# Patient Record
Sex: MALE | Race: ASIAN | NOT HISPANIC OR LATINO | Employment: FULL TIME | URBAN - METROPOLITAN AREA
[De-identification: names, ages, dates, MRNs, and addresses within clinical notes are randomized per-mention and may not be internally consistent; named-entity substitution may affect disease eponyms.]

---

## 2017-01-10 ENCOUNTER — CARE COORDINATION (OUTPATIENT)
Dept: OTOLARYNGOLOGY | Facility: CLINIC | Age: 52
End: 2017-01-10

## 2017-01-10 NOTE — PROGRESS NOTES
Call was placed to the patient to inform him that his CT scan did not show evidence of sinusitis.  Will follow up as previously planned.  was left with this information.    Raeann Quintero R.N., B.S.N.  1/10/2017 3:41 PM

## 2017-01-17 ENCOUNTER — OFFICE VISIT (OUTPATIENT)
Dept: OTOLARYNGOLOGY | Facility: CLINIC | Age: 52
End: 2017-01-17

## 2017-01-17 DIAGNOSIS — J31.0 CHRONIC RHINITIS: Primary | ICD-10-CM

## 2017-01-17 ASSESSMENT — PAIN SCALES - GENERAL: PAINLEVEL: NO PAIN (0)

## 2017-01-17 NOTE — PATIENT INSTRUCTIONS
Plan of care:  Follow up with Dr Yang as needed  Clinic contact information:  1. To schedule an appointment call [phone], option 1  2. To talk to the Triage RN call [phone], option 3  3. If you need to speak to Janeth or get a message to your doctor on a Friday, call the triage RN  4. JanethRN: [phone]  5. Surgery scheduling:      Kandy Squires: [phone]      Julienne Chen: [phone]  6. Fax: [phone]

## 2017-01-17 NOTE — Clinical Note
1/17/2017       RE: Vimal Goldsmith  [address] [facility] [address]     Dear Colleague,    Thank you for referring your patient, Vimal Goldsmith, to the [facility] at [facility]. Please see a copy of my visit note below.    CHIEF COMPLAINT:  Followup of rhinitis and possible sinusitis.      HISTORY OF PRESENT ILLNESS:  The patient returns to clinic for followup of the above complaints.  We were able to obtain a CT scan of his sinuses on 01/06/2017.  A CT showed clear maxillary sinuses,  clear sphenoid sinuses, clear frontal sinuses, some minimal left anterior ethmoid air cell mucosal thickening.  Overall, the CT scan did not show significant sinusitis.  The patient reports that in the interim, he continues with nasal sprays that I had placed him on which were saline, Atrovent and Flonase.  He is not sure if it was placebo or not, but he does feel like it has made a difference.  The effect is temporary, but he still feels better than before.  He still continues to get some headaches deep behind his head.      IMAGING:  A CT maxillofacial scan was done on 01/06/2017.  I reviewed the CT scan images with the patient.  By my read, I do not see any evidence of significant chronic or acute sinusitis.      ASSESSMENT AND PLAN:  I reassured the patient that I do not believe that his sinuses were the cause of his symptoms.  I feel that he has likely chronic nonallergic rhinitis in which case I recommend that he continue with the sprays.  My goal would be to have him cycle on and off of the sprays throughout the year.  Most likely, he would need sprays during the winter and fall months whereas spring and summer he likely will not have as much of an issue.  The patient is in agreement with the plan.  He will call if he needs refills of his medications which we will provide for him.     I spent a total of 15 minutes face-to-face with Vimal Goldsmith during today's  office visit.  Over 50% of this time was spent counseling the patient on and/or coordinating care as documented in my assessment and plan.    Again, thank you for allowing me to participate in the care of your patient.      Sincerely,    Raeann Yang MD

## 2017-01-17 NOTE — PROGRESS NOTES
CHIEF COMPLAINT:  Followup of rhinitis and possible sinusitis.      HISTORY OF PRESENT ILLNESS:  The patient returns to clinic for followup of the above complaints.  We were able to obtain a CT scan of his sinuses on 01/06/2017.  A CT showed clear maxillary sinuses,  clear sphenoid sinuses, clear frontal sinuses, some minimal left anterior ethmoid air cell mucosal thickening.  Overall, the CT scan did not show significant sinusitis.  The patient reports that in the interim, he continues with nasal sprays that I had placed him on which were saline, Atrovent and Flonase.  He is not sure if it was placebo or not, but he does feel like it has made a difference.  The effect is temporary, but he still feels better than before.  He still continues to get some headaches deep behind his head.      IMAGING:  A CT maxillofacial scan was done on 01/06/2017.  I reviewed the CT scan images with the patient.  By my read, I do not see any evidence of significant chronic or acute sinusitis.      ASSESSMENT AND PLAN:  I reassured the patient that I do not believe that his sinuses were the cause of his symptoms.  I feel that he has likely chronic nonallergic rhinitis in which case I recommend that he continue with the sprays.  My goal would be to have him cycle on and off of the sprays throughout the year.  Most likely, he would need sprays during the winter and fall months whereas spring and summer he likely will not have as much of an issue.  The patient is in agreement with the plan.  He will call if he needs refills of his medications which we will provide for him.     I spent a total of 15 minutes face-to-face with Vimal Goldsmith during today's office visit.  Over 50% of this time was spent counseling the patient on and/or coordinating care as documented in my assessment and plan.

## 2017-01-17 NOTE — NURSING NOTE
Chief Complaint   Patient presents with     RECHECK     recurrent maxillary sinusitis     Stephanie York Medical Assistant

## 2017-02-23 ENCOUNTER — OFFICE VISIT (OUTPATIENT)
Dept: OPHTHALMOLOGY | Facility: CLINIC | Age: 52
End: 2017-02-23
Attending: OPHTHALMOLOGY
Payer: COMMERCIAL

## 2017-02-23 DIAGNOSIS — H01.00A BLEPHARITIS OF UPPER AND LOWER EYELIDS OF BOTH EYES, UNSPECIFIED TYPE: ICD-10-CM

## 2017-02-23 DIAGNOSIS — H53.2 MONOCULAR DIPLOPIA OF BOTH EYES: Primary | ICD-10-CM

## 2017-02-23 DIAGNOSIS — H01.00B BLEPHARITIS OF UPPER AND LOWER EYELIDS OF BOTH EYES, UNSPECIFIED TYPE: ICD-10-CM

## 2017-02-23 DIAGNOSIS — Z98.890 H/O LASER ASSISTED IN SITU KERATOMILEUSIS: ICD-10-CM

## 2017-02-23 DIAGNOSIS — H04.123 BILATERAL DRY EYES: ICD-10-CM

## 2017-02-23 PROCEDURE — 99213 OFFICE O/P EST LOW 20 MIN: CPT | Mod: ZF

## 2017-02-23 ASSESSMENT — CONF VISUAL FIELD
OD_NORMAL: 1
OS_NORMAL: 1

## 2017-02-23 ASSESSMENT — VISUAL ACUITY
OS_SC+: -1
METHOD: SNELLEN - LINEAR
OD_PH_SC: 20/25
OD_SC: 20/50
OS_SC: 20/20

## 2017-02-23 ASSESSMENT — TONOMETRY
IOP_METHOD: TONOPEN
OS_IOP_MMHG: 10
OD_IOP_MMHG: 10

## 2017-02-23 ASSESSMENT — EXTERNAL EXAM - RIGHT EYE: OD_EXAM: NORMAL

## 2017-02-23 ASSESSMENT — EXTERNAL EXAM - LEFT EYE: OS_EXAM: NORMAL

## 2017-02-23 NOTE — NURSING NOTE
Chief Complaints and History of Present Illnesses   Patient presents with     Consult For     diplopia     HPI    Affected eye(s):  Both   Symptoms:        Frequency:  Constant       Do you have eye pain now?:  No      Comments:  States that since Lasik (LE) in Jan vertical diplopia has gotten worse.    Has monovision and states that the diplopia has been going on for a long time  +dry  Monocular diplopia BE.  Gets worse when reading and tired  Doreen Wilson COT 2:13 PM February 23, 2017

## 2017-02-23 NOTE — MR AVS SNAPSHOT
After Visit Summary   2/23/2017    Vimal Goldsmith    MRN: [id number]           Patient Information     Date Of Birth          1965        Visit Information        Provider Department      2/23/2017 1:45 PM Missael Chahal MD [facility]        Today's Diagnoses     Monocular diplopia of both eyes    -  1    H/O laser assisted in situ keratomileusis - Left Eye        Bilateral dry eyes        Blepharitis of upper and lower eyelids of both eyes, unspecified type           Follow-ups after your visit        Follow-up notes from your care team     Return in about 4 weeks (around 3/23/2017) for VT only, Tear lab, topography.      Your next 10 appointments already scheduled     Apr 04, 2017 12:45 PM CDT   RETURN GENERAL with Missael Chahal MD   Eye Clinic ([facility])    [address]  [address]  [address]  [facility] [id number] [phone]              Who to contact     Please call your clinic at [phone] to:    Ask questions about your health    Make or cancel appointments    Discuss your medicines    Learn about your test results    Speak to your doctor   If you have compliments or concerns about an experience at your clinic, or if you wish to file a complaint, please contact [facility] Patient Relations at [phone] or email us at [email or web address]         Additional Information About Your Visit        MyChart Information     [employer] gives you secure access to your electronic health record. If you see a primary care provider, you can also send messages to your care team and make appointments. If you have questions, please call your primary care clinic.  If you do not have a primary care provider, please call [phone] and they will assist you.      Snoox is an electronic gateway that provides easy, online access to your medical records. With Snoox, you can request a clinic appointment, read your test  results, renew a prescription or communicate with your care team.     To access your existing account, please contact your [facility] or call [phone] for assistance.        Care EveryWhere ID     This is your Care EveryWhere ID. This could be used by other organizations to access your [place] medical records  [id number]         Blood Pressure from Last 3 Encounters:   11/28/16 120/74   12/02/15 106/65   11/21/14 117/58    Weight from Last 3 Encounters:   11/28/16 74.7 kg (164 lb 9.6 oz)   12/02/15 73.8 kg (162 lb 11.2 oz)   11/21/14 75.3 kg (166 lb)              Today, you had the following     No orders found for display         Today's Medication Changes          These changes are accurate as of: 2/23/17  3:17 PM.  If you have any questions, ask your nurse or doctor.               Start taking these medicines.        Dose/Directions    Lifitegrast 5 % Soln   Commonly known as:  XIIDRA   Used for:  Monocular diplopia of both eyes, H/O laser assisted in situ keratomileusis, Bilateral dry eyes   Started by:  Missael Chahal MD        Dose:  1 drop   Apply 1 drop to eye 2 times daily   Quantity:  90 each   Refills:  3            Where to get your medicines      These medications were sent to [id number] - [place], [place] - [address] AT [address]  [address] [address]     Phone:  [phone]     Lifitegrast 5 % Soln                Primary Care Provider Office Phone # Fax #    Topher Kenney [phone]        [facility] [address]  [facility] [address]        Thank you!     Thank you for choosing EYE CLINIC  for your care. Our goal is always to provide you with excellent care. Hearing back from our patients is one way we can continue to improve our services. Please take a few minutes to complete the written survey that you may receive in the mail after your visit  with us. Thank you!             Your Updated Medication List - Protect others around you: Learn how to safely use, store and throw away your medicines at www.disposemymeds.org.          This list is accurate as of: 2/23/17  3:17 PM.  Always use your most recent med list.                   Brand Name Dispense Instructions for use    CALCIUM 500 + D PO      Take  by mouth daily.       DAILY MULTIVITAMIN PO      Take  by mouth daily.       dapsone 25 MG tablet     45 tablet    Take 1 tablet (25 mg) by mouth daily Take 2 when flaring       ipratropium 0.06 % spray    ATROVENT    3 Box    Spray 2 sprays into both nostrils 4 times daily as needed for rhinitis       ketoconazole 2 % cream    NIZORAL    60 g    Apply topically daily       Lifitegrast 5 % Soln    XIIDRA    90 each    Apply 1 drop to eye 2 times daily       misc intestinal milla regulat Chew      Take  by mouth daily.       sildenafil 50 MG cap/tab    REVATIO/VIAGRA    6 tablet    Take 0.5 tablets (25 mg) by mouth daily as needed for erectile dysfunction Take 30 min to 4 hours before intercourse.  Never use with nitroglycerin, terazosin or doxazosin.       triamcinolone 0.1 % cream    KENALOG    80 g    Use on the body as needed for dermatitis herpetiformis       zolpidem 5 MG tablet    AMBIEN    30 tablet    Take 1 tablet (5 mg) by mouth nightly as needed

## 2017-02-24 NOTE — PROGRESS NOTES
Assessment & Plan      Vimal Goldsmith is a 52 year old male with the following diagnoses:   1. Monocular diplopia of both eyes    2. 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 Bilateral dry eyes    4. Blepharitis of upper and lower eyelids of both eyes, unspecified type         Longstanding monocular diplopia, previously followed by Dr. Marcelino  H/O anisometropia and possible amblyopia ( Left eye previously with greater myopia)  S/P laser in situ keratomileusis (LASIK) left eye in Jan 2017   Goal to maintain monovision with right eye for near    Reports worsening diplopia since surgery   Currently using artificial tears occasionally  Reading glasses as needed     Ortho by cover-testing today  Monocular symptoms persist both eyes   Discussed contributing factors  Recommend artifical tears frequently  Warm compresses twice a day   Trial of xiidra twice a day both eyes      Patient disposition:   Return in about 4 weeks (around 3/23/2017) for VT only, Tear lab, topography.  May need hard contact lens refraction if symptoms persist         Attending Physician Attestation: Complete documentation of historical and exam elements from today's encounter can be found in the full encounter summary report (not reduplicated in this progress note). I personally obtained the chief complaint(s) and history of present illness.  I confirmed and edited as necessary the review of systems, past medical/surgical history, family history, social history, and examination findings as documented by others; and I examined the patient myself. I personally reviewed the relevant tests, images, and reports as documented above. I formulated and edited as necessary the assessment and plan and discussed the findings and management plan with the patient and family. - Missael Chahal MD 10:25 AM 2/24/2017

## 2017-02-25 DIAGNOSIS — Z51.81 THERAPEUTIC DRUG MONITORING: ICD-10-CM

## 2017-02-25 DIAGNOSIS — L13.0 DERMATITIS HERPETIFORMIS: ICD-10-CM

## 2017-02-28 RX ORDER — DAPSONE 25 MG/1
TABLET ORAL
Qty: 60 TABLET | Refills: 0 | Status: SHIPPED | OUTPATIENT
Start: 2017-02-28 | End: 2017-04-26

## 2017-02-28 NOTE — TELEPHONE ENCOUNTER
Last seen:12/27/16  Next appt: None    Dermatitis herpetiformis, well controlled on a gluten-free diet and oral dapsone.  Today he will continue dapsone 25 mg daily with occasional 50 mg daily for flares.  He had a normal CBC with differential and LFTs at his last check in late November.  He also will continue his gluten-free diet and follow up with us in approximately 4 months' time.

## 2017-02-28 NOTE — TELEPHONE ENCOUNTER
Received refill request for dapsone 25 mg as the resident on call. Reviewed patient's chart and attached communication. Patient last seen 12/27/16 for dermatitis herpetiformis. After reviewing the assessment and plan from last visit, the refill request was accepted. Patient to follow up in roughly two months. Will CC clinic coordinators to help facilitate follow up as none scheduled yet.       Tunde Coronado MD  PGY-2 -Internal Medicine - Dermatology Resident

## 2017-04-24 DIAGNOSIS — Z98.890 H/O LASER ASSISTED IN SITU KERATOMILEUSIS: Primary | ICD-10-CM

## 2017-04-24 DIAGNOSIS — H04.123 DRY EYES: ICD-10-CM

## 2017-04-25 ENCOUNTER — OFFICE VISIT (OUTPATIENT)
Dept: OPHTHALMOLOGY | Facility: CLINIC | Age: 52
End: 2017-04-25
Attending: OPHTHALMOLOGY
Payer: COMMERCIAL

## 2017-04-25 DIAGNOSIS — H04.123 DRY EYES: ICD-10-CM

## 2017-04-25 DIAGNOSIS — Z98.890 H/O LASER ASSISTED IN SITU KERATOMILEUSIS: ICD-10-CM

## 2017-04-25 DIAGNOSIS — H01.00B BLEPHARITIS OF UPPER AND LOWER EYELIDS OF BOTH EYES, UNSPECIFIED TYPE: ICD-10-CM

## 2017-04-25 DIAGNOSIS — H26.9 CATARACTS, BILATERAL: Primary | ICD-10-CM

## 2017-04-25 DIAGNOSIS — H01.00A BLEPHARITIS OF UPPER AND LOWER EYELIDS OF BOTH EYES, UNSPECIFIED TYPE: ICD-10-CM

## 2017-04-25 PROCEDURE — 83861 MICROFLUID ANALY TEARS: CPT | Mod: ZP | Performed by: OPHTHALMOLOGY

## 2017-04-25 PROCEDURE — 92025 CPTRIZED CORNEAL TOPOGRAPHY: CPT | Mod: ZF | Performed by: OPHTHALMOLOGY

## 2017-04-25 PROCEDURE — 92015 DETERMINE REFRACTIVE STATE: CPT | Mod: ZF

## 2017-04-25 PROCEDURE — 99212 OFFICE O/P EST SF 10 MIN: CPT | Mod: 25,ZF

## 2017-04-25 ASSESSMENT — EXTERNAL EXAM - LEFT EYE: OS_EXAM: NORMAL

## 2017-04-25 ASSESSMENT — VISUAL ACUITY
OD_PH_SC: 20/25
OS_SC: 20/20
METHOD: SNELLEN - LINEAR
OD_SC: 20/30

## 2017-04-25 ASSESSMENT — CONF VISUAL FIELD
OD_NORMAL: 1
OS_NORMAL: 1

## 2017-04-25 ASSESSMENT — TONOMETRY
IOP_METHOD: ICARE
OD_IOP_MMHG: 09
OS_IOP_MMHG: 08

## 2017-04-25 ASSESSMENT — EXTERNAL EXAM - RIGHT EYE: OD_EXAM: NORMAL

## 2017-04-25 ASSESSMENT — REFRACTION_MANIFEST
OD_AXIS: 135
OS_SPHERE: -0.50
OD_SPHERE: -1.00
OD_ADD: +1.75
OS_ADD: +1.75
OD_CYLINDER: +1.00
OS_CYLINDER: SPHERE

## 2017-04-25 NOTE — NURSING NOTE
Chief Complaints and History of Present Illnesses   Patient presents with     Follow Up For     Monocular diplopia of both eyes      HPI    Affected eye(s):  Both   Symptoms:        Duration:  2 months   Frequency:  Constant       Do you have eye pain now?:  No      Comments:  Pt. States that VA has still been blurry BE.  VA may have improved slightly.   No c/o comfort BE.  Ani Nieves COT 3:39 PM April 25, 2017

## 2017-04-25 NOTE — PROGRESS NOTES
HPI  Improvement in symptoms since starting Xiidra. Symptoms are worse in the distance than near, worse after prolonged reading.     Assessment & Plan      Vimal Goldsmith is a 52 year old male with the following diagnoses:   1. Cataracts, bilateral - Both Eyes    2. Dry eyes - Both Eyes    3. Blepharitis of upper and lower eyelids of both eyes, unspecified type    4. H/O laser assisted in situ keratomileusis - Left Eye         Longstanding monocular diplopia, previously followed by Dr. Marcelino  H/O anisometropia and possible mild amblyopia right eye (Left eye previously with greater myopia)  S/P laser in situ keratomileusis (LASIK) left eye in Jan 2017   Goal to maintain monovision with right eye for near, though patient considering right eye laser in situ keratomileusis (LASIK) in the future  Advised to trial contact lens right eye first to simulate monovision      TearOSM today within normal limits today   Continue artifical tears frequently  Warm compresses twice a day   Continue xiidra twice a day both eyes    Refracted today with mild myopia/astigmatism OD, will try glasses for now  Advise patient to defer LASIK OD for near monovision    Patient disposition:   Return in about 1 year (around 4/25/2018) for Follow Up.         Topher Kaye MD    Attending Physician Attestation: Complete documentation of historical and exam elements from today's encounter can be found in the full encounter summary report (not reduplicated in this progress note). I personally obtained the chief complaint(s) and history of present illness.  I confirmed and edited as necessary the review of systems, past medical/surgical history, family history, social history, and examination findings as documented by others; and I examined the patient myself. I personally reviewed the relevant tests, images, and reports as documented above. I formulated and edited as necessary the assessment and plan and discussed the findings and management plan with  the patient and family. - Missael Chahal MD  4/25/2017   Attending Physician Image/Tesing Attestation: I have personally view and interpreted all testing/images as documented in imaging/procedures

## 2017-04-25 NOTE — MR AVS SNAPSHOT
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      Who to contact     Please call your clinic at [phone] to:    Ask questions about your health    Make or cancel appointments    Discuss your medicines    Learn about your test results    Speak to your doctor   If you have compliments or concerns about an experience at your clinic, or if you wish to file a complaint, please contact [facility] Patient Relations at [phone] or email us at [email or web address]         Additional Information About Your Visit        MyChart Information     [employer] gives you secure access to your electronic health record. If you see a primary care provider, you can also send messages to your care team and make appointments. If you have questions, please call your primary care clinic.  If you do not have a primary care provider, please call [phone] and they will assist you.      HyperBees is an electronic gateway that provides easy, online access to your medical records. With [employer]Bees, you can request a clinic appointment, read your test results, renew a prescription or communicate with your care team.     To access your existing account, please contact your [facility] or call [phone] for assistance.        Care EveryWhere ID     This is your Care EveryWhere ID. This could be used by  other organizations to access your [place] medical records  [id number]         Blood Pressure from Last 3 Encounters:   11/28/16 120/74   12/02/15 106/65   11/21/14 117/58    Weight from Last 3 Encounters:   11/28/16 74.7 kg (164 lb 9.6 oz)   12/02/15 73.8 kg (162 lb 11.2 oz)   11/21/14 75.3 kg (166 lb)              We Performed the Following     Corneal Topography OU (both eyes)     Ophth TearLab Test        Primary Care Provider Office Phone # Fax #    Topher Kenney [phone]        [facility] [address]  [facility] [address]        Thank you!     Thank you for choosing EYE CLINIC  for your care. Our goal is always to provide you with excellent care. Hearing back from our patients is one way we can continue to improve our services. Please take a few minutes to complete the written survey that you may receive in the mail after your visit with us. Thank you!             Your Updated Medication List - Protect others around you: Learn how to safely use, store and throw away your medicines at www.disposemymeds.org.          This list is accurate as of: 4/25/17  9:08 PM.  Always use your most recent med list.                   Brand Name Dispense Instructions for use    CALCIUM 500 + D PO      Take  by mouth daily.       DAILY MULTIVITAMIN PO      Take  by mouth daily.       * dapsone 25 MG tablet     45 tablet    Take 1 tablet (25 mg) by mouth daily Take 2 when flaring       * dapsone 25 MG tablet     60 tablet    TAKE 1 TABLET BY MOUTH DAILY. TAKE 2 TABLETS BY MOUTH WHEN FLARING.       ipratropium 0.06 % spray    ATROVENT    3 Box    Spray 2 sprays into both nostrils 4 times daily as needed for rhinitis       ketoconazole 2 % cream    NIZORAL    60 g    Apply topically daily       Lifitegrast 5 % Soln    XIIDRA    90 each    Apply 1 drop to eye 2 times daily       misc intestinal milla regulat Chew      Take  by mouth daily.       sildenafil 50 MG cap/tab    REVATIO/VIAGRA    6  tablet    Take 0.5 tablets (25 mg) by mouth daily as needed for erectile dysfunction Take 30 min to 4 hours before intercourse.  Never use with nitroglycerin, terazosin or doxazosin.       triamcinolone 0.1 % cream    KENALOG    80 g    Use on the body as needed for dermatitis herpetiformis       zolpidem 5 MG tablet    AMBIEN    30 tablet    Take 1 tablet (5 mg) by mouth nightly as needed       * Notice:  This list has 2 medication(s) that are the same as other medications prescribed for you. Read the directions carefully, and ask your doctor or other care provider to review them with you.

## 2017-04-26 DIAGNOSIS — Z51.81 THERAPEUTIC DRUG MONITORING: ICD-10-CM

## 2017-04-26 DIAGNOSIS — L13.0 DERMATITIS HERPETIFORMIS: ICD-10-CM

## 2017-04-26 DIAGNOSIS — G47.00 INSOMNIA: ICD-10-CM

## 2017-04-26 RX ORDER — DAPSONE 25 MG/1
TABLET ORAL
Qty: 60 TABLET | Refills: 0 | Status: SHIPPED | OUTPATIENT
Start: 2017-04-26 | End: 2017-12-27

## 2017-04-26 NOTE — TELEPHONE ENCOUNTER
Received refill request for dapsone 25 mg as the resident on call. Reviewed patient's chart and attached communication. Patient last seen 12/27/16 for dermatitis herpetiformis. Refill prescribed in February with plan to schedule f/u however none in place. Left a Voicemail for patient to call in to schedule f/u appt.   Will CC clinic coordinators to help facilitate follow up as none scheduled yet.

## 2017-04-27 RX ORDER — ZOLPIDEM TARTRATE 5 MG/1
5 TABLET ORAL
Qty: 30 TABLET | Refills: 3 | Status: SHIPPED | OUTPATIENT
Start: 2017-04-27 | End: 2018-04-30

## 2017-06-01 DIAGNOSIS — R09.81 NASAL CONGESTION: Primary | ICD-10-CM

## 2017-06-01 RX ORDER — FLUTICASONE PROPIONATE 50 MCG
2 SPRAY, SUSPENSION (ML) NASAL DAILY
Qty: 1 BOTTLE | Refills: 6 | Status: SHIPPED | OUTPATIENT
Start: 2017-06-01 | End: 2017-07-01

## 2017-09-15 DIAGNOSIS — N52.9 ERECTILE DYSFUNCTION, UNSPECIFIED ERECTILE DYSFUNCTION TYPE: ICD-10-CM

## 2017-09-15 RX ORDER — SILDENAFIL 50 MG/1
TABLET, FILM COATED ORAL
Qty: 6 TABLET | Refills: 0 | Status: SHIPPED | OUTPATIENT
Start: 2017-09-15 | End: 2017-11-29

## 2017-09-15 NOTE — TELEPHONE ENCOUNTER
viagra   Last Written Prescription Date:  8/5/16  Last Fill Quantity: 6,   # refills: 3  Last Office Visit : 11/28/16  Future Office visit:  No future appt  Routed to clinic RN, directions edited so rx would eprescribe

## 2017-10-26 ENCOUNTER — TELEPHONE (OUTPATIENT)
Dept: INTERNAL MEDICINE | Facility: CLINIC | Age: 52
End: 2017-10-26

## 2017-10-26 DIAGNOSIS — Z11.3 ROUTINE SCREENING FOR STI (SEXUALLY TRANSMITTED INFECTION): Primary | ICD-10-CM

## 2017-10-26 DIAGNOSIS — Z13.6 SCREENING, ISCHEMIC HEART DISEASE: ICD-10-CM

## 2017-10-26 DIAGNOSIS — Z13.1 SCREENING FOR DIABETES MELLITUS: ICD-10-CM

## 2017-10-26 DIAGNOSIS — K90.0 CELIAC DISEASE: ICD-10-CM

## 2017-10-26 DIAGNOSIS — Z12.5 SPECIAL SCREENING FOR MALIGNANT NEOPLASM OF PROSTATE: ICD-10-CM

## 2017-10-26 NOTE — TELEPHONE ENCOUNTER
Dr. Kenney    Pt has an upcoming appt for physical on 11/29, wants to have labs done prior to the appt.  Is it OK to order following labs ?    Lipid  PSA  CBC    Soon-Mi

## 2017-11-01 ENCOUNTER — TELEPHONE (OUTPATIENT)
Dept: DERMATOLOGY | Facility: CLINIC | Age: 52
End: 2017-11-01

## 2017-11-01 NOTE — TELEPHONE ENCOUNTER
Lvm to change 1/4 appointment from 10:30 to either 7:30 or 9:00. Left triage line for Vimal to call back.

## 2017-11-27 DIAGNOSIS — K90.0 CELIAC DISEASE: ICD-10-CM

## 2017-11-27 DIAGNOSIS — Z13.1 SCREENING FOR DIABETES MELLITUS: ICD-10-CM

## 2017-11-27 DIAGNOSIS — Z13.6 SCREENING, ISCHEMIC HEART DISEASE: ICD-10-CM

## 2017-11-27 DIAGNOSIS — Z12.5 SPECIAL SCREENING FOR MALIGNANT NEOPLASM OF PROSTATE: ICD-10-CM

## 2017-11-27 DIAGNOSIS — Z11.3 ROUTINE SCREENING FOR STI (SEXUALLY TRANSMITTED INFECTION): ICD-10-CM

## 2017-11-27 LAB
ALBUMIN SERPL-MCNC: 3.8 G/DL (ref 3.4–5)
ALP SERPL-CCNC: 56 U/L (ref 40–150)
ALT SERPL W P-5'-P-CCNC: 26 U/L (ref 0–70)
ANION GAP SERPL CALCULATED.3IONS-SCNC: 8 MMOL/L (ref 3–14)
AST SERPL W P-5'-P-CCNC: 13 U/L (ref 0–45)
BILIRUB SERPL-MCNC: 0.9 MG/DL (ref 0.2–1.3)
BUN SERPL-MCNC: 17 MG/DL (ref 7–30)
CALCIUM SERPL-MCNC: 8.8 MG/DL (ref 8.5–10.1)
CHLORIDE SERPL-SCNC: 106 MMOL/L (ref 94–109)
CHOLEST SERPL-MCNC: 158 MG/DL
CO2 SERPL-SCNC: 25 MMOL/L (ref 20–32)
CREAT SERPL-MCNC: 0.91 MG/DL (ref 0.66–1.25)
GFR SERPL CREATININE-BSD FRML MDRD: 87 ML/MIN/1.7M2
GLUCOSE SERPL-MCNC: 108 MG/DL (ref 70–99)
HBA1C MFR BLD: 4.7 % (ref 4.3–6)
HDLC SERPL-MCNC: 52 MG/DL
HIV 1+2 AB+HIV1 P24 AG SERPL QL IA: NONREACTIVE
LDLC SERPL CALC-MCNC: 92 MG/DL
NONHDLC SERPL-MCNC: 106 MG/DL
POTASSIUM SERPL-SCNC: 4 MMOL/L (ref 3.4–5.3)
PROT SERPL-MCNC: 7.4 G/DL (ref 6.8–8.8)
PSA SERPL-ACNC: 0.8 UG/L (ref 0–4)
SODIUM SERPL-SCNC: 139 MMOL/L (ref 133–144)
T PALLIDUM IGG+IGM SER QL: NEGATIVE
TRIGL SERPL-MCNC: 70 MG/DL

## 2017-11-28 LAB
C TRACH DNA SPEC QL NAA+PROBE: NEGATIVE
N GONORRHOEA DNA SPEC QL NAA+PROBE: NEGATIVE
SPECIMEN SOURCE: NORMAL
SPECIMEN SOURCE: NORMAL

## 2017-11-29 ENCOUNTER — OFFICE VISIT (OUTPATIENT)
Dept: INTERNAL MEDICINE | Facility: CLINIC | Age: 52
End: 2017-11-29

## 2017-11-29 VITALS
HEART RATE: 84 BPM | BODY MASS INDEX: 25.82 KG/M2 | WEIGHT: 164.5 LBS | OXYGEN SATURATION: 93 % | SYSTOLIC BLOOD PRESSURE: 126 MMHG | DIASTOLIC BLOOD PRESSURE: 74 MMHG | RESPIRATION RATE: 18 BRPM | TEMPERATURE: 98 F | HEIGHT: 67 IN

## 2017-11-29 DIAGNOSIS — R61 GENERALIZED HYPERHIDROSIS: ICD-10-CM

## 2017-11-29 DIAGNOSIS — N52.9 ERECTILE DYSFUNCTION, UNSPECIFIED ERECTILE DYSFUNCTION TYPE: ICD-10-CM

## 2017-11-29 DIAGNOSIS — Z00.00 ROUTINE GENERAL MEDICAL EXAMINATION AT A HEALTH CARE FACILITY: Primary | ICD-10-CM

## 2017-11-29 DIAGNOSIS — L13.0 DERMATITIS HERPETIFORMIS: ICD-10-CM

## 2017-11-29 LAB
BASOPHILS # BLD AUTO: 0.1 10E9/L (ref 0–0.2)
BASOPHILS NFR BLD AUTO: 0.8 %
DIFFERENTIAL METHOD BLD: NORMAL
EOSINOPHIL # BLD AUTO: 0.1 10E9/L (ref 0–0.7)
EOSINOPHIL NFR BLD AUTO: 1.7 %
ERYTHROCYTE [DISTWIDTH] IN BLOOD BY AUTOMATED COUNT: 12.2 % (ref 10–15)
HCT VFR BLD AUTO: 42 % (ref 40–53)
HGB BLD-MCNC: 14.5 G/DL (ref 13.3–17.7)
IMM GRANULOCYTES # BLD: 0 10E9/L (ref 0–0.4)
IMM GRANULOCYTES NFR BLD: 0.2 %
LYMPHOCYTES # BLD AUTO: 1.9 10E9/L (ref 0.8–5.3)
LYMPHOCYTES NFR BLD AUTO: 29.1 %
MCH RBC QN AUTO: 30.8 PG (ref 26.5–33)
MCHC RBC AUTO-ENTMCNC: 34.5 G/DL (ref 31.5–36.5)
MCV RBC AUTO: 89 FL (ref 78–100)
MONOCYTES # BLD AUTO: 0.4 10E9/L (ref 0–1.3)
MONOCYTES NFR BLD AUTO: 6.3 %
NEUTROPHILS # BLD AUTO: 4.1 10E9/L (ref 1.6–8.3)
NEUTROPHILS NFR BLD AUTO: 61.9 %
NRBC # BLD AUTO: 0 10*3/UL
NRBC BLD AUTO-RTO: 0 /100
PLATELET # BLD AUTO: 224 10E9/L (ref 150–450)
RBC # BLD AUTO: 4.71 10E12/L (ref 4.4–5.9)
TSH SERPL DL<=0.005 MIU/L-ACNC: 3.2 MU/L (ref 0.4–4)
WBC # BLD AUTO: 6.5 10E9/L (ref 4–11)

## 2017-11-29 RX ORDER — SILDENAFIL 50 MG/1
TABLET, FILM COATED ORAL
Qty: 6 TABLET | Refills: 11 | Status: SHIPPED | OUTPATIENT
Start: 2017-11-29 | End: 2018-11-19

## 2017-11-29 ASSESSMENT — PAIN SCALES - GENERAL: PAINLEVEL: NO PAIN (0)

## 2017-11-29 NOTE — PROGRESS NOTES
ASSESSMENT/PLAN:  Preventative - he will send me the biometric form to fill out.  His labs look very good.  He has cataracts and will follow with the eye doctor.    Needs CBC for dermatitis herptiformis    Sweating with travel - likely disrupted circadian rhythm, check TSH    Erectile dysfunction - renew lenin Kenney MD, FACP      SUBJECTIVE:  Here for a physical.    No concerns with health.  He is traveling a lot.    Gen: no fevers, night sweats or weight change (some weight gain over time with decreased exercise) - some sweating with traveling and when body is disrupted  Eyes: he has some blurry vision - bilateral cataracts and dry eyes (per the ophthalmology)  Ears, Noses, Mouth, Throat: no ringing in ears or hearing change, no epistaxis or nasal discharge, no oral lesions, throat clear  Cardiac: no chest pain, palpitations, or pain with walking  Lungs: no dyspnea, cough, or shortness of breath  GI: no nausea, vomiting, diarrhea or constipation, no abdominal pain  : no change in urine, hematuria, or sexual dysfunction (takes viagra)  Musculoskeletal: no joint or muscle pain or swelling  Skin: no concerning lesions or moles  Neuro: no loss of strength or sensation, no numbness or tingling, no tremor  Endo: no polyuria or polydipsia, no temperature intolerance  Heme/Lymph: no concerning bumps, no bleeding problems  Allergy: no environmental or drug allergies  Psych: no depression or anxiety, he uses Ambien for jet lag with travel to [place]    Patient Active Problem List   Diagnosis     Dermatitis herpetiformis     Celiac disease     Tinea pedis     Acne rosacea     BPH (benign prostatic hypertrophy)     Bacterial folliculitis     Chalazion     Therapeutic drug monitoring       Past Medical History:   Diagnosis Date     Benign positional vertigo      Celiac disease 10/23/2011     Chronic kidney disease 1975    diagnosed when I was [age]     Chronic sinusitis      Dermatitis herpetiformis       Gastro-oesophageal reflux disease 1987    had suffered any years ago before  celiac disease was diagno     Hoarseness      Migraines      Nasal polyps      Reduced vision        Past Surgical History:   Procedure Laterality Date     ENHANCE LASER REFRACTIVE BILATERAL EXISTING PT IN PARAMETERS Left 01/04/2017     ESOPHAGOSCOPY, GASTROSCOPY, DUODENOSCOPY (EGD), COMBINED  2/11/2013    Procedure: COMBINED ESOPHAGOSCOPY, GASTROSCOPY, DUODENOSCOPY (EGD), BIOPSY SINGLE OR MULTIPLE;;  Surgeon: Luke Juan MD;  Location: [facility]     NASAL/SINUS POLYPECTOMY         Family History   Problem Relation Age of Onset     Lipids Mother      KIDNEY DISEASE Mother      Arthritis Mother      Hypertension Mother      OSTEOPOROSIS Mother      GASTROINTESTINAL DISEASE Father      maybe celiac     CANCER Father      Leukemia     HEART DISEASE Father      Hypertension Father      CEREBROVASCULAR DISEASE Father      Stomach Problem Father      GASTROINTESTINAL DISEASE Brother      maybe celiac     KIDNEY DISEASE Brother      Kidnet stone     Cancer - colorectal No family hx of      Prostate Cancer No family hx of      Melanoma No family hx of      Skin Cancer No family hx of        Social History     Social History     Marital status: Single     Spouse name: N/A     Number of children: N/A     Years of education: N/A     Occupational History     Not on file.     Social History Main Topics     Smoking status: Never Smoker     Smokeless tobacco: Never Used      Comment: NON SMOKING ENVIRONMENT, 10/6/11, KJM.      Alcohol use No      Comment: a few sips to help sleep     Drug use: No     Sexual activity: Yes     Partners: Male     Birth control/ protection: Pull-out method, Condom, None     Other Topics Concern     Not on file     Social History Narrative       Health Maintenance   Topic Date Due     TETANUS IMMUNIZATION (SYSTEM ASSIGNED)  08/21/2019     COLONOSCOPY Q10 YR  11/12/2020     LIPID SCREEN Q5 YR MALE (SYSTEM ASSIGNED)  11/27/2022  "    INFLUENZA VACCINE (SYSTEM ASSIGNED)  Completed     HEPATITIS C SCREENING  Completed       OBJECTIVE:  Physical Exam:  /74  Pulse 84  Temp 98  F (36.7  C) (Oral)  Resp 18  Ht 1.702 m (5' 7\")  Wt 74.6 kg (164 lb 8 oz)  SpO2 93%  BMI 25.76 kg/m2  Constitutional: no distress, comfortable, pleasant   Eyes: anicteric, normal extra-ocular movements   Ears, Nose and Throat: tympanic membranes clear, nose clear and free of lesions, throat clear, neck supple with full range of motion, no thyromegaly.   Cardiovascular: regular rate and rhythm, normal S1 and S2, no murmurs, rubs or gallops, peripheral pulses full and symmetric   Respiratory: clear to auscultation, no wheezes or crackles, normal breath sounds   Gastrointestinal: positive bowel sounds, nontender, no hepatosplenomegaly, no masses   Musculoskeletal: full range of motion, no edema   Skin: no concerning lesions although did not get him entirely undressed for a full head to toe exam - only undressed from the waist up   Neurological: cranial nerves intact, normal strength and sensation, reflexes at patella and biceps normal, normal gait, no tremor   Psychological: appropriate mood   Lymphatic: no cervical  lymphadenopathy        " No flare at this time,    anti CCP: results pending   ESR: 7   CRP: 2.0  NSAIDS PRN. No flare at this time,    anti CCP: results pending   ESR: 7   CRP: 2.0  NSAIDS PRN. No flare at this time,    anti CCP: results pending   ESR: 7   CRP: 2.0  NSAIDS PRN.

## 2017-11-29 NOTE — NURSING NOTE
Chief Complaint   Patient presents with     Physical     Patient is here for annual physical.      Eve Atkins LPN at 4:32 PM on 11/29/2017.

## 2017-11-29 NOTE — PATIENT INSTRUCTIONS
[facility]: [phone]     [facility] Medication Refill Request Information:  * Please contact your pharmacy regarding ANY request for medication refills.  ** [facility] Prescription Fax = [phone]  * Please allow 3 business days for routine medication refills.  * Please allow 5 business days for controlled substance medication refills.     [facility] Test Result notification information:  *You will be notified with in 7-10 days of your appointment day regarding the results of your test.  If you are on MyChart you will be notified as soon as the provider has reviewed the results and signed off on them.

## 2017-11-29 NOTE — MR AVS SNAPSHOT
After Visit Summary   11/29/2017    Vimal Goldsmith    MRN: [id number]           Patient Information     Date Of Birth          1965        Visit Information        Provider Department      11/29/2017 4:30 PM Topher Kenney MD [facility]        Today's Diagnoses     Dermatitis herpetiformis    -  1    Erectile dysfunction, unspecified erectile dysfunction type        Generalized hyperhidrosis          Care Instructions    Primary Care Center: [phone]     [facility] Center Medication Refill Request Information:  * Please contact your pharmacy regarding ANY request for medication refills.  ** [facility] Prescription Fax = [phone]  * Please allow 3 business days for routine medication refills.  * Please allow 5 business days for controlled substance medication refills.     [facility] Test Result notification information:  *You will be notified with in 7-10 days of your appointment day regarding the results of your test.  If you are on MyChart you will be notified as soon as the provider has reviewed the results and signed off on them.          Follow-ups after your visit        Your next 10 appointments already scheduled     Nov 29, 2017  5:15 PM CST   LAB with [facility] ([facility])    [address] [place] [id number] [phone]           Please do not eat 10-12 hours before your appointment if you are coming in fasting for labs on lipids, cholesterol, or glucose (sugar). This does not apply to pregnant women. Water, hot tea and black coffee (with nothing added) are okay. Do not drink other fluids, diet soda or chew gum.            Dec 27, 2017 10:45 AM CST   (Arrive by 10:30 AM)   Return Visit with Palak Garza MD   [facility] Dermatology ([facility])    [address] [place] [address] [phone]              Future tests that were ordered for  "you today     Open Future Orders        Priority Expected Expires Ordered    TSH with free T4 reflex Routine 11/29/2017 12/29/2017 11/29/2017    CBC with platelets differential Routine 11/29/2017 12/29/2017 11/29/2017            Who to contact     Please call your clinic at [phone] to:    Ask questions about your health    Make or cancel appointments    Discuss your medicines    Learn about your test results    Speak to your doctor   If you have compliments or concerns about an experience at your clinic, or if you wish to file a complaint, please contact [facility] Patient Relations at [phone] or email us at [email or web address]         Additional Information About Your Visit        Branch[employer] Information     [employer] gives you secure access to your electronic health record. If you see a primary care provider, you can also send messages to your care team and make appointments. If you have questions, please call your primary care clinic.  If you do not have a primary care provider, please call [phone] and they will assist you.      [employer]anch[employer] is an electronic gateway that provides easy, online access to your medical records. With [employer], you can request a clinic appointment, read your test results, renew a prescription or communicate with your care team.     To access your existing account, please contact your [facility] or call [phone] for assistance.        Care EveryWhere ID     This is your Care EveryWhere ID. This could be used by other organizations to access your [place] medical records  [id number]        Your Vitals Were     Pulse Temperature Respirations Height Pulse Oximetry BMI (Body Mass Index)    84 98  F (36.7  C) (Oral) 18 1.702 m (5' 7\") 93% 25.76 kg/m2       Blood Pressure from Last 3 Encounters:   11/29/17 126/74   11/28/16 120/74   12/02/15 106/65    Weight from Last 3 Encounters:   11/29/17 74.6 kg (164 lb 8 oz) "   11/28/16 74.7 kg (164 lb 9.6 oz)   12/02/15 73.8 kg (162 lb 11.2 oz)                 Where to get your medicines      These medications were sent to [id number] - [place] - [address] AT [facility] OF [facility] access [facility] [address] & [facility]  [address], [place] [address]     Phone:  [phone]     sildenafil 50 MG tablet          Primary Care Provider Office Phone # Fax #    Topher Kenney [phone]       [address] [place] [address] 741  [facility] [address]        Equal Access to Services     [facility] HospitalMADELINE : Hadii aad ku hadasho Soomaali, waaxda luqadaha, qaybta kaalmada adeegyada, waxjose e ramirez . So [facility] [phone].    ATENCIÓN: Si habla español, tiene a burden disposición servicios gratuitos de asistencia lingüística. [facility] [phone].    We comply with applicable federal civil rights laws and [place] laws. We do not discriminate on the basis of race, color, national origin, age, disability, sex, sexual orientation, or gender identity.            Thank you!     Thank you for choosing [facility]IMARY CARE CLINIC  for your care. Our goal is always to provide you with excellent care. Hearing back from our patients is one way we can continue to improve our services. Please take a few minutes to complete the written survey that you may receive in the mail after your visit with us. Thank you!             Your Updated Medication List - Protect others around you: Learn how to safely use, store and throw away your medicines at www.disposemymeds.org.          This list is accurate as of: 11/29/17  4:58 PM.  Always use your most recent med list.                   Brand Name Dispense Instructions for use Diagnosis    CALCIUM 500 + D PO      Take  by mouth daily.    Dermatitis herpetiformis       DAILY MULTIVITAMIN PO      Take  by mouth daily.    Dermatitis herpetiformis       * dapsone 25 MG tablet     45 tablet    Take 1 tablet (25 mg) by mouth  daily Take 2 when flaring    Dermatitis herpetiformis, Therapeutic drug monitoring       * dapsone 25 MG tablet     60 tablet    TAKE 1 TABLET BY MOUTH DAILY, TAKE 2 TABLETS WHEN FLARING    Dermatitis herpetiformis, Therapeutic drug monitoring       ipratropium 0.06 % spray    ATROVENT    3 Box    Spray 2 sprays into both nostrils 4 times daily as needed for rhinitis    Sinus pressure       Lifitegrast 5 % Soln    XIIDRA    90 each    Apply 1 drop to eye 2 times daily    Monocular diplopia of both eyes, H/O laser assisted in situ keratomileusis, Bilateral dry eyes       misc intestinal milla regulat Chew      Take  by mouth daily.    Dermatitis herpetiformis       sildenafil 50 MG tablet    VIAGRA    6 tablet    Take 0.5 tablets (25 mg) 30 min to 4 hours before intercourse daily as needed. Never use with nitroglycerin, terazosin or doxazosin.    Erectile dysfunction, unspecified erectile dysfunction type       triamcinolone 0.1 % cream    KENALOG    80 g    Use on the body as needed for dermatitis herpetiformis    Dermatitis herpetiformis       zolpidem 5 MG tablet    AMBIEN    30 tablet    Take 1 tablet (5 mg) by mouth nightly as needed    Insomnia       * Notice:  This list has 2 medication(s) that are the same as other medications prescribed for you. Read the directions carefully, and ask your doctor or other care provider to review them with you.

## 2017-12-27 ENCOUNTER — OFFICE VISIT (OUTPATIENT)
Dept: DERMATOLOGY | Facility: CLINIC | Age: 52
End: 2017-12-27
Payer: COMMERCIAL

## 2017-12-27 DIAGNOSIS — L13.0 DERMATITIS HERPETIFORMIS: ICD-10-CM

## 2017-12-27 DIAGNOSIS — Z51.81 THERAPEUTIC DRUG MONITORING: ICD-10-CM

## 2017-12-27 RX ORDER — DAPSONE 25 MG/1
TABLET ORAL
Qty: 60 TABLET | Refills: 8 | Status: SHIPPED | OUTPATIENT
Start: 2017-12-27 | End: 2018-11-19

## 2017-12-27 ASSESSMENT — PAIN SCALES - GENERAL: PAINLEVEL: NO PAIN (0)

## 2017-12-27 NOTE — LETTER
12/27/2017       RE: Vimal Goldsmith  [address] [place] [address]     Dear Colleague,    Thank you for referring your patient, Vimal Goldsmith, to the [facility] DERMATOLOGY at [facility]. Please see a copy of my visit note below.    Dermatology Follow up Visit:    DERMATOLOGY PROBLEM LIST:  1. Dermatitis herpetiformis  - on dapsone 25 mg daily, increase to 50 mg daily with flares  - gluten free diet    CHIEF COMPLAINT:  Followup of dermatitis herpetiformis.        HISTORY OF PRESENT ILLNESS:  Mr. Goldsmith is a very pleasant, 52-year-old male with a long-standing diagnosis of dermatitis herpetiformis, currently managed with gluten avoidance and oral dapsone.  His last visit was in 012/27/16, at which time the plan was to continue dapsone 25 mg daily with 50 mg on days in which he was flaring.  Since then, he reports generally good control with occasional flares over the past several weeks when he was off schedule for the holidays. He notes infrequent outbreaks on the elbows and buttocks occurring with likely cross-contamination when eating out.  In general, he has only required dapsone 25 mg per day for most days.  He has no new areas of involvement and no other skin complaints.          REVIEW OF SYSTEMS:  No recent fevers or chills.  No significant shortness of breath or chest pain.        PHYSICAL EXAMINATION:    GENERAL:  This is a well-appearing, well-nourished male with a normal mood and affect who is oriented x3.    SKIN:  A cutaneous exam of the head, neck and left upper extremity is performed.  On the left extensor elbow, there are rare, pink macules and flat-topped, 2 mm papules, predominantly post inflammatory in nature.  No active vesicles.  NEURO: normal toe walk      ASSESSMENT AND PLAN:  Dermatitis herpetiformis, well controlled on a gluten-free diet and oral dapsone.  He had a normal CBC with differential and LFTs at his last check in late November 2017.   -  continue dapsone 25 mg daily with occasional 50 mg daily for flares.     - continue gluten-free diet--Patient is doing very well with this but notes likely cross-contamination occurring on occasion. Discussed the elevated risk for MALT lymphoma in patients with celiac who are repeatedly exposed to gluten.       RTC in 1 year  Dr. Garza staffed the patient.    Staff Involved:  Resident (Kamille Hope), staff (as above)    .I, Palak Garza MD, saw this patient with the resident and agree with the resident s findings and plan of care as documented in the resident s note.    Again, thank you for allowing me to participate in the care of your patient.      Sincerely,    Palak Garza MD

## 2017-12-27 NOTE — PATIENT INSTRUCTIONS
Continue dapsone 25 mg daily, okay to increase to 50 mg as needed for flares. Continue the good work in maintaining your gluten free diet. Contact us with any questions or concerns.

## 2017-12-27 NOTE — MR AVS SNAPSHOT
After Visit Summary   12/27/2017    Vimal Goldsmith    MRN: [id number]           Patient Information     Date Of Birth          1965        Visit Information        Provider Department      12/27/2017 10:45 AM Palak Garza MD [facility] D[facility]        Today's Diagnoses     Dermatitis herpetiformis        Therapeutic drug monitoring          Care Instructions    Continue dapsone 25 mg daily, okay to increase to 50 mg as needed for flares. Continue the good work in maintaining your gluten free diet. Contact us with any questions or concerns.           Follow-ups after your visit        Follow-up notes from your care team     Return in about 1 year (around 12/27/2018).      Who to contact     Please call your clinic at [phone] to:    Ask questions about your health    Make or cancel appointments    Discuss your medicines    Learn about your test results    Speak to your doctor   If you have compliments or concerns about an experience at your clinic, or if you wish to file a complaint, please contact [facility] Patient Relations at [phone] or email us at [email or web address]         Additional Information About Your Visit        MyChart Information     [employer] gives you secure access to your electronic health record. If you see a primary care provider, you can also send messages to your care team and make appointments. If you have questions, please call your primary care clinic.  If you do not have a primary care provider, please call [phone] and they will assist you.      [employer] is an electronic gateway that provides easy, online access to your medical records. With [employer], you can request a clinic appointment, read your test results, renew a prescription or communicate with your care team.     To access your existing account, please contact your [facility] or call [phone] for assistance.        Care  EveryWhere ID     This is your Care EveryWhere ID. This could be used by other organizations to access your [place] medical records  [id number]         Blood Pressure from Last 3 Encounters:   11/29/17 126/74   11/28/16 120/74   12/02/15 106/65    Weight from Last 3 Encounters:   11/29/17 74.6 kg (164 lb 8 oz)   11/28/16 74.7 kg (164 lb 9.6 oz)   12/02/15 73.8 kg (162 lb 11.2 oz)              Today, you had the following     No orders found for display         Today's Medication Changes          These changes are accurate as of: 12/27/17 11:05 AM.  If you have any questions, ask your nurse or doctor.               These medicines have changed or have updated prescriptions.        Dose/Directions    * dapsone 25 MG tablet   This may have changed:  Another medication with the same name was changed. Make sure you understand how and when to take each.   Used for:  Dermatitis herpetiformis, Therapeutic drug monitoring   Changed by:  Thomas Rodas MD        Dose:  25 mg   Take 1 tablet (25 mg) by mouth daily Take 2 when flaring   Quantity:  45 tablet   Refills:  5       * dapsone 25 MG tablet   This may have changed:  See the new instructions.   Used for:  Dermatitis herpetiformis, Therapeutic drug monitoring   Changed by:  Palak Garza MD        TAKE 1 TABLET BY MOUTH DAILY, TAKE 2 TABLETS WHEN FLARING   Quantity:  60 tablet   Refills:  8       * Notice:  This list has 2 medication(s) that are the same as other medications prescribed for you. Read the directions carefully, and ask your doctor or other care provider to review them with you.         Where to get your medicines      These medications were sent to [facility] - [place] - [address] AT [address]  [address] [place] [address] [address]     Phone:  [phone]     dapsone 25 MG tablet                Primary Care Provider Office Phone # Fax #    Topher Kenney [phone]  [phone]       [address] [place] [address] [address]  [facility] [address]        Equal Access to Services     LAITH BHAKTA : Hadii hudson jacobs shereenjose manuel Jose, waconradda luqadaha, qaybta kaalmada patrickgallito, abdullahi sdin hayaaanabella nguyenanamaria ford ashley gutierrez. So [facility] [phone].    ATENCIÓN: Si habla español, tiene a burden disposición servicios gratuitos de asistencia lingüística. Charlette al [phone].    We comply with applicable federal civil rights laws and [place] laws. We do not discriminate on the basis of race, color, national origin, age, disability, sex, sexual orientation, or gender identity.            Thank you!     Thank you for choosing [facility] DERMATOLOGY  for your care. Our goal is always to provide you with excellent care. Hearing back from our patients is one way we can continue to improve our services. Please take a few minutes to complete the written survey that you may receive in the mail after your visit with us. Thank you!             Your Updated Medication List - Protect others around you: Learn how to safely use, store and throw away your medicines at www.disposemymeds.org.          This list is accurate as of: 12/27/17 11:05 AM.  Always use your most recent med list.                   Brand Name Dispense Instructions for use Diagnosis    CALCIUM 500 + D PO      Take  by mouth daily.    Dermatitis herpetiformis       DAILY MULTIVITAMIN PO      Take  by mouth daily.    Dermatitis herpetiformis       * dapsone 25 MG tablet     45 tablet    Take 1 tablet (25 mg) by mouth daily Take 2 when flaring    Dermatitis herpetiformis, Therapeutic drug monitoring       * dapsone 25 MG tablet     60 tablet    TAKE 1 TABLET BY MOUTH DAILY, TAKE 2 TABLETS WHEN FLARING    Dermatitis herpetiformis, Therapeutic drug monitoring       ipratropium 0.06 % spray    ATROVENT    3 Box    Spray 2 sprays into both nostrils 4 times daily as needed for rhinitis    Sinus pressure       Lifitegrast 5 % Soln    XIIDRA    90 each    Apply 1 drop  to eye 2 times daily    Monocular diplopia of both eyes, H/O laser assisted in situ keratomileusis, Bilateral dry eyes       misc intestinal milla regulat Chew      Take  by mouth daily.    Dermatitis herpetiformis       sildenafil 50 MG tablet    VIAGRA    6 tablet    Take 0.5 tablets (25 mg) 30 min to 4 hours before intercourse daily as needed. Never use with nitroglycerin, terazosin or doxazosin.    Erectile dysfunction, unspecified erectile dysfunction type       triamcinolone 0.1 % cream    KENALOG    80 g    Use on the body as needed for dermatitis herpetiformis    Dermatitis herpetiformis       zolpidem 5 MG tablet    AMBIEN    30 tablet    Take 1 tablet (5 mg) by mouth nightly as needed    Insomnia       * Notice:  This list has 2 medication(s) that are the same as other medications prescribed for you. Read the directions carefully, and ask your doctor or other care provider to review them with you.

## 2017-12-27 NOTE — NURSING NOTE
Dermatology Rooming Note    Vimal Goldsmith's goals for this visit include:   Chief Complaint   Patient presents with     Derm Problem     Vimal is here today for a follow up for his medication dapsone. States he needs his dapsone rx renewed. States he has no concerning areas today.         Bess Gaviria LPN

## 2017-12-27 NOTE — PROGRESS NOTES
Dermatology Follow up Visit:    DERMATOLOGY PROBLEM LIST:  1. Dermatitis herpetiformis  - on dapsone 25 mg daily, increase to 50 mg daily with flares  - gluten free diet    CHIEF COMPLAINT:  Followup of dermatitis herpetiformis.        HISTORY OF PRESENT ILLNESS:  Mr. Goldsmith is a very pleasant, 52-year-old male with a long-standing diagnosis of dermatitis herpetiformis, currently managed with gluten avoidance and oral dapsone.  His last visit was in 012/27/16, at which time the plan was to continue dapsone 25 mg daily with 50 mg on days in which he was flaring.  Since then, he reports generally good control with occasional flares over the past several weeks when he was off schedule for the holidays. He notes infrequent outbreaks on the elbows and buttocks occurring with likely cross-contamination when eating out.  In general, he has only required dapsone 25 mg per day for most days.  He has no new areas of involvement and no other skin complaints.          REVIEW OF SYSTEMS:  No recent fevers or chills.  No significant shortness of breath or chest pain.        PHYSICAL EXAMINATION:    GENERAL:  This is a well-appearing, well-nourished male with a normal mood and affect who is oriented x3.    SKIN:  A cutaneous exam of the head, neck and left upper extremity is performed.  On the left extensor elbow, there are rare, pink macules and flat-topped, 2 mm papules, predominantly post inflammatory in nature.  No active vesicles.  NEURO: normal toe walk      ASSESSMENT AND PLAN:  Dermatitis herpetiformis, well controlled on a gluten-free diet and oral dapsone.  He had a normal CBC with differential and LFTs at his last check in late November 2017.   - continue dapsone 25 mg daily with occasional 50 mg daily for flares.     - continue gluten-free diet--Patient is doing very well with this but notes likely cross-contamination occurring on occasion. Discussed the elevated risk for MALT lymphoma in patients with celiac who are  repeatedly exposed to gluten.       RTC in 1 year  Dr. Garza staffed the patient.    Staff Involved:  Resident (Kamille Hope), staff (as above)    .I, Palak Garza MD, saw this patient with the resident and agree with the resident s findings and plan of care as documented in the resident s note.

## 2018-04-30 DIAGNOSIS — G47.00 INSOMNIA: ICD-10-CM

## 2018-04-30 DIAGNOSIS — H04.123 BILATERAL DRY EYES: ICD-10-CM

## 2018-04-30 DIAGNOSIS — H53.2 MONOCULAR DIPLOPIA OF BOTH EYES: ICD-10-CM

## 2018-04-30 DIAGNOSIS — Z98.890 H/O LASER ASSISTED IN SITU KERATOMILEUSIS: ICD-10-CM

## 2018-04-30 RX ORDER — ZOLPIDEM TARTRATE 5 MG/1
5 TABLET ORAL
Qty: 30 TABLET | Refills: 3 | Status: SHIPPED | OUTPATIENT
Start: 2018-04-30 | End: 2018-11-19

## 2018-04-30 NOTE — TELEPHONE ENCOUNTER
burtpdpatrickm faxed [id number] - [place], [place] - [address]  AT [place] OF [place] [address] & [facility]    Nadja Smith LPN

## 2018-04-30 NOTE — TELEPHONE ENCOUNTER
"Medication:   Lifitegrast (XIIDRA) 5 % SOLN  Last Written Prescription Date:  2/23/17  Last Fill Quantity: 90,   # refills: 3    Last Office Visit:4/25/17  Future Office visit:none    Attending Provider:AIMEE Chahal  \" Continue xiidra twice a day both eyes\"       Routing refill request to provider for review/approval because:  Not on protocol    "

## 2018-09-15 DIAGNOSIS — J31.0 CHRONIC RHINITIS: Primary | ICD-10-CM

## 2018-09-15 DIAGNOSIS — R09.81 NASAL CONGESTION: ICD-10-CM

## 2018-09-18 NOTE — TELEPHONE ENCOUNTER
fluticasone (FLONASE) 50 MCG/ACT spray      Last Written Prescription Date:  6/1/17  Last Fill Quantity: 1 bottle,   # refills: 6  Last Office Visit : 1/17/17  Future Office visit:  none    Routing refill request to provider for review/approval because:  Drug not active on patient's medication list         English

## 2018-09-20 ENCOUNTER — OFFICE VISIT (OUTPATIENT)
Dept: INTERNAL MEDICINE | Facility: CLINIC | Age: 53
End: 2018-09-20
Payer: COMMERCIAL

## 2018-09-20 VITALS — SYSTOLIC BLOOD PRESSURE: 114 MMHG | HEART RATE: 73 BPM | DIASTOLIC BLOOD PRESSURE: 76 MMHG

## 2018-09-20 DIAGNOSIS — J31.0 NON-ALLERGIC RHINITIS: Primary | ICD-10-CM

## 2018-09-20 DIAGNOSIS — G44.209 TENSION HEADACHE: ICD-10-CM

## 2018-09-20 RX ORDER — ECHINACEA PURPUREA EXTRACT 125 MG
1 TABLET ORAL EVERY 4 HOURS PRN
Qty: 104 ML | Refills: 0 | Status: SHIPPED | OUTPATIENT
Start: 2018-09-20 | End: 2022-06-06

## 2018-09-20 RX ORDER — FLUTICASONE PROPIONATE 50 MCG
1 SPRAY, SUSPENSION (ML) NASAL DAILY
Qty: 1 BOTTLE | Refills: 0 | Status: SHIPPED | OUTPATIENT
Start: 2018-09-20 | End: 2022-06-06

## 2018-09-20 RX ORDER — IPRATROPIUM BROMIDE 42 UG/1
2 SPRAY, METERED NASAL 4 TIMES DAILY PRN
Qty: 15 ML | Refills: 0 | Status: SHIPPED | OUTPATIENT
Start: 2018-09-20 | End: 2019-03-17

## 2018-09-20 RX ORDER — OXYMETAZOLINE HYDROCHLORIDE 0.05 G/100ML
2 SPRAY NASAL 2 TIMES DAILY
Qty: 30 ML | Refills: 0 | Status: SHIPPED | OUTPATIENT
Start: 2018-09-20 | End: 2019-03-17

## 2018-09-20 ASSESSMENT — PAIN SCALES - GENERAL: PAINLEVEL: MODERATE PAIN (4)

## 2018-09-20 NOTE — PROGRESS NOTES
"                     PRIMARY CARE CENTER       SUBJECTIVE:  Vimal Goldsmith is a 53 year old male with a PMHx of celiac disease, dermatitis herpetiformis, migraines, nasal polyps who comes in for concerns for sinus infection.    Patient is previously been seen by ENT, most recently 1/2017.  At the time patient was on nasal saline, Atrovent, Flonase.  CT scan showed clear maxillary sinuses.  Patient was noted to have headaches which he associated with sinusitis.  ENT felt that sinuses were likely not the cause of his symptoms.  Diagnosed with likely chronic nonallergic rhinitis.  Was recommended to continue nasal sprays cycling on during the fall and winter months and off during spring and summer.    Went to [place] 7/2018. Notes continuing congestion since that time and \"mucous bothering throat\" and cough for first month. Was taking sudafed and mucinex which he has since stopped. \"Pressure on top of my head\" and temples. Symptoms vary in frequency. Similar problem \"2 years ago\". Using all 3 sprays and \"not helping very much\". Not using all every day. No fever chills. No SOB. Stress level \"actually fine\". Works on computer all day and notes holding tension in his shoulders.    Medications and allergies reviewed by me today.     ROS:   Constitutional, HEENT, cardiovascular, pulmonary, gi and gu systems are negative, except as otherwise noted.    OBJECTIVE:    /76 (BP Location: Right arm, Patient Position: Sitting, Cuff Size: Adult Regular)  Pulse 73   Wt Readings from Last 1 Encounters:   11/29/17 74.6 kg (164 lb 8 oz)     GEN: pleasant, nad  HEENT: nc/at, eomi, anicteric, b/l TMs normal, no maxillary or frontal sinus tenderness, b/l edematous nasal mucosae, no purulent mucous, multiple areas of tenderness at posterior skull base and temporalis m. No temporal tenderness, palpable cord, temporal artery pulses symmetric. Oropharynx with mucous drainage.     ASSESSMENT/PLAN:    Vimal was seen today for sinus " problem and headache.    Diagnoses and all orders for this visit:    Non-allergic rhinitis  Reviewed nasal spray technique.  Recommended the patient start with Afrin in order to facilitate delivery of other nasal sprays but that he limited to less than 3 days of use.  Patient will return to clinic in 2 weeks if symptoms have not improved.  -     oxymetazoline (AFRIN) 0.05 % spray; Spray 2 sprays into both nostrils 2 times daily Spray before using other inhalers, do not use for more than 3 days  -     fluticasone (FLONASE) 50 MCG/ACT spray; Spray 1 spray into both nostrils daily  -     sodium chloride (OCEAN) 0.65 % nasal spray; Spray 1 spray into both nostrils every 4 hours as needed for congestion  -     ipratropium (ATROVENT) 0.06 % spray; Spray 2 sprays into both nostrils 4 times daily as needed for rhinitis    Tension headache  Patient symptoms most consistent with tension type headache, does have tender points along posterior skull base as well as temporalis muscle consistent with this.  Patient's posture at the computer also consistent with tension-type headache.  Discussed conservative measures as well as massage therapy and acupuncture.  Recommended that patient continue with self massage as needed.  If symptoms have not improved within 2 weeks, patient will return for follow-up.  Will consider physical therapy evaluation at the time in order to improve posture.       Pt should return to clinic for f/u with me if symptoms not improved within 2 weeks     Rafa Trammell MD  Sep 20, 2018    Pt was seen and plan of care discussed with Dr Munson.   While the patient was in clinic, I reviewed the pertinent medical history and results.  I discussed the current findings on physical examination, as well as the patient s diagnosis and treatment plan with the resident and agree with the information as documented with the following exceptions: none.  Jennifer Munson MD

## 2018-09-20 NOTE — MR AVS SNAPSHOT
After Visit Summary   9/20/2018    Vimal Goldsmith    MRN: [id number]           Patient Information     Date Of Birth          1965        Visit Information        Provider Department      9/20/2018 12:35 PM Rafa Trammell MD [facility]        Today's Diagnoses     Non-allergic rhinitis    -  1    Tension headache          Care Instructions    Primary Care Center Medication Refill Request Information:  * Please contact your pharmacy regarding ANY request for medication refills.  ** [facility] Prescription Fax = [phone]  * Please allow 3 business days for routine medication refills.  * Please allow 5 business days for controlled substance medication refills.     Primary Care Center Test Result notification information:  *You will be notified with in 7-10 days of your appointment day regarding the results of your test.  If you are on MyChart you will be notified as soon as the provider has reviewed the results and signed off on them.    Primary Care Center: [phone]                [facility]         Internal Medicine Resident                   Continuity Clinic    Who We Are    Resident Continuity Clinic is a part of the [facility] Primary [facility].  Resident physicians see patients independently and establish a relationship with them over the course of their three-year residency program.  As with the Primary Care Clinic, our Resident Continuity Clinic models a group practice.  If your doctor is not available, you will be able to see another resident physician.  At the end of a resident s training, patients will be transitioned to a new resident physician for ongoing care.     We treat patients with a wide array of medical needs from routine physicals, to acute illnesses, to diabetes and blood pressure management, to complex medical illness.  What is a Resident Physician?    Resident physicians hold medical degrees and are doctors. They are training to become  specialists in Internal Medicine. They work under the supervision of board-certified faculty physicians.  Expectations for Your Care    We strive to provide accessible, quality care at all times.    In order to provide this care, it is best to see your primary care resident doctor consistently rather switching between providers.  In the event you do see another physician, you should schedule a follow-up visit with your usual primary care doctor.    If you are transitioning your care from another clinic, it is helpful to have your records available for your doctor to review.    We do not prescribe controlled substances, such as ADD medications or narcotic pain medications, on your first visit.  We will review your health records and concerns prior to devising a treatment plan with you in order to provide the best care.      Clinic Services     Extended clinic hours; patient  to help navigate your visit;  parking; laboratory and imaging services with evening and weekend hours    Multiple medical and surgical specialties in one building    Complementary services, including Nutrition, Integrative Medicine, Pharmacy consultations, Mental and Behavioral Health, Sports Medicine and Physical Therapy    Thank You    We would like to thank you for choosing the [facility] Internal Medicine Resident Continuity Clinic for your primary care. You are making a priceless contribution to the training of the next generation of health care practitioners.     Contact us at [phone] for appointments or questions.    Resident Clinic Hours are Tuesdays and Thursdays, 7:30am-5:00pm    Residents  Aster Baptiste MD   (Female )   Nadja Us MD   (Female)   Israel Rocha MD  (Male)   Lex Escudero MD  (Male)   Kaushal Rodriguez MD   (Female)   Eddie Alberto MD  (Male)    Rob Casey MD  (Male)   Yo Babcock MD  (Male)   Lex Trammell MD (Male)   Emmanuel Lopez MD  (Male)   Lisa Montoya,  MD (Female)    Madelyn Dickinson MD (Female)   Sophie Cullen MD  (Male)   Shelly Victoria MD(Female)   Mary Fritz MD  (Female)    Supervising Physicians   Marissa Torres, MD Barb Brasher, MD Rafael Zaragoza, MD Marcello Herrera, MD Janeth Beckham, MD Jennifer Munson, MD Lane Bhatti, MD Marissa Eason, MD Catalina Villanueva MD          - start using flonase, atrovent, nasal saline every day  - use afrin nasal spray for up to 3 days prior to using your other inhalers  - use heat/ice/tylenol/nsaids for headache, massage painful areas with your fingers as needed  - may pursue massage and/or acupuncture   - return to clinic if symptoms do not improve in the next 2 weeks          Follow-ups after your visit        Follow-up notes from your care team     Return if symptoms worsen or fail to improve.      Your next 10 appointments already scheduled     Dec 17, 2018 10:00 AM CST   (Arrive by 9:45 AM)   PHYSICAL with Topher Kenney MD   [facility] ([facility])    [address] [place] [id number] [phone]              Who to contact     Please call your clinic at [phone] to:    Ask questions about your health    Make or cancel appointments    Discuss your medicines    Learn about your test results    Speak to your doctor            Additional Information About Your Visit        [employer] Information     [employer] gives you secure access to your electronic health record. If you see a primary care provider, you can also send messages to your care team and make appointments. If you have questions, please call your primary care clinic.  If you do not have a primary care provider, please call [phone] and they will assist you.      [employer] is an electronic gateway that provides easy, online access to your medical records. With [employer], you can request a clinic appointment, read your test results, renew a prescription or  communicate with your care team.     To access your existing account, please contact your [facility] or call [phone] for assistance.        Care EveryWhere ID     This is your Care EveryWhere ID. This could be used by other organizations to access your [facility] medical records  [id number]        Your Vitals Were     Pulse                   73            Blood Pressure from Last 3 Encounters:   09/20/18 114/76   11/29/17 126/74   11/28/16 120/74    Weight from Last 3 Encounters:   11/29/17 74.6 kg (164 lb 8 oz)   11/28/16 74.7 kg (164 lb 9.6 oz)   12/02/15 73.8 kg (162 lb 11.2 oz)              Today, you had the following     No orders found for display         Today's Medication Changes          These changes are accurate as of 9/20/18  1:35 PM.  If you have any questions, ask your nurse or doctor.               Start taking these medicines.        Dose/Directions    fluticasone 50 MCG/ACT spray   Commonly known as:  FLONASE   Used for:  Non-allergic rhinitis   Started by:  Rafa Trammell MD        Dose:  1 spray   Spray 1 spray into both nostrils daily   Quantity:  1 Bottle   Refills:  0       oxymetazoline 0.05 % spray   Commonly known as:  AFRIN   Used for:  Non-allergic rhinitis   Started by:  Rafa Trammell MD        Dose:  2 spray   Spray 2 sprays into both nostrils 2 times daily Spray before using other inhalers, do not use for more than 3 days   Quantity:  30 mL   Refills:  0       sodium chloride 0.65 % nasal spray   Commonly known as:  OCEAN   Used for:  Non-allergic rhinitis   Started by:  Rafa Trammell MD        Dose:  1 spray   Spray 1 spray into both nostrils every 4 hours as needed for congestion   Quantity:  104 mL   Refills:  0         These medicines have changed or have updated prescriptions.        Dose/Directions    * ipratropium 0.06 % spray   Commonly known as:  ATROVENT   This may have changed:  Another medication with the same name was added. Make  sure you understand how and when to take each.   Used for:  Sinus pressure   Changed by:  Rafa Trammell MD        Dose:  2 spray   Spray 2 sprays into both nostrils 4 times daily as needed for rhinitis   Quantity:  3 Box   Refills:  1       * ipratropium 0.06 % spray   Commonly known as:  ATROVENT   This may have changed:  You were already taking a medication with the same name, and this prescription was added. Make sure you understand how and when to take each.   Used for:  Non-allergic rhinitis   Changed by:  Rafa Trammell MD        Dose:  2 spray   Spray 2 sprays into both nostrils 4 times daily as needed for rhinitis   Quantity:  15 mL   Refills:  0       * Notice:  This list has 2 medication(s) that are the same as other medications prescribed for you. Read the directions carefully, and ask your doctor or other care provider to review them with you.      Stop taking these medicines if you haven't already. Please contact your care team if you have questions.     CALCIUM 500 + D PO   Stopped by:  Rafa Trammell MD           DAILY MULTIVITAMIN PO   Stopped by:  Rafa Trammell MD           misc intestinal milla regulat Chew   Stopped by:  Rafa Trammell MD           triamcinolone 0.1 % cream   Commonly known as:  KENALOG   Stopped by:  Rafa Trammell MD                Where to get your medicines      These medications were sent to [facility] [address] [facility] [address] AT [facility] [address] & [facility]  [address] [place] [facility] [place] [address]     Phone:  [phone]     fluticasone 50 MCG/ACT spray    ipratropium 0.06 % spray    oxymetazoline 0.05 % spray    sodium chloride 0.65 % nasal spray                Primary Care Provider Office Phone # Fax #    Topher Kenney [phone]       [address] [place] [address]  [facility] [address]        Equal Access to Services     LAITH BHAKTA AH: Bri Garcia, sugey wood, ricardo whitley  abdullahi patelanamaria benitez'aan ah. So [facility] [place] [phone].    ATENCIÓN: Si silvinola tu, tiene a burden disposición servicios gratuitos de asistencia lingüística. Charlette al [phone].    We comply with applicable federal civil rights laws and [place] laws. We do not discriminate on the basis of race, color, national origin, age, disability, sex, sexual orientation, or gender identity.            Thank you!     Thank you for choosing [facility]IMARY [facility]ARE CL[facility]  for your care. Our goal is always to provide you with excellent care. Hearing back from our patients is one way we can continue to improve our services. Please take a few minutes to complete the written survey that you may receive in the mail after your visit with us. Thank you!             Your Updated Medication List - Protect others around you: Learn how to safely use, store and throw away your medicines at www.disposemymeds.org.          This list is accurate as of 9/20/18  1:35 PM.  Always use your most recent med list.                   Brand Name Dispense Instructions for use Diagnosis    * dapsone 25 MG tablet     45 tablet    Take 1 tablet (25 mg) by mouth daily Take 2 when flaring    Dermatitis herpetiformis, Therapeutic drug monitoring       * dapsone 25 MG tablet     60 tablet    TAKE 1 TABLET BY MOUTH DAILY, TAKE 2 TABLETS WHEN FLARING    Dermatitis herpetiformis, Therapeutic drug monitoring       fluticasone 50 MCG/ACT spray    FLONASE    1 Bottle    Spray 1 spray into both nostrils daily    Non-allergic rhinitis       * ipratropium 0.06 % spray    ATROVENT    3 Box    Spray 2 sprays into both nostrils 4 times daily as needed for rhinitis    Sinus pressure       * ipratropium 0.06 % spray    ATROVENT    15 mL    Spray 2 sprays into both nostrils 4 times daily as needed for rhinitis    Non-allergic rhinitis       lifitegrast 5 % opthalmic solution    XIIDRA    60 each    Place 1 drop into both eyes 2 times daily    Monocular  diplopia of both eyes, H/O laser assisted in situ keratomileusis, Bilateral dry eyes       oxymetazoline 0.05 % spray    AFRIN    30 mL    Spray 2 sprays into both nostrils 2 times daily Spray before using other inhalers, do not use for more than 3 days    Non-allergic rhinitis       sildenafil 50 MG tablet    VIAGRA    6 tablet    Take 0.5 tablets (25 mg) 30 min to 4 hours before intercourse daily as needed. Never use with nitroglycerin, terazosin or doxazosin.    Erectile dysfunction, unspecified erectile dysfunction type       sodium chloride 0.65 % nasal spray    OCEAN    104 mL    Spray 1 spray into both nostrils every 4 hours as needed for congestion    Non-allergic rhinitis       zolpidem 5 MG tablet    AMBIEN    30 tablet    Take 1 tablet (5 mg) by mouth nightly as needed    Insomnia       * Notice:  This list has 4 medication(s) that are the same as other medications prescribed for you. Read the directions carefully, and ask your doctor or other care provider to review them with you.

## 2018-09-20 NOTE — PATIENT INSTRUCTIONS
[facility] Medication Refill Request Information:  * Please contact your pharmacy regarding ANY request for medication refills.  ** [facility] Prescription Fax = [phone]  * Please allow 3 business days for routine medication refills.  * Please allow 5 business days for controlled substance medication refills.     [facility] Test Result notification information:  *You will be notified with in 7-10 days of your appointment day regarding the results of your test.  If you are on MyChart you will be notified as soon as the provider has reviewed the results and signed off on them.    [facility]: [phone]                [facility]         Internal Medicine Resident                   Continuity Clinic    Who We Are    Resident Continuity Clinic is a part of the [facility] Primary Care Clinic.  Resident physicians see patients independently and establish a relationship with them over the course of their three-year residency program.  As with the Primary Care Clinic, our Resident Continuity Clinic models a group practice.  If your doctor is not available, you will be able to see another resident physician.  At the end of a resident s training, patients will be transitioned to a new resident physician for ongoing care.     We treat patients with a wide array of medical needs from routine physicals, to acute illnesses, to diabetes and blood pressure management, to complex medical illness.  What is a Resident Physician?    Resident physicians hold medical degrees and are doctors. They are training to become specialists in Internal Medicine. They work under the supervision of board-certified faculty physicians.  Expectations for Your Care    We strive to provide accessible, quality care at all times.    In order to provide this care, it is best to see your primary care resident doctor consistently rather switching between providers.  In the event you do see another physician, you should  schedule a follow-up visit with your usual primary care doctor.    If you are transitioning your care from another clinic, it is helpful to have your records available for your doctor to review.    We do not prescribe controlled substances, such as ADD medications or narcotic pain medications, on your first visit.  We will review your health records and concerns prior to devising a treatment plan with you in order to provide the best care.      Clinic Services     Extended clinic hours; patient  to help navigate your visit;  parking; laboratory and imaging services with evening and weekend hours    Multiple medical and surgical specialties in one building    Complementary services, including Nutrition, Integrative Medicine, Pharmacy consultations, Mental and Behavioral Health, Sports Medicine and Physical Therapy    Thank You    We would like to thank you for choosing the [facility] Internal Medicine Resident Continuity Clinic for your primary care. You are making a priceless contribution to the training of the next generation of health care practitioners.     Contact us at [phone] for appointments or questions.    Resident Clinic Hours are Tuesdays and Thursdays, 7:30am-5:00pm    Residents  Aster Baptiste MD   (Female )   Nadja Us MD   (Female)   Israel Rocha MD  (Male)   Lex Escudero MD  (Male)   Kaushal Rodriguez MD   (Female)   Eddie Alberto MD  (Male)    Rob Casey MD  (Male)   Yo Babcock MD  (Male)   Lex Trammell MD (Male)   Emmanuel Lopez MD  (Male)   Lisa Montoya MD (Female)    Madelyn Dickinson MD (Female)   Sophie Cullen MD  (Male)   Shelly Victoria MD(Female)   Mary Fritz MD  (Female)    Supervising Physicians   MD Barb Rosales MD Briar Duffy, MD James Langland, MD Mary Logeais, MD Tanya Melnik, MD Charles Moldow, MD Heather Thompson Buum, MD Kathleen Watson, MD          - start using  flonase, atrovent, nasal saline every day  - use afrin nasal spray for up to 3 days prior to using your other inhalers  - use heat/ice/tylenol/nsaids for headache, massage painful areas with your fingers as needed  - may pursue massage and/or acupuncture   - return to clinic if symptoms do not improve in the next 2 weeks

## 2018-09-20 NOTE — NURSING NOTE
Chief Complaint   Patient presents with     Sinus Problem     since late July     Headache     on the very top of his head       Naveen Aparicio, EMT 12:52 PM on 9/20/2018.

## 2018-11-19 ENCOUNTER — OFFICE VISIT (OUTPATIENT)
Dept: FAMILY MEDICINE | Facility: CLINIC | Age: 53
End: 2018-11-19
Payer: COMMERCIAL

## 2018-11-19 VITALS
OXYGEN SATURATION: 95 % | TEMPERATURE: 98 F | SYSTOLIC BLOOD PRESSURE: 122 MMHG | HEIGHT: 67 IN | HEART RATE: 71 BPM | DIASTOLIC BLOOD PRESSURE: 72 MMHG | WEIGHT: 164 LBS | BODY MASS INDEX: 25.74 KG/M2

## 2018-11-19 DIAGNOSIS — N52.9 ERECTILE DYSFUNCTION, UNSPECIFIED ERECTILE DYSFUNCTION TYPE: ICD-10-CM

## 2018-11-19 DIAGNOSIS — Z98.890 H/O LASER ASSISTED IN SITU KERATOMILEUSIS: ICD-10-CM

## 2018-11-19 DIAGNOSIS — H04.123 BILATERAL DRY EYES: ICD-10-CM

## 2018-11-19 DIAGNOSIS — F51.02 ADJUSTMENT INSOMNIA: ICD-10-CM

## 2018-11-19 DIAGNOSIS — H53.2 MONOCULAR DIPLOPIA OF BOTH EYES: ICD-10-CM

## 2018-11-19 DIAGNOSIS — L13.0 DERMATITIS HERPETIFORMIS: ICD-10-CM

## 2018-11-19 DIAGNOSIS — Z51.81 THERAPEUTIC DRUG MONITORING: ICD-10-CM

## 2018-11-19 DIAGNOSIS — Z00.00 ROUTINE HISTORY AND PHYSICAL EXAMINATION OF ADULT: Primary | ICD-10-CM

## 2018-11-19 DIAGNOSIS — Z11.3 SCREEN FOR STD (SEXUALLY TRANSMITTED DISEASE): ICD-10-CM

## 2018-11-19 LAB
ALBUMIN SERPL-MCNC: 4 G/DL (ref 3.4–5)
ALP SERPL-CCNC: 58 U/L (ref 40–150)
ALT SERPL W P-5'-P-CCNC: 29 U/L (ref 0–70)
ANION GAP SERPL CALCULATED.3IONS-SCNC: 8 MMOL/L (ref 3–14)
AST SERPL W P-5'-P-CCNC: 20 U/L (ref 0–45)
BASOPHILS # BLD AUTO: 0 10E9/L (ref 0–0.2)
BASOPHILS NFR BLD AUTO: 0.6 %
BILIRUB SERPL-MCNC: 0.8 MG/DL (ref 0.2–1.3)
BUN SERPL-MCNC: 16 MG/DL (ref 7–30)
CALCIUM SERPL-MCNC: 9.4 MG/DL (ref 8.5–10.1)
CHLORIDE SERPL-SCNC: 104 MMOL/L (ref 94–109)
CO2 SERPL-SCNC: 25 MMOL/L (ref 20–32)
CREAT SERPL-MCNC: 0.85 MG/DL (ref 0.66–1.25)
DIFFERENTIAL METHOD BLD: NORMAL
EOSINOPHIL # BLD AUTO: 0.1 10E9/L (ref 0–0.7)
EOSINOPHIL NFR BLD AUTO: 2.2 %
ERYTHROCYTE [DISTWIDTH] IN BLOOD BY AUTOMATED COUNT: 13.6 % (ref 10–15)
GFR SERPL CREATININE-BSD FRML MDRD: >90 ML/MIN/1.7M2
GLUCOSE SERPL-MCNC: 103 MG/DL (ref 70–99)
HCT VFR BLD AUTO: 43.4 % (ref 40–53)
HGB BLD-MCNC: 14.9 G/DL (ref 13.3–17.7)
LYMPHOCYTES # BLD AUTO: 1.9 10E9/L (ref 0.8–5.3)
LYMPHOCYTES NFR BLD AUTO: 28.4 %
MCH RBC QN AUTO: 31.3 PG (ref 26.5–33)
MCHC RBC AUTO-ENTMCNC: 34.3 G/DL (ref 31.5–36.5)
MCV RBC AUTO: 91 FL (ref 78–100)
MONOCYTES # BLD AUTO: 0.6 10E9/L (ref 0–1.3)
MONOCYTES NFR BLD AUTO: 8.6 %
NEUTROPHILS # BLD AUTO: 3.9 10E9/L (ref 1.6–8.3)
NEUTROPHILS NFR BLD AUTO: 60.2 %
PLATELET # BLD AUTO: 239 10E9/L (ref 150–450)
POTASSIUM SERPL-SCNC: 4 MMOL/L (ref 3.4–5.3)
PROT SERPL-MCNC: 7.8 G/DL (ref 6.8–8.8)
RBC # BLD AUTO: 4.76 10E12/L (ref 4.4–5.9)
SODIUM SERPL-SCNC: 137 MMOL/L (ref 133–144)
WBC # BLD AUTO: 6.5 10E9/L (ref 4–11)

## 2018-11-19 PROCEDURE — 99396 PREV VISIT EST AGE 40-64: CPT | Performed by: FAMILY MEDICINE

## 2018-11-19 PROCEDURE — 80053 COMPREHEN METABOLIC PANEL: CPT | Performed by: FAMILY MEDICINE

## 2018-11-19 PROCEDURE — 87491 CHLMYD TRACH DNA AMP PROBE: CPT | Performed by: FAMILY MEDICINE

## 2018-11-19 PROCEDURE — 36415 COLL VENOUS BLD VENIPUNCTURE: CPT | Performed by: FAMILY MEDICINE

## 2018-11-19 PROCEDURE — 87389 HIV-1 AG W/HIV-1&-2 AB AG IA: CPT | Performed by: FAMILY MEDICINE

## 2018-11-19 PROCEDURE — 86780 TREPONEMA PALLIDUM: CPT | Performed by: FAMILY MEDICINE

## 2018-11-19 PROCEDURE — 85025 COMPLETE CBC W/AUTO DIFF WBC: CPT | Performed by: FAMILY MEDICINE

## 2018-11-19 PROCEDURE — 87591 N.GONORRHOEAE DNA AMP PROB: CPT | Performed by: FAMILY MEDICINE

## 2018-11-19 RX ORDER — DAPSONE 25 MG/1
TABLET ORAL
Qty: 60 TABLET | Refills: 8 | Status: SHIPPED | OUTPATIENT
Start: 2018-11-19 | End: 2019-12-20

## 2018-11-19 RX ORDER — SILDENAFIL 50 MG/1
TABLET, FILM COATED ORAL
Qty: 6 TABLET | Refills: 11 | Status: SHIPPED | OUTPATIENT
Start: 2018-11-19 | End: 2020-01-07

## 2018-11-19 RX ORDER — ZOLPIDEM TARTRATE 5 MG/1
5 TABLET ORAL
Qty: 30 TABLET | Refills: 3 | Status: SHIPPED | OUTPATIENT
Start: 2018-11-19 | End: 2018-11-20

## 2018-11-19 NOTE — MR AVS SNAPSHOT
After Visit Summary   11/19/2018    Vimal Goldsmith    MRN: [id number]           Patient Information     Date Of Birth          1965        Visit Information        Provider Department      11/19/2018 9:30 AM Griffin Bravo MD [facility]        Today's Diagnoses     Routine history and physical examination of adult    -  1    Dermatitis herpetiformis        Therapeutic drug monitoring        Adjustment insomnia        Monocular diplopia of both eyes        H/O laser assisted in situ keratomileusis - Left Eye        Bilateral dry eyes        Erectile dysfunction, unspecified erectile dysfunction type        Screen for STD (sexually transmitted disease)          Care Instructions      Preventive Health Recommendations  Male Ages 50 - 64    Yearly exam:             See your health care provider every year in order to  o   Review health changes.   o   Discuss preventive care.    o   Review your medicines if your doctor has prescribed any.     Have a cholesterol test every 5 years, or more frequently if you are at risk for high cholesterol/heart disease.     Have a diabetes test (fasting glucose) every three years. If you are at risk for diabetes, you should have this test more often.     Have a colonoscopy at age 50, or have a yearly FIT test (stool test). These exams will check for colon cancer.      Talk with your health care provider about whether or not a prostate cancer screening test (PSA) is right for you.    You should be tested each year for STDs (sexually transmitted diseases), if you re at risk.     Shots: Get a flu shot each year. Get a tetanus shot every 10 years.     Nutrition:    Eat at least 5 servings of fruits and vegetables daily.     Eat whole-grain bread, whole-wheat pasta and brown rice instead of white grains and rice.     Get adequate Calcium and Vitamin D.     Lifestyle    Exercise for at least 150 minutes a week (30 minutes a day, 5 days a week). This will  "help you control your weight and prevent disease.     Limit alcohol to one drink per day.     No smoking.     Wear sunscreen to prevent skin cancer.     See your dentist every six months for an exam and cleaning.     See your eye doctor every 1 to 2 years.            Follow-ups after your visit        Follow-up notes from your care team     Return in about 1 year (around 11/19/2019) for Physical Exam.      Who to contact     If you have questions or need follow up information about today's clinic visit or your schedule please contact [facility] directly at [phone].  Normal or non-critical lab and imaging results will be communicated to you by [employer]hart, letter or phone within 4 business days after the clinic has received the results. If you do not hear from us within 7 days, please contact the clinic through [employer] or phone. If you have a critical or abnormal lab result, we will notify you by phone as soon as possible.  Submit refill requests through [employer] or call your pharmacy and they will forward the refill request to us. Please allow 3 business days for your refill to be completed.          Additional Information About Your Visit        MyChart Information     [employer] gives you secure access to your electronic health record. If you see a primary care provider, you can also send messages to your care team and make appointments. If you have questions, please call your primary care clinic.  If you do not have a primary care provider, please call [phone] and they will assist you.        Care EveryWhere ID     This is your Care EveryWhere ID. This could be used by other organizations to access your [place] medical records  [id number]        Your Vitals Were     Pulse Temperature Height Pulse Oximetry BMI (Body Mass Index)       71 98  F (36.7  C) (Oral) 5' 7\" (1.702 m) 95% 25.69 kg/m2        Blood Pressure from Last 3 Encounters:   11/19/18 122/72   09/20/18 114/76   11/29/17 126/74    " Weight from Last 3 Encounters:   11/19/18 164 lb (74.4 kg)   11/29/17 164 lb 8 oz (74.6 kg)   11/28/16 164 lb 9.6 oz (74.7 kg)              We Performed the Following     CBC with platelets differential     Chlamydia trachomatis PCR     Comprehensive metabolic panel     HIV Antigen Antibody Combo     Neisseria gonorrhoeae PCR     Treponema Abs w Reflex to RPR and Titer          Today's Medication Changes          These changes are accurate as of 11/19/18 11:59 PM.  If you have any questions, ask your nurse or doctor.               These medicines have changed or have updated prescriptions.        Dose/Directions    zolpidem 5 MG tablet   Commonly known as:  AMBIEN   This may have changed:  reasons to take this   Used for:  Adjustment insomnia   Changed by:  Griffin Bravo MD        Dose:  5 mg   Take 1 tablet (5 mg) by mouth nightly as needed for sleep   Quantity:  30 tablet   Refills:  3            Where to get your medicines      These medications were sent to [id number]  [place] - [address] AT [place] [id number] [facility] [place] [address]     Phone:  [phone]     dapsone 25 MG tablet    lifitegrast 5 % opthalmic solution    sildenafil 50 MG tablet         Some of these will need a paper prescription and others can be bought over the counter.  Ask your nurse if you have questions.     Bring a paper prescription for each of these medications     zolpidem 5 MG tablet                Primary Care Provider Office Phone # Fax #    Topher Kenney [phone]       [address] [place] [address] [address] [place] [address]        Equal Access to Services     [facility] AH: Hadii aad ku hadasho Soomaali, waaxda luqadaha, qaybta kaalmada adeegyada, abdullahi gutierrez. So [facility] [phone].    ATENCIÓN: Si habla español, tiene a burden disposición servicios gratuitos de asistencia lingüística. Llame al  [phone].    We comply with applicable federal civil rights laws and [place] laws. We do not discriminate on the basis of race, color, national origin, age, disability, sex, sexual orientation, or gender identity.            Thank you!     Thank you for choosing [facility]  for your care. Our goal is always to provide you with excellent care. Hearing back from our patients is one way we can continue to improve our services. Please take a few minutes to complete the written survey that you may receive in the mail after your visit with us. Thank you!             Your Updated Medication List - Protect others around you: Learn how to safely use, store and throw away your medicines at www.disposemymeds.org.          This list is accurate as of 11/19/18 11:59 PM.  Always use your most recent med list.                   Brand Name Dispense Instructions for use Diagnosis    dapsone 25 MG tablet     60 tablet    TAKE 1 TABLET BY MOUTH DAILY, TAKE 2 TABLETS WHEN FLARING    Dermatitis herpetiformis, Therapeutic drug monitoring       fluticasone 50 MCG/ACT spray    FLONASE    1 Bottle    Spray 1 spray into both nostrils daily    Non-allergic rhinitis       ipratropium 0.06 % spray    ATROVENT    15 mL    Spray 2 sprays into both nostrils 4 times daily as needed for rhinitis    Non-allergic rhinitis       lifitegrast 5 % opthalmic solution    XIIDRA    60 each    Place 1 drop into both eyes 2 times daily    Monocular diplopia of both eyes, H/O laser assisted in situ keratomileusis, Bilateral dry eyes       oxymetazoline 0.05 % spray    AFRIN    30 mL    Spray 2 sprays into both nostrils 2 times daily Spray before using other inhalers, do not use for more than 3 days    Non-allergic rhinitis       sildenafil 50 MG tablet    VIAGRA    6 tablet    Take 0.5 tablets (25 mg) 30 min to 4 hours before intercourse daily as needed. Never use with nitroglycerin, terazosin or doxazosin.    Erectile dysfunction, unspecified  erectile dysfunction type       sodium chloride 0.65 % nasal spray    OCEAN    104 mL    Spray 1 spray into both nostrils every 4 hours as needed for congestion    Non-allergic rhinitis       zolpidem 5 MG tablet    AMBIEN    30 tablet    Take 1 tablet (5 mg) by mouth nightly as needed for sleep    Adjustment insomnia

## 2018-11-19 NOTE — PROGRESS NOTES
SUBJECTIVE:   CC: Vimal Goldsmith is an 53 year old male who presents for preventative health visit.     Physical   Annual:     Getting at least 3 servings of Calcium per day:  Yes    Bi-annual eye exam:  Yes    Dental care twice a year:  Yes    Sleep apnea or symptoms of sleep apnea:  None    Diet:  Gluten-free/reduced    Frequency of exercise:  1 day/week    Duration of exercise:  15-30 minutes    Taking medications regularly:  Yes    Medication side effects:  None, No muscle aches and No significant flushing    Additional concerns today:  Yes      Desires STD screening, in a relationship that is stable but not always 100% monogamous    Also has a number chronic conditions that he needs refills on including dapsone for dermatitis herpetiformis and some medication for dry eyes    Needs labs for these things        Today's PHQ-2 Score:   PHQ-2 ( 1999 [employer]) 11/19/2018   Q1: Little interest or pleasure in doing things 0   Q2: Feeling down, depressed or hopeless 0   PHQ-2 Score 0   Q1: Little interest or pleasure in doing things Not at all   Q2: Feeling down, depressed or hopeless Not at all   PHQ-2 Score 0       Abuse: Current or Past(Physical, Sexual or Emotional)- No  Do you feel safe in your environment - Yes    Social History   Substance Use Topics     Smoking status: Never Smoker     Smokeless tobacco: Never Used      Comment: NON SMOKING ENVIRONMENT, 10/6/11, TALIB.      Alcohol use No      Comment: a few sips to help sleep     Alcohol Use 11/19/2018   If you drink alcohol do you typically have greater than 3 drinks per day OR greater than 7 drinks per week? No   No flowsheet data found.    Last PSA:   PSA   Date Value Ref Range Status   11/27/2017 0.80 0 - 4 ug/L Final     Comment:     Assay Method:  Chemiluminescence using Siemens Vista analyzer       Reviewed orders with patient. Reviewed health maintenance and updated orders accordingly - Yes  Labs reviewed in EPIC  BP Readings from Last 3 Encounters:   11/19/18  122/72   09/20/18 114/76   11/29/17 126/74    Wt Readings from Last 3 Encounters:   11/19/18 164 lb (74.4 kg)   11/29/17 164 lb 8 oz (74.6 kg)   11/28/16 164 lb 9.6 oz (74.7 kg)                  Patient Active Problem List   Diagnosis     Dermatitis herpetiformis     Celiac disease     Tinea pedis     Acne rosacea     BPH (benign prostatic hypertrophy)     Bacterial folliculitis     Chalazion     Therapeutic drug monitoring     Bilateral dry eyes     Adjustment insomnia     Past Surgical History:   Procedure Laterality Date     ENHANCE LASER REFRACTIVE BILATERAL EXISTING PT IN PARAMETERS Left 01/04/2017     ESOPHAGOSCOPY, GASTROSCOPY, DUODENOSCOPY (EGD), COMBINED  2/11/2013    Procedure: COMBINED ESOPHAGOSCOPY, GASTROSCOPY, DUODENOSCOPY (EGD), BIOPSY SINGLE OR MULTIPLE;;  Surgeon: Luke Juan MD;  Location:  GI     NASAL/SINUS POLYPECTOMY         Social History   Substance Use Topics     Smoking status: Never Smoker     Smokeless tobacco: Never Used      Comment: NON SMOKING ENVIRONMENT, 10/6/11, KJM.      Alcohol use No      Comment: a few sips to help sleep     Family History   Problem Relation Age of Onset     Lipids Mother      KIDNEY DISEASE Mother      Arthritis Mother      Hypertension Mother      Osteoporosis Mother      GASTROINTESTINAL DISEASE Father      maybe celiac     Cancer Father      Leukemia     HEART DISEASE Father      Hypertension Father      Cerebrovascular Disease Father      Stomach Problem Father      GASTROINTESTINAL DISEASE Brother      maybe celiac     KIDNEY DISEASE Brother      Kidnet stone     Cancer - colorectal No family hx of      Prostate Cancer No family hx of      Melanoma No family hx of      Skin Cancer No family hx of          Current Outpatient Prescriptions   Medication Sig Dispense Refill     dapsone 25 MG tablet TAKE 1 TABLET BY MOUTH DAILY, TAKE 2 TABLETS WHEN FLARING 60 tablet 8     fluticasone (FLONASE) 50 MCG/ACT spray Spray 1 spray into both nostrils daily 1  Bottle 0     ipratropium (ATROVENT) 0.06 % spray Spray 2 sprays into both nostrils 4 times daily as needed for rhinitis 15 mL 0     lifitegrast (XIIDRA) 5 % opthalmic solution Place 1 drop into both eyes 2 times daily 60 each 1     oxymetazoline (AFRIN) 0.05 % spray Spray 2 sprays into both nostrils 2 times daily Spray before using other inhalers, do not use for more than 3 days 30 mL 0     sildenafil (VIAGRA) 50 MG tablet Take 0.5 tablets (25 mg) 30 min to 4 hours before intercourse daily as needed. Never use with nitroglycerin, terazosin or doxazosin. 6 tablet 11     sodium chloride (OCEAN) 0.65 % nasal spray Spray 1 spray into both nostrils every 4 hours as needed for congestion 104 mL 0     zolpidem (AMBIEN) 5 MG tablet Take 1 tablet (5 mg) by mouth nightly as needed for sleep 30 tablet 3     Allergies   Allergen Reactions     Gluten Meal Rash and GI Disturbance     Nkda [No Known Drug Allergies]        Reviewed and updated as needed this visit by clinical staff         Reviewed and updated as needed this visit by Provider            Review of Systems  CONSTITUTIONAL: NEGATIVE for fever, chills, change in weight  INTEGUMENTARY/SKIN: NEGATIVE for worrisome rashes, moles or lesions  EYES: NEGATIVE for vision changes or irritation  ENT: NEGATIVE for ear, mouth and throat problems  RESP: NEGATIVE for significant cough or SOB  CV: NEGATIVE for chest pain, palpitations or peripheral edema  GI: NEGATIVE for nausea, abdominal pain, heartburn, or change in bowel habits   male: negative for dysuria, hematuria, decreased urinary stream, erectile dysfunction, urethral discharge  MUSCULOSKELETAL: NEGATIVE for significant arthralgias or myalgia  NEURO: NEGATIVE for weakness, dizziness or paresthesias  PSYCHIATRIC: NEGATIVE for changes in mood or affect    OBJECTIVE:   There were no vitals taken for this visit.    Physical Exam    General Appearance: Pleasant, alert, in no acute respiratory distress.  Head Exam: Normal.  Normocephalic, atraumatic. No sinus tenderness.  Eye Exam: Normal external eye, conjunctiva, lids. DOMINGUEZ.  Ear Exam: Normal auditory canals and external ears. Non-tender.  Left TM-normal. Right TM-normal.  OroPharynx Exam: Dental hygiene adequate. Normal buccal mucosa. Normal pharynx.  Neck Exam: Supple, no masses or enlarged, tender nodes.  Thyroid Exam: No nodules or enlargement or tenderness.  Chest/Respiratory Exam: Normal, comfortable, easy respirations. Chest wall normal. Lungs are clear to auscultation. No wheezing, crackles, or rhonchi.  Cardiovascular Exam: Regular rate and rhythm. No murmur, gallop, or rubs. No pedal edema.  Gastrointestinal Exam: Soft, non-tender, no masses or organomegaly.  Musculoskeletal Exam: Back is non-tender, full ROM of upper and lower extremities.  Skin: no rash, warm and dry.    Neurologic Exam: Nonfocal, no tremor. Normal gait.  Psychiatric Exam: Alert - appropriate, normal affect    ASSESSMENT/PLAN:       ICD-10-CM    1. Routine history and physical examination of adult Z00.00    2. Dermatitis herpetiformis L13.0 dapsone 25 MG tablet     CBC with platelets differential     Comprehensive metabolic panel   3. Therapeutic drug monitoring Z51.81 dapsone 25 MG tablet   4. Adjustment insomnia F51.02 zolpidem (AMBIEN) 5 MG tablet   5. Monocular diplopia of both eyes H53.2 lifitegrast (XIIDRA) 5 % opthalmic solution   6. H/O laser assisted in situ keratomileusis - Left Eye Z98.890 lifitegrast (XIIDRA) 5 % opthalmic solution   7. Bilateral dry eyes H04.123 lifitegrast (XIIDRA) 5 % opthalmic solution   8. Erectile dysfunction, unspecified erectile dysfunction type N52.9 sildenafil (VIAGRA) 50 MG tablet   9. Screen for STD (sexually transmitted disease) Z11.3 Treponema Abs w Reflex to RPR and Titer     Chlamydia trachomatis PCR     Neisseria gonorrhoeae PCR     HIV Antigen Antibody Combo       COUNSELING:   Reviewed preventive health counseling, as reflected in patient instructions        "Regular exercise       Healthy diet/nutrition    BP Readings from Last 1 Encounters:   09/20/18 114/76     Estimated body mass index is 25.76 kg/(m^2) as calculated from the following:    Height as of 11/29/17: 5' 7\" (1.702 m).    Weight as of 11/29/17: 164 lb 8 oz (74.6 kg).           reports that he has never smoked. He has never used smokeless tobacco.      Counseling Resources:  ATP IV Guidelines  Pooled Cohorts Equation Calculator  FRAX Risk Assessment  ICSI Preventive Guidelines  Dietary Guidelines for Americans, 2010  USDA's MyPlate  ASA Prophylaxis  Lung CA Screening    Griffin Bravo MD  [facility]  "

## 2018-11-19 NOTE — NURSING NOTE
"Chief Complaint   Patient presents with     Physical     /72  Pulse 71  Temp 98  F (36.7  C) (Oral)  Ht 5' 7\" (1.702 m)  Wt 164 lb (74.4 kg)  SpO2 95%  BMI 25.69 kg/m2 Estimated body mass index is 25.69 kg/(m^2) as calculated from the following:    Height as of this encounter: 5' 7\" (1.702 m).    Weight as of this encounter: 164 lb (74.4 kg).  Medication Reconciliation: complete      Health Maintenance that is potentially due pending provider review:  NONE    n/a    ZACH Cardenas  "

## 2018-11-20 ENCOUNTER — TELEPHONE (OUTPATIENT)
Dept: FAMILY MEDICINE | Facility: CLINIC | Age: 53
End: 2018-11-20

## 2018-11-20 DIAGNOSIS — F51.02 ADJUSTMENT INSOMNIA: ICD-10-CM

## 2018-11-20 DIAGNOSIS — Z13.220 SCREENING FOR HYPERLIPIDEMIA: Primary | ICD-10-CM

## 2018-11-20 LAB
C TRACH DNA SPEC QL NAA+PROBE: NEGATIVE
HIV 1+2 AB+HIV1 P24 AG SERPL QL IA: NONREACTIVE
N GONORRHOEA DNA SPEC QL NAA+PROBE: NEGATIVE
SPECIMEN SOURCE: NORMAL
SPECIMEN SOURCE: NORMAL
T PALLIDUM AB SER QL: NONREACTIVE

## 2018-11-20 RX ORDER — ZOLPIDEM TARTRATE 5 MG/1
5 TABLET ORAL
Qty: 30 TABLET | Refills: 3 | Status: SHIPPED | OUTPATIENT
Start: 2018-11-20 | End: 2019-06-10

## 2018-11-20 NOTE — TELEPHONE ENCOUNTER
Rx faxed.  Pt informed.    JF,  Please sign pended fasting lipids per pt request for biometric form.  Pt requesting to come in tomorrow am for fasting labs.  Please authorize if appropriate.  Thanks,  Amy Merida RN

## 2018-11-20 NOTE — TELEPHONE ENCOUNTER
JF,  Pharm says they did not receive the ambien.  Did receive other meds yesterday.  Pended new order.  Please authorize if appropriate.  Thanks,  Amy Merida RN

## 2018-11-20 NOTE — TELEPHONE ENCOUNTER
Reason for Call:  Medication or medication refill:    Do you use a [facility]?  Name of the pharmacy and phone number for the current request:  [id number] - [place] - [address] AT [address]      Name of the medication requested: zolpidem (AMBIEN) 5 MG tablet    Other request: Pt stats he misplaced the paper Rx that as given at the apt    Can we leave a detailed message on this number? YES    Phone number patient can be reached at: Home number on file [phone] (home)    Best Time: any    Call taken on 11/20/2018 at 8:48 AM by Angie Alves

## 2018-11-20 NOTE — TELEPHONE ENCOUNTER
JF,  Please see below.  Do you know if this rx was given to pt?  I dont see that it was faxed from us.  Please advise.  Thanks,  Amy Merida RN

## 2018-11-21 ENCOUNTER — TELEPHONE (OUTPATIENT)
Dept: FAMILY MEDICINE | Facility: CLINIC | Age: 53
End: 2018-11-21

## 2018-11-21 DIAGNOSIS — Z13.220 SCREENING FOR HYPERLIPIDEMIA: ICD-10-CM

## 2018-11-21 PROCEDURE — 80061 LIPID PANEL: CPT | Performed by: FAMILY MEDICINE

## 2018-11-21 PROCEDURE — 36415 COLL VENOUS BLD VENIPUNCTURE: CPT | Performed by: FAMILY MEDICINE

## 2018-11-21 NOTE — TELEPHONE ENCOUNTER
Reason for Call:  Form, our goal is to have forms completed with 72 hours, however, some forms may require a visit or additional information.    Type of letter, form or note:  biometric screening    Who is the form from?: Patient    Where did the form come from: Patient or family brought in       What clinic location was the form placed at?: [facility]    Where the form was placed: 's Box    What number is listed as a contact on the form?: [phone]        Additional comments: fax to [phone]    Call taken on 11/21/2018 at 10:47 AM by Gwen Marte

## 2018-11-21 NOTE — TELEPHONE ENCOUNTER
PN,   Please advise in JF's absence  Pt needs lipid panel for biometric form  Was in for lab only this AM  No order in place though  JF out until Monday  Lab needs order this week to send blood out  Please advise  Thanks,  Fariha FLYNN RN

## 2018-11-21 NOTE — TELEPHONE ENCOUNTER
Received form(s) from Pt for Biometric screenings.  Placed form(s) in/on JF's desk.  Forms need to be filled out and signed and faxed to [phone]..    Call pt to verify form was sent: Yes  Copy needs to be sent for scanning after completion: Yes

## 2018-11-23 LAB
CHOLEST SERPL-MCNC: 173 MG/DL
HDLC SERPL-MCNC: 48 MG/DL
LDLC SERPL CALC-MCNC: 114 MG/DL
NONHDLC SERPL-MCNC: 125 MG/DL
TRIGL SERPL-MCNC: 57 MG/DL

## 2018-11-27 NOTE — PROGRESS NOTES
Dear Vimal,   Your test results are all back -   -Cholesterol levels (LDL,HDL, Triglycerides) are up a little.  Keep working on healthy diet and exercise.  ADVISE: rechecking  in 1 year.  Let us know if you have any questions.  -Noris Knox, DO

## 2018-12-03 RX ORDER — FLUTICASONE PROPIONATE 50 MCG
2 SPRAY, SUSPENSION (ML) NASAL DAILY
Qty: 16 G | Refills: 6 | OUTPATIENT
Start: 2018-12-03

## 2019-03-17 DIAGNOSIS — J31.0 NON-ALLERGIC RHINITIS: ICD-10-CM

## 2019-03-23 NOTE — TELEPHONE ENCOUNTER
ipratropium (ATROVENT) 0.06 % spray  Last Written Prescription Date:  9/20/18  Last Fill Quantity: 15ML,   # refills:  0  Last Office Visit : 9/20/18  Future Office visit:  NONE    oxymetazoline (AFRIN) 0.05 %   Last Written Prescription Date:  9/20/18  Last Fill Quantity: 30ML,   # refills: 0     2 MEDS Routing refill request to provider for review/approval because:  Drug not on the refill protocol

## 2019-03-25 RX ORDER — IPRATROPIUM BROMIDE 42 UG/1
SPRAY, METERED NASAL
Qty: 15 ML | Refills: 1 | Status: SHIPPED | OUTPATIENT
Start: 2019-03-25 | End: 2021-11-23

## 2019-03-25 RX ORDER — OXYMETAZOLINE HYDROCHLORIDE 0.05 G/100ML
SPRAY NASAL
Qty: 30 ML | Refills: 1 | Status: SHIPPED | OUTPATIENT
Start: 2019-03-25 | End: 2022-06-06

## 2019-04-17 ENCOUNTER — OFFICE VISIT (OUTPATIENT)
Dept: FAMILY MEDICINE | Facility: CLINIC | Age: 54
End: 2019-04-17
Payer: COMMERCIAL

## 2019-04-17 VITALS
HEART RATE: 70 BPM | SYSTOLIC BLOOD PRESSURE: 114 MMHG | OXYGEN SATURATION: 94 % | DIASTOLIC BLOOD PRESSURE: 72 MMHG | TEMPERATURE: 98.1 F | RESPIRATION RATE: 16 BRPM

## 2019-04-17 DIAGNOSIS — Z71.84 TRAVEL ADVICE ENCOUNTER: Primary | ICD-10-CM

## 2019-04-17 PROCEDURE — 99403 PREV MED CNSL INDIV APPRX 45: CPT | Mod: 25 | Performed by: NURSE PRACTITIONER

## 2019-04-17 PROCEDURE — 90691 TYPHOID VACCINE IM: CPT | Mod: GA | Performed by: NURSE PRACTITIONER

## 2019-04-17 PROCEDURE — 36415 COLL VENOUS BLD VENIPUNCTURE: CPT | Mod: GA | Performed by: NURSE PRACTITIONER

## 2019-04-17 PROCEDURE — 90472 IMMUNIZATION ADMIN EACH ADD: CPT | Mod: GA | Performed by: NURSE PRACTITIONER

## 2019-04-17 PROCEDURE — 90686 IIV4 VACC NO PRSV 0.5 ML IM: CPT | Performed by: NURSE PRACTITIONER

## 2019-04-17 PROCEDURE — 86762 RUBELLA ANTIBODY: CPT | Mod: GA | Performed by: NURSE PRACTITIONER

## 2019-04-17 PROCEDURE — 90471 IMMUNIZATION ADMIN: CPT | Performed by: NURSE PRACTITIONER

## 2019-04-17 PROCEDURE — 86787 VARICELLA-ZOSTER ANTIBODY: CPT | Mod: GA | Performed by: NURSE PRACTITIONER

## 2019-04-17 PROCEDURE — 86765 RUBEOLA ANTIBODY: CPT | Mod: GA | Performed by: NURSE PRACTITIONER

## 2019-04-17 PROCEDURE — 86735 MUMPS ANTIBODY: CPT | Mod: GA | Performed by: NURSE PRACTITIONER

## 2019-04-17 PROCEDURE — 90715 TDAP VACCINE 7 YRS/> IM: CPT | Mod: GA | Performed by: NURSE PRACTITIONER

## 2019-04-17 RX ORDER — AZITHROMYCIN 500 MG/1
500 TABLET, FILM COATED ORAL DAILY
Qty: 3 TABLET | Refills: 0 | Status: SHIPPED | OUTPATIENT
Start: 2019-04-17 | End: 2019-04-20

## 2019-04-17 RX ORDER — ACETAZOLAMIDE 125 MG/1
TABLET ORAL
Qty: 30 TABLET | Refills: 0 | Status: SHIPPED | OUTPATIENT
Start: 2019-04-17 | End: 2021-11-23

## 2019-04-17 NOTE — NURSING NOTE
"Chief Complaint   Patient presents with     Travel Clinic     [place] and [facility]      /72   Pulse 70   Temp 98.1  F (36.7  C) (Oral)   Resp 16   SpO2 94%  Estimated body mass index is 25.69 kg/m  as calculated from the following:    Height as of 11/19/18: 1.702 m (5' 7\").    Weight as of 11/19/18: 74.4 kg (164 lb).  bp completed using cuff size: regular       Health Maintenance addressed:  NONE    n/a    Alecia Murillo MA     "

## 2019-04-17 NOTE — PROGRESS NOTES
Nurse Note      Itinerary:  [place] and [place]       Departure Date: 05/20/2019      Return Date: 06/07/2019      Length of Trip 2 weeks       Reason for Travel: Tourism           Urban or rural: both      Accommodations: Hotel        IMMUNIZATION HISTORY  Have you received any immunizations within the past 4 weeks?  No  Have you ever fainted from having your blood drawn or from an injection?  No  Have you ever had a fever reaction to vaccination?  No  Have you ever had any bad reaction or side effect from any vaccination?  No  Have you ever had hepatitis A or B vaccine?  Yes  Do you live (or work closely) with anyone who has AIDS, an AIDS-like condition, any other immune disorder or who is on chemotherapy for cancer?  No  Do you have a family history of immunodeficiency?  No  Have you received any injection of immune globulin or any blood products during the past 12 months?  No    Patient roomed by BAYRON Ray  Vimal Goldsmith is a 54 year old male seen today alone for counsultation for international travel to [place] ([facility]) for Tourism.  Patient will be departing in  5 week(s) and staying for   2.5 week(s) and  traveling with partner.      Patient itinerary :  will be in the [facility] > [facility] . [facility] >  [facility] > [facility]  region  which presents risk for food borne illnesses and Typhoid. exposure.      Patient's activities will include sightseeing and high altitude exposure.    Patient's country of birth is [place]    Special medical concerns: Celiac  Pre-travel questionnaire was completed by patient and reviewed by provider.     Vitals: /72   Pulse 70   Temp 98.1  F (36.7  C) (Oral)   Resp 16   SpO2 94%   BMI= There is no height or weight on file to calculate BMI.    EXAM:  General:  Well-nourished, well-developed in no acute distress.  Appears to be stated age, interacts appropriately and expresses understanding of information given to patient.    Current Outpatient Medications    Medication Sig Dispense Refill     dapsone 25 MG tablet TAKE 1 TABLET BY MOUTH DAILY, TAKE 2 TABLETS WHEN FLARING 60 tablet 8     fluticasone (FLONASE) 50 MCG/ACT spray Spray 1 spray into both nostrils daily 1 Bottle 0     ipratropium (ATROVENT) 0.06 % nasal spray Spray 2 sprays into both nostrils 4 times daily as needed for rhinitis 15 mL 1     lifitegrast (XIIDRA) 5 % opthalmic solution Place 1 drop into both eyes 2 times daily 60 each 1     oxymetazoline (ANEFRIN NASAL SPRAY) 0.05 % nasal spray USE TWO SPRAYS IN EACH NOSTRIL TWICE DAILY(SPRAY BEFORE USING OTHER INHALERS, DO NOT USE FOR MORE THAN 3 DAYS). 30 mL 1     sildenafil (VIAGRA) 50 MG tablet Take 0.5 tablets (25 mg) 30 min to 4 hours before intercourse daily as needed. Never use with nitroglycerin, terazosin or doxazosin. 6 tablet 11     sodium chloride (OCEAN) 0.65 % nasal spray Spray 1 spray into both nostrils every 4 hours as needed for congestion 104 mL 0     zolpidem (AMBIEN) 5 MG tablet Take 1 tablet (5 mg) by mouth nightly as needed for sleep 30 tablet 3     Patient Active Problem List   Diagnosis     Dermatitis herpetiformis     Celiac disease     Tinea pedis     Acne rosacea     BPH (benign prostatic hypertrophy)     Bacterial folliculitis     Chalazion     Therapeutic drug monitoring     Bilateral dry eyes     Adjustment insomnia     Allergies   Allergen Reactions     Gluten Meal Rash and GI Disturbance     Nkda [No Known Drug Allergies]          Immunizations discussed include:   Hepatitis A:  Up to date  Hepatitis B: Up to date  Influenza: Ordered/given today, risks, benefits and side effects reviewed  Typhoid: Ordered/given today, risks, benefits and side effects reviewed  Rabies: Declined  reviewed managment of a animal bite or scratch (washing wound, seek medical care within 24 hours for post exposure prophylaxis )  Yellow Fever: Not indicated  Japanese Encephalitis: Not indicated  Meningococcus: Not indicated  Tetanus/Diphtheria:  Ordered/given today, risks, benefits and side effects reviewed  Measles/Mumps/Rubella: Titers drawn  Cholera: Not needed  Polio: Up to date  Pneumococcal: Under age of 65  Varicella: Titers drawn  Zostavax:  Not indicated  Shingrix: deferred  HPV:  Not indicated  TB:  Had disease as a child . Treatd    Altitude Exposure on this trip: yes up to 12,000ft in [place] ( has a gradual elevation gain profile)   Past tolerance to Altitude: no experience    ASSESSMENT/PLAN:    ICD-10-CM    1. Travel advice encounter Z71.89 [place]ola Antibody IgG     Rubella Antibody IgG Quantitative     Mumps Antibody IgG     Varicella Zoster Virus Antibody IgG     azithromycin (ZITHROMAX) 500 MG tablet     acetaZOLAMIDE (DIAMOX) 125 MG tablet     VACCINE ADMINISTRATION, INITIAL     VACCINE ADMINISTRATION, EACH ADDITIONAL     I have reviewed general recommendations for safe travel   including: food/water precautions, insect precautions, safer sex   practices given high prevalence of Zika, HIV and other STDs,   roadway safety. Educational materials and Travax report provided.    Malaraia prophylaxis recommended: none  Symptomatic treatment for traveler's diarrhea: azithromycin  Altitude illness prevention and treatment: Diamox prescription given with instructions on use and education provided on Acute altitude illness recognition and prevention.    Evacuation insurance advised and resources were provided to patient.    Total visit time 45 minutes  with over 50% of time spent counseling patient as detailed above.    Daisy Roman CNP

## 2019-04-17 NOTE — PATIENT INSTRUCTIONS
Today April 17, 2019 you received the    Flu Vaccine    Tetanus (Tdap) Vaccine    Typhoid - injectable. This vaccine is valid for two years.   .    These appointments can be made as a NURSE ONLY visit.    **It is very important for the vaccinations to be given on the scheduled day(s), this helps ensure you receive the full effectiveness of the vaccine.**    Please call [facility] with any questions [phone]    Thank you for visiting [facility]

## 2019-04-17 NOTE — NURSING NOTE
Screening Questionnaire for Adult Immunization    Are you sick today?   No   Do you have allergies to medications, food, a vaccine component or latex?   No   Have you ever had a serious reaction after receiving a vaccination?   No   Do you have a long-term health problem with heart disease, lung disease, asthma, kidney disease, metabolic disease (e.g. diabetes), anemia, or other blood disorder?   No   Do you have cancer, leukemia, HIV/AIDS, or any other immune system problem?   No   In the past 3 months, have you taken medications that affect  your immune system, such as prednisone, other steroids, or anticancer drugs; drugs for the treatment of rheumatoid arthritis, Crohn s disease, or psoriasis; or have you had radiation treatments?   No   Have you had a seizure, or a brain or other nervous system problem?   No   During the past year, have you received a transfusion of blood or blood     products, or been given immune (gamma) globulin or antiviral drug?   No   For women: Are you pregnant or is there a chance you could become        pregnant during the next month?   No   Have you received any vaccinations in the past 4 weeks?   No     Immunization questionnaire answers were all negative.        Per orders of GIRISH Roman, injection of Typhoid, Flu and TDAP given by Alecia Murillo. Patient instructed to remain in clinic for 15 minutes afterwards, and to report any adverse reaction to me immediately.       Screening performed by Alecia Murillo on 4/17/2019 at 11:24 AM.

## 2019-04-18 LAB
MEV IGG SER QL IA: 6.3 AI (ref 0–0.8)
MUV IGG SER QL IA: 2.3 AI (ref 0–0.8)
RUBV IGG SERPL IA-ACNC: 14 IU/ML
VZV IGG SER QL IA: 2.2 AI (ref 0–0.8)

## 2019-05-13 ENCOUNTER — TELEPHONE (OUTPATIENT)
Dept: OTHER | Facility: CLINIC | Age: 54
End: 2019-05-13

## 2019-06-10 DIAGNOSIS — F51.02 ADJUSTMENT INSOMNIA: ICD-10-CM

## 2019-06-11 PROBLEM — F51.02 ADJUSTMENT INSOMNIA: Status: ACTIVE | Noted: 2018-11-19

## 2019-06-11 RX ORDER — ZOLPIDEM TARTRATE 5 MG/1
TABLET ORAL
Qty: 30 TABLET | Refills: 0 | Status: SHIPPED | OUTPATIENT
Start: 2019-06-11 | End: 2019-10-21

## 2019-06-11 NOTE — TELEPHONE ENCOUNTER
ZOLPIDEM 5MG TABLETS  Last Written Prescription Date:  11/20/2018  Last Fill Quantity: 30,  # refills: 3   Last office visit: 4/17/2019 with prescribing provider:  11/19/2018 by DIEGO other date is from travel visit with [place]   Future Office Visit: Nothing at this time scheduled.      Requested Prescriptions   Pending Prescriptions Disp Refills     zolpidem (AMBIEN) 5 MG tablet [Pharmacy Med Name: ZOLPIDEM 5MG TABLETS] 30 tablet 0     Sig: TAKE 1 TABLET BY MOUTH NIGHTLY AS NEEDED FOR SLEEP       There is no refill protocol information for this order

## 2019-06-11 NOTE — TELEPHONE ENCOUNTER
JF,     Controlled Substance Refill Request for Ambien    Last refill: no fill information on [place]     Last clinic visit: 11/19/2018     Clinic visit frequency required: Q 12 months  Next appt: none    Controlled substance agreement on file: No.    Documentation in problem list reviewed:  started, needs to be completed    Processing:  Fax Rx to [facility] [address] - [place] [facility]    [place] reviewed:  reviewed- no concerns  [place]  [email or web address]    Please authorize if appropriate.  Thanks,  Fariha FLYNN RN

## 2019-10-21 DIAGNOSIS — F51.02 ADJUSTMENT INSOMNIA: ICD-10-CM

## 2019-10-21 NOTE — TELEPHONE ENCOUNTER
Zolpidem Tartrate 5 MG         Last Written Prescription Date:  6/11/2019  Last Fill Quantity: 30tablet,   # refills: 0  Last Office Visit: 4/17/2019 [place]  Future Office visit:       Routing refill request to provider for review/approval because:  Drug not on the [facility], [facility] or [facility] refill protocol or controlled substance

## 2019-10-22 NOTE — TELEPHONE ENCOUNTER
Controlled Substance Refill Request for Ambien    Last refill: 6/11/19 #30    Last clinic visit: 11/19/18     Clinic visit frequency required: Not documented  Next appt:     Controlled substance agreement on file: No.    Documentation in problem list reviewed:  No    Processing:  Fax Rx to [facility] pharmacy    RX monitoring [place] ([place]) reviewed:  reviewed- no concerns  [place] profile:  [email or web address]

## 2019-10-24 RX ORDER — ZOLPIDEM TARTRATE 5 MG/1
TABLET ORAL
Qty: 30 TABLET | Refills: 0 | Status: SHIPPED | OUTPATIENT
Start: 2019-10-24 | End: 2020-01-07

## 2019-11-02 ENCOUNTER — HEALTH MAINTENANCE LETTER (OUTPATIENT)
Age: 54
End: 2019-11-02

## 2019-12-20 ENCOUNTER — MYC MEDICAL ADVICE (OUTPATIENT)
Dept: FAMILY MEDICINE | Facility: CLINIC | Age: 54
End: 2019-12-20

## 2020-01-06 NOTE — PROGRESS NOTES
SUBJECTIVE:   CC: Vimal Goldsmith is an 55 year old male who presents for preventative health visit.     Healthy Habits:     Getting at least 3 servings of Calcium per day:  Yes    Bi-annual eye exam:  Yes    Dental care twice a year:  Yes    Sleep apnea or symptoms of sleep apnea:  None    Diet:  Gluten-free/reduced    Frequency of exercise:  1 day/week    Duration of exercise:  Less than 15 minutes    Taking medications regularly:  Yes    Medication side effects:  Not applicable    PHQ-2 Total Score: 0    Additional concerns today:  No    Desires STD screening, in a relationship that is stable but not always 100% monogamous     Also has a number chronic conditions that he needs refills on including dapsone for dermatitis herpetiformis and some medication for dry eyes     Needs labs for these things  Refills, stable    The patient also wishes to address some reduction in erectile function, and  Viagra type medications.   ROS: He is not having any loss in body hair, muscle mass or decrease in libido  Genital Exam deferred   Side effects are discussed. He does not have heart disease, does not seem at risk for CAD, and is not using nitrates. Brief sexual counseling is provided. The proper use is discussed.      Patient has been having mild to moderate symptoms of insomnia  Particularly with travel    Previous treatment modalities employed include ambien     No known medical causes of insomnia    Problem pert ROS:  Musculoskeletal: Normal; movements are symmetrical, no weakness  Neuro: Normal; no tremor  Psych: No suicidal thoughts or plan. No halluciations.  Psych exam:  GROOMING: Adequately groomed.  ATTIRE: Appropriate.  AGE: Appears stated.  BEHAVIOR TOWARDS EXAMINER: Cooperative.  MOTOR ACTIVITY: Unremarkable.  EYE CONTACT: Appropriate.  Mood:  good  Affect:  appropriate and in normal range  SPEECH/LANGUAGE: Unremarkable.  ATTENTION: Unremarkable.  CONCENTRATION: Unremarkable.  THOUGHT PROCESS: Unremarkable  THOUGHT  CONTENT: Unremarkable for hallucinations and delusions.  ORIENTATION: Times 3.  MEMORY: No evidence of impairment.  JUDGMENT: No evidence of impairment.  ESTIMATED INTELLIGENCE: Average.  DEMONSTRATED INSIGHT: Adequate.  FUND OF KNOWLEDGE: Adequate.  SUICIDE RISK: None apparent and denied.    Discussed appropriate use of ambien    Discussed potential for impairment and dependence with benzo or Z medications, a limited Rx prescribed                   Today's PHQ-2 Score:   PHQ-2 ( 1999 Pfizer) 1/7/2020   Q1: Little interest or pleasure in doing things 0   Q2: Feeling down, depressed or hopeless 0   PHQ-2 Score 0   Q1: Little interest or pleasure in doing things Not at all   Q2: Feeling down, depressed or hopeless Not at all   PHQ-2 Score 0       Abuse: Current or Past(Physical, Sexual or Emotional)- No  Do you feel safe in your environment? Yes    Have you ever done Advance Care Planning? (For example, a Health Directive, POLST, or a discussion with a medical provider or your loved ones about your wishes):     Social History     Tobacco Use     Smoking status: Never Smoker     Smokeless tobacco: Never Used     Tobacco comment: NON SMOKING ENVIRONMENT, 10/6/11, TALIB.    Substance Use Topics     Alcohol use: No     Comment: a few sips to help sleep     If you drink alcohol do you typically have >3 drinks per day or >7 drinks per week? No    Alcohol Use 1/7/2020   Prescreen: >3 drinks/day or >7 drinks/week? No   Prescreen: >3 drinks/day or >7 drinks/week? -   No flowsheet data found.    Last PSA:   PSA   Date Value Ref Range Status   11/27/2017 0.80 0 - 4 ug/L Final     Comment:     Assay Method:  Chemiluminescence using Siemens Vista analyzer       Reviewed orders with patient. Reviewed health maintenance and updated orders accordingly - Yes  Labs reviewed in EPIC  BP Readings from Last 3 Encounters:   01/07/20 116/74   04/17/19 114/72   11/19/18 122/72    Wt Readings from Last 3 Encounters:   01/07/20 75.4 kg (166 lb  3.2 oz)   11/19/18 74.4 kg (164 lb)   11/29/17 74.6 kg (164 lb 8 oz)                  Patient Active Problem List   Diagnosis     Dermatitis herpetiformis     Celiac disease     Tinea pedis     Acne rosacea     BPH (benign prostatic hypertrophy)     Bacterial folliculitis     Chalazion     Therapeutic drug monitoring     Bilateral dry eyes     Adjustment insomnia     Past Surgical History:   Procedure Laterality Date     ENHANCE LASER REFRACTIVE BILATERAL EXISTING PT IN PARAMETERS Left 01/04/2017     ESOPHAGOSCOPY, GASTROSCOPY, DUODENOSCOPY (EGD), COMBINED  2/11/2013    Procedure: COMBINED ESOPHAGOSCOPY, GASTROSCOPY, DUODENOSCOPY (EGD), BIOPSY SINGLE OR MULTIPLE;;  Surgeon: Luke Juan MD;  Location:  GI     NASAL/SINUS POLYPECTOMY         Social History     Tobacco Use     Smoking status: Never Smoker     Smokeless tobacco: Never Used     Tobacco comment: NON SMOKING ENVIRONMENT, 10/6/11, KJM.    Substance Use Topics     Alcohol use: No     Comment: a few sips to help sleep     Family History   Problem Relation Age of Onset     Lipids Mother      Kidney Disease Mother      Arthritis Mother      Hypertension Mother      Osteoporosis Mother      Gastrointestinal Disease Father         maybe celiac     Cancer Father         Leukemia     Heart Disease Father      Hypertension Father      Cerebrovascular Disease Father      Stomach Problem Father      Gastrointestinal Disease Brother         maybe celiac     Kidney Disease Brother         Kidnet stone     Cancer - colorectal No family hx of      Prostate Cancer No family hx of      Melanoma No family hx of      Skin Cancer No family hx of          Current Outpatient Medications   Medication Sig Dispense Refill     acetaZOLAMIDE (DIAMOX) 125 MG tablet Take one tab every 12 hours starting 24 hours prior to [place] and continue for 24 hours while at the highest altitude 30 tablet 0     dapsone (ACZONE) 25 MG tablet TAKE 1 TABLET BY MOUTH DAILY, TAKE 2 TABLETS WHEN  FLARING 60 tablet 11     fluticasone (FLONASE) 50 MCG/ACT spray Spray 1 spray into both nostrils daily 1 Bottle 0     ipratropium (ATROVENT) 0.06 % nasal spray Spray 2 sprays into both nostrils 4 times daily as needed for rhinitis 15 mL 1     lifitegrast (XIIDRA) 5 % opthalmic solution Place 1 drop into both eyes 2 times daily 60 each 1     oxymetazoline (ANEFRIN NASAL SPRAY) 0.05 % nasal spray USE TWO SPRAYS IN EACH NOSTRIL TWICE DAILY(SPRAY BEFORE USING OTHER INHALERS, DO NOT USE FOR MORE THAN 3 DAYS). 30 mL 1     sildenafil (VIAGRA) 50 MG tablet Take 0.5 tablets (25 mg) 30 min to 4 hours before intercourse daily as needed. Never use with nitroglycerin, terazosin or doxazosin. 6 tablet 11     sodium chloride (OCEAN) 0.65 % nasal spray Spray 1 spray into both nostrils every 4 hours as needed for congestion 104 mL 0     zolpidem (AMBIEN) 5 MG tablet TAKE ONE TABLET BY MOUTH NIGHTLY AS NEEDED FOR SLEEP 30 tablet 0     Allergies   Allergen Reactions     Gluten Meal Rash and GI Disturbance     Nkda [No Known Drug Allergies]        Reviewed and updated as needed this visit by clinical staff  Allergies         Reviewed and updated as needed this visit by Provider            Review of Systems  CONSTITUTIONAL: NEGATIVE for fever, chills, change in weight  INTEGUMENTARY/SKIN: NEGATIVE for worrisome rashes, moles or lesions  EYES: NEGATIVE for vision changes or irritation  ENT: NEGATIVE for ear, mouth and throat problems  RESP: NEGATIVE for significant cough or SOB  CV: NEGATIVE for chest pain, palpitations or peripheral edema  GI: NEGATIVE for nausea, abdominal pain, heartburn, or change in bowel habits   male: negative for dysuria, hematuria, decreased urinary stream, erectile dysfunction, urethral discharge  MUSCULOSKELETAL: NEGATIVE for significant arthralgias or myalgia  NEURO: NEGATIVE for weakness, dizziness or paresthesias  PSYCHIATRIC: NEGATIVE for changes in mood or affect    OBJECTIVE:   /74 (BP Location:  "Left arm, Patient Position: Sitting, Cuff Size: Adult Large)   Pulse 69   Resp 14   Ht 1.715 m (5' 7.5\")   Wt 75.4 kg (166 lb 3.2 oz)   SpO2 98%   BMI 25.65 kg/m      Physical Exam    General Appearance: Pleasant, alert, in no acute respiratory distress.  Head Exam: Normal. Normocephalic, atraumatic. No sinus tenderness.  Eye Exam: Normal external eye, conjunctiva, lids. DOMINGUEZ.  Ear Exam: Normal auditory canals and external ears. Non-tender.  Left TM-normal. Right TM-normal.  OroPharynx Exam: Dental hygiene adequate. Normal buccal mucosa. Normal pharynx.  Neck Exam: Supple, no masses or enlarged, tender nodes.  Thyroid Exam: No nodules or enlargement or tenderness.  Chest/Respiratory Exam: Normal, comfortable, easy respirations. Chest wall normal. Lungs are clear to auscultation. No wheezing, crackles, or rhonchi.  Cardiovascular Exam: Regular rate and rhythm. No murmur, gallop, or rubs. No pedal edema.  Gastrointestinal Exam: Soft, non-tender, no masses or organomegaly.  Musculoskeletal Exam: Back is non-tender, full ROM of upper and lower extremities.  Skin: no rash, warm and dry.    Neurologic Exam: Nonfocal, no tremor. Normal gait.  Psychiatric Exam: Alert - appropriate, normal affect      ASSESSMENT/PLAN:       ICD-10-CM    1. Routine general medical examination at a health care facility Z00.00    2. Dermatitis herpetiformis L13.0 dapsone (ACZONE) 25 MG tablet     CBC with platelets     Comprehensive metabolic panel   3. Therapeutic drug monitoring Z51.81 dapsone (ACZONE) 25 MG tablet     CBC with platelets     Comprehensive metabolic panel   4. Monocular diplopia of both eyes H53.2 lifitegrast (XIIDRA) 5 % opthalmic solution   5. H/O laser assisted in situ keratomileusis - Left Eye Z98.890 lifitegrast (XIIDRA) 5 % opthalmic solution   6. Bilateral dry eyes H04.123 lifitegrast (XIIDRA) 5 % opthalmic solution   7. Erectile dysfunction, unspecified erectile dysfunction type N52.9 sildenafil (VIAGRA) 50 MG " "tablet   8. Adjustment insomnia F51.02 zolpidem (AMBIEN) 5 MG tablet   9. Screen for STD (sexually transmitted disease) Z11.3 Neisseria gonorrhoeae PCR     Chlamydia trachomatis PCR     Treponema Abs w Reflex to RPR and Titer     HIV Antigen Antibody Combo   10. Lipid screening Z13.220 Lipid Profile       COUNSELING:   Reviewed preventive health counseling, as reflected in patient instructions       Regular exercise       Healthy diet/nutrition       HIV screeninx in teen years, 1x in adult years, and at intervals if high risk       Colon cancer screening    Estimated body mass index is 25.65 kg/m  as calculated from the following:    Height as of this encounter: 1.715 m (5' 7.5\").    Weight as of this encounter: 75.4 kg (166 lb 3.2 oz).          reports that he has never smoked. He has never used smokeless tobacco.      Counseling Resources:  ATP IV Guidelines  Pooled Cohorts Equation Calculator  FRAX Risk Assessment  ICSI Preventive Guidelines  Dietary Guidelines for Americans, 2010  USDA's MyPlate  ASA Prophylaxis  Lung CA Screening    Griffin Lex Bravo MD  [facility]  "

## 2020-01-07 ENCOUNTER — OFFICE VISIT (OUTPATIENT)
Dept: FAMILY MEDICINE | Facility: CLINIC | Age: 55
End: 2020-01-07
Payer: COMMERCIAL

## 2020-01-07 VITALS
RESPIRATION RATE: 14 BRPM | HEART RATE: 69 BPM | DIASTOLIC BLOOD PRESSURE: 74 MMHG | OXYGEN SATURATION: 98 % | SYSTOLIC BLOOD PRESSURE: 116 MMHG | BODY MASS INDEX: 25.19 KG/M2 | WEIGHT: 166.2 LBS | HEIGHT: 68 IN

## 2020-01-07 DIAGNOSIS — H04.123 BILATERAL DRY EYES: ICD-10-CM

## 2020-01-07 DIAGNOSIS — Z13.220 LIPID SCREENING: ICD-10-CM

## 2020-01-07 DIAGNOSIS — Z51.81 THERAPEUTIC DRUG MONITORING: ICD-10-CM

## 2020-01-07 DIAGNOSIS — F51.02 ADJUSTMENT INSOMNIA: ICD-10-CM

## 2020-01-07 DIAGNOSIS — L13.0 DERMATITIS HERPETIFORMIS: ICD-10-CM

## 2020-01-07 DIAGNOSIS — Z00.00 ROUTINE GENERAL MEDICAL EXAMINATION AT A HEALTH CARE FACILITY: Primary | ICD-10-CM

## 2020-01-07 DIAGNOSIS — Z11.3 SCREEN FOR STD (SEXUALLY TRANSMITTED DISEASE): ICD-10-CM

## 2020-01-07 DIAGNOSIS — Z98.890 H/O LASER ASSISTED IN SITU KERATOMILEUSIS: ICD-10-CM

## 2020-01-07 DIAGNOSIS — H53.2 MONOCULAR DIPLOPIA OF BOTH EYES: ICD-10-CM

## 2020-01-07 DIAGNOSIS — N52.9 ERECTILE DYSFUNCTION, UNSPECIFIED ERECTILE DYSFUNCTION TYPE: ICD-10-CM

## 2020-01-07 LAB
ALBUMIN SERPL-MCNC: 3.8 G/DL (ref 3.4–5)
ALP SERPL-CCNC: 54 U/L (ref 40–150)
ALT SERPL W P-5'-P-CCNC: 32 U/L (ref 0–70)
ANION GAP SERPL CALCULATED.3IONS-SCNC: 5 MMOL/L (ref 3–14)
AST SERPL W P-5'-P-CCNC: 16 U/L (ref 0–45)
BILIRUB SERPL-MCNC: 0.7 MG/DL (ref 0.2–1.3)
BUN SERPL-MCNC: 18 MG/DL (ref 7–30)
CALCIUM SERPL-MCNC: 8.6 MG/DL (ref 8.5–10.1)
CHLORIDE SERPL-SCNC: 107 MMOL/L (ref 94–109)
CHOLEST SERPL-MCNC: 173 MG/DL
CO2 SERPL-SCNC: 26 MMOL/L (ref 20–32)
CREAT SERPL-MCNC: 0.91 MG/DL (ref 0.66–1.25)
ERYTHROCYTE [DISTWIDTH] IN BLOOD BY AUTOMATED COUNT: 13.1 % (ref 10–15)
GFR SERPL CREATININE-BSD FRML MDRD: >90 ML/MIN/{1.73_M2}
GLUCOSE SERPL-MCNC: 109 MG/DL (ref 70–99)
HCT VFR BLD AUTO: 43.8 % (ref 40–53)
HDLC SERPL-MCNC: 53 MG/DL
HGB BLD-MCNC: 15 G/DL (ref 13.3–17.7)
LDLC SERPL CALC-MCNC: 104 MG/DL
MCH RBC QN AUTO: 30.1 PG (ref 26.5–33)
MCHC RBC AUTO-ENTMCNC: 34.2 G/DL (ref 31.5–36.5)
MCV RBC AUTO: 88 FL (ref 78–100)
NONHDLC SERPL-MCNC: 120 MG/DL
PLATELET # BLD AUTO: 198 10E9/L (ref 150–450)
POTASSIUM SERPL-SCNC: 3.8 MMOL/L (ref 3.4–5.3)
PROT SERPL-MCNC: 7.4 G/DL (ref 6.8–8.8)
RBC # BLD AUTO: 4.99 10E12/L (ref 4.4–5.9)
SODIUM SERPL-SCNC: 138 MMOL/L (ref 133–144)
TRIGL SERPL-MCNC: 81 MG/DL
WBC # BLD AUTO: 4.6 10E9/L (ref 4–11)

## 2020-01-07 PROCEDURE — 99396 PREV VISIT EST AGE 40-64: CPT | Performed by: FAMILY MEDICINE

## 2020-01-07 PROCEDURE — 36415 COLL VENOUS BLD VENIPUNCTURE: CPT | Performed by: FAMILY MEDICINE

## 2020-01-07 PROCEDURE — 87491 CHLMYD TRACH DNA AMP PROBE: CPT | Performed by: FAMILY MEDICINE

## 2020-01-07 PROCEDURE — 99214 OFFICE O/P EST MOD 30 MIN: CPT | Mod: 25 | Performed by: FAMILY MEDICINE

## 2020-01-07 PROCEDURE — 86780 TREPONEMA PALLIDUM: CPT | Performed by: FAMILY MEDICINE

## 2020-01-07 PROCEDURE — 80061 LIPID PANEL: CPT | Performed by: FAMILY MEDICINE

## 2020-01-07 PROCEDURE — 85027 COMPLETE CBC AUTOMATED: CPT | Performed by: FAMILY MEDICINE

## 2020-01-07 PROCEDURE — 87591 N.GONORRHOEAE DNA AMP PROB: CPT | Performed by: FAMILY MEDICINE

## 2020-01-07 PROCEDURE — 87389 HIV-1 AG W/HIV-1&-2 AB AG IA: CPT | Performed by: FAMILY MEDICINE

## 2020-01-07 PROCEDURE — 80053 COMPREHEN METABOLIC PANEL: CPT | Performed by: FAMILY MEDICINE

## 2020-01-07 RX ORDER — SILDENAFIL 50 MG/1
TABLET, FILM COATED ORAL
Qty: 6 TABLET | Refills: 11 | Status: SHIPPED | OUTPATIENT
Start: 2020-01-07 | End: 2021-03-11

## 2020-01-07 RX ORDER — DAPSONE 25 MG/1
TABLET ORAL
Qty: 60 TABLET | Refills: 11 | Status: SHIPPED | OUTPATIENT
Start: 2020-01-07 | End: 2021-01-22

## 2020-01-07 RX ORDER — ZOLPIDEM TARTRATE 5 MG/1
TABLET ORAL
Qty: 30 TABLET | Refills: 0 | Status: SHIPPED | OUTPATIENT
Start: 2020-01-07 | End: 2020-02-27

## 2020-01-07 ASSESSMENT — MIFFLIN-ST. JEOR: SCORE: 1555.44

## 2020-01-07 ASSESSMENT — PAIN SCALES - GENERAL: PAINLEVEL: NO PAIN (0)

## 2020-01-21 ENCOUNTER — OFFICE VISIT (OUTPATIENT)
Dept: OPHTHALMOLOGY | Facility: CLINIC | Age: 55
End: 2020-01-21
Attending: OPHTHALMOLOGY
Payer: COMMERCIAL

## 2020-01-21 DIAGNOSIS — H04.123 BILATERAL DRY EYES: ICD-10-CM

## 2020-01-21 DIAGNOSIS — H52.7 REFRACTIVE ERROR: ICD-10-CM

## 2020-01-21 DIAGNOSIS — Z98.890 S/P LASIK (LASER ASSISTED IN SITU KERATOMILEUSIS) OF BOTH EYES: ICD-10-CM

## 2020-01-21 DIAGNOSIS — H25.813 COMBINED FORM OF AGE-RELATED CATARACT, BOTH EYES: Primary | ICD-10-CM

## 2020-01-21 PROCEDURE — 92015 DETERMINE REFRACTIVE STATE: CPT | Mod: ZF

## 2020-01-21 PROCEDURE — G0463 HOSPITAL OUTPT CLINIC VISIT: HCPCS | Mod: 25,ZF

## 2020-01-21 ASSESSMENT — REFRACTION_MANIFEST
OS_SPHERE: PLANO
OS_AXIS: 165
OD_ADD: +2.25
OS_CYLINDER: +0.50
OD_CYLINDER: +1.00
OD_AXIS: 149
OD_SPHERE: -1.00
OS_ADD: +2.25

## 2020-01-21 ASSESSMENT — TONOMETRY
IOP_METHOD: TONOPEN
OS_IOP_MMHG: 10
OD_IOP_MMHG: 11

## 2020-01-21 ASSESSMENT — CUP TO DISC RATIO
OD_RATIO: 0.2
OS_RATIO: 0.2

## 2020-01-21 ASSESSMENT — CONF VISUAL FIELD
METHOD: COUNTING FINGERS
OD_NORMAL: 1
OS_NORMAL: 1

## 2020-01-21 ASSESSMENT — EXTERNAL EXAM - LEFT EYE: OS_EXAM: NORMAL

## 2020-01-21 ASSESSMENT — VISUAL ACUITY
OD_SC: 20/40
OS_SC: 20/25
METHOD: SNELLEN - LINEAR
OD_SC+: -1

## 2020-01-21 ASSESSMENT — EXTERNAL EXAM - RIGHT EYE: OD_EXAM: NORMAL

## 2020-01-21 NOTE — PROGRESS NOTES
Chief Complaint(s) and History of Present Illness(es)     Consult       Comments    Vimal is here complaining of tired after being on the computer or reading   for an extended period of time. This has been going on for years and he   has been seen here for the same problem in the past. He was told to use   Xiidra which he does as needed. He has a history of LASIK LE by Dr Hobson, and cataract surgery BE from here. He says refraction does not   help his RE, but he would like new glasses.    Simone Valdovinos COT 2:42 PM January 21, 2020         Review of systems for the eyes was negative other than the pertinent positives/negatives listed in the HPI.      Assessment & Plan      Vimal Goldsmith is a 55 year old male with the following diagnoses:   1. Combined form of age-related cataract, both eyes    2. Refractive error    3. Bilateral dry eyes    4. S/P LASIK (laser assisted in situ keratomileusis) of both eyes       BCVA 20/25 -3 and 20/20    Longstanding monocular diplopia, previously followed by Dr. Marcelino  H/O anisometropia and possible mild amblyopia right eye (Left eye previously with greater myopia)  S/P laser in situ keratomileusis (LASIK) left eye in Jan 2017  Noting more fatigue with reading and computer (working on main application)  Using xiidra as needed and tears on occasion        Increase artifical tears to 3-4x daily during heavy computer use days  Warm compresses twice a day   Resume xiidra twice a day both eyes; instructed that this medication works best if uses consistently    Early visual significance of cataracts ou, L > R  Observe, BAT testing next year    Patient disposition:   Return in about 1 year (around 1/21/2021) for Vision, Pressure, Refraction, (BAT), Dilation.       Dawit Roger, PGY2  Ophthalmology Resident    Attending Physician Attestation:  Complete documentation of historical and exam elements from today's encounter can be found in the full encounter summary report (not  reduplicated in this progress note).  I personally obtained the chief complaint(s) and history of present illness.  I confirmed and edited as necessary the review of systems, past medical/surgical history, family history, social history, and examination findings as documented by others; and I examined the patient myself.  I personally reviewed the relevant tests, images, and reports as documented above.  I formulated and edited as necessary the assessment and plan and discussed the findings and management plan with the patient and family. . - Missael Chahal MD

## 2020-01-21 NOTE — NURSING NOTE
Chief Complaints and History of Present Illnesses   Patient presents with     Consult     Chief Complaint(s) and History of Present Illness(es)     Consult               Comments     Vimla is here complaining of tired after being on the computer or reading for an extended period of time. This has been going on for years and he has been seen here for the same problem in the past. He was told to use Xiidra which he does as needed. He has a history of LASIK LE by Dr Hobson, and cataract surgery BE from here. He says refraction does not help his RE, but he would like new glasses.    Simone Valdovinos COT 2:42 PM January 21, 2020

## 2020-01-23 ENCOUNTER — TELEPHONE (OUTPATIENT)
Dept: FAMILY MEDICINE | Facility: CLINIC | Age: 55
End: 2020-01-23

## 2020-01-23 NOTE — TELEPHONE ENCOUNTER
Reason for Call:  Form, our goal is to have forms completed with 72 hours, however, some forms may require a visit or additional information.    Type of letter, form or note:  medical    Who is the form from?: Patient    Where did the form come from: Patient or family brought in       What clinic location was the form placed at?: [facility]    Where the form was placed: Dr Bravo's Box/Folder    What number is listed as a contact on the form?: [phone]       Additional comments: Health screening form    Call taken on 1/23/2020 at 11:04 AM by Gwen Marte

## 2020-01-23 NOTE — TELEPHONE ENCOUNTER
Forms received from [employer] form for completion and signature  Placed forms:  On your desk  Forms need to be faxed or mailed to [phone]    Patient call? yes  Copy sent to scanning? yes    All.ZACH Wang

## 2020-02-27 DIAGNOSIS — F51.02 ADJUSTMENT INSOMNIA: ICD-10-CM

## 2020-02-27 RX ORDER — ZOLPIDEM TARTRATE 5 MG/1
TABLET ORAL
Qty: 30 TABLET | Refills: 0 | Status: SHIPPED | OUTPATIENT
Start: 2020-02-27 | End: 2021-01-28

## 2020-02-27 NOTE — TELEPHONE ENCOUNTER
ZOLPIDEM 5MG TABLETS  Last Written Prescription Date:  01/07/2020  Last Fill Quantity: 30,  # refills: 0   Last office visit: 1/7/2020 with prescribing provider:  DIEGO   Future Office Visit: Nothing at this time scheduled.     Requested Prescriptions   Pending Prescriptions Disp Refills     zolpidem (AMBIEN) 5 MG tablet [Pharmacy Med Name: ZOLPIDEM 5MG TABLETS] 30 tablet      Sig: TAKE ONE TABLET BY MOUTH NIGHTLY AS NEEDED FOR SLEEP       There is no refill protocol information for this order

## 2020-02-27 NOTE — TELEPHONE ENCOUNTER
JF,    Controlled Substance Refill Request for Ambien 5 mg    Last refill: 1/8/2020 - #30    Last clinic visit: 1/7/2020  Note from OV:  Patient has been having mild to moderate symptoms of insomnia  Particularly with travel     Discussed appropriate use of ambien     Discussed potential for impairment and dependence with benzo or Z medications, a limited Rx prescribed      Clinic visit frequency required: Not documented  Next appt: none    Controlled substance agreement on file: No.    Documentation in problem list reviewed:  No    Processing:  Rx to be electronically transmitted to pharmacy by provider     [place] reviewed:  problem. Called [facility]. Filled 1/8/2020 for #30  [place]:  [email or web address]    Thanks,  Marisol Renteria RN

## 2020-07-06 DIAGNOSIS — F51.02 ADJUSTMENT INSOMNIA: ICD-10-CM

## 2020-07-07 RX ORDER — ZOLPIDEM TARTRATE 5 MG/1
TABLET ORAL
Start: 2020-07-07

## 2020-11-05 ENCOUNTER — ALLIED HEALTH/NURSE VISIT (OUTPATIENT)
Dept: NURSING | Facility: CLINIC | Age: 55
End: 2020-11-05
Payer: COMMERCIAL

## 2020-11-05 DIAGNOSIS — Z23 NEED FOR PROPHYLACTIC VACCINATION AND INOCULATION AGAINST INFLUENZA: Primary | ICD-10-CM

## 2020-11-05 PROCEDURE — 90682 RIV4 VACC RECOMBINANT DNA IM: CPT

## 2020-11-05 PROCEDURE — 90471 IMMUNIZATION ADMIN: CPT

## 2020-11-05 PROCEDURE — 99207 PR NO CHARGE NURSE ONLY: CPT

## 2021-01-21 ENCOUNTER — MYC REFILL (OUTPATIENT)
Dept: FAMILY MEDICINE | Facility: CLINIC | Age: 56
End: 2021-01-21

## 2021-01-21 ENCOUNTER — MYC MEDICAL ADVICE (OUTPATIENT)
Dept: FAMILY MEDICINE | Facility: CLINIC | Age: 56
End: 2021-01-21

## 2021-01-21 DIAGNOSIS — Z51.81 THERAPEUTIC DRUG MONITORING: ICD-10-CM

## 2021-01-21 DIAGNOSIS — F51.02 ADJUSTMENT INSOMNIA: ICD-10-CM

## 2021-01-21 DIAGNOSIS — L13.0 DERMATITIS HERPETIFORMIS: ICD-10-CM

## 2021-01-21 RX ORDER — ZOLPIDEM TARTRATE 5 MG/1
TABLET ORAL
Qty: 30 TABLET | Refills: 0 | Status: CANCELLED | OUTPATIENT
Start: 2021-01-21

## 2021-01-21 NOTE — TELEPHONE ENCOUNTER
Routing refill request to provider for review/approval because:  Drug not on the [facility] refill protocol   Please authorize if appropriate.  Thanks,  Amy Merida RN        Requested Prescriptions   Pending Prescriptions Disp Refills     dapsone (ACZONE) 25 MG tablet 60 tablet 11     Sig: TAKE 1 TABLET BY MOUTH DAILY, TAKE 2 TABLETS WHEN FLARING       There is no refill protocol information for this order

## 2021-01-22 RX ORDER — DAPSONE 25 MG/1
TABLET ORAL
Qty: 60 TABLET | Refills: 0 | Status: SHIPPED | OUTPATIENT
Start: 2021-01-22 | End: 2021-01-28

## 2021-01-28 ENCOUNTER — MYC MEDICAL ADVICE (OUTPATIENT)
Dept: FAMILY MEDICINE | Facility: CLINIC | Age: 56
End: 2021-01-28

## 2021-01-28 ENCOUNTER — VIRTUAL VISIT (OUTPATIENT)
Dept: FAMILY MEDICINE | Facility: CLINIC | Age: 56
End: 2021-01-28
Payer: COMMERCIAL

## 2021-01-28 DIAGNOSIS — L13.0 DERMATITIS HERPETIFORMIS: ICD-10-CM

## 2021-01-28 DIAGNOSIS — F51.02 ADJUSTMENT INSOMNIA: Primary | ICD-10-CM

## 2021-01-28 DIAGNOSIS — Z51.81 THERAPEUTIC DRUG MONITORING: ICD-10-CM

## 2021-01-28 DIAGNOSIS — Z11.3 SCREEN FOR STD (SEXUALLY TRANSMITTED DISEASE): ICD-10-CM

## 2021-01-28 DIAGNOSIS — Z12.11 COLON CANCER SCREENING: ICD-10-CM

## 2021-01-28 PROCEDURE — 99214 OFFICE O/P EST MOD 30 MIN: CPT | Mod: 95 | Performed by: FAMILY MEDICINE

## 2021-01-28 RX ORDER — DAPSONE 25 MG/1
TABLET ORAL
Qty: 60 TABLET | Refills: 6 | Status: SHIPPED | OUTPATIENT
Start: 2021-01-28 | End: 2021-11-23

## 2021-01-28 RX ORDER — ZOLPIDEM TARTRATE 5 MG/1
TABLET ORAL
Qty: 30 TABLET | Refills: 1 | Status: SHIPPED | OUTPATIENT
Start: 2021-01-28 | End: 2022-04-24

## 2021-01-28 NOTE — PROGRESS NOTES
Alison Fisher is a 56 year old who is being evaluated via a billable video visit.      How would you like to obtain your AVS? MyChart  If the video visit is dropped, the invitation should be resent by: Text to cell phone: [phone]  Will anyone else be joining your video visit? No      Video Start Time: 12:51 PM  Assessment & Plan       Adjustment insomnia  Stable, refill    - zolpidem (AMBIEN) 5 MG tablet; TAKE ONE TABLET BY MOUTH NIGHTLY AS NEEDED FOR SLEEP    Dermatitis herpetiformis  Stable  Needs monitoring for side effects    - dapsone (ACZONE) 25 MG tablet; TAKE 1 TABLET BY MOUTH DAILY, TAKE 2 TABLETS WHEN FLARING  - CBC with platelets differential; Future  - Comprehensive metabolic panel; Future    Therapeutic drug monitoring    - dapsone (ACZONE) 25 MG tablet; TAKE 1 TABLET BY MOUTH DAILY, TAKE 2 TABLETS WHEN FLARING  - CBC with platelets differential; Future  - Comprehensive metabolic panel; Future    Screen for STD (sexually transmitted disease)    Non monogamous  - HIV Antigen Antibody Combo; Future    Colon cancer screening    - GASTROENTEROLOGY ADULT REF PROCEDURE ONLY; Future        Return in about 1 year (around 1/28/2022) for Physical, or as needed if today's problem persists.    Griffin Bravo MD  [facility]     Alison Fisher is a 56 year old who presents to clinic today for the following health issues     HPI       Patient calling for medication check   Just needs refills of Ambien   chronic conditions that he needs refills on including dapsone for dermatitis herpetiformis and some medication for dry eyes     Patient has been having mild to moderate symptoms of insomnia  Particularly with travel    Previous treatment modalities employed include ambien     No known medical causes of insomnia  No other current concerns      How many servings of fruits and vegetables do you eat daily?  4 or more    On average, how many sweetened beverages do you  drink each day (Examples: soda, juice, sweet tea, etc.  Do NOT count diet or artificially sweetened beverages)?   0    How many days per week do you exercise enough to make your heart beat faster? 3 or less    How many minutes a day do you exercise enough to make your heart beat faster? 20 - 29    How many days per week do you miss taking your medication? 0    Health Maintenance Addressed:  Colon - will discuss with PCP - knows he's due        Review of Systems         Objective           Vitals:  No vitals were obtained today due to virtual visit.    Physical Exam   GENERAL: Healthy, alert and no distress  EYES: Eyes grossly normal to inspection.  No discharge or erythema, or obvious scleral/conjunctival abnormalities.  RESP: No audible wheeze, cough, or visible cyanosis.  No visible retractions or increased work of breathing.    SKIN: Visible skin clear. No significant rash, abnormal pigmentation or lesions.  NEURO: Cranial nerves grossly intact.  Mentation and speech appropriate for age.  PSYCH: Mentation appears normal, affect normal/bright, judgement and insight intact, normal speech and appearance well-groomed.                Video-Visit Details    Type of service:  Video Visit    Video End Time:12:59 PM    Originating Location (pt. Location): Home    Distant Location (provider location):  [facility]     Platform used for Video Visit: [employer]

## 2021-03-11 DIAGNOSIS — N52.9 ERECTILE DYSFUNCTION, UNSPECIFIED ERECTILE DYSFUNCTION TYPE: ICD-10-CM

## 2021-03-11 RX ORDER — SILDENAFIL 50 MG/1
TABLET, FILM COATED ORAL
Qty: 6 TABLET | Refills: 3 | Status: SHIPPED | OUTPATIENT
Start: 2021-03-11 | End: 2022-04-25

## 2021-03-11 NOTE — TELEPHONE ENCOUNTER
Sildenafil:    Prescription approved per [facility] Refill Protocol.  Marisol Renteria RN

## 2021-03-27 ENCOUNTER — IMMUNIZATION (OUTPATIENT)
Dept: NURSING | Facility: CLINIC | Age: 56
End: 2021-03-27
Payer: COMMERCIAL

## 2021-03-27 PROCEDURE — 0011A PR COVID VAC MODERNA 100 MCG/0.5 ML IM: CPT

## 2021-03-27 PROCEDURE — 91301 PR COVID VAC MODERNA 100 MCG/0.5 ML IM: CPT

## 2021-04-03 ENCOUNTER — HEALTH MAINTENANCE LETTER (OUTPATIENT)
Age: 56
End: 2021-04-03

## 2021-04-24 ENCOUNTER — IMMUNIZATION (OUTPATIENT)
Dept: NURSING | Facility: CLINIC | Age: 56
End: 2021-04-24
Attending: INTERNAL MEDICINE
Payer: COMMERCIAL

## 2021-04-24 PROCEDURE — 0012A PR COVID VAC MODERNA 100 MCG/0.5 ML IM: CPT

## 2021-04-24 PROCEDURE — 91301 PR COVID VAC MODERNA 100 MCG/0.5 ML IM: CPT

## 2021-09-07 ENCOUNTER — TRANSFERRED RECORDS (OUTPATIENT)
Dept: HEALTH INFORMATION MANAGEMENT | Facility: CLINIC | Age: 56
End: 2021-09-07

## 2021-09-12 ENCOUNTER — HEALTH MAINTENANCE LETTER (OUTPATIENT)
Age: 56
End: 2021-09-12

## 2021-10-18 ENCOUNTER — IMMUNIZATION (OUTPATIENT)
Dept: FAMILY MEDICINE | Facility: CLINIC | Age: 56
End: 2021-10-18
Payer: COMMERCIAL

## 2021-10-18 DIAGNOSIS — Z23 NEED FOR PROPHYLACTIC VACCINATION AND INOCULATION AGAINST INFLUENZA: Primary | ICD-10-CM

## 2021-10-18 PROCEDURE — 90471 IMMUNIZATION ADMIN: CPT

## 2021-10-18 PROCEDURE — 90682 RIV4 VACC RECOMBINANT DNA IM: CPT

## 2021-11-08 ENCOUNTER — IMMUNIZATION (OUTPATIENT)
Dept: NURSING | Facility: CLINIC | Age: 56
End: 2021-11-08
Payer: COMMERCIAL

## 2021-11-08 PROCEDURE — 91306 COVID-19,PF,MODERNA (18+ YRS BOOSTER .25ML): CPT

## 2021-11-08 PROCEDURE — 0064A COVID-19,PF,MODERNA (18+ YRS BOOSTER .25ML): CPT

## 2021-11-15 ENCOUNTER — MYC MEDICAL ADVICE (OUTPATIENT)
Dept: FAMILY MEDICINE | Facility: CLINIC | Age: 56
End: 2021-11-15
Payer: COMMERCIAL

## 2021-11-15 DIAGNOSIS — Z11.3 SCREEN FOR STD (SEXUALLY TRANSMITTED DISEASE): Primary | ICD-10-CM

## 2021-11-16 NOTE — TELEPHONE ENCOUNTER
JF,  Please see below [employer] message and advise.  You already have CMP, CBC, and HIV ordered - anything else?  Thanks,  Fariha FLYNN RN

## 2021-11-19 ENCOUNTER — LAB (OUTPATIENT)
Dept: LAB | Facility: CLINIC | Age: 56
End: 2021-11-19
Payer: COMMERCIAL

## 2021-11-19 DIAGNOSIS — Z11.3 SCREEN FOR STD (SEXUALLY TRANSMITTED DISEASE): ICD-10-CM

## 2021-11-19 DIAGNOSIS — Z13.220 LIPID SCREENING: ICD-10-CM

## 2021-11-19 DIAGNOSIS — Z13.1 DIABETES MELLITUS SCREENING: ICD-10-CM

## 2021-11-19 DIAGNOSIS — L13.0 DERMATITIS HERPETIFORMIS: ICD-10-CM

## 2021-11-19 DIAGNOSIS — Z51.81 THERAPEUTIC DRUG MONITORING: ICD-10-CM

## 2021-11-19 LAB
ALBUMIN SERPL-MCNC: 3.7 G/DL (ref 3.4–5)
ALP SERPL-CCNC: 62 U/L (ref 40–150)
ALT SERPL W P-5'-P-CCNC: 35 U/L (ref 0–70)
ANION GAP SERPL CALCULATED.3IONS-SCNC: 6 MMOL/L (ref 3–14)
AST SERPL W P-5'-P-CCNC: 18 U/L (ref 0–45)
BASOPHILS # BLD AUTO: 0 10E3/UL (ref 0–0.2)
BASOPHILS NFR BLD AUTO: 1 %
BILIRUB SERPL-MCNC: 0.6 MG/DL (ref 0.2–1.3)
BUN SERPL-MCNC: 17 MG/DL (ref 7–30)
CALCIUM SERPL-MCNC: 8.8 MG/DL (ref 8.5–10.1)
CHLORIDE BLD-SCNC: 107 MMOL/L (ref 94–109)
CO2 SERPL-SCNC: 24 MMOL/L (ref 20–32)
CREAT SERPL-MCNC: 0.93 MG/DL (ref 0.66–1.25)
EOSINOPHIL # BLD AUTO: 0.1 10E3/UL (ref 0–0.7)
EOSINOPHIL NFR BLD AUTO: 3 %
ERYTHROCYTE [DISTWIDTH] IN BLOOD BY AUTOMATED COUNT: 12.9 % (ref 10–15)
GFR SERPL CREATININE-BSD FRML MDRD: >90 ML/MIN/1.73M2
GLUCOSE BLD-MCNC: 117 MG/DL (ref 70–99)
HCT VFR BLD AUTO: 44.3 % (ref 40–53)
HGB BLD-MCNC: 15.5 G/DL (ref 13.3–17.7)
HIV 1+2 AB+HIV1 P24 AG SERPL QL IA: NONREACTIVE
LYMPHOCYTES # BLD AUTO: 1.9 10E3/UL (ref 0.8–5.3)
LYMPHOCYTES NFR BLD AUTO: 39 %
MCH RBC QN AUTO: 31.4 PG (ref 26.5–33)
MCHC RBC AUTO-ENTMCNC: 35 G/DL (ref 31.5–36.5)
MCV RBC AUTO: 90 FL (ref 78–100)
MONOCYTES # BLD AUTO: 0.4 10E3/UL (ref 0–1.3)
MONOCYTES NFR BLD AUTO: 9 %
NEUTROPHILS # BLD AUTO: 2.4 10E3/UL (ref 1.6–8.3)
NEUTROPHILS NFR BLD AUTO: 49 %
PLATELET # BLD AUTO: 221 10E3/UL (ref 150–450)
POTASSIUM BLD-SCNC: 4.1 MMOL/L (ref 3.4–5.3)
PROT SERPL-MCNC: 7.6 G/DL (ref 6.8–8.8)
RBC # BLD AUTO: 4.94 10E6/UL (ref 4.4–5.9)
SODIUM SERPL-SCNC: 137 MMOL/L (ref 133–144)
T PALLIDUM AB SER QL: NONREACTIVE
WBC # BLD AUTO: 4.9 10E3/UL (ref 4–11)

## 2021-11-19 PROCEDURE — 87591 N.GONORRHOEAE DNA AMP PROB: CPT

## 2021-11-19 PROCEDURE — 87389 HIV-1 AG W/HIV-1&-2 AB AG IA: CPT

## 2021-11-19 PROCEDURE — 36415 COLL VENOUS BLD VENIPUNCTURE: CPT

## 2021-11-19 PROCEDURE — 80061 LIPID PANEL: CPT

## 2021-11-19 PROCEDURE — 80053 COMPREHEN METABOLIC PANEL: CPT

## 2021-11-19 PROCEDURE — 87491 CHLMYD TRACH DNA AMP PROBE: CPT

## 2021-11-19 PROCEDURE — 83036 HEMOGLOBIN GLYCOSYLATED A1C: CPT

## 2021-11-19 PROCEDURE — 85025 COMPLETE CBC W/AUTO DIFF WBC: CPT

## 2021-11-19 PROCEDURE — 86780 TREPONEMA PALLIDUM: CPT

## 2021-11-20 LAB
C TRACH DNA SPEC QL NAA+PROBE: NEGATIVE
N GONORRHOEA DNA SPEC QL NAA+PROBE: NEGATIVE

## 2021-11-22 ASSESSMENT — ENCOUNTER SYMPTOMS
COUGH: 0
FREQUENCY: 1
HEMATOCHEZIA: 1
DIARRHEA: 0
PARESTHESIAS: 0
NERVOUS/ANXIOUS: 0
MYALGIAS: 1
HEADACHES: 1
DYSURIA: 0
SORE THROAT: 0
CHILLS: 0
ABDOMINAL PAIN: 0
HEARTBURN: 0
SHORTNESS OF BREATH: 0
FEVER: 0
WEAKNESS: 0
DIZZINESS: 0
ARTHRALGIAS: 0
EYE PAIN: 1
NAUSEA: 0
CONSTIPATION: 0
HEMATURIA: 0

## 2021-11-23 ENCOUNTER — OFFICE VISIT (OUTPATIENT)
Dept: FAMILY MEDICINE | Facility: CLINIC | Age: 56
End: 2021-11-23
Payer: COMMERCIAL

## 2021-11-23 VITALS
HEIGHT: 67 IN | OXYGEN SATURATION: 99 % | RESPIRATION RATE: 22 BRPM | BODY MASS INDEX: 26.38 KG/M2 | SYSTOLIC BLOOD PRESSURE: 134 MMHG | WEIGHT: 168.1 LBS | HEART RATE: 75 BPM | DIASTOLIC BLOOD PRESSURE: 79 MMHG | TEMPERATURE: 98.5 F

## 2021-11-23 DIAGNOSIS — Z00.00 ROUTINE GENERAL MEDICAL EXAMINATION AT A HEALTH CARE FACILITY: Primary | ICD-10-CM

## 2021-11-23 DIAGNOSIS — Z51.81 THERAPEUTIC DRUG MONITORING: ICD-10-CM

## 2021-11-23 DIAGNOSIS — Z13.1 DIABETES MELLITUS SCREENING: ICD-10-CM

## 2021-11-23 DIAGNOSIS — L13.0 DERMATITIS HERPETIFORMIS: ICD-10-CM

## 2021-11-23 DIAGNOSIS — J31.0 NON-ALLERGIC RHINITIS: ICD-10-CM

## 2021-11-23 DIAGNOSIS — Z12.11 COLON CANCER SCREENING: ICD-10-CM

## 2021-11-23 DIAGNOSIS — Z13.220 LIPID SCREENING: ICD-10-CM

## 2021-11-23 LAB
CHOLEST SERPL-MCNC: 165 MG/DL
HBA1C MFR BLD: 4.6 % (ref 0–5.6)
HDLC SERPL-MCNC: 49 MG/DL
LDLC SERPL CALC-MCNC: 105 MG/DL
NONHDLC SERPL-MCNC: 116 MG/DL
TRIGL SERPL-MCNC: 57 MG/DL

## 2021-11-23 PROCEDURE — 99396 PREV VISIT EST AGE 40-64: CPT | Performed by: FAMILY MEDICINE

## 2021-11-23 RX ORDER — DAPSONE 25 MG/1
TABLET ORAL
Qty: 60 TABLET | Refills: 6 | Status: SHIPPED | OUTPATIENT
Start: 2021-11-23 | End: 2022-06-06

## 2021-11-23 RX ORDER — IPRATROPIUM BROMIDE 42 UG/1
SPRAY, METERED NASAL
Qty: 15 ML | Refills: 1 | Status: SHIPPED | OUTPATIENT
Start: 2021-11-23 | End: 2022-06-06

## 2021-11-23 ASSESSMENT — MIFFLIN-ST. JEOR: SCORE: 1543.19

## 2021-11-23 NOTE — PROGRESS NOTES
SUBJECTIVE:   CC: Vimal Goldsmith is an 56 year old male who presents for preventative health visit.     Patient has been advised of split billing requirements and indicates understanding: Yes  Healthy Habits:     Getting at least 3 servings of Calcium per day:  Yes    Bi-annual eye exam:  Yes    Dental care twice a year:  Yes    Sleep apnea or symptoms of sleep apnea:  None    Diet:  Gluten-free/reduced    Frequency of exercise:  2-3 days/week    Duration of exercise:  15-30 minutes    Taking medications regularly:  Yes    Medication side effects:  Not applicable    PHQ-2 Total Score: 0    Additional concerns today:  Yes    Pt here to f/u on rashes and seasonal allergies/sinus issues.    Desires STD screening, in a relationship that is stable but not always 100% monogamous     Also has a number chronic conditions that he needs refills on including dapsone for dermatitis herpetiformis and some medication for dry eyes     Needs labs for these things  Refills, stable        Today's PHQ-2 Score:   PHQ-2 ( 1999 [employer]) 11/22/2021   Q1: Little interest or pleasure in doing things 0   Q2: Feeling down, depressed or hopeless 0   PHQ-2 Score 0   PHQ-2 Total Score (12-17 Years)- Positive if 3 or more points; Administer PHQ-A if positive -   Q1: Little interest or pleasure in doing things Not at all   Q2: Feeling down, depressed or hopeless Not at all   PHQ-2 Score 0       Abuse: Current or Past(Physical, Sexual or Emotional)- No  Do you feel safe in your environment? Yes    Have you ever done Advance Care Planning? (For example, a Health Directive, POLST, or a discussion with a medical provider or your loved ones about your wishes): No, advance care planning information given to patient to review.  Patient declined advance care planning discussion at this time.    Social History     Tobacco Use     Smoking status: Never Smoker     Smokeless tobacco: Never Used     Tobacco comment: NON SMOKING ENVIRONMENT, 10/6/11, TAL[facility].     Substance Use Topics     Alcohol use: No     Comment: a few sips to help sleep     If you drink alcohol do you typically have >3 drinks per day or >7 drinks per week? No    No flowsheet data found.    Last PSA:   PSA   Date Value Ref Range Status   11/27/2017 0.80 0 - 4 ug/L Final     Comment:     Assay Method:  Chemiluminescence using Siemens Vista analyzer       Reviewed orders with patient. Reviewed health maintenance and updated orders accordingly - Yes  Lab work is in process  Labs reviewed in EPIC  BP Readings from Last 3 Encounters:   11/23/21 134/79   01/07/20 116/74   04/17/19 114/72    Wt Readings from Last 3 Encounters:   11/23/21 76.2 kg (168 lb 1.6 oz)   01/07/20 75.4 kg (166 lb 3.2 oz)   11/19/18 74.4 kg (164 lb)                  Patient Active Problem List   Diagnosis     Dermatitis herpetiformis     Celiac disease     Tinea pedis     Acne rosacea     BPH (benign prostatic hypertrophy)     Bacterial folliculitis     Chalazion     Therapeutic drug monitoring     Bilateral dry eyes     Adjustment insomnia     Past Surgical History:   Procedure Laterality Date     CATARACT IOL, RT/LT       ENHANCE LASER REFRACTIVE BILATERAL EXISTING PT IN PARAMETERS Left 01/04/2017     ESOPHAGOSCOPY, GASTROSCOPY, DUODENOSCOPY (EGD), COMBINED  2/11/2013    Procedure: COMBINED ESOPHAGOSCOPY, GASTROSCOPY, DUODENOSCOPY (EGD), BIOPSY SINGLE OR MULTIPLE;;  Surgeon: Luke Juan MD;  Location:  GI     NASAL/SINUS POLYPECTOMY         Social History     Tobacco Use     Smoking status: Never Smoker     Smokeless tobacco: Never Used     Tobacco comment: NON SMOKING ENVIRONMENT, 10/6/11, KJM.    Substance Use Topics     Alcohol use: No     Comment: a few sips to help sleep     Family History   Problem Relation Age of Onset     Lipids Mother      Kidney Disease Mother      Arthritis Mother      Hypertension Mother         passed away     Osteoporosis Mother      Gastrointestinal Disease Father         maybe celiac     Cancer  Father         Leukemia     Heart Disease Father      Hypertension Father         passed away     Cerebrovascular Disease Father         passed     Stomach Problem Father      Other Cancer Father         leukemia     Gastrointestinal Disease Brother         maybe celiac     Kidney Disease Brother         Casimiro stone     Cancer - colorectal No family hx of      Prostate Cancer No family hx of      Melanoma No family hx of      Skin Cancer No family hx of          Current Outpatient Medications   Medication Sig Dispense Refill     dapsone (ACZONE) 25 MG tablet TAKE 1 TABLET BY MOUTH DAILY, TAKE 2 TABLETS WHEN FLARING 60 tablet 6     fluticasone (FLONASE) 50 MCG/ACT spray Spray 1 spray into both nostrils daily 1 Bottle 0     ipratropium (ATROVENT) 0.06 % nasal spray Spray 2 sprays into both nostrils 4 times daily as needed for rhinitis 15 mL 1     lifitegrast (XIIDRA) 5 % opthalmic solution Place 1 drop into both eyes 2 times daily 60 each 1     oxymetazoline (ANEFRIN NASAL SPRAY) 0.05 % nasal spray USE TWO SPRAYS IN EACH NOSTRIL TWICE DAILY(SPRAY BEFORE USING OTHER INHALERS, DO NOT USE FOR MORE THAN 3 DAYS). 30 mL 1     sildenafil (VIAGRA) 50 MG tablet Take 0.5 tablets (25 mg) 30 min to 4 hours before intercourse daily as needed. Never use with nitroglycerin, terazosin or doxazosin. 6 tablet 3     sodium chloride (OCEAN) 0.65 % nasal spray Spray 1 spray into both nostrils every 4 hours as needed for congestion 104 mL 0     zolpidem (AMBIEN) 5 MG tablet TAKE ONE TABLET BY MOUTH NIGHTLY AS NEEDED FOR SLEEP 30 tablet 1       Reviewed and updated as needed this visit by clinical staff  Tobacco  Allergies  Meds   Med Hx  Surg Hx  Fam Hx  Soc Hx       Reviewed and updated as needed this visit by Provider                   Review of Systems   Constitutional: Negative for chills and fever.   HENT: Positive for congestion. Negative for ear pain, hearing loss and sore throat.    Eyes: Positive for pain. Negative for  "visual disturbance.   Respiratory: Negative for cough and shortness of breath.    Cardiovascular: Negative for chest pain and peripheral edema.   Gastrointestinal: Positive for hematochezia. Negative for abdominal pain, constipation, diarrhea, heartburn and nausea.   Genitourinary: Positive for frequency and urgency. Negative for dysuria, genital sores, hematuria, impotence and penile discharge.   Musculoskeletal: Positive for myalgias. Negative for arthralgias.   Skin: Positive for rash.   Neurological: Positive for headaches. Negative for dizziness, weakness and paresthesias.   Psychiatric/Behavioral: Negative for mood changes. The patient is not nervous/anxious.          OBJECTIVE:   /79 (Patient Position: Sitting, Cuff Size: Adult Regular)   Pulse 75   Temp 98.5  F (36.9  C) (Tympanic)   Resp 22   Ht 1.689 m (5' 6.5\")   Wt 76.2 kg (168 lb 1.6 oz)   SpO2 99%   BMI 26.73 kg/m      Physical Exam    General Appearance: Pleasant, alert, in no acute respiratory distress.  Head Exam: Normal. Normocephalic, atraumatic. No sinus tenderness.  Eye Exam: Normal external eye, conjunctiva, lids. DOMINGUEZ.  Ear Exam: Normal auditory canals and external ears. Non-tender.  Left TM-normal. Right TM-normal.  Neck Exam: Supple, no obvious thyroid enlargement  Chest/Respiratory Exam: Normal, comfortable, easy respirations. Chest wall normal. Lungs are clear to auscultation. No wheezing, crackles, or rhonchi.  Cardiovascular Exam: Regular rate and rhythm. No murmur, gallop, or rubs.  Musculoskeletal Exam: Back is non-tender, full ROM of upper and lower extremities.  Skin: no rash, warm and dry.  folliculitis  Neurologic Exam: Nonfocal, no tremor. Normal gait.  Psychiatric Exam: Alert - appropriate, normal affect      ASSESSMENT/PLAN:       ICD-10-CM    1. Routine general medical examination at a health care facility  Z00.00 REVIEW OF HEALTH MAINTENANCE PROTOCOL ORDERS   2. Dermatitis herpetiformis  L13.0 dapsone (ACZONE) 25 " "MG tablet   3. Therapeutic drug monitoring  Z51.81 dapsone (ACZONE) 25 MG tablet   4. Non-allergic rhinitis  J31.0 ipratropium (ATROVENT) 0.06 % nasal spray   5. Lipid screening  Z13.220 Lipid panel reflex to direct LDL Fasting   6. Diabetes mellitus screening  Z13.1 Hemoglobin A1c   7. Colon cancer screening  Z12.11 Adult Gastro Ref - Procedure Only       Patient has been advised of split billing requirements and indicates understanding: Yes  COUNSELING:   Reviewed preventive health counseling, as reflected in patient instructions       Regular exercise       Healthy diet/nutrition    Estimated body mass index is 26.73 kg/m  as calculated from the following:    Height as of this encounter: 1.689 m (5' 6.5\").    Weight as of this encounter: 76.2 kg (168 lb 1.6 oz).         He reports that he has never smoked. He has never used smokeless tobacco.      Counseling Resources:  ATP IV Guidelines  Pooled Cohorts Equation Calculator  FRAX Risk Assessment  ICSI Preventive Guidelines  Dietary Guidelines for Americans, 2010  USDA's MyPlate  ASA Prophylaxis  Lung CA Screening    Griffin Lex Bravo MD  [facility]  "

## 2022-02-27 ENCOUNTER — TRANSFERRED RECORDS (OUTPATIENT)
Dept: HEALTH INFORMATION MANAGEMENT | Facility: CLINIC | Age: 57
End: 2022-02-27
Payer: COMMERCIAL

## 2022-04-22 DIAGNOSIS — F51.02 ADJUSTMENT INSOMNIA: ICD-10-CM

## 2022-04-22 NOTE — TELEPHONE ENCOUNTER
Routing refill request to provider for review/approval because:  Drug not on the [facility] refill protocol     Pending Prescriptions:                       Disp   Refills    zolpidem (AMBIEN) 5 MG tablet              30 tab*1        Sig: TAKE ONE TABLET BY MOUTH NIGHTLY AS NEEDED FOR SLEEP      Angie Chow RN

## 2022-04-24 RX ORDER — ZOLPIDEM TARTRATE 5 MG/1
TABLET ORAL
Qty: 30 TABLET | Refills: 1 | Status: SHIPPED | OUTPATIENT
Start: 2022-04-24 | End: 2022-06-06

## 2022-06-03 NOTE — PROGRESS NOTES
Assessment & Plan     Impacted cerumen of right ear  The clinical assistant flushed out the impacted cerumen.  Tips on how to prevent wax buildup in the future were given.  He tolerated the procedure well.  - MO REMOVAL IMPACTED CERUMEN IRRIGATION/LVG UNILAT    Screen for colon cancer  - Adult Gastro Ref - Procedure Only; Future    Adjustment insomnia  He was given a refill of zolpidem which continues to help with insomnia symptoms when needed.  - zolpidem (AMBIEN) 5 MG tablet; TAKE ONE TABLET BY MOUTH NIGHTLY AS NEEDED FOR SLEEP    Dermatitis herpetiformis  He was given a refill of dapsone which she takes as needed.  - dapsone (ACZONE) 25 MG tablet; TAKE 1 TABLET BY MOUTH DAILY, TAKE 2 TABLETS WHEN FLARING    Erectile dysfunction, unspecified erectile dysfunction type  He was given a refill of sildenafil that continues to help with erectile dysfunction symptoms.  - sildenafil (VIAGRA) 50 MG tablet; Take 0.5 tablets (25 mg) 30 min to 4 hours before intercourse daily as needed. Never use with nitroglycerin, terazosin or doxazosin.    Non-allergic rhinitis  Stable on Flonase and ipratropium as needed.  - fluticasone (FLONASE) 50 MCG/ACT nasal spray; Spray 1 spray into both nostrils daily  - ipratropium (ATROVENT) 0.06 % nasal spray; Spray 2 sprays into both nostrils 4 times daily as needed for rhinitis                 Return in about 6 months (around 12/6/2022) for Annual Exam.    Steven Ambrosio [facility]    Subjective   OskarAsim or Phillip is a 57 year old who presents for the following health issues     HPI     Concern - Ear problem   Onset: After swimming on a week ago  Description: Stuffy ear mainly on right  Intensity: moderate  Progression of Symptoms:  improving  Accompanying Signs & Symptoms: started as hearing issues with ear plugging and stuffy with decreased hearing but it is improving since using debrox  Previous history of similar problem: None  Precipitating factors:         Worsened by: None  Alleviating factors:        Improved by: Debrox  Therapies tried and outcome: Debrox        He also has a history significant for insomnia, dermatitis herpetiformis, allergic rhinitis and erectile dysfunction.  He needs refills of his chronic medications that have helped keep these issues stable.    Review of Systems   Constitutional, HEENT, cardiovascular, pulmonary, gi and gu systems are negative, except as otherwise noted.      Objective    /69   Pulse 70   Temp 97.4  F (36.3  C) (Tympanic)   Resp 16   Wt 72.8 kg (160 lb 8 oz)   SpO2 94%   BMI 25.52 kg/m    Body mass index is 25.52 kg/m .  Physical Exam   GENERAL: healthy, alert and no distress  HENT: The left canal and tympanic membrane is clear.  The right canal is impacted with cerumen.  Once this was removed the canal and tympanic membrane appeared clear.  NECK: no adenopathy, no asymmetry, masses, or scars and thyroid normal to palpation  RESP: lungs clear to auscultation - no rales, rhonchi or wheezes  CV: regular rate and rhythm, normal S1 S2, no S3 or S4, no murmur, click or rub, no peripheral edema and peripheral pulses strong  MS: no gross musculoskeletal defects noted, no edema  SKIN: no suspicious lesions or rashes  NEURO: Normal strength and tone, mentation intact and speech normal  PSYCH: mentation appears normal, affect normal/bright

## 2022-06-06 ENCOUNTER — OFFICE VISIT (OUTPATIENT)
Dept: FAMILY MEDICINE | Facility: CLINIC | Age: 57
End: 2022-06-06
Payer: COMMERCIAL

## 2022-06-06 VITALS
RESPIRATION RATE: 16 BRPM | TEMPERATURE: 97.4 F | BODY MASS INDEX: 25.52 KG/M2 | SYSTOLIC BLOOD PRESSURE: 126 MMHG | HEART RATE: 70 BPM | DIASTOLIC BLOOD PRESSURE: 69 MMHG | OXYGEN SATURATION: 94 % | WEIGHT: 160.5 LBS

## 2022-06-06 DIAGNOSIS — J31.0 NON-ALLERGIC RHINITIS: ICD-10-CM

## 2022-06-06 DIAGNOSIS — N52.9 ERECTILE DYSFUNCTION, UNSPECIFIED ERECTILE DYSFUNCTION TYPE: ICD-10-CM

## 2022-06-06 DIAGNOSIS — H61.21 IMPACTED CERUMEN OF RIGHT EAR: Primary | ICD-10-CM

## 2022-06-06 DIAGNOSIS — L13.0 DERMATITIS HERPETIFORMIS: ICD-10-CM

## 2022-06-06 DIAGNOSIS — F51.02 ADJUSTMENT INSOMNIA: ICD-10-CM

## 2022-06-06 DIAGNOSIS — Z12.11 SCREEN FOR COLON CANCER: ICD-10-CM

## 2022-06-06 PROCEDURE — 99214 OFFICE O/P EST MOD 30 MIN: CPT | Mod: 25 | Performed by: FAMILY MEDICINE

## 2022-06-06 PROCEDURE — 69209 REMOVE IMPACTED EAR WAX UNI: CPT | Performed by: FAMILY MEDICINE

## 2022-06-06 RX ORDER — SILDENAFIL 50 MG/1
TABLET, FILM COATED ORAL
Qty: 6 TABLET | Refills: 3 | Status: SHIPPED | OUTPATIENT
Start: 2022-06-06 | End: 2022-12-19

## 2022-06-06 RX ORDER — DAPSONE 25 MG/1
TABLET ORAL
Qty: 60 TABLET | Refills: 6 | Status: SHIPPED | OUTPATIENT
Start: 2022-06-06 | End: 2022-12-19

## 2022-06-06 RX ORDER — FLUTICASONE PROPIONATE 50 MCG
1 SPRAY, SUSPENSION (ML) NASAL DAILY
Qty: 18.2 ML | Refills: 3 | Status: SHIPPED | OUTPATIENT
Start: 2022-06-06 | End: 2022-12-19

## 2022-06-06 RX ORDER — ZOLPIDEM TARTRATE 5 MG/1
TABLET ORAL
Qty: 30 TABLET | Refills: 1 | Status: SHIPPED | OUTPATIENT
Start: 2022-06-06 | End: 2022-12-19

## 2022-06-06 RX ORDER — IPRATROPIUM BROMIDE 42 UG/1
SPRAY, METERED NASAL
Qty: 15 ML | Refills: 1 | Status: SHIPPED | OUTPATIENT
Start: 2022-06-06

## 2022-06-20 ENCOUNTER — TELEPHONE (OUTPATIENT)
Dept: GASTROENTEROLOGY | Facility: CLINIC | Age: 57
End: 2022-06-20
Payer: COMMERCIAL

## 2022-06-20 NOTE — TELEPHONE ENCOUNTER
Screening Questions  BlueKIND OF PREP RedLOCATION [review exclusion criteria] GreenSEDATION TYPE      1. Are you able to give consent for your medical care? Do you have a legal guardian or medical Power of ?  Y (Sedation review/consideration needed)    2. Have you had a positive covid test in the last 90 days? N  a. If yes, what date?    3. Are you active on mychart? Y    4. What insurance is in the chart? MEDICA     3.   Ordering/Referring Provider: Steven Robledo     4. BMI 25.4 [BMI OVER 40-EXTENDED PREP]  If greater than 40 review exclusion criteria [PAC APPT IF @ [facility]]        5.  Respiratory Screening :  [If yes to any of the following HOSPITAL setting only]     Do you use daily home oxygen? N    Do you have mod to severe Obstructive Sleep Apnea? N  [OKAY @ [facility] SH PH RI]   Do you have Pulmonary Hypertension? N     Do you have UNCONTROLLED asthma? N        6.   Have you had a heart or lung transplant? N      7.   Are you currently on dialysis? N [ If yes, G-PREP & HOSPITAL setting only]     8.   Do you have chronic kidney disease? N [ If yes, G-PREP ]    9.   Have you had a stroke or Transient ischemic attack (TIA - aka  mini stroke ) within 6 months?  N (If yes, please review exclusion criteria)    10.   In the past 6 months, have you had any heart related issues including cardiomyopathy or heart attack? N           If yes, did it require cardiac stenting or other implantable device? N      11.   Do you have any implantable devices in your body (pacemaker, defib, LVAD)? N (If yes, please review exclusion criteria)    12.   Do you take nitroglycerin? N           If yes, how often? N  (if yes, HOSPITAL setting ONLY)    13.   Are you currently taking any blood thinners? N           [IF YES, INFORM PATIENT TO FOLLOW UP W/ ORDERING PROVIDER FOR BRIDGING INSTRUCTIONS]     14.   Do you have a diagnosis of diabetes? N   [ If yes, G-PREP ]    15.   [FEMALES] Are you currently pregnant?     If  yes, how many weeks?     16.   Are you taking any prescription pain medications on a routine schedule?  N  [ If yes, EXTENDED PREP.] [If yes, MAC]    17.   Do you have any chemical dependencies such as alcohol, street drugs, or methadone?  N [If yes, MAC]    18.   Do you have any history of post-traumatic stress syndrome, severe anxiety or history of psychosis?  N  [If yes, MAC]    19.   Do you transfer independently?  Y    20.  On a regular basis do you go 3-5 days between bowel movements? N   [ If yes, EXTENDED PREP.]    21.   Preferred LOCAL Pharmacy for Pre Prescription   [id number] - [place], [place] - [address]  AT [facility] OF [address]      Scheduling Details      Caller : Vimal Goldsmith  (Please ask for phone number if not scheduled by patient)    Type of Procedure Scheduled: colon  Which Colonoscopy Prep was Sent?: mirlax  KHORUTS CF PATIENTS & GROEN'S PATIENTS REQUIRE EXTENDED PREP  Surgeon: Yessica  Date of Procedure: 7/14/22  Location:       Sedation Type: cs  Conscious Sedation- Needs  for 6 hours after the procedure  MAC/General-Needs  for 24 hours after procedure    Pre-op Required at [facility] [place], [place] and OR for MAC sedation:   (advise patient they will need a pre-op prior to procedure -)      Informed patient they will need an adult  y  Cannot take any type of public or medical transportation alone    Pre-Procedure Covid test to be completed at [facility] or Externally: home test    Confirmed Nurse will call to complete assessment y    Additional comments:

## 2022-06-22 ENCOUNTER — LAB (OUTPATIENT)
Dept: LAB | Facility: CLINIC | Age: 57
End: 2022-06-22
Attending: FAMILY MEDICINE
Payer: COMMERCIAL

## 2022-06-22 DIAGNOSIS — Z20.822 ENCOUNTER FOR LABORATORY TESTING FOR COVID-19 VIRUS: ICD-10-CM

## 2022-06-22 PROCEDURE — U0005 INFEC AGEN DETEC AMPLI PROBE: HCPCS

## 2022-06-22 PROCEDURE — U0003 INFECTIOUS AGENT DETECTION BY NUCLEIC ACID (DNA OR RNA); SEVERE ACUTE RESPIRATORY SYNDROME CORONAVIRUS 2 (SARS-COV-2) (CORONAVIRUS DISEASE [COVID-19]), AMPLIFIED PROBE TECHNIQUE, MAKING USE OF HIGH THROUGHPUT TECHNOLOGIES AS DESCRIBED BY CMS-2020-01-R: HCPCS

## 2022-06-23 LAB — SARS-COV-2 RNA RESP QL NAA+PROBE: NEGATIVE

## 2022-07-14 ENCOUNTER — HOSPITAL ENCOUNTER (OUTPATIENT)
Facility: CLINIC | Age: 57
Discharge: HOME | End: 2022-07-14
Attending: INTERNAL MEDICINE | Admitting: INTERNAL MEDICINE
Payer: COMMERCIAL

## 2022-07-14 VITALS
OXYGEN SATURATION: 95 % | DIASTOLIC BLOOD PRESSURE: 77 MMHG | RESPIRATION RATE: 9 BRPM | SYSTOLIC BLOOD PRESSURE: 108 MMHG | HEART RATE: 61 BPM

## 2022-07-14 LAB — COLONOSCOPY: NORMAL

## 2022-07-14 PROCEDURE — G0500 MOD SEDAT ENDO SERVICE >5YRS: HCPCS | Performed by: INTERNAL MEDICINE

## 2022-07-14 PROCEDURE — 250N000011 HC RX IP 250 OP 636: Performed by: INTERNAL MEDICINE

## 2022-07-14 PROCEDURE — 45380 COLONOSCOPY AND BIOPSY: CPT | Performed by: INTERNAL MEDICINE

## 2022-07-14 PROCEDURE — 88305 TISSUE EXAM BY PATHOLOGIST: CPT | Mod: TC | Performed by: INTERNAL MEDICINE

## 2022-07-14 PROCEDURE — 88305 TISSUE EXAM BY PATHOLOGIST: CPT | Mod: 26 | Performed by: PATHOLOGY

## 2022-07-14 RX ORDER — FENTANYL CITRATE 50 UG/ML
INJECTION, SOLUTION INTRAMUSCULAR; INTRAVENOUS PRN
Status: COMPLETED | OUTPATIENT
Start: 2022-07-14 | End: 2022-07-14

## 2022-07-14 RX ADMIN — FENTANYL CITRATE 100 MCG: 50 INJECTION, SOLUTION INTRAMUSCULAR; INTRAVENOUS at 09:11

## 2022-07-14 RX ADMIN — MIDAZOLAM 2 MG: 1 INJECTION INTRAMUSCULAR; INTRAVENOUS at 09:12

## 2022-07-14 NOTE — H&P
[facility]  Pre-Endoscopy History and Physical     Vimal Goldsmith MRN# [id number]   YOB: 1965 Age: 57 year old     Date of Procedure: 7/14/2022  Primary care provider: Steven Robledo  Type of Endoscopy: colonoscopy  Reason for Procedure: Screening  Type of Anesthesia Anticipated: Moderate Sedation    HPI:    Vimal is a 57 year old male who will be undergoing the above procedure.      A history and physical has been performed. The patient's medications and allergies have been reviewed. The risks and benefits of the procedure and the sedation options and risks were discussed with the patient.  All questions were answered and informed consent was obtained.      He denies a personal or family history of anesthesia complications or bleeding disorders.     Allergies   Allergen Reactions     Gluten Meal Rash and GI Disturbance     Nkda [No Known Drug Allergies]         Prior to Admission Medications   Prescriptions Last Dose Informant Patient Reported? Taking?   dapsone (ACZONE) 25 MG tablet   No No   Sig: TAKE 1 TABLET BY MOUTH DAILY, TAKE 2 TABLETS WHEN FLARING   fluticasone (FLONASE) 50 MCG/ACT nasal spray   No No   Sig: Spray 1 spray into both nostrils daily   ipratropium (ATROVENT) 0.06 % nasal spray   No No   Sig: Spray 2 sprays into both nostrils 4 times daily as needed for rhinitis   sildenafil (VIAGRA) 50 MG tablet   No No   Sig: Take 0.5 tablets (25 mg) 30 min to 4 hours before intercourse daily as needed. Never use with nitroglycerin, terazosin or doxazosin.   zolpidem (AMBIEN) 5 MG tablet   No No   Sig: TAKE ONE TABLET BY MOUTH NIGHTLY AS NEEDED FOR SLEEP      Facility-Administered Medications: None       Patient Active Problem List   Diagnosis     Dermatitis herpetiformis     Celiac disease     Tinea pedis     Acne rosacea     BPH (benign prostatic hypertrophy)     Bacterial folliculitis     Chalazion     Therapeutic drug monitoring     Bilateral dry eyes      Adjustment insomnia        Past Medical History:   Diagnosis Date     Benign positional vertigo      Celiac disease 10/23/2011     Chronic kidney disease 1975    diagnosed when I was [age]     Chronic sinusitis      Dermatitis herpetiformis      Gastro-oesophageal reflux disease 1987    had suffered any years ago before  celiac disease was diagno     Hoarseness      Migraines      Nasal polyps      Reduced vision         Past Surgical History:   Procedure Laterality Date     CATARACT IOL, RT/LT       ENHANCE LASER REFRACTIVE BILATERAL EXISTING PT IN PARAMETERS Left 01/04/2017     ESOPHAGOSCOPY, GASTROSCOPY, DUODENOSCOPY (EGD), COMBINED  2/11/2013    Procedure: COMBINED ESOPHAGOSCOPY, GASTROSCOPY, DUODENOSCOPY (EGD), BIOPSY SINGLE OR MULTIPLE;;  Surgeon: Luke Juan MD;  Location:  GI     NASAL/SINUS POLYPECTOMY         Social History     Tobacco Use     Smoking status: Never Smoker     Smokeless tobacco: Never Used     Tobacco comment: NON SMOKING ENVIRONMENT, 10/6/11, KJM.    Substance Use Topics     Alcohol use: No     Comment: a few sips to help sleep       Family History   Problem Relation Age of Onset     Lipids Mother      Kidney Disease Mother      Arthritis Mother      Hypertension Mother         passed away     Osteoporosis Mother      Gastrointestinal Disease Father         maybe celiac     Cancer Father         Leukemia     Heart Disease Father      Hypertension Father         passed away     Cerebrovascular Disease Father         passed     Stomach Problem Father      Other Cancer Father         leukemia     Gastrointestinal Disease Brother         maybe celiac     Kidney Disease Brother         Casimiro ledesma     Cancer - colorectal No family hx of      Prostate Cancer No family hx of      Melanoma No family hx of      Skin Cancer No family hx of        REVIEW OF SYSTEMS:     5 point ROS negative except as noted above in HPI, including Gen., Resp., CV, GI &  system review.      PHYSICAL EXAM:   BP  "100/72   Pulse 62   Resp 16   SpO2 96%  Estimated body mass index is 25.52 kg/m  as calculated from the following:    Height as of 11/23/21: 1.689 m (5' 6.5\").    Weight as of 6/6/22: 72.8 kg (160 lb 8 oz).   GENERAL APPEARANCE: healthy, alert and no distress  MENTAL STATUS: alert  AIRWAY EXAM: Mallampatti Class I (visualization of the soft palate, fauces, uvula, anterior and posterior pillars)  RESP: lungs clear to auscultation - no rales, rhonchi or wheezes  CV: normal S1 S2, no S3 or S4      DIAGNOSTICS:    Not indicated      IMPRESSION   ASA Class 2 - Mild systemic disease        PLAN:       Plan for colonoscopy. We discussed the risks, benefits and alternatives and the patient wished to proceed.    The above has been forwarded to the consulting provider.      Signed Electronically by: Cooper Juan MD,MD  July 14, 2022      [facility]  Ph: [phone] Fax: [phone]    "

## 2022-07-15 LAB
PATH REPORT.COMMENTS IMP SPEC: NORMAL
PATH REPORT.COMMENTS IMP SPEC: NORMAL
PATH REPORT.FINAL DX SPEC: NORMAL
PATH REPORT.GROSS SPEC: NORMAL
PATH REPORT.MICROSCOPIC SPEC OTHER STN: NORMAL
PATH REPORT.RELEVANT HX SPEC: NORMAL
PHOTO IMAGE: NORMAL

## 2022-08-18 ENCOUNTER — TELEPHONE (OUTPATIENT)
Dept: DERMATOLOGY | Facility: CLINIC | Age: 57
End: 2022-08-18

## 2022-12-19 ENCOUNTER — OFFICE VISIT (OUTPATIENT)
Dept: FAMILY MEDICINE | Facility: CLINIC | Age: 57
End: 2022-12-19
Payer: COMMERCIAL

## 2022-12-19 VITALS
WEIGHT: 163.6 LBS | DIASTOLIC BLOOD PRESSURE: 69 MMHG | SYSTOLIC BLOOD PRESSURE: 121 MMHG | TEMPERATURE: 97.9 F | BODY MASS INDEX: 25.68 KG/M2 | OXYGEN SATURATION: 94 % | HEIGHT: 67 IN | RESPIRATION RATE: 18 BRPM | HEART RATE: 79 BPM

## 2022-12-19 DIAGNOSIS — L13.0 DERMATITIS HERPETIFORMIS: ICD-10-CM

## 2022-12-19 DIAGNOSIS — Z00.00 ROUTINE GENERAL MEDICAL EXAMINATION AT A HEALTH CARE FACILITY: Primary | ICD-10-CM

## 2022-12-19 DIAGNOSIS — N52.9 ERECTILE DYSFUNCTION, UNSPECIFIED ERECTILE DYSFUNCTION TYPE: ICD-10-CM

## 2022-12-19 DIAGNOSIS — Z11.3 SCREENING FOR STDS (SEXUALLY TRANSMITTED DISEASES): ICD-10-CM

## 2022-12-19 DIAGNOSIS — Z12.5 SCREENING FOR PROSTATE CANCER: ICD-10-CM

## 2022-12-19 DIAGNOSIS — F51.02 ADJUSTMENT INSOMNIA: ICD-10-CM

## 2022-12-19 DIAGNOSIS — Z13.0 SCREENING, ANEMIA, DEFICIENCY, IRON: ICD-10-CM

## 2022-12-19 DIAGNOSIS — Z13.1 SCREENING FOR DIABETES MELLITUS: ICD-10-CM

## 2022-12-19 DIAGNOSIS — Z13.220 SCREENING CHOLESTEROL LEVEL: ICD-10-CM

## 2022-12-19 DIAGNOSIS — J31.0 NON-ALLERGIC RHINITIS: ICD-10-CM

## 2022-12-19 PROCEDURE — 99396 PREV VISIT EST AGE 40-64: CPT | Performed by: FAMILY MEDICINE

## 2022-12-19 RX ORDER — SILDENAFIL 50 MG/1
TABLET, FILM COATED ORAL
Qty: 12 TABLET | Refills: 6 | Status: SHIPPED | OUTPATIENT
Start: 2022-12-19 | End: 2024-04-04

## 2022-12-19 RX ORDER — DAPSONE 25 MG/1
TABLET ORAL
Qty: 180 TABLET | Refills: 3 | Status: SHIPPED | OUTPATIENT
Start: 2022-12-19 | End: 2023-01-17

## 2022-12-19 RX ORDER — ZOLPIDEM TARTRATE 5 MG/1
TABLET ORAL
Qty: 30 TABLET | Refills: 3 | Status: SHIPPED | OUTPATIENT
Start: 2022-12-19 | End: 2023-04-26

## 2022-12-19 RX ORDER — FLUTICASONE PROPIONATE 50 MCG
1 SPRAY, SUSPENSION (ML) NASAL DAILY
Qty: 18.2 ML | Refills: 6 | Status: SHIPPED | OUTPATIENT
Start: 2022-12-19

## 2022-12-19 ASSESSMENT — ENCOUNTER SYMPTOMS
PARESTHESIAS: 0
NAUSEA: 0
PALPITATIONS: 0
FEVER: 0
WEAKNESS: 0
SHORTNESS OF BREATH: 0
COUGH: 0
HEARTBURN: 0
HEMATURIA: 0
NERVOUS/ANXIOUS: 0
JOINT SWELLING: 0
MYALGIAS: 0
FREQUENCY: 1
DIZZINESS: 0
EYE PAIN: 0
SORE THROAT: 0
DYSURIA: 0
HEADACHES: 0
ABDOMINAL PAIN: 0
CONSTIPATION: 0
CHILLS: 0
ARTHRALGIAS: 0
HEMATOCHEZIA: 0
DIARRHEA: 0

## 2022-12-19 ASSESSMENT — PAIN SCALES - GENERAL: PAINLEVEL: NO PAIN (0)

## 2022-12-19 NOTE — PROGRESS NOTES
SUBJECTIVE:   CC: Alison Fisher is an 57 year old who presents for preventative health visit.   Patient has been advised of split billing requirements and indicates understanding: Yes  Healthy Habits:     Getting at least 3 servings of Calcium per day:  Yes    Bi-annual eye exam:  NO    Dental care twice a year:  Yes    Sleep apnea or symptoms of sleep apnea:  Daytime drowsiness    Diet:  Gluten-free/reduced    Frequency of exercise:  2-3 days/week    Duration of exercise:  15-30 minutes    Taking medications regularly:  Yes    Medication side effects:  None    PHQ-2 Total Score: 0    Additional concerns today:  No    He also has a history significant for insomnia, dermatitis herpetiformis, allergic rhinitis and erectile dysfunction.  He needs refills of his chronic medications that have helped keep these issues stable.    Social history: .  He and his  are going to [place] for the holidays.  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            Today's PHQ-2 Score:   PHQ-2 ( 1999 [employer]) 12/19/2022   Q1: Little interest or pleasure in doing things 0   Q2: Feeling down, depressed or hopeless 0   PHQ-2 Score 0   PHQ-2 Total Score (12-17 Years)- Positive if 3 or more points; Administer PHQ-A if positive -   Q1: Little interest or pleasure in doing things Not at all   Q2: Feeling down, depressed or hopeless Not at all   PHQ-2 Score 0           Social History     Tobacco Use     Smoking status: Never     Smokeless tobacco: Never     Tobacco comments:     NON SMOKING ENVIRONMENT, 10/6/11, TALIB.    Substance Use Topics     Alcohol use: No     Comment: a few sips to help sleep     If you drink alcohol do you typically have >3 drinks per day or >7 drinks per week? No    Alcohol Use 12/19/2022   Prescreen: >3 drinks/day or >7 drinks/week? No   Prescreen: >3 drinks/day or >7 drinks/week? -   No flowsheet data found.    Last PSA:   PSA   Date Value Ref Range Status   11/27/2017 0.80  "0 - 4 ug/L Final     Comment:     Assay Method:  Chemiluminescence using Siemens Vista analyzer       Reviewed orders with patient. Reviewed health maintenance and updated orders accordingly - Yes  Lab work is in process  Labs reviewed in EPIC    Reviewed and updated as needed this visit by clinical staff   Tobacco  Allergies  Meds              Reviewed and updated as needed this visit by Provider                     Review of Systems   Constitutional: Negative for chills and fever.   HENT: Negative for congestion, ear pain, hearing loss and sore throat.    Eyes: Negative for pain and visual disturbance.   Respiratory: Negative for cough and shortness of breath.    Cardiovascular: Negative for chest pain, palpitations and peripheral edema.   Gastrointestinal: Negative for abdominal pain, constipation, diarrhea, heartburn, hematochezia and nausea.   Genitourinary: Positive for frequency. Negative for dysuria, genital sores, hematuria, impotence, penile discharge and urgency.   Musculoskeletal: Negative for arthralgias, joint swelling and myalgias.   Skin: Negative for rash.   Neurological: Negative for dizziness, weakness, headaches and paresthesias.   Psychiatric/Behavioral: Negative for mood changes. The patient is not nervous/anxious.          OBJECTIVE:   /69   Pulse 79   Temp 97.9  F (36.6  C) (Temporal)   Resp 18   Ht 1.689 m (5' 6.5\")   Wt 74.2 kg (163 lb 9.6 oz)   SpO2 94%   BMI 26.01 kg/m      Physical Exam  GENERAL: healthy, alert and no distress  EYES: Eyes grossly normal to inspection, PERRL and conjunctivae and sclerae normal  HENT: ear canals and TM's normal, nose and mouth without ulcers or lesions  NECK: no adenopathy, no asymmetry, masses, or scars and thyroid normal to palpation  RESP: lungs clear to auscultation - no rales, rhonchi or wheezes  CV: regular rate and rhythm, normal S1 S2, no S3 or S4, no murmur, click or rub, no peripheral edema and peripheral pulses strong  ABDOMEN: " soft, nontender, no hepatosplenomegaly, no masses and bowel sounds normal   (male): normal male genitalia without lesions or urethral discharge, no hernia  RECTAL: Anus appears normal with no fissures or hemorrhoids.  There is a small skin tag on the left buttocks.    MS: no gross musculoskeletal defects noted, no edema  SKIN: no suspicious lesions or rashes  NEURO: Normal strength and tone, mentation intact and speech normal  PSYCH: mentation appears normal, affect normal/bright    Diagnostic Test Results:  Labs reviewed in Epic    ASSESSMENT/PLAN:   1. Routine general medical examination at a health care facility  I encouraged him to get regular exercise and eat a healthy diet.  He is going to schedule time for fasting labs in the near future.    2. Dermatitis herpetiformis  This issue is stable and controlled on dapsone.  - dapsone (ACZONE) 25 MG tablet; TAKE 1 TABLET BY MOUTH DAILY, TAKE 2 TABLETS WHEN FLARING  Dispense: 180 tablet; Refill: 3    3. Adjustment insomnia  He was given a refill of zolpidem which continues to help with insomnia symptoms.  - zolpidem (AMBIEN) 5 MG tablet; TAKE ONE TABLET BY MOUTH NIGHTLY AS NEEDED FOR SLEEP  Dispense: 30 tablet; Refill: 3    4. Erectile dysfunction, unspecified erectile dysfunction type  He was given a refill of sildenafil which helps with erectile dysfunction symptoms.  - sildenafil (VIAGRA) 50 MG tablet; Take 0.5 tablets (25 mg) 30 min to 4 hours before intercourse daily as needed. Never use with nitroglycerin, terazosin or doxazosin.  Dispense: 12 tablet; Refill: 6    5. Non-allergic rhinitis  He may continue Flonase.  - fluticasone (FLONASE) 50 MCG/ACT nasal spray; Spray 1 spray into both nostrils daily  Dispense: 18.2 mL; Refill: 6    6. Screening cholesterol level  - Lipid Profile (Chol, Trig, HDL, LDL calc); Future    7. Screening for diabetes mellitus  - Comprehensive metabolic panel (BMP + Alb, Alk Phos, ALT, AST, Total. Bili, TP); Future  - Hemoglobin A1c;  Future    8. Screening for prostate cancer  - PSA, screen; Future    9. Screening for STDs (sexually transmitted diseases)  - HIV Antigen Antibody Combo; Future  - Treponema Abs w Reflex to RPR and Titer; Future  - Chlamydia & Gonorrhea by PCR, [facility] - Clinic Collect; Future    10. Screening, anemia, deficiency, iron  - CBC with platelets; Future      Patient has been advised of split billing requirements and indicates understanding: Yes      COUNSELING:   Reviewed preventive health counseling, as reflected in patient instructions       Regular exercise       Healthy diet/nutrition       Prostate cancer screening        He reports that he has never smoked. He has never used smokeless tobacco.            Steven Ambrosio, [facility]

## 2023-01-05 ENCOUNTER — LAB (OUTPATIENT)
Dept: LAB | Facility: CLINIC | Age: 58
End: 2023-01-05
Payer: COMMERCIAL

## 2023-01-05 DIAGNOSIS — Z13.220 SCREENING CHOLESTEROL LEVEL: ICD-10-CM

## 2023-01-05 DIAGNOSIS — Z13.1 SCREENING FOR DIABETES MELLITUS: ICD-10-CM

## 2023-01-05 DIAGNOSIS — Z13.0 SCREENING, ANEMIA, DEFICIENCY, IRON: ICD-10-CM

## 2023-01-05 DIAGNOSIS — Z12.5 SCREENING FOR PROSTATE CANCER: ICD-10-CM

## 2023-01-05 DIAGNOSIS — Z11.3 SCREENING FOR STDS (SEXUALLY TRANSMITTED DISEASES): ICD-10-CM

## 2023-01-05 LAB
ALBUMIN SERPL BCG-MCNC: 4.2 G/DL (ref 3.5–5.2)
ALP SERPL-CCNC: 47 U/L (ref 40–129)
ALT SERPL W P-5'-P-CCNC: 25 U/L (ref 10–50)
ANION GAP SERPL CALCULATED.3IONS-SCNC: 12 MMOL/L (ref 7–15)
AST SERPL W P-5'-P-CCNC: 30 U/L (ref 10–50)
BILIRUB SERPL-MCNC: 1.3 MG/DL
BUN SERPL-MCNC: 18.9 MG/DL (ref 6–20)
CALCIUM SERPL-MCNC: 8.9 MG/DL (ref 8.6–10)
CHLORIDE SERPL-SCNC: 108 MMOL/L (ref 98–107)
CHOLEST SERPL-MCNC: 155 MG/DL
CREAT SERPL-MCNC: 0.9 MG/DL (ref 0.67–1.17)
DEPRECATED HCO3 PLAS-SCNC: 22 MMOL/L (ref 22–29)
ERYTHROCYTE [DISTWIDTH] IN BLOOD BY AUTOMATED COUNT: 13.6 % (ref 10–15)
GFR SERPL CREATININE-BSD FRML MDRD: >90 ML/MIN/1.73M2
GLUCOSE SERPL-MCNC: 99 MG/DL (ref 70–99)
HBA1C MFR BLD: 4.7 % (ref 0–5.6)
HCT VFR BLD AUTO: 44.6 % (ref 40–53)
HDLC SERPL-MCNC: 48 MG/DL
HGB BLD-MCNC: 14.9 G/DL (ref 13.3–17.7)
HIV 1+2 AB+HIV1 P24 AG SERPL QL IA: NONREACTIVE
LDLC SERPL CALC-MCNC: 96 MG/DL
MCH RBC QN AUTO: 30.8 PG (ref 26.5–33)
MCHC RBC AUTO-ENTMCNC: 33.4 G/DL (ref 31.5–36.5)
MCV RBC AUTO: 92 FL (ref 78–100)
NONHDLC SERPL-MCNC: 107 MG/DL
PLATELET # BLD AUTO: 187 10E3/UL (ref 150–450)
POTASSIUM SERPL-SCNC: 4.2 MMOL/L (ref 3.4–5.3)
PROT SERPL-MCNC: 6.7 G/DL (ref 6.4–8.3)
PSA SERPL-MCNC: 1.48 NG/ML (ref 0–3.5)
RBC # BLD AUTO: 4.83 10E6/UL (ref 4.4–5.9)
SODIUM SERPL-SCNC: 142 MMOL/L (ref 136–145)
T PALLIDUM AB SER QL: NONREACTIVE
TRIGL SERPL-MCNC: 57 MG/DL
WBC # BLD AUTO: 4.1 10E3/UL (ref 4–11)

## 2023-01-05 PROCEDURE — 80061 LIPID PANEL: CPT

## 2023-01-05 PROCEDURE — 86780 TREPONEMA PALLIDUM: CPT

## 2023-01-05 PROCEDURE — 87389 HIV-1 AG W/HIV-1&-2 AB AG IA: CPT

## 2023-01-05 PROCEDURE — 87491 CHLMYD TRACH DNA AMP PROBE: CPT

## 2023-01-05 PROCEDURE — 87591 N.GONORRHOEAE DNA AMP PROB: CPT

## 2023-01-05 PROCEDURE — G0103 PSA SCREENING: HCPCS

## 2023-01-05 PROCEDURE — 83036 HEMOGLOBIN GLYCOSYLATED A1C: CPT

## 2023-01-05 PROCEDURE — 80053 COMPREHEN METABOLIC PANEL: CPT

## 2023-01-05 PROCEDURE — 36415 COLL VENOUS BLD VENIPUNCTURE: CPT

## 2023-01-05 PROCEDURE — 85027 COMPLETE CBC AUTOMATED: CPT

## 2023-01-06 LAB
C TRACH DNA SPEC QL PROBE+SIG AMP: NEGATIVE
N GONORRHOEA DNA SPEC QL NAA+PROBE: NEGATIVE

## 2023-01-12 DIAGNOSIS — L13.0 DERMATITIS HERPETIFORMIS: ICD-10-CM

## 2023-01-12 RX ORDER — DAPSONE 25 MG/1
TABLET ORAL
Qty: 60 TABLET | OUTPATIENT
Start: 2023-01-12

## 2023-01-12 NOTE — TELEPHONE ENCOUNTER
Refills remain on file. Refused.     Petra Laughlin, RN, BSN, PHN  [facility]: [place]

## 2023-01-17 ENCOUNTER — OFFICE VISIT (OUTPATIENT)
Dept: DERMATOLOGY | Facility: CLINIC | Age: 58
End: 2023-01-17
Payer: COMMERCIAL

## 2023-01-17 DIAGNOSIS — L13.0 DERMATITIS HERPETIFORMIS: ICD-10-CM

## 2023-01-17 PROCEDURE — 99214 OFFICE O/P EST MOD 30 MIN: CPT | Performed by: DERMATOLOGY

## 2023-01-17 RX ORDER — TRIAMCINOLONE ACETONIDE 1 MG/G
OINTMENT TOPICAL
Qty: 80 G | Refills: 11 | Status: SHIPPED | OUTPATIENT
Start: 2023-01-17 | End: 2023-07-26

## 2023-01-17 RX ORDER — DAPSONE 25 MG/1
TABLET ORAL
Qty: 180 TABLET | Refills: 3 | Status: SHIPPED | OUTPATIENT
Start: 2023-01-17 | End: 2023-07-26

## 2023-01-17 ASSESSMENT — PAIN SCALES - GENERAL: PAINLEVEL: NO PAIN (0)

## 2023-01-17 NOTE — PROGRESS NOTES
[facility] Dermatology Note  Encounter Date: Jan 17, 2023  Office Visit     Dermatology Problem List:  1. Dermatitis herpetiformis  - Dapsone 25 mg daily, increase to 50 mg daily with flares  - Gluten free diet  - Triamcinolone 0.1%    ____________________________________________    Assessment & Plan:    # Dermatitis herpetiformis. Chronic, flare/not at goal. Overall well controlled on a gluten-free diet and oral dapsone, although still with intermittent flares in setting of intermittent unintentional gluten intake. Will continue current regimen and add PRN use of topical steroids PRN flares. Patient's last upper endoscopy in 2013 - recommended repeat.   - Reviewed labs: CBC and CMP from 1/5/23  - Start triamcinolone 0.1% ointment BID PRN flares  - Continue dapsone 25 mg daily with occasional 50 mg daily for flares.     - Continue gluten-free diet--Patient is doing very well with this but notes likely cross-contamination occurring on occasion. Discussed the elevated risk for MALT lymphoma in patients with celiac who are repeatedly exposed to gluten. Referral to GI for endoscopy        Procedures Performed:   None.    Follow-up: 6 month(s) in-person, or earlier for new or changing lesions    Staff and Scribe:     Scribe Disclosure:  Felicitas PRAKASH, am serving as a scribe to document services personally performed by Lex Huffman MD based on data collection and the provider's statements to me.     Provider Disclosure:   The documentation recorded by the scribe accurately reflects the services I personally performed and the decisions made by me.    Lex Huffman MD    Department of Dermatology  [facility]: Phone: [phone], Fax:[phone]  [facility] [place]: Phone: [phone] Fax: [phone]  ____________________________________________    CC: Derm Problem  (Vimal is here today for a rash on legs and abdomen )    HPI:  Mr. Vimal Goldsmith is a(n) 58 year old male who presents today as a new patient for rash. Last seen in dermatology by Dr. Garza on 12/27/17, at which time patient was continued on dapsone 25 mg for treatment of Dermatitis herpetiformis.    Today, patient reports a rash on his legs and abdomen. He has celiac disease. He avoids gluten. He is still taking dapsone. He takes two tablets if he has a flare.     Patient is otherwise feeling well, without additional skin concerns.    Labs Reviewed:  N/A    Physical Exam:  Vitals: There were no vitals taken for this visit.  SKIN: Focused examination of lower extremities was performed.  - Few scattered hyperpigmented macules on bilateral lower extremities and thighs    - No other lesions of concern on areas examined.     Medications:  Current Outpatient Medications   Medication     dapsone (ACZONE) 25 MG tablet     fluticasone (FLONASE) 50 MCG/ACT nasal spray     sildenafil (VIAGRA) 50 MG tablet     zolpidem (AMBIEN) 5 MG tablet     ipratropium (ATROVENT) 0.06 % nasal spray     No current facility-administered medications for this visit.      Past Medical History:   Patient Active Problem List   Diagnosis     Dermatitis herpetiformis     Celiac disease     Tinea pedis     Acne rosacea     BPH (benign prostatic hypertrophy)     Bacterial folliculitis     Chalazion     Therapeutic drug monitoring     Bilateral dry eyes     Adjustment insomnia     Past Medical History:   Diagnosis Date     Benign positional vertigo      Celiac disease 10/23/2011     Chronic kidney disease 1975    diagnosed when I was [age]     Chronic sinusitis      Dermatitis herpetiformis      Gastro-oesophageal reflux disease 1987    had suffered any years ago before  celiac disease was diagno     Hoarseness      Migraines      Nasal polyps      Reduced vision       .

## 2023-01-17 NOTE — NURSING NOTE
Dermatology Rooming Note    Vimal Goldsmith's goals for this visit include:   Chief Complaint   Patient presents with     Derm Problem     Vimal is here today for a rash on legs and abdomen      Makenzie Ray RN

## 2023-01-17 NOTE — LETTER
1/17/2023       RE: Vimal Goldsmith  [address]  [place] [address]     Dear Colleague,    Thank you for referring your patient, Vimal Goldsmith, to the [facility] [place] at [facility]. Please see a copy of my visit note below.    [facility] Dermatology Note  Encounter Date: Jan 17, 2023  Office Visit     Dermatology Problem List:  1. Dermatitis herpetiformis  - Dapsone 25 mg daily, increase to 50 mg daily with flares  - Gluten free diet  - Triamcinolone 0.1%    ____________________________________________    Assessment & Plan:    # Dermatitis herpetiformis. Chronic, flare/not at goal. Overall well controlled on a gluten-free diet and oral dapsone, although still with intermittent flares in setting of intermittent unintentional gluten intake. Will continue current regimen and add PRN use of topical steroids PRN flares. Patient's last upper endoscopy in 2013 - recommended repeat.   - Reviewed labs: CBC and CMP from 1/5/23  - Start triamcinolone 0.1% ointment BID PRN flares  - Continue dapsone 25 mg daily with occasional 50 mg daily for flares.     - Continue gluten-free diet--Patient is doing very well with this but notes likely cross-contamination occurring on occasion. Discussed the elevated risk for MALT lymphoma in patients with celiac who are repeatedly exposed to gluten. Referral to GI for endoscopy        Procedures Performed:   None.    Follow-up: 6 month(s) in-person, or earlier for new or changing lesions    Staff and Scribe:     Scribe Disclosure:  I, Felicitas Vasques, am serving as a scribe to document services personally performed by Lex Huffman MD based on data collection and the provider's statements to me.     Provider Disclosure:   The documentation recorded by the scribe accurately reflects the services I personally performed and the decisions made by me.    Lex Huffman MD  Assistant  Professor  Department of Dermatology  [facility]: Phone: [phone], Fax:[phone]  [facility]: Phone: [phone] Fax: [phone]  ____________________________________________    CC: Derm Problem (Vimal is here today for a rash on legs and abdomen )    HPI:  Mr. Vimal Goldsmith is a(n) 58 year old male who presents today as a new patient for rash. Last seen in dermatology by Dr. Garza on 12/27/17, at which time patient was continued on dapsone 25 mg for treatment of Dermatitis herpetiformis.    Today, patient reports a rash on his legs and abdomen. He has celiac disease. He avoids gluten. He is still taking dapsone. He takes two tablets if he has a flare.     Patient is otherwise feeling well, without additional skin concerns.    Labs Reviewed:  N/A    Physical Exam:  Vitals: There were no vitals taken for this visit.  SKIN: Focused examination of lower extremities was performed.  - Few scattered hyperpigmented macules on bilateral lower extremities and thighs    - No other lesions of concern on areas examined.     Medications:  Current Outpatient Medications   Medication     dapsone (ACZONE) 25 MG tablet     fluticasone (FLONASE) 50 MCG/ACT nasal spray     sildenafil (VIAGRA) 50 MG tablet     zolpidem (AMBIEN) 5 MG tablet     ipratropium (ATROVENT) 0.06 % nasal spray     No current facility-administered medications for this visit.      Past Medical History:   Patient Active Problem List   Diagnosis     Dermatitis herpetiformis     Celiac disease     Tinea pedis     Acne rosacea     BPH (benign prostatic hypertrophy)     Bacterial folliculitis     Chalazion     Therapeutic drug monitoring     Bilateral dry eyes     Adjustment insomnia     Past Medical History:   Diagnosis Date     Benign positional vertigo      Celiac disease 10/23/2011     Chronic kidney disease 1975    diagnosed when I was [age]      Chronic sinusitis      Dermatitis herpetiformis      Gastro-oesophageal reflux disease 1987    had suffered any years ago before  celiac disease was diagno     Hoarseness      Migraines      Nasal polyps      Reduced vision       .

## 2023-02-17 DIAGNOSIS — H25.813 COMBINED FORMS OF AGE-RELATED CATARACT OF BOTH EYES: Primary | ICD-10-CM

## 2023-03-02 ENCOUNTER — TELEPHONE (OUTPATIENT)
Dept: DERMATOLOGY | Facility: CLINIC | Age: 58
End: 2023-03-02

## 2023-03-02 NOTE — TELEPHONE ENCOUNTER
Lvm,1st attempt, informed patient that his appointment for Thursday July 6th with Dr. Huffman needs to be rescheduled. Dr. Huffman will not be in clinic at that time. Please call [phone] to reschedule.

## 2023-03-08 ENCOUNTER — TELEPHONE (OUTPATIENT)
Dept: GASTROENTEROLOGY | Facility: CLINIC | Age: 58
End: 2023-03-08

## 2023-03-08 NOTE — TELEPHONE ENCOUNTER
Screening Questions  BLUE  KIND OF PREP RED  LOCATION [review exclusion criteria] GREEN  SEDATION TYPE        Y Are you active on mychart?       WANDY PUCKETT Ordering/Referring Provider?        MEDICA What type of coverage do you have?      N Have you had a positive covid test in the last 14 days?     26.3 1. BMI  [BMI 40+ - review exclusion criteria]    Y  2. Are you able to give consent for your medical care? [IF NO,RN REVIEW]          N  3. Are you taking any prescription pain medications on a routine schedule   (ex narcotics: oxycodone, roxicodone, oxycontin,  and percocet)? [RN Review]          3a. EXTENDED PREP What kind of prescription?     N 4. Do you have any chemical dependencies such as alcohol, street drugs, or methadone?        **If yes 3- 5 , please schedule with MAC sedation.**          IF YES TO ANY 6 - 10 - HOSPITAL SETTING ONLY.     N 6.   Do you need assistance transferring?     N 7.   Have you had a heart or lung transplant?    N 8.   Are you currently on dialysis?   N 9.   Do you use daily home oxygen?   N 10. Do you take nitroglycerin?   10a.  If yes, how often?     11. [FEMALES]   Are you currently pregnant?    11a.  If yes, how many weeks? [ Greater than 12 weeks, OR NEEDED]    N 12. Do you have Pulmonary Hypertension? *NEED PAC APPT AT [facility] w/ MAC*     N 13. [review exclusion criteria]  Do you have any implantable devices in your body (pacemaker, defib, LVAD)?    N 14. In the past 6 months, have you had any heart related issues including cardiomyopathy or heart attack?     14a.  If yes, did it require cardiac stenting if so when?     N 15. Have you had a stroke or Transient ischemic attack (TIA - aka  mini stroke ) within 6 months?      N 16. Do you have mod to severe Obstructive Sleep Apnea?  [Hospital only]    N 17. Do you have SEVERE AND UNCONTROLLED asthma? *NEED PAC APPT AT [facility] w/MAC*     18. Are you currently taking any blood thinners?     18a. No. Continue to  "19.   18b.    N 19. Do you take the medication Phentermine?    19a. If yes, \"Hold for 7 days before procedure.  Please consult your prescribing provider if you have questions about holding this medication.\"     N  20. Do you have chronic kidney disease?      N  21. Do you have a diagnosis of diabetes?       22. On a regular basis do you go 3-5 days between bowel movements?      23. Preferred LOCAL Pharmacy for Pre Prescription    [ LIST ONLY ONE PHARMACY]     [id number] - [place], [place] - [address] AT [facility] [place] OF [address]        - CLOSING REMINDERS -    Informed patient they will need an adult    Cannot take any type of public or medical transportation alone    Conscious Sedation- Needs  for 6 hours after the procedure       MAC/General-Needs  for 24 hours after procedure    Pre-Procedure Covid test to be completed [[facility] PCR Testing Required]    Confirmed Nurse will call to complete assessment       - SCHEDULING DETAILS -  N Hospital Setting Required? If yes, what is the exclusion?:    NANCY  Surgeon    5/23  Date of Procedure  Upper Endoscopy [EGD]  Type of Procedure Scheduled  [facility]   Which Colonoscopy Prep was Sent?     CS  Sedation Type     N PAC / Pre-op Required                 "

## 2023-03-29 ENCOUNTER — OFFICE VISIT (OUTPATIENT)
Dept: OPHTHALMOLOGY | Facility: CLINIC | Age: 58
End: 2023-03-29
Attending: OPHTHALMOLOGY
Payer: COMMERCIAL

## 2023-03-29 DIAGNOSIS — H53.001 AMBLYOPIA, RIGHT: ICD-10-CM

## 2023-03-29 DIAGNOSIS — H04.123 BILATERAL DRY EYES: ICD-10-CM

## 2023-03-29 DIAGNOSIS — H25.813 COMBINED FORMS OF AGE-RELATED CATARACT OF BOTH EYES: Primary | ICD-10-CM

## 2023-03-29 PROCEDURE — 92015 DETERMINE REFRACTIVE STATE: CPT

## 2023-03-29 PROCEDURE — 92004 COMPRE OPH EXAM NEW PT 1/>: CPT | Mod: GC | Performed by: OPHTHALMOLOGY

## 2023-03-29 PROCEDURE — G0463 HOSPITAL OUTPT CLINIC VISIT: HCPCS | Performed by: OPHTHALMOLOGY

## 2023-03-29 ASSESSMENT — VISUAL ACUITY
OD_BAT_HIGH: 20/40-
METHOD: SNELLEN - LINEAR
OD_BAT_MED: 20/40
OS_BAT_HIGH: 20/25-
METHOD_MR: SIGNS ARE CORRECT
OS_SC: 20/30
OD_SC+: -2
OD_SC: 20/30
OS_BAT_MED: 20/25

## 2023-03-29 ASSESSMENT — SLIT LAMP EXAM - LIDS
COMMENTS: NORMAL
COMMENTS: NORMAL

## 2023-03-29 ASSESSMENT — CONF VISUAL FIELD
METHOD: COUNTING FINGERS
OD_SUPERIOR_NASAL_RESTRICTION: 0
OS_INFERIOR_NASAL_RESTRICTION: 0
OS_NORMAL: 1
OD_NORMAL: 1
OS_SUPERIOR_TEMPORAL_RESTRICTION: 0
OS_SUPERIOR_NASAL_RESTRICTION: 0
OD_SUPERIOR_TEMPORAL_RESTRICTION: 0
OD_INFERIOR_TEMPORAL_RESTRICTION: 0
OD_INFERIOR_NASAL_RESTRICTION: 0
OS_INFERIOR_TEMPORAL_RESTRICTION: 0

## 2023-03-29 ASSESSMENT — EXTERNAL EXAM - LEFT EYE: OS_EXAM: NORMAL

## 2023-03-29 ASSESSMENT — REFRACTION_MANIFEST
OD_AXIS: 147
OD_CYLINDER: +1.25
OS_AXIS: 165
OS_CYLINDER: +0.50
OD_ADD: +2.50
OD_SPHERE: -1.50
OS_ADD: +2.50
OS_SPHERE: +0.25

## 2023-03-29 ASSESSMENT — CUP TO DISC RATIO
OS_RATIO: 0.2
OD_RATIO: 0.2

## 2023-03-29 ASSESSMENT — EXTERNAL EXAM - RIGHT EYE: OD_EXAM: NORMAL

## 2023-03-29 ASSESSMENT — REFRACTION_WEARINGRX
OS_SPHERE: +2.50
SPECS_TYPE: SVL
OD_CYLINDER: SPHERE
OD_SPHERE: +2.50
OS_CYLINDER: SPHERE

## 2023-03-29 ASSESSMENT — TONOMETRY
OD_IOP_MMHG: 10
IOP_METHOD: TONOPEN
OS_IOP_MMHG: 10

## 2023-03-29 NOTE — PROGRESS NOTES
"  Technician Evaluation:       Chief Complaint(s) and History of Present Illness(es)     Cataract Follow-Up            Laterality: both eyes    Associated symptoms: blurred vision, glare, haloes, starburst, double   vision (\"letters overlap with a shadow\") and a need for brighter lights    Duration: years    Comments: 1 year follow up.   1. Combined form of age-related cataract, both eyes   2. Refractive error   3. Bilateral dry eyes   4. S/P LASIK (laser assisted in situ keratomileusis) of both eyes     Patient states vision is more blurry each eye at near. Onset is at least a   year. \"In the last couple months, I have been using my eyes extensively   with research proposals and projects.\" Patient notes it's more difficult   to see when driving at night, especially with the bright lights.          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 Denies floaters or   flashes. Patient notes some redness and stinging when using eye drops. \"It   can vary throughout the day, or if I don't get enough sleep. I notice my   eyes get sore and painful with a lot of eye strain.\"     Ocular meds:   - AT 3-4x/day each eye, \"using at least BID\"   - Xiidra BID each eye, \"I haven't used it for a while\"   - Warm compresses BID each eye, \"not using\"      Jossie Martinez, COT 2:12 PM 03/29/2023      Presents for 1 year annual dilated eye exam with BAT. He feels like his vision has gotten more blurry and he is noticing more glare and halos at night. He continues to have longstanding double vision after a lot of reading.  He used to use Xiidra but hasn't used it for a while. He uses artificial tears as needed and feels his dryness is well controlled.       Family history: Thinks father may have had glaucoma, he is not sure.       Review of systems for the eyes was negative other than the pertinent positives/negatives listed in the HPI.      Assessment & Plan      Vimal Goldsmith is a 55 year old male with the following " diagnoses:   1. Combined forms of age-related cataract of both eyes    2. Amblyopia, right    3. Bilateral dry eyes       BCVA 20/30 and 20/25    Longstanding monocular diplopia, previously followed by Dr. Marcelino  H/O anisometropia and possible mild amblyopia right eye (Left eye previously with greater myopia)  S/P laser in situ keratomileusis (LASIK) left eye in Jan 2017  Noting more fatigue with reading and computer (working on main application)     Continue PRN artificial tears    Early visual significance of cataracts both eyes, L > R  BAT testing done today, cataract symptomatic  Will try new glasses first  Refractive options reviewed  Refraction given  Monocular precaution encouraged     Patient disposition:   Return in about 1 year (around 3/29/2024) for DFE.       Ninoska Rae MD  Ophthalmology Resident, PGY-3  [facility]      Attending Physician Attestation:  Complete documentation of historical and exam elements from today's encounter can be found in the full encounter summary report (not reduplicated in this progress note).  I personally obtained the chief complaint(s) and history of present illness.  I confirmed and edited as necessary the review of systems, past medical/surgical history, family history, social history, and examination findings as documented by others; and I examined the patient myself.  I personally reviewed the relevant tests, images, and reports as documented above.  I formulated and edited as necessary the assessment and plan and discussed the findings and management plan with the patient and family. . - Missael Chahal MD

## 2023-03-29 NOTE — NURSING NOTE
"Chief Complaints and History of Present Illnesses   Patient presents with     Cataract Follow-Up     1 year follow up.   1. Combined form of age-related cataract, both eyes   2. Refractive error   3. Bilateral dry eyes   4. S/P LASIK (laser assisted in situ keratomileusis) of both eyes     Patient states vision is more blurry each eye at near. Onset is at least a year. \"In the last couple months, I have been using my eyes extensively with research proposals and projects.\" Patient notes it's more difficult to see when driving at night, especially with the bright lights.        Chief Complaint(s) and History of Present Illness(es)     Cataract Follow-Up            Laterality: both eyes    Associated symptoms: blurred vision, glare, haloes, starburst, double vision (\"letters overlap with a shadow\") and a need for brighter lights    Duration: years    Comments: 1 year follow up.   1. Combined form of age-related cataract, both eyes   2. Refractive error   3. Bilateral dry eyes   4. S/P LASIK (laser assisted in situ keratomileusis) of both eyes     Patient states vision is more blurry each eye at near. Onset is at least a year. \"In the last couple months, I have been using my eyes extensively with research proposals and projects.\" Patient notes it's more difficult to see when driving at night, especially with the bright lights.             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 Denies floaters or flashes. Patient notes some redness and stinging when using eye drops. \"It can vary throughout the day, or if I don't get enough sleep. I notice my eyes get sore and painful with a lot of eye strain.\"     Ocular meds:   - AT 3-4x/day each eye, \"using at least BID\"   - Xiidra BID each eye, \"I haven't used it for a while\"   - Warm compresses BID each eye, \"not using\"     Jossie Martinez, COT 2:12 PM 03/29/2023                       "

## 2023-04-26 DIAGNOSIS — F51.02 ADJUSTMENT INSOMNIA: ICD-10-CM

## 2023-04-26 RX ORDER — ZOLPIDEM TARTRATE 5 MG/1
TABLET ORAL
Qty: 30 TABLET | Refills: 3 | Status: SHIPPED | OUTPATIENT
Start: 2023-04-26 | End: 2024-04-04

## 2023-04-26 NOTE — TELEPHONE ENCOUNTER
DE,    Routing refill request to provider for review/approval because:  Drug not on the [facility] refill protocol     Thanks  ARIELLE Oropeza

## 2023-05-05 ENCOUNTER — TELEPHONE (OUTPATIENT)
Dept: GASTROENTEROLOGY | Facility: CLINIC | Age: 58
End: 2023-05-05

## 2023-05-05 NOTE — TELEPHONE ENCOUNTER
Attempted to contact patient regarding upcoming Upper endoscopy (EGD) procedure on 5/23/23 for pre assessment questions.    Pt answered but was not a good time. He will return call to [phone] #4    Discuss Covid policy and designated  policy.    Pre op exam? N/A    Arrival time: 0700. Procedure time: 0800    [facility]; [address] [place] [address], [address] Floor, [place] [address]    Sedation type: Conscious sedation     NSAIDs? No NSAID medications per patient's medication list.  RN will verify with pre-assessment call.    Anticoagulants: No    Electronic implanted devices? No    Diabetic? No    Indication for procedure: Hx celiac disease    Prep instructions sent via [email or web address].     Raeann Birmingham RN  Endoscopy Procedure Pre Assessment RN

## 2023-05-15 ENCOUNTER — MYC MEDICAL ADVICE (OUTPATIENT)
Dept: FAMILY MEDICINE | Facility: CLINIC | Age: 58
End: 2023-05-15

## 2023-05-16 NOTE — TELEPHONE ENCOUNTER
Pre assessment questions completed for upcoming Upper endoscopy (EGD) procedure scheduled on 5.23.23    COVID policy reviewed.     Reviewed procedural arrival time 0700 and facility location [facility]; [address] [place] [address], [address] Floor, [place] [address]    Designated  policy reviewed. Instructed to have someone stay 6 hours post procedure.     NSAIDs? No    Anticoagulation/blood thinners? No    Electronic implanted devices? No    Diabetic? No    Reviewed EGD procedure prep instructions.     Patient verbalized understanding and had no questions or concerns at this time.    Ani Ibrahim RN  Endoscopy Procedure Pre Assessment RN

## 2023-05-16 NOTE — TELEPHONE ENCOUNTER
Form completed and signed  Faxed to number on Form [phone]  Sent for scanning  Maribel [facility] Unit Coordinator

## 2023-05-23 ENCOUNTER — HOSPITAL ENCOUNTER (OUTPATIENT)
Facility: AMBULATORY SURGERY CENTER | Age: 58
Discharge: HOME | End: 2023-05-23
Attending: INTERNAL MEDICINE | Admitting: INTERNAL MEDICINE
Payer: COMMERCIAL

## 2023-05-23 VITALS
RESPIRATION RATE: 16 BRPM | HEART RATE: 66 BPM | SYSTOLIC BLOOD PRESSURE: 105 MMHG | DIASTOLIC BLOOD PRESSURE: 61 MMHG | OXYGEN SATURATION: 96 % | TEMPERATURE: 98.7 F

## 2023-05-23 LAB — UPPER GI ENDOSCOPY: NORMAL

## 2023-05-23 PROCEDURE — 43239 EGD BIOPSY SINGLE/MULTIPLE: CPT

## 2023-05-23 PROCEDURE — 88305 TISSUE EXAM BY PATHOLOGIST: CPT | Mod: 26 | Performed by: PATHOLOGY

## 2023-05-23 PROCEDURE — 88305 TISSUE EXAM BY PATHOLOGIST: CPT | Mod: TC | Performed by: INTERNAL MEDICINE

## 2023-05-23 RX ORDER — PROCHLORPERAZINE MALEATE 10 MG
10 TABLET ORAL EVERY 6 HOURS PRN
Status: DISCONTINUED | OUTPATIENT
Start: 2023-05-23 | End: 2023-05-24 | Stop reason: HOSPADM

## 2023-05-23 RX ORDER — FENTANYL CITRATE 50 UG/ML
INJECTION, SOLUTION INTRAMUSCULAR; INTRAVENOUS PRN
Status: DISCONTINUED | OUTPATIENT
Start: 2023-05-23 | End: 2023-05-23 | Stop reason: HOSPADM

## 2023-05-23 RX ORDER — NALOXONE HYDROCHLORIDE 0.4 MG/ML
0.2 INJECTION, SOLUTION INTRAMUSCULAR; INTRAVENOUS; SUBCUTANEOUS
Status: DISCONTINUED | OUTPATIENT
Start: 2023-05-23 | End: 2023-05-24 | Stop reason: HOSPADM

## 2023-05-23 RX ORDER — LIDOCAINE 40 MG/G
CREAM TOPICAL
Status: DISCONTINUED | OUTPATIENT
Start: 2023-05-23 | End: 2023-05-23 | Stop reason: HOSPADM

## 2023-05-23 RX ORDER — ONDANSETRON 4 MG/1
4 TABLET, ORALLY DISINTEGRATING ORAL EVERY 6 HOURS PRN
Status: DISCONTINUED | OUTPATIENT
Start: 2023-05-23 | End: 2023-05-24 | Stop reason: HOSPADM

## 2023-05-23 RX ORDER — NALOXONE HYDROCHLORIDE 0.4 MG/ML
0.4 INJECTION, SOLUTION INTRAMUSCULAR; INTRAVENOUS; SUBCUTANEOUS
Status: DISCONTINUED | OUTPATIENT
Start: 2023-05-23 | End: 2023-05-24 | Stop reason: HOSPADM

## 2023-05-23 RX ORDER — FLUMAZENIL 0.1 MG/ML
0.2 INJECTION, SOLUTION INTRAVENOUS
Status: ACTIVE | OUTPATIENT
Start: 2023-05-23 | End: 2023-05-23

## 2023-05-23 RX ORDER — ONDANSETRON 2 MG/ML
4 INJECTION INTRAMUSCULAR; INTRAVENOUS
Status: DISCONTINUED | OUTPATIENT
Start: 2023-05-23 | End: 2023-05-23 | Stop reason: HOSPADM

## 2023-05-23 RX ORDER — ONDANSETRON 2 MG/ML
4 INJECTION INTRAMUSCULAR; INTRAVENOUS EVERY 6 HOURS PRN
Status: DISCONTINUED | OUTPATIENT
Start: 2023-05-23 | End: 2023-05-24 | Stop reason: HOSPADM

## 2023-05-23 NOTE — DISCHARGE INSTRUCTIONS
Discharge Instructions after Upper Endoscopy (EGD)       Activity and Diet   You were given medicine for pain. You may be dizzy or sleepy.   For 24 hours:    Do not drive or use heavy equipment.    Do not make important decisions.    Do not drink any alcohol.   You may return to your regular diet as tolerated.      Discomfort   You may have a sore throat for 2 to 3 days. It may help to:    Avoid hot liquids for 24 hours.    Use sore throat lozenges.    Gargle as needed with salt water up to 4 times a day. Mix 1 cup of warm water with 1 teaspoon of salt. Do not swallow.   If your esophagus was dilated (opened) or banded during the exam:    Drink only cool liquids for the rest of the day. Eat a soft diet for the next few days.    You may have a sore chest for 2 to 3 days.       You may take Tylenol (acetaminophen) for pain unless your doctor has told you not to.       Do not take aspirin or ibuprofen (Advil, Motrin) or other NSAIDS (anti-inflammatory drugs) for 2-3 days.       Follow-up   If you do not have a follow-up visit scheduled, call your provider s office in 2 weeks for the results.       When to call us:   Problems are rare. Call right away if you have:    Unusual throat pain or trouble swallowing    Unusual pain in belly or chest that is not relieved by belching or passing air    Black stools (tar-like looking bowel movement)    Temperature above 100.6  F. (37.5  C).       If you vomit blood or have severe pain, go to an emergency room.       If you have questions, call:   Monday to Friday, 7 a.m. to 4:30 p.m.: Endoscopy: [phone] (We may have to call you back)       After hours: Hospital: [phone] (Ask for the GI fellow on call)

## 2023-05-23 NOTE — H&P
Vimal Goldsmith  [id number]  male  58 year old      Reason for procedure/surgery: celiac disease    Patient Active Problem List   Diagnosis     Dermatitis herpetiformis     Celiac disease     Tinea pedis     Acne rosacea     BPH (benign prostatic hypertrophy)     Bacterial folliculitis     Chalazion     Therapeutic drug monitoring     Bilateral dry eyes     Adjustment insomnia       Past Surgical History:    Past Surgical History:   Procedure Laterality Date     CATARACT IOL, RT/LT       COLONOSCOPY N/A 7/14/2022    Procedure: COLONOSCOPY, WITH POLYPECTOMY AND BIOPSY;  Surgeon: Cooper Juan MD;  Location:  GI     ENHANCE LASER REFRACTIVE BILATERAL EXISTING PT IN PARAMETERS Left 01/04/2017     ESOPHAGOSCOPY, GASTROSCOPY, DUODENOSCOPY (EGD), COMBINED  2/11/2013    Procedure: COMBINED ESOPHAGOSCOPY, GASTROSCOPY, DUODENOSCOPY (EGD), BIOPSY SINGLE OR MULTIPLE;;  Surgeon: Luke uJan MD;  Location:  GI     NASAL/SINUS POLYPECTOMY         Past Medical History:   Past Medical History:   Diagnosis Date     Benign positional vertigo      Celiac disease 10/23/2011     Chronic kidney disease 1975    diagnosed when I was [age]     Chronic sinusitis      Dermatitis herpetiformis      Gastro-oesophageal reflux disease 1987    had suffered any years ago before  celiac disease was diagno     Hoarseness      Migraines      Nasal polyps      Reduced vision        Social History:   Social History     Tobacco Use     Smoking status: Never     Smokeless tobacco: Never     Tobacco comments:     NON SMOKING ENVIRONMENT, 10/6/11, [facility].    Vaping Use     Vaping status: Never Used   Substance Use Topics     Alcohol use: No     Comment: a few sips to help sleep       Family History:   Family History   Problem Relation Age of Onset     Lipids Mother      Kidney Disease Mother      Arthritis Mother      Hypertension Mother         passed away     Osteoporosis Mother      Gastrointestinal Disease Father         maybe celiac     Cancer  Father         Leukemia     Heart Disease Father      Hypertension Father         passed away     Cerebrovascular Disease Father         passed     Stomach Problem Father      Other Cancer Father         leukemia     Gastrointestinal Disease Brother         maybe celiac     Kidney Disease Brother         Casimiro stone     Cancer - colorectal No family hx of      Prostate Cancer No family hx of      Melanoma No family hx of      Skin Cancer No family hx of        Allergies:   Allergies   Allergen Reactions     Gluten Meal Rash and GI Disturbance     Nkda [No Known Drug Allergy]        Active Medications:   Current Outpatient Medications   Medication Sig Dispense Refill     dapsone (ACZONE) 25 MG tablet TAKE 1 TABLET BY MOUTH DAILY, TAKE 2 TABLETS WHEN FLARING 180 tablet 3     fluticasone (FLONASE) 50 MCG/ACT nasal spray Spray 1 spray into both nostrils daily 18.2 mL 6     sildenafil (VIAGRA) 50 MG tablet Take 0.5 tablets (25 mg) 30 min to 4 hours before intercourse daily as needed. Never use with nitroglycerin, terazosin or doxazosin. 12 tablet 6     triamcinolone (KENALOG) 0.1 % external ointment Apply twice daily as needed for rash on the trunk or extremities 80 g 11     zolpidem (AMBIEN) 5 MG tablet TAKE ONE TABLET BY MOUTH NIGHTLY AS NEEDED FOR SLEEP 30 tablet 3     ipratropium (ATROVENT) 0.06 % nasal spray Spray 2 sprays into both nostrils 4 times daily as needed for rhinitis (Patient not taking: Reported on 1/17/2023) 15 mL 1       Systemic Review:   ROS otherwise negative    Physical Examination:   Vital Signs: /77   Pulse 70   Temp 97.4  F (36.3  C) (Temporal)   Resp 16   SpO2 94%   GENERAL: healthy, alert and no distress  HENT: oropharynx clear and oral mucous membranes moist, Mallampati II  NECK: Normal ROM  RESP: lungs clear to auscultation - no rales, rhonchi or wheezes  CV: regular rate and rhythm, normal S1 S2,   ABDOMEN: soft, nontender, and bowel sounds normal  MS: no gross musculoskeletal  defects noted, no edema      Plan: Appropriate to proceed as scheduled.      Palak Sandoval MD  5/23/2023    PCP:  Steven Robledo

## 2023-05-26 ENCOUNTER — TELEPHONE (OUTPATIENT)
Dept: GASTROENTEROLOGY | Facility: CLINIC | Age: 58
End: 2023-05-26

## 2023-05-26 NOTE — TELEPHONE ENCOUNTER
Called and spoke with patient about scheduling an appt with a General GI PA, as requested by Dr. Sandoval. Pt stated now was not a good time and will call back to schedule. Left K pod # with patient, and sent a [employer] message as well.     Ok to use an urgent slot to schedule pt with any General GI PA per Cornelia GUZMÁN. Bess Rivera has the soonest openings in June. Reason for appt: celiac disease

## 2023-05-28 ENCOUNTER — HOSPITAL ENCOUNTER (EMERGENCY)
Facility: CLINIC | Age: 58
Discharge: HOME | End: 2023-05-28
Attending: EMERGENCY MEDICINE | Admitting: EMERGENCY MEDICINE
Payer: COMMERCIAL

## 2023-05-28 VITALS
DIASTOLIC BLOOD PRESSURE: 71 MMHG | BODY MASS INDEX: 24.25 KG/M2 | WEIGHT: 160 LBS | OXYGEN SATURATION: 93 % | HEIGHT: 68 IN | TEMPERATURE: 98.6 F | SYSTOLIC BLOOD PRESSURE: 123 MMHG | RESPIRATION RATE: 16 BRPM | HEART RATE: 59 BPM

## 2023-05-28 DIAGNOSIS — K64.8 INTERNAL HEMORRHOIDS: ICD-10-CM

## 2023-05-28 DIAGNOSIS — K62.5 BRBPR (BRIGHT RED BLOOD PER RECTUM): ICD-10-CM

## 2023-05-28 PROCEDURE — 99283 EMERGENCY DEPT VISIT LOW MDM: CPT

## 2023-05-28 PROCEDURE — 99282 EMERGENCY DEPT VISIT SF MDM: CPT

## 2023-05-28 ASSESSMENT — ACTIVITIES OF DAILY LIVING (ADL): ADLS_ACUITY_SCORE: 37

## 2023-05-28 NOTE — DISCHARGE INSTRUCTIONS
As discussed, I recommend you take an over-the-counter medication called Colace twice a day with plenty of water to help soften your stools.  Spend minimal time on the toilet if you can.  Follow-up with your primary doctor next week.  Return to us for any other emergent concerns.

## 2023-05-28 NOTE — ED PROVIDER NOTES
"  History     Chief Complaint:  Rectal Bleeding     The history is provided by the patient.      Vimal Goldsmith is a 58 year old male who presents with rectal weekend. The patient reports that about 7 days ago, he noticed bright red blood mixed in with his stool. He reports that five days ago, he had an endoscopy done and did not eat, but notes that afterward, he continued to notice blood mixed in with his stool throughout the week, noting that he has had slight discomfort in the abdomen. He states that he called the nurse triage line yesterday and was advised to come to the ED. He denies any pain in the rectal area. Of note, he has a history of anal fissure and internal hemorrhoids.    Independent Historian:   None - Patient Only    Review of External Notes: I reviewed the colonoscopy from 7/2022    Medications:    Dapsone   Sildenafil   Zolpidem     Past Medical History:    Benign positional vertigo   Celiac disease   Chronic kidney disease   Chronic sinusitis   Dermatitis herpetiformis   GERD  Migraines   Nasal polyps   Bacterial folliculitis  Dermatitis herpetiformis  Tinea pedis  Acne rosacea  Benign prostatic hypertrophy  Chalazion    Past Surgical History:    Cataract removal  Nasal/sinus polypectomy  Enhance laser refractive bilateral      Physical Exam     Patient Vitals for the past 24 hrs:   BP Temp Temp src Pulse Resp SpO2 Height Weight   05/28/23 1055 123/71 98.6  F (37  C) Temporal 59 16 93 % 1.727 m (5' 8\") 72.6 kg (160 lb)      Physical Exam    Eye:  Pupils are equal, round, and reactive.  Extraocular movements intact.    ENT:  No rhinorrhea.  Moist mucus membranes.  Normal tongue and tonsil.    Cardiac:  Regular rate and rhythm.  No murmurs, gallops, or rubs.    Pulmonary:  Clear to auscultation bilaterally.  No wheezes, rales, or rhonchi.    Abdomen:  Positive bowel sounds.  Abdomen is soft and non-distended, without focal tenderness.    Musculoskeletal:  Normal movement of all extremities without " evidence for deficit.    Skin:  Warm and dry without rashes.    Neurologic:  Non-focal exam without asymmetric weakness or numbness.     Psychiatric:  Normal affect with appropriate interaction with examiner.      Emergency Department Course     Emergency Department Course & Assessments:    Consultations/Discussion of Management or Tests:  None     Assessments:  ED Course as of 05/28/23 1322   Sun May 28, 2023   1305 I obtained history and examined the patient as noted above.        Social Determinants of Health affecting care:   None    Disposition:  The patient was discharged to home.     Impression & Plan      Medical Decision Making:  This healthy 58-year-old presents to us because of some bright red blood per rectum over the past week.  The bleeding is painless and is only present when he has a bowel movement.  He showed me pictures and it is bright red and typically dripping into the toilet water or on the toilet paper.  He denies any black stools or abdominal pain.  On review of his colonoscopy from last year, he was noted to have internal hemorrhoids.  I reassured him that with his normal vital signs, lack of abdominal pain, and history, I do not believe that he would require blood work.  I explained these are related to internal hemorrhoids.  I provided him with education about this pathophysiology.  Questions were answered he is comfortable to plan for discharge home.  He will otherwise return to us for worsening of condition or other emergent concerns.  He will initiate Colace to soften his stools and minimize his time on the toilet as able.    Diagnosis:    ICD-10-CM    1. BRBPR (bright red blood per rectum)  K62.5       2. Internal hemorrhoids  K64.8     history of           Scribe Disclosure:  I, DANNI SHELTON, am serving as a scribe at 12:33 PM on 5/28/2023 to document services personally performed by Trierweiler, Chad A, MD based on my observations and the provider's statements to me.      5/28/2023    Trierweiler, Chad A, MD Trierweiler, Chad A, MD  05/29/23 1827

## 2023-05-28 NOTE — ED TRIAGE NOTES
Pt sts he has had bright red blood in stool. Hx of anal fissure     Triage Assessment     Row Name 05/28/23 1058       Triage Assessment (Adult)    Airway WDL WDL       Respiratory WDL    Respiratory WDL WDL       Skin Circulation/Temperature WDL    Skin Circulation/Temperature WDL WDL       Cardiac WDL    Cardiac WDL WDL       Peripheral/Neurovascular WDL    Peripheral Neurovascular WDL WDL       Cognitive/Neuro/Behavioral WDL    Cognitive/Neuro/Behavioral WDL WDL

## 2023-05-30 ENCOUNTER — MYC MEDICAL ADVICE (OUTPATIENT)
Dept: DERMATOLOGY | Facility: CLINIC | Age: 58
End: 2023-05-30

## 2023-05-30 ENCOUNTER — MYC MEDICAL ADVICE (OUTPATIENT)
Dept: FAMILY MEDICINE | Facility: CLINIC | Age: 58
End: 2023-05-30

## 2023-05-30 DIAGNOSIS — K90.0 CELIAC DISEASE: Primary | ICD-10-CM

## 2023-06-01 ENCOUNTER — MYC MEDICAL ADVICE (OUTPATIENT)
Dept: GASTROENTEROLOGY | Facility: CLINIC | Age: 58
End: 2023-06-01

## 2023-06-01 NOTE — TELEPHONE ENCOUNTER
REFERRAL INFORMATION:    Referring Provider:  Dr. Lex Huffman     Referring Clinic:  [facility]    Reason for Visit/Diagnosis: Celiac disease     FUTURE VISIT INFORMATION:    Appointment Date: 06-07-23    Appointment Time: @ 1pm     NOTES STATUS DETAILS   OFFICE NOTE from Referring Provider Internal 05-30-23 Dr. Lex Huffman   OFFICE NOTE from Other Specialist Internal 11-19-18 Dr. Griffin Bravo  09-20-18 Dr. Rafa Trammell  11-29-17 Dr. Obando [facility] DISCHARGE SUMMARY/  ED VISITS Internal 05-28-23 Dr. Chad Trierweiler   OPERATIVE REPORT     MEDICATION LIST Internal         ENDOSCOPY      COLONOSCOPY Internal 07-14-22   ERCP     EUS     STOOL TESTING     PERTINENT LABS Internal C-diff: 11-19-21, 11-19-18, 11-29-17   PATHOLOGY REPORTS (RELATED) Internal 05-23-23, 07-14-22   IMAGING (CT, MRI, EGD, MRCP, Small Bowel Follow Through/SBT, MR/CT Enterography)

## 2023-06-02 NOTE — TELEPHONE ENCOUNTER
Called to remind patient of their upcoming appointment with our GI clinic, on Wednesday, June 7th at 1pm with Dr. Palak Sandoval. This appointment is scheduled as an in-person appt. Please arrive 15 minutes early to check in for your appointment. , if your appointment is virtual (video or telephone) you need to be in [place] for the visit. To reschedule or cancel patient to call [phone].    Nilam Connolly

## 2023-06-07 ENCOUNTER — PRE VISIT (OUTPATIENT)
Dept: GASTROENTEROLOGY | Facility: CLINIC | Age: 58
End: 2023-06-07

## 2023-06-07 ENCOUNTER — OFFICE VISIT (OUTPATIENT)
Dept: GASTROENTEROLOGY | Facility: CLINIC | Age: 58
End: 2023-06-07
Attending: FAMILY MEDICINE
Payer: COMMERCIAL

## 2023-06-07 VITALS
DIASTOLIC BLOOD PRESSURE: 75 MMHG | HEART RATE: 82 BPM | BODY MASS INDEX: 25.54 KG/M2 | SYSTOLIC BLOOD PRESSURE: 117 MMHG | HEIGHT: 67 IN | WEIGHT: 162.7 LBS | OXYGEN SATURATION: 95 %

## 2023-06-07 DIAGNOSIS — K64.8 INTERNAL BLEEDING HEMORRHOIDS: ICD-10-CM

## 2023-06-07 DIAGNOSIS — K90.0 CELIAC DISEASE: Primary | ICD-10-CM

## 2023-06-07 PROCEDURE — 99214 OFFICE O/P EST MOD 30 MIN: CPT | Mod: GC | Performed by: INTERNAL MEDICINE

## 2023-06-07 ASSESSMENT — PAIN SCALES - GENERAL: PAINLEVEL: NO PAIN (0)

## 2023-06-07 NOTE — LETTER
6/7/2023         RE: Vimal Goldsmith  [address] [place] [address]        Dear Colleague,    Thank you for referring your patient, Vimal Goldsmith, to the [facility] [place]. Please see a copy of my visit note below.    GI CLINIC VISIT     CC/REFERRING MD:  Steven Ambrosio     REASON FOR CONSULTATION:   Mr. Goldsmith is a 58 year old male who we were asked to see in consultation at the request of Dr. Steven Ambrosio for establishing care for celiac disease.     ASSESSMENT/PLAN:        Celiac disease, well controlled  Diagnosed > 20 years ago (around 2001). In our system normal celiac serologies as far back as 2005 and normal duodenal biopsies as far back as 2013 (though patient reports remission was established within months of original diagnosis, though records are not accessible from that time).     Recent EGD with normal duodenal biopsies and overall feeling well with no overt symptoms. BMs are WNL and no weight loss.  We did discuss basic approach to celiac management with annual check-ins and repeat serologies and/or endoscopy if change in symptoms/clinical picture. I did offer nutritional testing as it's been quite some time since the patient has seen a gastroenterologist.   - vit A, D, E, B12, folate, iron, ferritin, hgb, zinc  - continue your excellent efforts to maintain a gluten-free diet  - let us know if you would like to meet with our GI dietitian in the future       Constipation  Internal hemorrhoids  Infrequent hard stools with int hemorrhoids seen on colonoscopy last year. Recent episode of BRBPR which has resolved.     - ok for use of Colace/docusate as needed to avoid hard stools  - ok for gluten-free fiber supplement, though overall your stool consistency is quite good (soft and formed)       Colorectal cancer surveillance  One small TA resected in 2022.     - you are due for a repeat colonoscopy in 7-10 years       RTC 1 year        Discussed with Dr Lori Limon MD  PGY-3 internal medicine    --------------------------------------------  ATTESTATION:    Physician Attestation   I, Palak Sandoval MD, saw this patient and agree with the findings and plan of care as documented in the note.  Note has been edited to reflect my history, exam, and assessment.     Items personally reviewed/procedural attestation: vitals, labs and extensive chart review.    Palak Sandoval MD      I spent 25 minutes with the patient, of which > 50% was spent face-to-face with the patient in education, counseling, and addressing questions regarding the above diagnoses.   An additional 30 minutes was spent on the date of the encounter doing chart review (notes, labs, imaging reports, endoscopic and pathology reports, clinical status events, medications, etc)  documentation, care coodination, and arranging of care plan with the fellow.      ---------------------------------------------------------------------------------------------------  HPI:  Mr. Goldsmith is a 58 year old male with dermatitis herpetiformis, BPV, and celiac disease who presents to GI clinic to establish care (prior GI provider reportedly retired some time ago).     He was diagnosed with celiac disease in 2001 after many years of unexplained diarrhea, weight loss, and skin rashes. He recalls even episodes of vomiting and pain as a kid as well. Diagnosis was reportedly via celiac serologies and confirmed with duodenal biopsies done here at the [facility] (though prior to the EMR).  Since his diagnosis he has been on a gluten free diet and he reports repeat endoscopy 3 months after diagnosis was showing remission. He recalls experiencing weight gain after he went gluten-free. He does not recall if any nutritional evaluation was done at that time. We do have a normal Vit A level in 2007 in this system but not much else. Most recent hgb was several months ago - 14.9 with  MCV 92. Had a normal DXA scan in 2007.     From available EMR records he had normal TTG and anti-gliadin abs which were normal in 2005. We also have a pathology report from 2/11/2013 which showed normal duodenal histology (no signs of celiac disease).     He follows in [facility] for dermatitis herpetiformis. He feels this is generally well-managed (and much improved compared with prior to his celiac diagnosis). He uses dapsone and, today, reports that rashes only develop if he accidentally misses his dapsone, but reports they resolve quickly once he restarts dapsone. An EGD was ordered by his dermatology team which was undertaken last week. The endoscopic exam was normal and duodenal biopsies were normal with no evidence of active celiac disease.     GI History/GI ROS:  *Screening colonoscopy July 2022 - 5 mm TA was resected. Exam with diverticulosis and internal hemorrhoids. Due for repeat exam in 7-10 years.    *Infrequently has harder ([place] 2-3 stools). Typical bowel pattern is soft, formed stools most days, often twice daily.     *Had BRBPR last week, occurred initially after passage of a [place] 2 stool (patient did bring pictures to review in clinic today). Blood was small amount seen in toilet bowel. Persisted for a few days - always small amount with wiping or coating stool. Ultimately he presented to the ED and was told this was likely due to internal hemorrhoids. He did not note any anal/rectal pain or itching. No prolapsed tissue. Bleeding has resolved at this time. Subsequent BMs were more like his normal ([place] 4, soft and formed).       *Occasionally will have abdominal bloating after dinner in the evenings. Has found pro-biotic to be helpful.          PROBLEM LIST        Patient Active Problem List     Diagnosis Date Noted    Bilateral dry eyes 11/19/2018       Priority: Medium    Adjustment insomnia 11/19/2018       Priority: Medium       Patient is followed by CHRISTINA MOSCOSO  WANDY for ongoing prescription of controlled insomnia medication.  All refills should be approved by this provider, or covering partner.     Medication(s): Ambien.   Maximum quantity per month:   Clinic visit frequency required:       Controlled substance agreement on file: No     Last [facility] website verification:  done on 6/11/2019  [email or web address]       Therapeutic drug monitoring 09/07/2013       Priority: Medium    Chalazion 05/20/2013       Priority: Medium    Bacterial folliculitis 01/06/2013       Priority: Medium    BPH (benign prostatic hypertrophy) 02/08/2012       Priority: Medium    Tinea pedis 01/23/2012       Priority: Medium    Acne rosacea 01/23/2012       Priority: Medium    Celiac disease 10/23/2011       Priority: Medium    Dermatitis herpetiformis 10/06/2011       Priority: Medium         PERTINENT PAST MEDICAL HISTORY:  Past Medical History        Past Medical History:   Diagnosis Date    Benign positional vertigo      Celiac disease 10/23/2011    Chronic kidney disease 1975     diagnosed when I was [age]    Chronic sinusitis      Dermatitis herpetiformis      Gastro-oesophageal reflux disease 1987     had suffered any years ago before  celiac disease was diagno    Hoarseness      Migraines      Nasal polyps      Reduced vision              PREVIOUS SURGERIES:  Past Surgical History         Past Surgical History:   Procedure Laterality Date    CATARACT IOL, RT/LT        COLONOSCOPY N/A 7/14/2022     Procedure: COLONOSCOPY, WITH POLYPECTOMY AND BIOPSY;  Surgeon: Cooper Juan MD;  Location:  GI    ENHANCE LASER REFRACTIVE BILATERAL EXISTING PT IN PARAMETERS Left 01/04/2017    ESOPHAGOSCOPY, GASTROSCOPY, DUODENOSCOPY (EGD), COMBINED   2/11/2013     Procedure: COMBINED ESOPHAGOSCOPY, GASTROSCOPY, DUODENOSCOPY (EGD), BIOPSY SINGLE OR MULTIPLE;;  Surgeon: Luke Juan MD;  Location:  GI    ESOPHAGOSCOPY, GASTROSCOPY, DUODENOSCOPY (EGD), COMBINED N/A 5/23/2023      Procedure: Esophagoscopy, gastroscopy, duodenoscopy (EGD), combined;  Surgeon: Palak Sandoval MD;  Location: [facility] OR    NASAL/SINUS POLYPECTOMY                PREVIOUS ENDOSCOPY:     EGD 05/23/23 Impression:                         - Z-line regular, 39 cm from the incisors.                          - Normal esophagus.                          - Gastroesophageal flap valve classified as Hill Grade                          I (prominent fold, tight to endoscope).                          - Normal stomach.                          - Normal examined duodenum. Biopsied.      Colonoscopy 07/14/22 Impression:                            - The examined portion of the ileum was normal.                             - Diverticulosis in the sigmoid colon and in the                             descending colon.                             - One 5 mm polyp in the sigmoid colon, removed with                             a cold biopsy forceps. Resected and retrieved.      ALLERGIES:          Allergies   Allergen Reactions    Gluten Meal Rash and GI Disturbance    Nkda [No Known Drug Allergy]           PERTINENT MEDICATIONS:      Current Outpatient Medications   Medication    dapsone (ACZONE) 25 MG tablet    fluticasone (FLONASE) 50 MCG/ACT nasal spray    sildenafil (VIAGRA) 50 MG tablet    triamcinolone (KENALOG) 0.1 % external ointment    zolpidem (AMBIEN) 5 MG tablet    ipratropium (ATROVENT) 0.06 % nasal spray      No current facility-administered medications for this visit.         SOCIAL HISTORY:  Social History   Social History            Socioeconomic History    Marital status:        Spouse name: Not on file    Number of children: Not on file    Years of education: Not on file    Highest education level: Not on file   Occupational History    Not on file   Tobacco Use    Smoking status: Never    Smokeless tobacco: Never    Tobacco comments:       NON SMOKING ENVIRONMENT, 10/6/11, TALIB.    Vaping Use     "Vaping status: Never Used   Substance and Sexual Activity    Alcohol use: No       Comment: a few sips to help sleep    Drug use: No    Sexual activity: Yes       Partners: Male       Birth control/protection: Pull-out method, Condom, None       Comment: unprotected sex with my partner (), sometimes others   Other Topics Concern    Parent/sibling w/ CABG, MI or angioplasty before 65F 55M? Yes       Comment: My father   Social History Narrative    Not on file      Social Determinants of Health      Financial Resource Strain: Not on file   Food Insecurity: Not on file   Transportation Needs: Not on file   Physical Activity: Not on file   Stress: Not on file   Social Connections: Not on file   Intimate Partner Violence: Not on file   Housing Stability: Not on file            FAMILY HISTORY:  Family History         Family History   Problem Relation Age of Onset    Lipids Mother      Kidney Disease Mother      Arthritis Mother      Hypertension Mother           passed away    Osteoporosis Mother      Gastrointestinal Disease Father           maybe celiac    Cancer Father           Leukemia    Heart Disease Father      Hypertension Father           passed away    Cerebrovascular Disease Father           passed    Stomach Problem Father      Other Cancer Father           leukemia    Gastrointestinal Disease Brother           maybe celiac    Kidney Disease Brother           Kidnet stone    Cancer - colorectal No family hx of      Prostate Cancer No family hx of      Melanoma No family hx of      Skin Cancer No family hx of              Past/family/social history reviewed and no changes     PHYSICAL EXAMINATION:  Vitals /75   Pulse 82   Ht 1.689 m (5' 6.5\")   Wt 73.8 kg (162 lb 11.2 oz)   SpO2 95%   BMI 25.87 kg/m     Wt       Wt Readings from Last 2 Encounters:   06/07/23 73.8 kg (162 lb 11.2 oz)   05/28/23 72.6 kg (160 lb)      Gen: Pt sitting up in NAD, interactive and cooperative on exam  Eyes: sclerae " anicteric, no injection  ENT:  MMM  Resp: Clear bilaterally  GI: Normoactive BS, abd soft, flat, nontender  Skin: Warm, dry, no jaundice, mild < 1 cm rashes in ankles - pt reports areas of resolving dermatitis herpetiformis  Neuro: alert, oriented, answers questions appropriately        PERTINENT STUDIES:             Lab on 01/05/2023   Component Date Value Ref Range Status    Cholesterol 01/05/2023 155  <200 mg/dL Final    Triglycerides 01/05/2023 57  <150 mg/dL Final    Direct Measure HDL 01/05/2023 48  >=40 mg/dL Final    LDL Cholesterol Calculated 01/05/2023 96  <=100 mg/dL Final    Non HDL Cholesterol 01/05/2023 107  <130 mg/dL Final    Sodium 01/05/2023 142  136 - 145 mmol/L Final    Potassium 01/05/2023 4.2  3.4 - 5.3 mmol/L Final    Chloride 01/05/2023 108 (H)  98 - 107 mmol/L Final    Carbon Dioxide (CO2) 01/05/2023 22  22 - 29 mmol/L Final    Anion Gap 01/05/2023 12  7 - 15 mmol/L Final    Urea Nitrogen 01/05/2023 18.9  6.0 - 20.0 mg/dL Final    Creatinine 01/05/2023 0.90  0.67 - 1.17 mg/dL Final    Calcium 01/05/2023 8.9  8.6 - 10.0 mg/dL Final    Glucose 01/05/2023 99  70 - 99 mg/dL Final    Alkaline Phosphatase 01/05/2023 47  40 - 129 U/L Final    AST 01/05/2023 30  10 - 50 U/L Final    ALT 01/05/2023 25  10 - 50 U/L Final    Protein Total 01/05/2023 6.7  6.4 - 8.3 g/dL Final    Albumin 01/05/2023 4.2  3.5 - 5.2 g/dL Final    Bilirubin Total 01/05/2023 1.3 (H)  <=1.2 mg/dL Final    GFR Estimate 01/05/2023 >90  >60 mL/min/1.73m2 Final    WBC Count 01/05/2023 4.1  4.0 - 11.0 10e3/uL Final    RBC Count 01/05/2023 4.83  4.40 - 5.90 10e6/uL Final    Hemoglobin 01/05/2023 14.9  13.3 - 17.7 g/dL Final    Hematocrit 01/05/2023 44.6  40.0 - 53.0 % Final    MCV 01/05/2023 92  78 - 100 fL Final    MCH 01/05/2023 30.8  26.5 - 33.0 pg Final    MCHC 01/05/2023 33.4  31.5 - 36.5 g/dL Final    RDW 01/05/2023 13.6  10.0 - 15.0 % Final    Platelet Count 01/05/2023 187  150 - 450 10e3/uL Final    Prostate Specific Antigen  Screen 01/05/2023 1.48  0.00 - 3.50 ng/mL Final    HIV Antigen Antibody Combo 01/05/2023 Nonreactive  Nonreactive Final    Treponema Antibody Total 01/05/2023 Nonreactive  Nonreactive Final    Chlamydia Trachomatis 01/05/2023 Negative  Negative Final    Neisseria gonorrhoeae 01/05/2023 Negative  Negative Final    Hemoglobin A1C 01/05/2023 4.7  0.0 - 5.6 % Final              Palak Sandoval MD

## 2023-06-07 NOTE — PATIENT INSTRUCTIONS
- stop at the lab for a blood draw at your earliest convenience  - continue your excellent efforts to maintain a gluten-free diet  - let us know if you would like to meet with our GI dietitian in the future  - you are due for a repeat colonoscopy in 7-10 years  - ok for use of Colace/docusate as needed  - ok for gluten-free fiber supplement, though overall your stool consistency is quite good (soft and formed)  - follow-up in GI clinic in 12 months       If you have any questions, please don't hesitate to contact me through our GI RN Clinic Coordinator, Cornelia Ulloa, at [phone].

## 2023-06-07 NOTE — PROGRESS NOTES
GI CLINIC VISIT     CC/REFERRING MD:  Steven Ambrosio     REASON FOR CONSULTATION:   Mr. Goldsmith is a 58 year old male who we were asked to see in consultation at the request of Dr. Steven Ambrosio for establishing care for celiac disease.     ASSESSMENT/PLAN:        Celiac disease, well controlled  Diagnosed > 20 years ago (around 2001). In our system normal celiac serologies as far back as 2005 and normal duodenal biopsies as far back as 2013 (though patient reports remission was established within months of original diagnosis, though records are not accessible from that time).     Recent EGD with normal duodenal biopsies and overall feeling well with no overt symptoms. BMs are WNL and no weight loss.  We did discuss basic approach to celiac management with annual check-ins and repeat serologies and/or endoscopy if change in symptoms/clinical picture. I did offer nutritional testing as it's been quite some time since the patient has seen a gastroenterologist.   - vit A, D, E, B12, folate, iron, ferritin, hgb, zinc  - continue your excellent efforts to maintain a gluten-free diet  - let us know if you would like to meet with our GI dietitian in the future       Constipation  Internal hemorrhoids  Infrequent hard stools with int hemorrhoids seen on colonoscopy last year. Recent episode of BRBPR which has resolved.     - ok for use of Colace/docusate as needed to avoid hard stools  - ok for gluten-free fiber supplement, though overall your stool consistency is quite good (soft and formed)       Colorectal cancer surveillance  One small TA resected in 2022.     - you are due for a repeat colonoscopy in 7-10 years       RTC 1 year       Discussed with Dr oLri Limon MD  PGY-3 internal medicine    --------------------------------------------  ATTESTATION:    Physician Attestation   I, Palak Sandoval MD, saw this patient and agree with the findings and plan of care as documented in  the note.  Note has been edited to reflect my history, exam, and assessment.     Items personally reviewed/procedural attestation: vitals, labs and extensive chart review.    Palak Sandoval MD      I spent 25 minutes with the patient, of which > 50% was spent face-to-face with the patient in education, counseling, and addressing questions regarding the above diagnoses.   An additional 30 minutes was spent on the date of the encounter doing chart review (notes, labs, imaging reports, endoscopic and pathology reports, clinical status events, medications, etc)  documentation, care coodination, and arranging of care plan with the fellow.      ---------------------------------------------------------------------------------------------------  HPI:  Mr. Goldsmith is a 58 year old male with dermatitis herpetiformis, BPV, and celiac disease who presents to GI clinic to establish care (prior GI provider reportedly retired some time ago).     He was diagnosed with celiac disease in 2001 after many years of unexplained diarrhea, weight loss, and skin rashes. He recalls even episodes of vomiting and pain as a kid as well. Diagnosis was reportedly via celiac serologies and confirmed with duodenal biopsies done here at the [facility] (though prior to the EMR).  Since his diagnosis he has been on a gluten free diet and he reports repeat endoscopy 3 months after diagnosis was showing remission. He recalls experiencing weight gain after he went gluten-free. He does not recall if any nutritional evaluation was done at that time. We do have a normal Vit A level in 2007 in this system but not much else. Most recent hgb was several months ago - 14.9 with MCV 92. Had a normal DXA scan in 2007.     From available EMR records he had normal TTG and anti-gliadin abs which were normal in 2005. We also have a pathology report from 2/11/2013 which showed normal duodenal histology (no signs of celiac disease).     He follows in [facility] for dermatitis herpetiformis. He feels this is generally well-managed (and much improved compared with prior to his celiac diagnosis). He uses dapsone and, today, reports that rashes only develop if he accidentally misses his dapsone, but reports they resolve quickly once he restarts dapsone. An EGD was ordered by his dermatology team which was undertaken last week. The endoscopic exam was normal and duodenal biopsies were normal with no evidence of active celiac disease.     GI History/GI ROS:  *Screening colonoscopy July 2022 - 5 mm TA was resected. Exam with diverticulosis and internal hemorrhoids. Due for repeat exam in 7-10 years.    *Infrequently has harder ([place] 2-3 stools). Typical bowel pattern is soft, formed stools most days, often twice daily.     *Had BRBPR last week, occurred initially after passage of a [place] 2 stool (patient did bring pictures to review in clinic today). Blood was small amount seen in toilet bowel. Persisted for a few days - always small amount with wiping or coating stool. Ultimately he presented to the ED and was told this was likely due to internal hemorrhoids. He did not note any anal/rectal pain or itching. No prolapsed tissue. Bleeding has resolved at this time. Subsequent BMs were more like his normal ([place] 4, soft and formed).       *Occasionally will have abdominal bloating after dinner in the evenings. Has found pro-biotic to be helpful.          PROBLEM LIST        Patient Active Problem List     Diagnosis Date Noted     Bilateral dry eyes 11/19/2018       Priority: Medium     Adjustment insomnia 11/19/2018       Priority: Medium       Patient is followed by CHRISTINA MOSCOSO for ongoing prescription of controlled insomnia medication.  All refills should be approved by this provider, or covering partner.     Medication(s): Ambien.   Maximum quantity per month:   Clinic visit frequency required:       Controlled substance agreement on  file: No     Last [facility] website verification:  done on 6/11/2019  [email or web address]  '        Therapeutic drug monitoring 09/07/2013       Priority: Medium     Chalazion 05/20/2013       Priority: Medium     Bacterial folliculitis 01/06/2013       Priority: Medium     BPH (benign prostatic hypertrophy) 02/08/2012       Priority: Medium     Tinea pedis 01/23/2012       Priority: Medium     Acne rosacea 01/23/2012       Priority: Medium     Celiac disease 10/23/2011       Priority: Medium     Dermatitis herpetiformis 10/06/2011       Priority: Medium         PERTINENT PAST MEDICAL HISTORY:  Past Medical History        Past Medical History:   Diagnosis Date     Benign positional vertigo       Celiac disease 10/23/2011     Chronic kidney disease 1975     diagnosed when I was [age]     Chronic sinusitis       Dermatitis herpetiformis       Gastro-oesophageal reflux disease 1987     had suffered any years ago before  celiac disease was diagno     Hoarseness       Migraines       Nasal polyps       Reduced vision              PREVIOUS SURGERIES:  Past Surgical History         Past Surgical History:   Procedure Laterality Date     CATARACT IOL, RT/LT         COLONOSCOPY N/A 7/14/2022     Procedure: COLONOSCOPY, WITH POLYPECTOMY AND BIOPSY;  Surgeon: oCoper Juan MD;  Location:  GI     ENHANCE LASER REFRACTIVE BILATERAL EXISTING PT IN PARAMETERS Left 01/04/2017     ESOPHAGOSCOPY, GASTROSCOPY, DUODENOSCOPY (EGD), COMBINED   2/11/2013     Procedure: COMBINED ESOPHAGOSCOPY, GASTROSCOPY, DUODENOSCOPY (EGD), BIOPSY SINGLE OR MULTIPLE;;  Surgeon: Luke Juan MD;  Location:  GI     ESOPHAGOSCOPY, GASTROSCOPY, DUODENOSCOPY (EGD), COMBINED N/A 5/23/2023     Procedure: Esophagoscopy, gastroscopy, duodenoscopy (EGD), combined;  Surgeon: Palak Sandoval MD;  Location: [facility] OR     NASAL/SINUS POLYPECTOMY                PREVIOUS ENDOSCOPY:     EGD 05/23/23 Impression:                          - Z-line regular, 39 cm from the incisors.                          - Normal esophagus.                          - Gastroesophageal flap valve classified as Hill Grade                          I (prominent fold, tight to endoscope).                          - Normal stomach.                          - Normal examined duodenum. Biopsied.      Colonoscopy 07/14/22 Impression:                            - The examined portion of the ileum was normal.                             - Diverticulosis in the sigmoid colon and in the                             descending colon.                             - One 5 mm polyp in the sigmoid colon, removed with                             a cold biopsy forceps. Resected and retrieved.      ALLERGIES:          Allergies   Allergen Reactions     Gluten Meal Rash and GI Disturbance     Nkda [No Known Drug Allergy]           PERTINENT MEDICATIONS:      Current Outpatient Medications   Medication     dapsone (ACZONE) 25 MG tablet     fluticasone (FLONASE) 50 MCG/ACT nasal spray     sildenafil (VIAGRA) 50 MG tablet     triamcinolone (KENALOG) 0.1 % external ointment     zolpidem (AMBIEN) 5 MG tablet     ipratropium (ATROVENT) 0.06 % nasal spray      No current facility-administered medications for this visit.         SOCIAL HISTORY:  Social History   Social History            Socioeconomic History     Marital status:        Spouse name: Not on file     Number of children: Not on file     Years of education: Not on file     Highest education level: Not on file   Occupational History     Not on file   Tobacco Use     Smoking status: Never     Smokeless tobacco: Never     Tobacco comments:       NON SMOKING ENVIRONMENT, 10/6/11, [facility].    Vaping Use     Vaping status: Never Used   Substance and Sexual Activity     Alcohol use: No       Comment: a few sips to help sleep     Drug use: No     Sexual activity: Yes       Partners: Male       Birth control/protection: Pull-out method,  "Condom, None       Comment: unprotected sex with my partner (), sometimes others   Other Topics Concern     Parent/sibling w/ CABG, MI or angioplasty before 65F 55M? Yes       Comment: My father   Social History Narrative     Not on file      Social Determinants of Health      Financial Resource Strain: Not on file   Food Insecurity: Not on file   Transportation Needs: Not on file   Physical Activity: Not on file   Stress: Not on file   Social Connections: Not on file   Intimate Partner Violence: Not on file   Housing Stability: Not on file            FAMILY HISTORY:  Family History         Family History   Problem Relation Age of Onset     Lipids Mother       Kidney Disease Mother       Arthritis Mother       Hypertension Mother           passed away     Osteoporosis Mother       Gastrointestinal Disease Father           maybe celiac     Cancer Father           Leukemia     Heart Disease Father       Hypertension Father           passed away     Cerebrovascular Disease Father           passed     Stomach Problem Father       Other Cancer Father           leukemia     Gastrointestinal Disease Brother           maybe celiac     Kidney Disease Brother           Kidnet stone     Cancer - colorectal No family hx of       Prostate Cancer No family hx of       Melanoma No family hx of       Skin Cancer No family hx of              Past/family/social history reviewed and no changes     PHYSICAL EXAMINATION:  Vitals /75   Pulse 82   Ht 1.689 m (5' 6.5\")   Wt 73.8 kg (162 lb 11.2 oz)   SpO2 95%   BMI 25.87 kg/m     Wt       Wt Readings from Last 2 Encounters:   06/07/23 73.8 kg (162 lb 11.2 oz)   05/28/23 72.6 kg (160 lb)      Gen: Pt sitting up in NAD, interactive and cooperative on exam  Eyes: sclerae anicteric, no injection  ENT:  MMM  Resp: Clear bilaterally  GI: Normoactive BS, abd soft, flat, nontender  Skin: Warm, dry, no jaundice, mild < 1 cm rashes in ankles - pt reports areas of resolving " dermatitis herpetiformis  Neuro: alert, oriented, answers questions appropriately        PERTINENT STUDIES:             Lab on 01/05/2023   Component Date Value Ref Range Status     Cholesterol 01/05/2023 155  <200 mg/dL Final     Triglycerides 01/05/2023 57  <150 mg/dL Final     Direct Measure HDL 01/05/2023 48  >=40 mg/dL Final     LDL Cholesterol Calculated 01/05/2023 96  <=100 mg/dL Final     Non HDL Cholesterol 01/05/2023 107  <130 mg/dL Final     Sodium 01/05/2023 142  136 - 145 mmol/L Final     Potassium 01/05/2023 4.2  3.4 - 5.3 mmol/L Final     Chloride 01/05/2023 108 (H)  98 - 107 mmol/L Final     Carbon Dioxide (CO2) 01/05/2023 22  22 - 29 mmol/L Final     Anion Gap 01/05/2023 12  7 - 15 mmol/L Final     Urea Nitrogen 01/05/2023 18.9  6.0 - 20.0 mg/dL Final     Creatinine 01/05/2023 0.90  0.67 - 1.17 mg/dL Final     Calcium 01/05/2023 8.9  8.6 - 10.0 mg/dL Final     Glucose 01/05/2023 99  70 - 99 mg/dL Final     Alkaline Phosphatase 01/05/2023 47  40 - 129 U/L Final     AST 01/05/2023 30  10 - 50 U/L Final     ALT 01/05/2023 25  10 - 50 U/L Final     Protein Total 01/05/2023 6.7  6.4 - 8.3 g/dL Final     Albumin 01/05/2023 4.2  3.5 - 5.2 g/dL Final     Bilirubin Total 01/05/2023 1.3 (H)  <=1.2 mg/dL Final     GFR Estimate 01/05/2023 >90  >60 mL/min/1.73m2 Final     WBC Count 01/05/2023 4.1  4.0 - 11.0 10e3/uL Final     RBC Count 01/05/2023 4.83  4.40 - 5.90 10e6/uL Final     Hemoglobin 01/05/2023 14.9  13.3 - 17.7 g/dL Final     Hematocrit 01/05/2023 44.6  40.0 - 53.0 % Final     MCV 01/05/2023 92  78 - 100 fL Final     MCH 01/05/2023 30.8  26.5 - 33.0 pg Final     MCHC 01/05/2023 33.4  31.5 - 36.5 g/dL Final     RDW 01/05/2023 13.6  10.0 - 15.0 % Final     Platelet Count 01/05/2023 187  150 - 450 10e3/uL Final     Prostate Specific Antigen Screen 01/05/2023 1.48  0.00 - 3.50 ng/mL Final     HIV Antigen Antibody Combo 01/05/2023 Nonreactive  Nonreactive Final     Treponema Antibody Total 01/05/2023  Nonreactive  Nonreactive Final     Chlamydia Trachomatis 01/05/2023 Negative  Negative Final     Neisseria gonorrhoeae 01/05/2023 Negative  Negative Final     Hemoglobin A1C 01/05/2023 4.7  0.0 - 5.6 % Final

## 2023-06-07 NOTE — NURSING NOTE
"Chief Complaint   Patient presents with     New Patient       Vitals:    06/07/23 1254   BP: 117/75   Pulse: 82   SpO2: 95%   Weight: 73.8 kg (162 lb 11.2 oz)   Height: 1.689 m (5' 6.5\")       Body mass index is 25.87 kg/m .    [employer]    "

## 2023-06-07 NOTE — PROCEDURES
GI CLINIC VISIT    CC/REFERRING MD:  Steven Ambrosio    REASON FOR CONSULTATION:   Phillip is a 58 year old male who we were asked to see in consultation at the request of Dr. Steven Ambrosio for establishing care for celiac disease.    ASSESSMENT/PLAN:      Celiac disease, well controlled  Has had unremarkable EGDs and paths dating back as far as 2013. Doing well on gluten-free diet. No further weight loss or dominant diarrhea. Dermatitis herpetiformis seems to be in remission as well. Interested in checking vitamins and getting DEXA scan. Will order those to make sure good absorption.  - Vitamin B12, folate, vitamin D, iron panel, hemoglobin, hemoglobin  - Continue gluten-free diet  - Due colonoscopy in 7-10 years      Constipation  Internal hemorrhoids  Recommend Colace as needed to help soften stools, as well as . Preferably not daily which works as patient's constipation is intermittent.       RTC 1 year    Thank you for this consultation. It was a pleasure to participate in the care of this patient; please contact us with any further questions.  A total of 30 face-to-face minutes was spent with this patient, >50% of which was counseling regarding the above delineated issues. An additional 15 minutes was spent on the date of the encounter doing chart review, documentation, and further activities as noted above.    Discussed with Dr Lori Limon MD  PGY-3 internal medicine  ---------------------------------------------------------------------------------------------------  HPI:  He was diagnosed with celiac disease in 2001 after many years of unexplained diarrhea and weight loss. Since his diagnosis he has been on a gluten free diet and his repeat endoscopy 3 months after diagnosis was showing remission.    He also had been having unexplained skin rashes that more recently were diagnosed as dermatitis herpetiformis. He is on dapsone at this time, sometimes misses a few doses but generally  consistent and has seen much improvement in skin findings.    He underwent a colonoscopy last year due to blood in stools, found to have internal hemorrhoids. Does have occasional constipation. Less commonly diarrhea. Sometimes in evenings will get abdominal tenderness and bloating after eating.    Of note recent EGD 05/23/23 showing no evidence of celiac disease.    ROS:    Negative unless noted in HPI    PROBLEM LIST  Patient Active Problem List    Diagnosis Date Noted     Bilateral dry eyes 11/19/2018     Priority: Medium     Adjustment insomnia 11/19/2018     Priority: Medium     Patient is followed by CHRISTINA MOSCOSO for ongoing prescription of controlled insomnia medication.  All refills should be approved by this provider, or covering partner.    Medication(s): Ambien.   Maximum quantity per month:   Clinic visit frequency required:      Controlled substance agreement on file: No     Last [facility] website verification:  done on 6/11/2019  [email or web address]       Therapeutic drug monitoring 09/07/2013     Priority: Medium     Chalazion 05/20/2013     Priority: Medium     Bacterial folliculitis 01/06/2013     Priority: Medium     BPH (benign prostatic hypertrophy) 02/08/2012     Priority: Medium     Tinea pedis 01/23/2012     Priority: Medium     Acne rosacea 01/23/2012     Priority: Medium     Celiac disease 10/23/2011     Priority: Medium     Dermatitis herpetiformis 10/06/2011     Priority: Medium       PERTINENT PAST MEDICAL HISTORY:  Past Medical History:   Diagnosis Date     Benign positional vertigo      Celiac disease 10/23/2011     Chronic kidney disease 1975    diagnosed when I was [age]     Chronic sinusitis      Dermatitis herpetiformis      Gastro-oesophageal reflux disease 1987    had suffered any years ago before  celiac disease was diagno     Hoarseness      Migraines      Nasal polyps      Reduced vision        PREVIOUS SURGERIES:  Past Surgical History:   Procedure  Laterality Date     CATARACT IOL, RT/LT       COLONOSCOPY N/A 7/14/2022    Procedure: COLONOSCOPY, WITH POLYPECTOMY AND BIOPSY;  Surgeon: Cooper Juan MD;  Location:  GI     ENHANCE LASER REFRACTIVE BILATERAL EXISTING PT IN PARAMETERS Left 01/04/2017     ESOPHAGOSCOPY, GASTROSCOPY, DUODENOSCOPY (EGD), COMBINED  2/11/2013    Procedure: COMBINED ESOPHAGOSCOPY, GASTROSCOPY, DUODENOSCOPY (EGD), BIOPSY SINGLE OR MULTIPLE;;  Surgeon: Luke Juan MD;  Location:  GI     ESOPHAGOSCOPY, GASTROSCOPY, DUODENOSCOPY (EGD), COMBINED N/A 5/23/2023    Procedure: Esophagoscopy, gastroscopy, duodenoscopy (EGD), combined;  Surgeon: Palak Sandoval MD;  Location: [facility] OR     NASAL/SINUS POLYPECTOMY         PREVIOUS ENDOSCOPY:    EGD 05/23/23 Impression:                         - Z-line regular, 39 cm from the incisors.                          - Normal esophagus.                          - Gastroesophageal flap valve classified as Hill Grade                          I (prominent fold, tight to endoscope).                          - Normal stomach.                          - Normal examined duodenum. Biopsied.     Colonoscopy 07/14/22 Impression:                            - The examined portion of the ileum was normal.                             - Diverticulosis in the sigmoid colon and in the                             descending colon.                             - One 5 mm polyp in the sigmoid colon, removed with                             a cold biopsy forceps. Resected and retrieved.     ALLERGIES:     Allergies   Allergen Reactions     Gluten Meal Rash and GI Disturbance     Nkda [No Known Drug Allergy]        PERTINENT MEDICATIONS:  Current Outpatient Medications   Medication     dapsone (ACZONE) 25 MG tablet     fluticasone (FLONASE) 50 MCG/ACT nasal spray     sildenafil (VIAGRA) 50 MG tablet     triamcinolone (KENALOG) 0.1 % external ointment     zolpidem (AMBIEN) 5 MG tablet     ipratropium  (ATROVENT) 0.06 % nasal spray     No current facility-administered medications for this visit.       SOCIAL HISTORY:  Social History     Socioeconomic History     Marital status:      Spouse name: Not on file     Number of children: Not on file     Years of education: Not on file     Highest education level: Not on file   Occupational History     Not on file   Tobacco Use     Smoking status: Never     Smokeless tobacco: Never     Tobacco comments:     NON SMOKING ENVIRONMENT, 10/6/11, [facility].    Vaping Use     Vaping status: Never Used   Substance and Sexual Activity     Alcohol use: No     Comment: a few sips to help sleep     Drug use: No     Sexual activity: Yes     Partners: Male     Birth control/protection: Pull-out method, Condom, None     Comment: unprotected sex with my partner (), sometimes others   Other Topics Concern     Parent/sibling w/ CABG, MI or angioplasty before 65F 55M? Yes     Comment: My father   Social History Narrative     Not on file     Social Determinants of Health     Financial Resource Strain: Not on file   Food Insecurity: Not on file   Transportation Needs: Not on file   Physical Activity: Not on file   Stress: Not on file   Social Connections: Not on file   Intimate Partner Violence: Not on file   Housing Stability: Not on file       FAMILY HISTORY:  Family History   Problem Relation Age of Onset     Lipids Mother      Kidney Disease Mother      Arthritis Mother      Hypertension Mother         passed away     Osteoporosis Mother      Gastrointestinal Disease Father         maybe celiac     Cancer Father         Leukemia     Heart Disease Father      Hypertension Father         passed away     Cerebrovascular Disease Father         passed     Stomach Problem Father      Other Cancer Father         leukemia     Gastrointestinal Disease Brother         maybe celiac     Kidney Disease Brother         Kidnet stone     Cancer - colorectal No family hx of      Prostate Cancer No  "family hx of      Melanoma No family hx of      Skin Cancer No family hx of        Past/family/social history reviewed and no changes    PHYSICAL EXAMINATION:  Vitals /75   Pulse 82   Ht 1.689 m (5' 6.5\")   Wt 73.8 kg (162 lb 11.2 oz)   SpO2 95%   BMI 25.87 kg/m     Wt   Wt Readings from Last 2 Encounters:   06/07/23 73.8 kg (162 lb 11.2 oz)   05/28/23 72.6 kg (160 lb)      Gen: Pt sitting up on exam table in NAD, interactive and cooperative on exam  Eyes: sclerae anicteric, no injection  ENT:  OP clear, MMM  Resp: Breathing comfortably on room air  GI: Normoactive BS, abd soft, flat, nontender  Skin: Warm, dry, no jaundice, mild rashes in ankles  Neuro: alert, oriented, answers questions appropriately      PERTINENT STUDIES:    Lab on 01/05/2023   Component Date Value Ref Range Status     Cholesterol 01/05/2023 155  <200 mg/dL Final     Triglycerides 01/05/2023 57  <150 mg/dL Final     Direct Measure HDL 01/05/2023 48  >=40 mg/dL Final     LDL Cholesterol Calculated 01/05/2023 96  <=100 mg/dL Final     Non HDL Cholesterol 01/05/2023 107  <130 mg/dL Final     Sodium 01/05/2023 142  136 - 145 mmol/L Final     Potassium 01/05/2023 4.2  3.4 - 5.3 mmol/L Final     Chloride 01/05/2023 108 (H)  98 - 107 mmol/L Final     Carbon Dioxide (CO2) 01/05/2023 22  22 - 29 mmol/L Final     Anion Gap 01/05/2023 12  7 - 15 mmol/L Final     Urea Nitrogen 01/05/2023 18.9  6.0 - 20.0 mg/dL Final     Creatinine 01/05/2023 0.90  0.67 - 1.17 mg/dL Final     Calcium 01/05/2023 8.9  8.6 - 10.0 mg/dL Final     Glucose 01/05/2023 99  70 - 99 mg/dL Final     Alkaline Phosphatase 01/05/2023 47  40 - 129 U/L Final     AST 01/05/2023 30  10 - 50 U/L Final     ALT 01/05/2023 25  10 - 50 U/L Final     Protein Total 01/05/2023 6.7  6.4 - 8.3 g/dL Final     Albumin 01/05/2023 4.2  3.5 - 5.2 g/dL Final     Bilirubin Total 01/05/2023 1.3 (H)  <=1.2 mg/dL Final     GFR Estimate 01/05/2023 >90  >60 mL/min/1.73m2 Final     WBC Count 01/05/2023 " 4.1  4.0 - 11.0 10e3/uL Final     RBC Count 01/05/2023 4.83  4.40 - 5.90 10e6/uL Final     Hemoglobin 01/05/2023 14.9  13.3 - 17.7 g/dL Final     Hematocrit 01/05/2023 44.6  40.0 - 53.0 % Final     MCV 01/05/2023 92  78 - 100 fL Final     MCH 01/05/2023 30.8  26.5 - 33.0 pg Final     MCHC 01/05/2023 33.4  31.5 - 36.5 g/dL Final     RDW 01/05/2023 13.6  10.0 - 15.0 % Final     Platelet Count 01/05/2023 187  150 - 450 10e3/uL Final     Prostate Specific Antigen Screen 01/05/2023 1.48  0.00 - 3.50 ng/mL Final     HIV Antigen Antibody Combo 01/05/2023 Nonreactive  Nonreactive Final     Treponema Antibody Total 01/05/2023 Nonreactive  Nonreactive Final     Chlamydia Trachomatis 01/05/2023 Negative  Negative Final     Neisseria gonorrhoeae 01/05/2023 Negative  Negative Final     Hemoglobin A1C 01/05/2023 4.7  0.0 - 5.6 % Final

## 2023-06-19 ENCOUNTER — OFFICE VISIT (OUTPATIENT)
Dept: FAMILY MEDICINE | Facility: CLINIC | Age: 58
End: 2023-06-19
Payer: COMMERCIAL

## 2023-06-19 VITALS
BODY MASS INDEX: 25.11 KG/M2 | OXYGEN SATURATION: 95 % | DIASTOLIC BLOOD PRESSURE: 70 MMHG | HEART RATE: 87 BPM | RESPIRATION RATE: 16 BRPM | TEMPERATURE: 97 F | HEIGHT: 67 IN | SYSTOLIC BLOOD PRESSURE: 122 MMHG | WEIGHT: 160 LBS

## 2023-06-19 DIAGNOSIS — K90.0 CELIAC DISEASE: ICD-10-CM

## 2023-06-19 DIAGNOSIS — K62.5 BRBPR (BRIGHT RED BLOOD PER RECTUM): Primary | ICD-10-CM

## 2023-06-19 DIAGNOSIS — K64.8 INTERNAL HEMORRHOIDS: ICD-10-CM

## 2023-06-19 DIAGNOSIS — K57.30 DIVERTICULOSIS OF LARGE INTESTINE WITHOUT HEMORRHAGE: ICD-10-CM

## 2023-06-19 PROCEDURE — 99214 OFFICE O/P EST MOD 30 MIN: CPT | Performed by: FAMILY MEDICINE

## 2023-06-19 ASSESSMENT — PAIN SCALES - GENERAL: PAINLEVEL: NO PAIN (0)

## 2023-06-19 NOTE — PROGRESS NOTES
"  Assessment & Plan     BRBPR (bright red blood per rectum)  He reports that this issue seems to have improved/resolved since being seen in the emergency department.  At that time his symptoms were felt to be related to internal hemorrhoids.  It is reassuring that he is no longer having bleeding and he continues to be pain-free.    Internal hemorrhoids  We discussed strategies to help prevent this issue from causing problems.  I recommended he get plenty of fiber and stay well-hydrated.  He has Colace available when needed.  He will continue to avoid straining and sitting on the toilet for long periods of time.    Diverticulosis of large intestine without hemorrhage  His colonoscopy last year identified diverticulosis.  His current symptoms do not appear to be due to diverticulitis since he is pain-free and it is reassuring that he has no longer having any blood in the stools.    Celiac disease  He continues to eat a gluten-free diet.  His upper endoscopy from a few weeks ago was normal.             MED REC REQUIRED  Post Medication Reconciliation Status:  Discharge medications reconciled, continue medications without change    BMI:   Estimated body mass index is 25.06 kg/m  as calculated from the following:    Height as of this encounter: 1.702 m (5' 7\").    Weight as of this encounter: 72.6 kg (160 lb).   Weight management plan: Discussed healthy diet and exercise guidelines        Steven Ambrosio DO  [facility]   Alison Fisher is a 58 year old, presenting for the following health issues:  Hospital F/U        6/19/2023     1:14 PM   Additional Questions   Roomed by luis veloz   Accompanied by n/a         6/19/2023     1:14 PM   Patient Reported Additional Medications   Patient reports taking the following new medications n/a     [facility]     ED/UC Followup:    Facility:  [facility] emergency dept  Date of visit: 05/28/23  Reason for visit: 05/28/23  Current " "Status: stable    Rectal bleeding    On 5/28/2023 he was seen in the emergency department for bright red blood per rectum.  He was not having any pain with this.  His exam and vital signs were stable in the emergency department in the did not feel that labs or imaging studies were needed.  He had a colonoscopy on 7/14/2022 which showed a 5 mm polyp in the sigmoid colon, diverticulosis and internal hemorrhoids.  On 5/23/2023 he had an upper endoscopy which showed a normal esophagus, stomach and duodenum.  He continues to be pain-free and has not noticed any bleeding from the rectum since being seen in the emergency department.  He has Colace available if needed.      Review of Systems   Constitutional, HEENT, cardiovascular, pulmonary, GI, , musculoskeletal, neuro, skin, endocrine and psych systems are negative, except as otherwise noted.      Objective    /70 (BP Location: Left arm, Patient Position: Sitting, Cuff Size: Adult Regular)   Pulse 87   Temp 97  F (36.1  C) (Temporal)   Resp 16   Ht 1.702 m (5' 7\")   Wt 72.6 kg (160 lb)   SpO2 95%   BMI 25.06 kg/m    Body mass index is 25.06 kg/m .  Physical Exam   GENERAL: healthy, alert and no distress  EYES: Eyes grossly normal to inspection, PERRL and conjunctivae and sclerae normal  HENT:  nose and mouth without ulcers or lesions  NECK: no adenopathy, no asymmetry, masses, or scars and thyroid normal to palpation  RESP: lungs clear to auscultation - no rales, rhonchi or wheezes  CV: regular rate and rhythm, normal S1 S2, no S3 or S4, no murmur, click or rub, no peripheral edema and peripheral pulses strong  ABDOMEN: soft, nontender, no hepatosplenomegaly, no masses and bowel sounds normal  MS: no gross musculoskeletal defects noted, no edema  SKIN: no suspicious lesions or rashes  NEURO: Normal strength and tone, mentation intact and speech normal  PSYCH: mentation appears normal, affect normal/bright                    "

## 2023-07-10 ENCOUNTER — LAB (OUTPATIENT)
Dept: LAB | Facility: CLINIC | Age: 58
End: 2023-07-10
Payer: COMMERCIAL

## 2023-07-10 DIAGNOSIS — K90.0 CELIAC DISEASE: ICD-10-CM

## 2023-07-10 LAB
DEPRECATED CALCIDIOL+CALCIFEROL SERPL-MC: 31 UG/L (ref 20–75)
ERYTHROCYTE [DISTWIDTH] IN BLOOD BY AUTOMATED COUNT: 12.6 % (ref 10–15)
FERRITIN SERPL-MCNC: 141 NG/ML (ref 31–409)
FOLATE SERPL-MCNC: 4.8 NG/ML (ref 4.6–34.8)
HCT VFR BLD AUTO: 44.3 % (ref 40–53)
HGB BLD-MCNC: 14.9 G/DL (ref 13.3–17.7)
IRON SERPL-MCNC: 90 UG/DL (ref 61–157)
MCH RBC QN AUTO: 30.8 PG (ref 26.5–33)
MCHC RBC AUTO-ENTMCNC: 33.6 G/DL (ref 31.5–36.5)
MCV RBC AUTO: 92 FL (ref 78–100)
PLATELET # BLD AUTO: 206 10E3/UL (ref 150–450)
RBC # BLD AUTO: 4.84 10E6/UL (ref 4.4–5.9)
VIT B12 SERPL-MCNC: 448 PG/ML (ref 232–1245)
WBC # BLD AUTO: 6.1 10E3/UL (ref 4–11)

## 2023-07-10 PROCEDURE — 84630 ASSAY OF ZINC: CPT | Mod: 90

## 2023-07-10 PROCEDURE — 99000 SPECIMEN HANDLING OFFICE-LAB: CPT

## 2023-07-10 PROCEDURE — 84590 ASSAY OF VITAMIN A: CPT | Mod: 90

## 2023-07-10 PROCEDURE — 85027 COMPLETE CBC AUTOMATED: CPT

## 2023-07-10 PROCEDURE — 36415 COLL VENOUS BLD VENIPUNCTURE: CPT

## 2023-07-10 PROCEDURE — 83540 ASSAY OF IRON: CPT

## 2023-07-10 PROCEDURE — 84446 ASSAY OF VITAMIN E: CPT | Mod: 90

## 2023-07-10 PROCEDURE — 82728 ASSAY OF FERRITIN: CPT

## 2023-07-10 PROCEDURE — 82306 VITAMIN D 25 HYDROXY: CPT

## 2023-07-10 PROCEDURE — 82746 ASSAY OF FOLIC ACID SERUM: CPT

## 2023-07-10 PROCEDURE — 82607 VITAMIN B-12: CPT

## 2023-07-11 LAB — ZINC SERPL-MCNC: 77.4 UG/DL

## 2023-07-12 LAB
A-TOCOPHEROL VIT E SERPL-MCNC: 7.2 MG/L
ANNOTATION COMMENT IMP: NORMAL
BETA+GAMMA TOCOPHEROL SERPL-MCNC: 0.5 MG/L
RETINYL PALMITATE SERPL-MCNC: 0.02 MG/L
VIT A SERPL-MCNC: 0.55 MG/L

## 2023-07-26 ENCOUNTER — OFFICE VISIT (OUTPATIENT)
Dept: DERMATOLOGY | Facility: CLINIC | Age: 58
End: 2023-07-26
Payer: COMMERCIAL

## 2023-07-26 ENCOUNTER — LAB (OUTPATIENT)
Dept: LAB | Facility: CLINIC | Age: 58
End: 2023-07-26
Payer: COMMERCIAL

## 2023-07-26 DIAGNOSIS — Z51.81 THERAPEUTIC DRUG MONITORING: ICD-10-CM

## 2023-07-26 DIAGNOSIS — Z51.81 THERAPEUTIC DRUG MONITORING: Primary | ICD-10-CM

## 2023-07-26 DIAGNOSIS — D22.9 MULTIPLE BENIGN NEVI: ICD-10-CM

## 2023-07-26 DIAGNOSIS — B35.3 TINEA PEDIS OF LEFT FOOT: ICD-10-CM

## 2023-07-26 DIAGNOSIS — L13.0 DERMATITIS HERPETIFORMIS: ICD-10-CM

## 2023-07-26 DIAGNOSIS — L81.4 SOLAR LENTIGO: ICD-10-CM

## 2023-07-26 LAB
ALBUMIN SERPL BCG-MCNC: 4.3 G/DL (ref 3.5–5.2)
ALP SERPL-CCNC: 63 U/L (ref 40–129)
ALT SERPL W P-5'-P-CCNC: 20 U/L (ref 0–70)
ANION GAP SERPL CALCULATED.3IONS-SCNC: 12 MMOL/L (ref 7–15)
AST SERPL W P-5'-P-CCNC: 18 U/L (ref 0–45)
BASOPHILS # BLD AUTO: 0 10E3/UL (ref 0–0.2)
BASOPHILS NFR BLD AUTO: 0 %
BILIRUB SERPL-MCNC: 0.4 MG/DL
BUN SERPL-MCNC: 14.3 MG/DL (ref 6–20)
CALCIUM SERPL-MCNC: 9.6 MG/DL (ref 8.6–10)
CHLORIDE SERPL-SCNC: 104 MMOL/L (ref 98–107)
CREAT SERPL-MCNC: 0.93 MG/DL (ref 0.67–1.17)
DEPRECATED HCO3 PLAS-SCNC: 23 MMOL/L (ref 22–29)
EOSINOPHIL # BLD AUTO: 0.1 10E3/UL (ref 0–0.7)
EOSINOPHIL NFR BLD AUTO: 2 %
ERYTHROCYTE [DISTWIDTH] IN BLOOD BY AUTOMATED COUNT: 12.7 % (ref 10–15)
GFR SERPL CREATININE-BSD FRML MDRD: >90 ML/MIN/1.73M2
GLUCOSE SERPL-MCNC: 116 MG/DL (ref 70–99)
HCT VFR BLD AUTO: 41.7 % (ref 40–53)
HGB BLD-MCNC: 14.3 G/DL (ref 13.3–17.7)
IMM GRANULOCYTES # BLD: 0 10E3/UL
IMM GRANULOCYTES NFR BLD: 0 %
LYMPHOCYTES # BLD AUTO: 1.7 10E3/UL (ref 0.8–5.3)
LYMPHOCYTES NFR BLD AUTO: 32 %
MCH RBC QN AUTO: 30.6 PG (ref 26.5–33)
MCHC RBC AUTO-ENTMCNC: 34.3 G/DL (ref 31.5–36.5)
MCV RBC AUTO: 89 FL (ref 78–100)
MONOCYTES # BLD AUTO: 0.3 10E3/UL (ref 0–1.3)
MONOCYTES NFR BLD AUTO: 6 %
NEUTROPHILS # BLD AUTO: 3.2 10E3/UL (ref 1.6–8.3)
NEUTROPHILS NFR BLD AUTO: 60 %
NRBC # BLD AUTO: 0 10E3/UL
NRBC BLD AUTO-RTO: 0 /100
PLATELET # BLD AUTO: 247 10E3/UL (ref 150–450)
POTASSIUM SERPL-SCNC: 3.7 MMOL/L (ref 3.4–5.3)
PROT SERPL-MCNC: 7.2 G/DL (ref 6.4–8.3)
RBC # BLD AUTO: 4.68 10E6/UL (ref 4.4–5.9)
RETICS # AUTO: 0.1 10E6/UL (ref 0.03–0.1)
RETICS/RBC NFR AUTO: 2.3 % (ref 0.5–2)
SODIUM SERPL-SCNC: 139 MMOL/L (ref 136–145)
WBC # BLD AUTO: 5.4 10E3/UL (ref 4–11)

## 2023-07-26 PROCEDURE — 36415 COLL VENOUS BLD VENIPUNCTURE: CPT | Performed by: PATHOLOGY

## 2023-07-26 PROCEDURE — 80053 COMPREHEN METABOLIC PANEL: CPT | Performed by: PATHOLOGY

## 2023-07-26 PROCEDURE — 85045 AUTOMATED RETICULOCYTE COUNT: CPT | Performed by: PATHOLOGY

## 2023-07-26 PROCEDURE — 99214 OFFICE O/P EST MOD 30 MIN: CPT | Performed by: DERMATOLOGY

## 2023-07-26 PROCEDURE — 85025 COMPLETE CBC W/AUTO DIFF WBC: CPT | Performed by: PATHOLOGY

## 2023-07-26 RX ORDER — DAPSONE 25 MG/1
TABLET ORAL
Qty: 180 TABLET | Refills: 3 | Status: SHIPPED | OUTPATIENT
Start: 2023-07-26 | End: 2023-10-06

## 2023-07-26 RX ORDER — TRIAMCINOLONE ACETONIDE 1 MG/G
OINTMENT TOPICAL
Qty: 80 G | Refills: 11 | Status: SHIPPED | OUTPATIENT
Start: 2023-07-26

## 2023-07-26 RX ORDER — PRENATAL VIT 91/IRON/FOLIC/DHA 28-975-200
COMBINATION PACKAGE (EA) ORAL
Qty: 42 G | Refills: 11 | Status: SHIPPED | OUTPATIENT
Start: 2023-07-26 | End: 2023-10-06

## 2023-07-26 ASSESSMENT — PAIN SCALES - GENERAL: PAINLEVEL: NO PAIN (0)

## 2023-07-26 NOTE — LETTER
7/26/2023       RE: Vimal Goldsmith  [address] [place] [address]     Dear Colleague,    Thank you for referring your patient, Vimal Goldsmith, to the [facility] [place] [facility]. Please see a copy of my visit note below.    [facility] Dermatology Note  Encounter Date: Jul 26, 2023  Office Visit     Dermatology Problem List:  1. Dermatitis herpetiformis  - Dapsone 25 mg daily, increase to 50 mg daily with flares  - Gluten free diet  - Triamcinolone 0.1%    ____________________________________________    Assessment & Plan:     # Dermatitis herpetiformis. Chronic, stable.   Overall well controlled on a gluten-free diet and oral dapsone, although still with intermittent flares in setting of intermittent unintentional gluten intake. Will continue current regimen. Last endoscopy in 2023.   - Labs: CBC, retic, CMP  - Continue triamcinolone 0.1% ointment BID PRN flares  - Continue dapsone 25 mg daily with occasional 50 mg daily for flares.     - Continue gluten-free diet--Patient is doing very well with this but notes likely cross-contamination occurring on occasion.   - Continue to follow-up with GI    # Tinea pedis. Acute, uncomplicated.  - Start terbinafine 1% cream BID PRN    #  Benign lesions: Multiple benign nevi, solar lentigos. Explained to patient benign nature of lesion. No treatment is necessary at this time unless the lesion changes or becomes symptomatic.   - ABCDs of melanoma were discussed and self skin checks were advised.  - Sun precaution was advised including the use of sun screens of SPF 30 or higher, sun protective clothing, and avoidance of tanning beds.      Procedures Performed:   None    Follow-up: 6 month(s) in-person, or earlier for new or changing lesions    Staff:     Lex Huffman MD  Pronouns: he/him/his    Department of Dermatology  [facility]  [facility]: Phone: [phone], Fax:[phone]  [facility]: Phone: [phone] Fax: [phone]  ____________________________________________    CC: Derm Problem (Pt following up for dermatitis herpetiformis.)    HPI:  Mr. Vimal Goldsmith is a(n) 58 year old male who presents today as a return patient for follow-up DH. Last seen 6 months ago when continued on low-dose dapsone. He was also referred to GI for endoscopy and to establish regular care.     Today, he reports:    DH overall well controlled on dapsone. He does have occasional flares on his abdomen, buttock, and upper arms if accidentally ingesting gluten. He uses triam 0.1% ointment BID PRN. Did see GI who performed endoscopy and recommend every 10 years.   Requests FBSE today. Has in the past had these performed with Dr. Gallardo and had a colleague diagnosed with skin cancer.   Rash on the left foot. Reports scaly rash on the left foot.     Patient is otherwise feeling well, without additional skin concerns.     Labs Reviewed:  N/A    Physical Exam:  Vitals: There were no vitals taken for this visit.  SKIN: Full skin, which includes the head/face, both arms, chest, back, abdomen,both legs, genitalia and/or groin buttocks, digits and/or nails, was examined.    - Brown macules on sun exposed areas  - Brown macules and papules on trunk and extremities without concerning dermoscopy features  - Few brown macules on the upper arms, abdomen, and buttock c/w post-inflammatory hyperpigmentation.   - Diffuse scale and maceration on left plantar foot and toe interweb spaces. .   - No other lesions of concern on areas examined.     Medications:  Current Outpatient Medications   Medication    dapsone (ACZONE) 25 MG tablet    fluticasone (FLONASE) 50 MCG/ACT nasal spray    ipratropium (ATROVENT) 0.06 % nasal spray    sildenafil (VIAGRA) 50 MG tablet    terbinafine (LAMISIL) 1 %  external cream    triamcinolone (KENALOG) 0.1 % external ointment    zolpidem (AMBIEN) 5 MG tablet     No current facility-administered medications for this visit.      Past Medical History:   Patient Active Problem List   Diagnosis    Dermatitis herpetiformis    Celiac disease    Tinea pedis    Acne rosacea    BPH (benign prostatic hypertrophy)    Bacterial folliculitis    Chalazion    Therapeutic drug monitoring    Bilateral dry eyes    Adjustment insomnia     Past Medical History:   Diagnosis Date    Benign positional vertigo     Celiac disease 10/23/2011    Chronic kidney disease 1975    diagnosed when I was [age]    Chronic sinusitis     Dermatitis herpetiformis     Gastro-oesophageal reflux disease 1987    had suffered any years ago before  celiac disease was diagno    Hoarseness     Migraines     Nasal polyps     Reduced vision

## 2023-07-26 NOTE — NURSING NOTE
Chief Complaint   Patient presents with    Derm Problem     Pt following up for dermatitis herpetiformis.     El Benton, EMT

## 2023-07-26 NOTE — PROGRESS NOTES
[facility] Dermatology Note  Encounter Date: Jul 26, 2023  Office Visit     Dermatology Problem List:  1. Dermatitis herpetiformis  - Dapsone 25 mg daily, increase to 50 mg daily with flares  - Gluten free diet  - Triamcinolone 0.1%    ____________________________________________    Assessment & Plan:     # Dermatitis herpetiformis. Chronic, stable.   Overall well controlled on a gluten-free diet and oral dapsone, although still with intermittent flares in setting of intermittent unintentional gluten intake. Will continue current regimen. Last endoscopy in 2023.   - Labs: CBC, retic, CMP  - Continue triamcinolone 0.1% ointment BID PRN flares  - Continue dapsone 25 mg daily with occasional 50 mg daily for flares.     - Continue gluten-free diet--Patient is doing very well with this but notes likely cross-contamination occurring on occasion.   - Continue to follow-up with GI    # Tinea pedis. Acute, uncomplicated.  - Start terbinafine 1% cream BID PRN    #  Benign lesions: Multiple benign nevi, solar lentigos. Explained to patient benign nature of lesion. No treatment is necessary at this time unless the lesion changes or becomes symptomatic.   - ABCDs of melanoma were discussed and self skin checks were advised.  - Sun precaution was advised including the use of sun screens of SPF 30 or higher, sun protective clothing, and avoidance of tanning beds.      Procedures Performed:   None    Follow-up: 6 month(s) in-person, or earlier for new or changing lesions    Staff:     Lex Huffman MD  Pronouns: he/him/his    Department of Dermatology  [facility]: Phone: [phone], Fax:[phone]  [facility]: Phone: [phone] Fax: [phone]  ____________________________________________    CC: Derm Problem (Pt following up for dermatitis herpetiformis.)    HPI:  Mr. Vimal Glodsmith  is a(n) 58 year old male who presents today as a return patient for follow-up DH. Last seen 6 months ago when continued on low-dose dapsone. He was also referred to GI for endoscopy and to establish regular care.     Today, he reports:    DH overall well controlled on dapsone. He does have occasional flares on his abdomen, buttock, and upper arms if accidentally ingesting gluten. He uses triam 0.1% ointment BID PRN. Did see GI who performed endoscopy and recommend every 10 years.   Requests FBSE today. Has in the past had these performed with Dr. Gallardo and had a colleague diagnosed with skin cancer.   Rash on the left foot. Reports scaly rash on the left foot.     Patient is otherwise feeling well, without additional skin concerns.     Labs Reviewed:  N/A    Physical Exam:  Vitals: There were no vitals taken for this visit.  SKIN: Full skin, which includes the head/face, both arms, chest, back, abdomen,both legs, genitalia and/or groin buttocks, digits and/or nails, was examined.    - Brown macules on sun exposed areas  - Brown macules and papules on trunk and extremities without concerning dermoscopy features  - Few brown macules on the upper arms, abdomen, and buttock c/w post-inflammatory hyperpigmentation.   - Diffuse scale and maceration on left plantar foot and toe interweb spaces. .   - No other lesions of concern on areas examined.     Medications:  Current Outpatient Medications   Medication    dapsone (ACZONE) 25 MG tablet    fluticasone (FLONASE) 50 MCG/ACT nasal spray    ipratropium (ATROVENT) 0.06 % nasal spray    sildenafil (VIAGRA) 50 MG tablet    terbinafine (LAMISIL) 1 % external cream    triamcinolone (KENALOG) 0.1 % external ointment    zolpidem (AMBIEN) 5 MG tablet     No current facility-administered medications for this visit.      Past Medical History:   Patient Active Problem List   Diagnosis    Dermatitis herpetiformis    Celiac disease    Tinea pedis    Acne rosacea    BPH (benign prostatic  hypertrophy)    Bacterial folliculitis    Chalazion    Therapeutic drug monitoring    Bilateral dry eyes    Adjustment insomnia     Past Medical History:   Diagnosis Date    Benign positional vertigo     Celiac disease 10/23/2011    Chronic kidney disease 1975    diagnosed when I was [age]    Chronic sinusitis     Dermatitis herpetiformis     Gastro-oesophageal reflux disease 1987    had suffered any years ago before  celiac disease was diagno    Hoarseness     Migraines     Nasal polyps     Reduced vision

## 2023-08-14 ENCOUNTER — TELEPHONE (OUTPATIENT)
Dept: DERMATOLOGY | Facility: CLINIC | Age: 58
End: 2023-08-14

## 2023-08-14 NOTE — TELEPHONE ENCOUNTER
Jessica. 1st attempt Sent [facility] informing patient to call [phone] to schedule 6 month follow up with one of our Prividers  in Dermatology.

## 2023-10-06 ENCOUNTER — OFFICE VISIT (OUTPATIENT)
Dept: FAMILY MEDICINE | Facility: CLINIC | Age: 58
End: 2023-10-06
Payer: COMMERCIAL

## 2023-10-06 VITALS
TEMPERATURE: 98.2 F | HEART RATE: 76 BPM | SYSTOLIC BLOOD PRESSURE: 133 MMHG | DIASTOLIC BLOOD PRESSURE: 77 MMHG | HEIGHT: 67 IN | BODY MASS INDEX: 25.3 KG/M2 | OXYGEN SATURATION: 95 % | RESPIRATION RATE: 14 BRPM | WEIGHT: 161.2 LBS

## 2023-10-06 DIAGNOSIS — Z29.81 ENCOUNTER FOR HIV PRE-EXPOSURE PROPHYLAXIS: Primary | ICD-10-CM

## 2023-10-06 DIAGNOSIS — K64.8 INTERNAL BLEEDING HEMORRHOIDS: ICD-10-CM

## 2023-10-06 DIAGNOSIS — K90.0 CELIAC DISEASE: ICD-10-CM

## 2023-10-06 DIAGNOSIS — L13.0 DERMATITIS HERPETIFORMIS: ICD-10-CM

## 2023-10-06 PROCEDURE — 90632 HEPA VACCINE ADULT IM: CPT | Performed by: FAMILY MEDICINE

## 2023-10-06 PROCEDURE — 90471 IMMUNIZATION ADMIN: CPT | Performed by: FAMILY MEDICINE

## 2023-10-06 PROCEDURE — 99214 OFFICE O/P EST MOD 30 MIN: CPT | Mod: 25 | Performed by: FAMILY MEDICINE

## 2023-10-06 RX ORDER — EMTRICITABINE AND TENOFOVIR ALAFENAMIDE 200; 25 MG/1; MG/1
1 TABLET ORAL
COMMUNITY
Start: 2023-09-14

## 2023-10-06 RX ORDER — DAPSONE 25 MG/1
TABLET ORAL
Qty: 180 TABLET | Refills: 3 | Status: SHIPPED | OUTPATIENT
Start: 2023-10-06 | End: 2023-10-09

## 2023-10-06 RX ORDER — MODERNA COVID-19 VACCINE, BIVALENT 25; 25 UG/.5ML; UG/.5ML
INJECTION, SUSPENSION INTRAMUSCULAR
COMMUNITY
Start: 2023-07-09 | End: 2023-10-06

## 2023-10-06 ASSESSMENT — PAIN SCALES - GENERAL: PAINLEVEL: NO PAIN (0)

## 2023-10-06 NOTE — PATIENT INSTRUCTIONS
It was nice meeting you!  See you in a couple of months!  Read about Doxycycline for infections prevention (gonorrhea, chlamydia, and syphilis) if you get a chance.  I refilled your Dapsone so that it will but under our clinic for future refills. But it would still be good to see the Dermatologist at least yearly to review this.  We should do blood work before your next visit.  I will add orders for this.

## 2023-10-06 NOTE — PROGRESS NOTES
ASSESSMENT/PLAN:   There are no Patient Instructions on file for this visit.      ICD-10-CM    1. Encounter for HIV pre-exposure prophylaxis  Z29.81       2. Dermatitis herpetiformis  L13.0 dapsone (ACZONE) 25 MG tablet      3. Internal bleeding hemorrhoids  K64.8       4. Celiac disease  K90.0             See discussions below.  Will also update Hep A vaccine.  He can do Shingles through his pharmacy.    SUBJECTIVE:   Vimal Goldsmith is a 58 year old male who presents to clinic today for the following health issues:  PrEP--discussed LGBTQ medicine.  Already on PrEP (discovy).  Talked about possible Doxy for PEP to avoid other infections (syphilis, chlamydia, gonorrhea).  Will likely talk further about risk factors and do screening next time.  Dermatitis Herpetiformis--takes dapsone.  Was refilled a few months ago but will refill so it is under our clinic.  Will urge to still see derm yearly and check blood work  History of blood per rectum/celiac--thought to be from hemorrhoids.  Colonsocopy and endoscopy were done within last 2 years and were normal except tubular adenoma.  Repeat in 5 years. Endoscopy normal without significant celiac findings.      Problem list and histories reviewed & adjusted, as indicated.  Additional history: as documented    Patient Active Problem List   Diagnosis    Dermatitis herpetiformis    Celiac disease    Tinea pedis    Acne rosacea    BPH (benign prostatic hypertrophy)    Bacterial folliculitis    Chalazion    Therapeutic drug monitoring    Bilateral dry eyes    Adjustment insomnia    Internal bleeding hemorrhoids     Past Surgical History:   Procedure Laterality Date    CATARACT IOL, RT/LT      COLONOSCOPY N/A 7/14/2022    Procedure: COLONOSCOPY, WITH POLYPECTOMY AND BIOPSY;  Surgeon: Cooper Juan MD;  Location:  GI    ENHANCE LASER REFRACTIVE BILATERAL EXISTING PT IN PARAMETERS Left 01/04/2017    ESOPHAGOSCOPY, GASTROSCOPY, DUODENOSCOPY (EGD), COMBINED  2/11/2013     Procedure: COMBINED ESOPHAGOSCOPY, GASTROSCOPY, DUODENOSCOPY (EGD), BIOPSY SINGLE OR MULTIPLE;;  Surgeon: Luke Juan MD;  Location:  GI    ESOPHAGOSCOPY, GASTROSCOPY, DUODENOSCOPY (EGD), COMBINED N/A 5/23/2023    Procedure: Esophagoscopy, gastroscopy, duodenoscopy (EGD), combined;  Surgeon: Palak Sandoval MD;  Location: [facility] OR    NASAL/SINUS POLYPECTOMY         Social History     Tobacco Use    Smoking status: Never    Smokeless tobacco: Never    Tobacco comments:     NON SMOKING ENVIRONMENT, 10/6/11, [facility].    Substance Use Topics    Alcohol use: No     Comment: a few sips to help sleep     Family History   Problem Relation Age of Onset    Lipids Mother     Kidney Disease Mother     Arthritis Mother     Hypertension Mother         passed away    Osteoporosis Mother     Gastrointestinal Disease Father         maybe celiac    Cancer Father         Leukemia    Heart Disease Father     Hypertension Father         passed away    Cerebrovascular Disease Father         passed    Stomach Problem Father     Other Cancer Father         leukemia    Gastrointestinal Disease Brother         maybe celiac    Kidney Disease Brother         Kidnet stone    Cancer - colorectal No family hx of     Prostate Cancer No family hx of     Melanoma No family hx of     Skin Cancer No family hx of          Current Outpatient Medications   Medication Sig Dispense Refill    dapsone (ACZONE) 25 MG tablet TAKE 1 TABLET BY MOUTH DAILY, TAKE 2 TABLETS WHEN FLARING 180 tablet 3    DESCOVY 200-25 MG per tablet Take 1 tablet by mouth daily at 2 pm      fluticasone (FLONASE) 50 MCG/ACT nasal spray Spray 1 spray into both nostrils daily 18.2 mL 6    ipratropium (ATROVENT) 0.06 % nasal spray Spray 2 sprays into both nostrils 4 times daily as needed for rhinitis 15 mL 1    sildenafil (VIAGRA) 50 MG tablet Take 0.5 tablets (25 mg) 30 min to 4 hours before intercourse daily as needed. Never use with nitroglycerin, terazosin or doxazosin.  "12 tablet 6    triamcinolone (KENALOG) 0.1 % external ointment Apply twice daily as needed for rash on the trunk or extremities 80 g 11    zolpidem (AMBIEN) 5 MG tablet TAKE ONE TABLET BY MOUTH NIGHTLY AS NEEDED FOR SLEEP 30 tablet 3     Allergies   Allergen Reactions    Gluten Meal Rash and GI Disturbance    Nkda [No Known Drug Allergy]        Reviewed and updated as needed this visit by clinical staff   Tobacco  Allergies  Meds            Reviewed and updated as needed this visit by Provider                  ROS:  Constitutional, HEENT, cardiovascular, pulmonary, gi and gu systems are negative, except as otherwise noted.    OBJECTIVE:     /77   Pulse 76   Temp 98.2  F (36.8  C)   Resp 14   Ht 1.702 m (5' 7\")   Wt 73.1 kg (161 lb 3.2 oz)   SpO2 95%   BMI 25.25 kg/m    Body mass index is 25.25 kg/m .  GENERAL: healthy, alert and no distress  MS: no gross musculoskeletal defects noted, no edema  NEURO: Normal strength and tone, mentation intact and speech normal  PSYCH: mentation appears normal, affect normal/bright          Armando Banks MD  [facility]    "

## 2023-10-07 DIAGNOSIS — L13.0 DERMATITIS HERPETIFORMIS: ICD-10-CM

## 2023-10-09 RX ORDER — DAPSONE 25 MG/1
TABLET ORAL
Qty: 100 TABLET | Refills: 3 | Status: SHIPPED | OUTPATIENT
Start: 2023-10-09 | End: 2023-10-12

## 2023-10-11 DIAGNOSIS — L13.0 DERMATITIS HERPETIFORMIS: ICD-10-CM

## 2023-10-12 RX ORDER — DAPSONE 25 MG/1
TABLET ORAL
Qty: 180 TABLET | Refills: 3 | Status: SHIPPED | OUTPATIENT
Start: 2023-10-12 | End: 2024-06-14

## 2023-12-01 ENCOUNTER — TELEPHONE (OUTPATIENT)
Dept: GASTROENTEROLOGY | Facility: CLINIC | Age: 58
End: 2023-12-01

## 2023-12-01 NOTE — TELEPHONE ENCOUNTER
1st attempt- LVM and sent mychart    Schedule:  1 yr follow-up appointment with [facility] (around June 2024). [facility], in person or virtual    Per Dr. Sandoval's 6/7/23 follow-up order

## 2023-12-05 ENCOUNTER — MYC MEDICAL ADVICE (OUTPATIENT)
Dept: FAMILY MEDICINE | Facility: CLINIC | Age: 58
End: 2023-12-05

## 2023-12-05 DIAGNOSIS — L13.0 DERMATITIS HERPETIFORMIS: Primary | ICD-10-CM

## 2023-12-16 ENCOUNTER — LAB (OUTPATIENT)
Dept: LAB | Facility: CLINIC | Age: 58
End: 2023-12-16
Payer: COMMERCIAL

## 2023-12-16 DIAGNOSIS — K90.0 CELIAC DISEASE: ICD-10-CM

## 2023-12-16 DIAGNOSIS — L13.0 DERMATITIS HERPETIFORMIS: ICD-10-CM

## 2023-12-16 DIAGNOSIS — R73.9 HYPERGLYCEMIA: ICD-10-CM

## 2023-12-16 DIAGNOSIS — Z29.81 ENCOUNTER FOR HIV PRE-EXPOSURE PROPHYLAXIS: ICD-10-CM

## 2023-12-16 LAB
ALBUMIN SERPL BCG-MCNC: 4.3 G/DL (ref 3.5–5.2)
ALP SERPL-CCNC: 54 U/L (ref 40–150)
ALT SERPL W P-5'-P-CCNC: 18 U/L (ref 0–70)
ANION GAP SERPL CALCULATED.3IONS-SCNC: 9 MMOL/L (ref 7–15)
AST SERPL W P-5'-P-CCNC: 19 U/L (ref 0–45)
BILIRUB SERPL-MCNC: 1 MG/DL
BUN SERPL-MCNC: 13.6 MG/DL (ref 6–20)
CALCIUM SERPL-MCNC: 9.1 MG/DL (ref 8.6–10)
CHLORIDE SERPL-SCNC: 104 MMOL/L (ref 98–107)
CREAT SERPL-MCNC: 0.9 MG/DL (ref 0.67–1.17)
DEPRECATED HCO3 PLAS-SCNC: 24 MMOL/L (ref 22–29)
EGFRCR SERPLBLD CKD-EPI 2021: >90 ML/MIN/1.73M2
ERYTHROCYTE [DISTWIDTH] IN BLOOD BY AUTOMATED COUNT: 12.6 % (ref 10–15)
GLUCOSE SERPL-MCNC: 118 MG/DL (ref 70–99)
HCT VFR BLD AUTO: 42 % (ref 40–53)
HGB BLD-MCNC: 14.9 G/DL (ref 13.3–17.7)
HOLD SPECIMEN: NORMAL
MCH RBC QN AUTO: 31.9 PG (ref 26.5–33)
MCHC RBC AUTO-ENTMCNC: 35.5 G/DL (ref 31.5–36.5)
MCV RBC AUTO: 90 FL (ref 78–100)
PLATELET # BLD AUTO: 195 10E3/UL (ref 150–450)
POTASSIUM SERPL-SCNC: 4.2 MMOL/L (ref 3.4–5.3)
PROT SERPL-MCNC: 7.3 G/DL (ref 6.4–8.3)
RBC # BLD AUTO: 4.67 10E6/UL (ref 4.4–5.9)
SODIUM SERPL-SCNC: 137 MMOL/L (ref 135–145)
T PALLIDUM AB SER QL: NONREACTIVE
WBC # BLD AUTO: 4.4 10E3/UL (ref 4–11)

## 2023-12-16 PROCEDURE — 80053 COMPREHEN METABOLIC PANEL: CPT | Performed by: PATHOLOGY

## 2023-12-16 PROCEDURE — 99000 SPECIMEN HANDLING OFFICE-LAB: CPT | Performed by: PATHOLOGY

## 2023-12-16 PROCEDURE — 86780 TREPONEMA PALLIDUM: CPT | Performed by: FAMILY MEDICINE

## 2023-12-16 PROCEDURE — 87389 HIV-1 AG W/HIV-1&-2 AB AG IA: CPT | Performed by: FAMILY MEDICINE

## 2023-12-16 PROCEDURE — 87591 N.GONORRHOEAE DNA AMP PROB: CPT | Performed by: FAMILY MEDICINE

## 2023-12-16 PROCEDURE — 87491 CHLMYD TRACH DNA AMP PROBE: CPT | Performed by: FAMILY MEDICINE

## 2023-12-16 PROCEDURE — 36415 COLL VENOUS BLD VENIPUNCTURE: CPT | Performed by: PATHOLOGY

## 2023-12-16 PROCEDURE — 85027 COMPLETE CBC AUTOMATED: CPT | Performed by: PATHOLOGY

## 2023-12-17 LAB
C TRACH DNA SPEC QL NAA+PROBE: NEGATIVE
N GONORRHOEA DNA SPEC QL NAA+PROBE: NEGATIVE

## 2023-12-18 LAB — HIV 1+2 AB+HIV1 P24 AG SERPL QL IA: NONREACTIVE

## 2023-12-21 ENCOUNTER — OFFICE VISIT (OUTPATIENT)
Dept: FAMILY MEDICINE | Facility: CLINIC | Age: 58
End: 2023-12-21
Payer: COMMERCIAL

## 2023-12-21 VITALS
OXYGEN SATURATION: 95 % | BODY MASS INDEX: 25.58 KG/M2 | DIASTOLIC BLOOD PRESSURE: 78 MMHG | HEART RATE: 80 BPM | WEIGHT: 163 LBS | HEIGHT: 67 IN | SYSTOLIC BLOOD PRESSURE: 133 MMHG | RESPIRATION RATE: 18 BRPM | TEMPERATURE: 98.2 F

## 2023-12-21 DIAGNOSIS — R73.9 HYPERGLYCEMIA: ICD-10-CM

## 2023-12-21 DIAGNOSIS — Z00.00 ROUTINE GENERAL MEDICAL EXAMINATION AT A HEALTH CARE FACILITY: Primary | ICD-10-CM

## 2023-12-21 DIAGNOSIS — Z72.51 HIGH RISK SEXUAL BEHAVIOR, UNSPECIFIED TYPE: ICD-10-CM

## 2023-12-21 DIAGNOSIS — N40.0 ENLARGED PROSTATE: ICD-10-CM

## 2023-12-21 PROCEDURE — 99396 PREV VISIT EST AGE 40-64: CPT | Performed by: FAMILY MEDICINE

## 2023-12-21 RX ORDER — INFLUENZA VIRUS VACCINE 15; 15; 15; 15 UG/.5ML; UG/.5ML; UG/.5ML; UG/.5ML
SUSPENSION INTRAMUSCULAR
COMMUNITY
Start: 2023-09-24

## 2023-12-21 RX ORDER — DOXYCYCLINE 100 MG/1
200 CAPSULE ORAL DAILY PRN
Qty: 60 CAPSULE | Refills: 3 | Status: SHIPPED | OUTPATIENT
Start: 2023-12-21

## 2023-12-21 RX ORDER — COVID-19 VACCINE, MRNA 50 UG/.5ML
INJECTION, SUSPENSION INTRAMUSCULAR
COMMUNITY
Start: 2023-09-24

## 2023-12-21 ASSESSMENT — ENCOUNTER SYMPTOMS
HEARTBURN: 0
CONSTIPATION: 0
ABDOMINAL PAIN: 0
MYALGIAS: 0
WEAKNESS: 0
HEADACHES: 1
SORE THROAT: 0
NERVOUS/ANXIOUS: 0
SHORTNESS OF BREATH: 0
DYSURIA: 0
DIARRHEA: 0
DIZZINESS: 0
HEMATOCHEZIA: 0
NAUSEA: 0
PARESTHESIAS: 0
HEMATURIA: 0
JOINT SWELLING: 0
CHILLS: 0
COUGH: 0
FEVER: 0
EYE PAIN: 0
PALPITATIONS: 0
ARTHRALGIAS: 0
FREQUENCY: 1

## 2023-12-21 NOTE — PROGRESS NOTES
SUBJECTIVE:   Phillip is a 58 year old, presenting for the following:  Physical        12/21/2023     4:13 PM   Additional Questions   Roomed by jackie   Accompanied by self       Healthy Habits:     Getting at least 3 servings of Calcium per day:  Yes    Bi-annual eye exam:  Yes    Dental care twice a year:  Yes    Sleep apnea or symptoms of sleep apnea:  Daytime drowsiness    Diet:  Gluten-free/reduced    Frequency of exercise:  2-3 days/week    Duration of exercise:  15-30 minutes    Taking medications regularly:  Yes    Medication side effects:  None    Additional concerns today:  Yes            Social History     Tobacco Use    Smoking status: Never    Smokeless tobacco: Never    Tobacco comments:     NON SMOKING ENVIRONMENT, 10/6/11, [facility].    Substance Use Topics    Alcohol use: No     Comment: a few sips to help sleep             12/21/2023     4:05 PM   Alcohol Use   Prescreen: >3 drinks/day or >7 drinks/week? No       Last PSA:   PSA   Date Value Ref Range Status   11/27/2017 0.80 0 - 4 ug/L Final     Comment:     Assay Method:  Chemiluminescence using Siemens Vista analyzer     Prostate Specific Antigen Screen   Date Value Ref Range Status   01/05/2023 1.48 0.00 - 3.50 ng/mL Final       Reviewed orders with patient. Reviewed health maintenance and updated orders accordingly - Yes      Reviewed and updated as needed this visit by clinical staff   Tobacco  Allergies  Meds              Reviewed and updated as needed this visit by Provider                     Review of Systems   Constitutional:  Negative for chills and fever.   HENT:  Positive for congestion. Negative for ear pain, hearing loss and sore throat.    Eyes:  Positive for visual disturbance. Negative for pain.   Respiratory:  Negative for cough and shortness of breath.    Cardiovascular:  Negative for chest pain, palpitations and peripheral edema.   Gastrointestinal:  Negative for abdominal pain, constipation, diarrhea, heartburn, hematochezia and  "nausea.   Genitourinary:  Positive for frequency, impotence and urgency. Negative for dysuria, genital sores, hematuria and penile discharge.   Musculoskeletal:  Negative for arthralgias, joint swelling and myalgias.   Skin:  Negative for rash.   Neurological:  Positive for headaches. Negative for dizziness, weakness and paresthesias.   Psychiatric/Behavioral:  Negative for mood changes. The patient is not nervous/anxious.          OBJECTIVE:   /78 (BP Location: Left arm, Patient Position: Sitting, Cuff Size: Adult Regular)   Pulse 80   Temp 98.2  F (36.8  C)   Resp 18   Ht 1.689 m (5' 6.5\")   Wt 73.9 kg (163 lb)   SpO2 95%   BMI 25.91 kg/m      Physical Exam  GENERAL: healthy, alert and no distress  HENT: ear canals and TM's normal, nose and mouth without ulcers or lesions  RESP: lungs clear to auscultation - no rales, rhonchi or wheezes  CV: regular rate and rhythm, normal S1 S2, no S3 or S4, no murmur, click or rub, no peripheral edema and peripheral pulses strong  ABDOMEN: soft, nontender, no hepatosplenomegaly, no masses and bowel sounds normal  RECTAL (male): normal sphincter tone, no rectal masses, prostate normal size for age (slightly enlarged), smooth, nontender without nodules or masses  MS: no gross musculoskeletal defects noted, no edema    Diagnostic Test Results:  Labs reviewed in Epic  No results found for any visits on 12/21/23. Labs from 12/16 reviewed.  Only notable for mild hyperglycemia    ASSESSMENT/PLAN:       ICD-10-CM    1. Routine general medical examination at a health care facility  Z00.00       2. Hyperglycemia  R73.9 Hemoglobin A1c      3. High risk sexual behavior, unspecified type  Z72.51 doxycycline hyclate (VIBRAMYCIN) 100 MG capsule      4. Enlarged prostate  N40.0 Prostate spec antigen screen        Doing well overall.  Has some hesitancy and increased urination that may be normal given slightly large prostate.  Has seen urology in the past for similar issues. Talked " "about decreasing coffee/caffeine.  If not improving, could try Flomax or similar.    With slight hyperglycemia, will check A1C.  Was normal in the past.  For unprotected intercourse, will continue Descovy.  Will also add Doxy as Post-exposure treatment to decrease risk of syphilis and chlamydia.    Talked also about general health guidelines.  Will urge getting zoster vaccine.  Also increasing exercise and limiting sweets.  For sinus congestion, will have him try simply saline and also continue flonase and return if not improved.        COUNSELING:   Reviewed preventive health counseling, as reflected in patient instructions       Regular exercise       Healthy diet/nutrition       Safe sex practices/STD prevention       Prostate cancer screening      BMI:   Estimated body mass index is 25.91 kg/m  as calculated from the following:    Height as of this encounter: 1.689 m (5' 6.5\").    Weight as of this encounter: 73.9 kg (163 lb).   Weight management plan: Patient was referred to their PCP to discuss a diet and exercise plan.      He reports that he has never smoked. He has never used smokeless tobacco.        Armando Banks MD  [facility]  "

## 2023-12-22 ENCOUNTER — MYC MEDICAL ADVICE (OUTPATIENT)
Dept: FAMILY MEDICINE | Facility: CLINIC | Age: 58
End: 2023-12-22
Payer: COMMERCIAL

## 2023-12-22 DIAGNOSIS — U07.1 INFECTION DUE TO 2019 NOVEL CORONAVIRUS: Primary | ICD-10-CM

## 2023-12-22 PROCEDURE — 99207 PR NO CHARGE NURSE ONLY: CPT | Performed by: FAMILY MEDICINE

## 2023-12-27 ENCOUNTER — TELEPHONE (OUTPATIENT)
Dept: FAMILY MEDICINE | Facility: CLINIC | Age: 58
End: 2023-12-27

## 2023-12-27 NOTE — TELEPHONE ENCOUNTER
COVID Positive/Requesting COVID treatment    Patient is positive for COVID and requesting treatment options.    Date of positive COVID test (PCR or at home)? 12/27/2023  Current COVID symptoms: fever or chills, cough, muscle or body aches, headache, and congestion or runny nose  Date COVID symptoms began: 12/23/23    Message should be routed to clinic RN pool. Best phone number to use for call back: [phone]  okay to [facility]

## 2023-12-27 NOTE — TELEPHONE ENCOUNTER
RN COVID TREATMENT VISIT  12/27/23      The patient has been triaged and does not require a higher level of care.    Vimal Goldsmith  58 year old  Current weight? 163    Has the patient been seen by a primary care provider at an [facility] or [facility] within the past two years? Yes.   Have you been in close proximity to/do you have a known exposure to a person with a confirmed case of influenza? No.     General treatment eligibility:  Date of positive COVID test (PCR or at home)?  12/27/23    Are you or have you been hospitalized for this COVID-19 infection? No.   Have you received monoclonal antibodies or antiviral treatment for COVID-19 since this positive test? No.   Do you have any of the following conditions that place you at risk of being very sick from COVID-19?   - Age 50 years or older  Yes, patient has at least one high risk condition as noted above.     Current COVID symptoms:   - fever or chills  - cough  - fatigue  - muscle or body aches  - congestion or runny nose  Yes. Patient has at least one symptom as selected.     How many days since symptoms started? 5 days or less. Established patient, 12 years or older weighing at least 88.2 lbs, who has symptoms that started in the past 5 days, has not been hospitalized nor received treatment already, and is at risk for being very sick from COVID-19.     Treatment eligibility by RN:  Are you currently pregnant or nursing? No  Do you have a clinically significant hypersensitivity to nirmatrelvir or ritonavir, or toxic epidermal necrolysis (TEN) or Salguero-Josue Syndrome? No  Do you have a history of hepatitis, any hepatic impairment on the Problem List (such as Child-Whitley Class C, cirrhosis, fatty liver disease, alcoholic liver disease), or was the last liver lab (hepatic panel, ALT, AST, ALK Phos, bilirubin) elevated in the past 6 months? No  Do you have any history of severe renal impairment (eGFR < 30mL/min)? No    Is patient  eligible to continue? Yes, patient meets all eligibility requirements for the RN COVID treatment (as denoted by all no responses above).     Current Outpatient Medications   Medication Sig Dispense Refill    dapsone (ACZONE) 25 MG tablet TAKE 1 TABLET BY MOUTH EVERY DAY TAKE 2 TABLET BY MOUTH DAILY WHEN FLARING 180 tablet 3    DESCOVY 200-25 MG per tablet Take 1 tablet by mouth daily at 2 pm      doxycycline hyclate (VIBRAMYCIN) 100 MG capsule Take 2 capsules (200 mg) by mouth daily as needed (unprotected intercourse--take within 72 hours) 60 capsule 3    FLUARIX QUADRIVALENT 0.5 ML injection       fluticasone (FLONASE) 50 MCG/ACT nasal spray Spray 1 spray into both nostrils daily 18.2 mL 6    ipratropium (ATROVENT) 0.06 % nasal spray Spray 2 sprays into both nostrils 4 times daily as needed for rhinitis 15 mL 1    sildenafil (VIAGRA) 50 MG tablet Take 0.5 tablets (25 mg) 30 min to 4 hours before intercourse daily as needed. Never use with nitroglycerin, terazosin or doxazosin. 12 tablet 6    SPIKEVAX 50 MCG/0.5ML injection       triamcinolone (KENALOG) 0.1 % external ointment Apply twice daily as needed for rash on the trunk or extremities 80 g 11    zolpidem (AMBIEN) 5 MG tablet TAKE ONE TABLET BY MOUTH NIGHTLY AS NEEDED FOR SLEEP 30 tablet 3       Medications from List 1 of the standing order (on medications that exclude the use of Paxlovid) that patient is taking: NONE. Is patient taking Fallon's Wort? No  Is patient taking Bunn[place]'s Wort or any meds from List 1? No.   Medications from List 2 of the standing order (on meds that provider needs to adjust) that patient is taking: NONE. Is patient on any of the meds from List 2? No.   Medications from List 3 of standing order (on meds that a RN needs to adjust) that patient is taking: zolpidem (Ambien, Intermezzo, Edluar): Is patient taking as needed and less than daily? Yes, instructed patient to stop taking zolpidem while taking Paxlovid and restart zolpidem 3  days after the completion of Paxlovid. Is patient on any meds from List 3? Yes. Patient is on meds from list 3. No meds require a provider visit and at least one med required RN to adjust.     Paxlovid has an approximate 90% reduction in hospitalization. Paxlovid can possibly cause altered sense of taste, diarrhea (loose, watery stools), high blood pressure, muscle aches.     Would patient like a Paxlovid prescription?   Yes.   Lab Results   Component Value Date    GFRESTIMATED >90 12/16/2023       Was last eGFR reduced? No, eGFR 60 or greater/ No Result on record. Patient can receive the normal renal function dose. Paxlovid Rx sent to [place] [facility]   [facility]    Temporary change to home medications: Stop Zolpidem and do not take silidenafil    All medication adjustments (holds, etc) were discussed with the patient and patient was asked to repeat back (teachback) their med adjustment.  Did patient understand med adjustment? Yes, patient repeated back and understood correctly.        Reviewed the following instructions with the patient:    Paxlovid (nimatrelvir and ritonavir)    How it works  Two medicines (nirmatrelvir and ritonavir) are taken together. They stop the virus from growing. Less amount of virus is easier for your body to fight.    How to take  Medicine comes in a daily container with both medicine tablets. Take by mouth twice daily (once in the morning, once at night) for 5 days.  The number of tablets to take varies by patient.  Don't chew or break capsules. Swallow whole.    When to take  Take as soon as possible after positive COVID-19 test result, and within 5 days of your first symptoms.    Possible side effects  Can cause altered sense of taste, diarrhea (loose, watery stools), high blood pressure, muscle aches.    Ritika Armendariz RN

## 2024-01-12 ENCOUNTER — LAB (OUTPATIENT)
Dept: LAB | Facility: CLINIC | Age: 59
End: 2024-01-12
Payer: COMMERCIAL

## 2024-01-12 DIAGNOSIS — R73.9 HYPERGLYCEMIA: ICD-10-CM

## 2024-01-12 DIAGNOSIS — N40.0 ENLARGED PROSTATE: ICD-10-CM

## 2024-01-12 LAB
HBA1C MFR BLD: 4.7 % (ref 0–5.6)
PSA SERPL DL<=0.01 NG/ML-MCNC: 1.73 NG/ML (ref 0–3.5)

## 2024-01-12 PROCEDURE — 36415 COLL VENOUS BLD VENIPUNCTURE: CPT

## 2024-01-12 PROCEDURE — 83036 HEMOGLOBIN GLYCOSYLATED A1C: CPT

## 2024-01-12 PROCEDURE — G0103 PSA SCREENING: HCPCS

## 2024-02-19 ENCOUNTER — TELEPHONE (OUTPATIENT)
Dept: OPHTHALMOLOGY | Facility: CLINIC | Age: 59
End: 2024-02-19
Payer: COMMERCIAL

## 2024-02-19 ENCOUNTER — NURSE TRIAGE (OUTPATIENT)
Dept: NURSING | Facility: CLINIC | Age: 59
End: 2024-02-19
Payer: COMMERCIAL

## 2024-02-19 NOTE — TELEPHONE ENCOUNTER
"  Nurse Triage SBAR    Is this a 2nd Level Triage? YES, LICENSED PRACTITIONER REVIEW IS REQUIRED    Situation: right eye pain with blurry vision x a few weeks    Background:  Covid at the end of December    Assessment:   Pain to right eye, 5-6/10  irritation  White of eye is red  Denies fall or injury  He does have reading glasses   Blurry vision and not able to read the clock on the wall,  using his glasses he can read, but if he reads a long time, he gets tired, but not crystal clear.    He does have a slight headache, which he associates to his ongoing sinus issues, which have been present for several months.  Denies fever or chills  Eye gets tired  Patient willing to go to several Eye clinics to be seen  Using drops    Protocol Recommended Disposition:   Routing to EYE clinics in which the patient is willing to drive to.        Routed to provider    Reason for Disposition   Blurred vision or visual changes and present now and sudden onset or new (e.g., minutes, hours, days)  (Exception: Seeing floaters / black specks OR previously diagnosed migraine headaches with same symptoms.)    Additional Information   Negative: Complete loss of vision in one or both eyes   Negative: SEVERE eye pain    Answer Assessment - Initial Assessment Questions  1. DESCRIPTION: \"How has your vision changed?\" (e.g., complete vision loss, blurred vision, double vision, floaters, etc.)      Blurry vision and irritation to the right eye,  eyes tired, no edema  clear drainage  2. LOCATION: \"One or both eyes?\" If one, ask: \"Which eye?\"      Right eye  3. SEVERITY: \"Can you see anything?\" If Yes, ask: \"What can you see?\" (e.g., fine print)      Blurry and not able to read the clock on the wall,  using his glasses he can read, but if he reads a long time, he gets tired, but not crystal clear  4. ONSET: \"When did this begin?\" \"Did it start suddenly or has this been gradual?\"      Few weeks  5. PATTERN: \"Does this come and go, or has it been " "constant since it started?\"      constant  6. PAIN: \"Is there any pain in your eye(s)?\"  (Scale 1-10; or mild, moderate, severe)    - NONE (0): No pain.    - MILD (1-3): Doesn't interfere with normal activities.    - MODERATE (4-7): Interferes with normal activities or awakens from sleep.     - SEVERE (8-10): Excruciating pain, unable to do any normal activities.      Pain 5-6/10  irritation  7. CONTACTS-GLASSES: \"Do you wear contacts or glasses?\"      glasses  8. CAUSE: \"What do you think is causing this visual problem?\"      Unknown, he did have covid at the end of December 9. OTHER SYMPTOMS: \"Do you have any other symptoms?\" (e.g., confusion, headache, arm or leg weakness, speech problems)      Slight headache associated with sinus issues for several months.  No confusion or arm or leg weakness or speech problems.  10. PREGNANCY: \"Is there any chance you are pregnant?\" \"When was your last menstrual period?\"        N/a    Protocols used: Vision Loss or Change-A-OH    "

## 2024-02-19 NOTE — TELEPHONE ENCOUNTER
Caller reporting the following red-flag symptom(s): red eye, blurry vision, eye discomfort, light sensitivity, red eye.     Per the system red-flag symptom policy, patient was instructed to:  speak with a Registered Nurse    Action:  Patient warm transferred to a Registered Nurse

## 2024-02-19 NOTE — TELEPHONE ENCOUNTER
Patient notes eye redness and discomfort for one month.  He has had similar symptoms in the past but the symptoms improved with artificial tears.  The patient sees Dr. Chahal normally.  Patient scheduled in Acute clinic on day Dr. Chahal is attending.  Patient instructed to call if increased eye pain or vision change.

## 2024-02-21 ENCOUNTER — OFFICE VISIT (OUTPATIENT)
Dept: FAMILY MEDICINE | Facility: CLINIC | Age: 59
End: 2024-02-21
Payer: COMMERCIAL

## 2024-02-21 DIAGNOSIS — R21 RASH: Primary | ICD-10-CM

## 2024-02-21 PROCEDURE — 87070 CULTURE OTHR SPECIMN AEROBIC: CPT | Performed by: NURSE PRACTITIONER

## 2024-02-21 PROCEDURE — 99214 OFFICE O/P EST MOD 30 MIN: CPT | Performed by: NURSE PRACTITIONER

## 2024-02-21 PROCEDURE — 87205 SMEAR GRAM STAIN: CPT | Performed by: NURSE PRACTITIONER

## 2024-02-21 RX ORDER — KETOCONAZOLE 20 MG/ML
SHAMPOO TOPICAL DAILY
Qty: 120 ML | Refills: 11 | Status: SHIPPED | OUTPATIENT
Start: 2024-02-21

## 2024-02-21 RX ORDER — CLINDAMYCIN PHOSPHATE 10 UG/ML
LOTION TOPICAL 2 TIMES DAILY
Qty: 60 ML | Refills: 11 | Status: SHIPPED | OUTPATIENT
Start: 2024-02-21 | End: 2024-04-23

## 2024-02-21 ASSESSMENT — PAIN SCALES - GENERAL: PAINLEVEL: NO PAIN (0)

## 2024-02-21 NOTE — PROGRESS NOTES
[facility] Dermatology Note  Encounter Date: Feb 21, 2024  Office Visit     Reviewed patients past medical history and pertinent chart review prior to patients visit today.     Dermatology Problem List:  1. Dermatitis herpetiformis  - Dapsone 25 mg daily, increase to 50 mg daily with flares  - Gluten free diet  - Triamcinolone 0.1%   2. New rash on scalp and face.  Favor infection.  Bacterial culture taken 2/21/2024  ____________________________________________    Assessment & Plan:     #Rash on face and scalp.  Differential diagnosis includes bacterial infection versus seborrheic dermatitis versus dermatitis herpetiformis.  Bacterial culture swab taken from scalp and face lesions.  I will let patient know results when available and we will adjust treatment as indicated.  -Start ketoconazole 2% shampoo daily to the face and scalp leave on for 3 to 5 minutes before rinsing  -Start clindamycin 1% lotion twice daily to lesions on scalp and entire face    Follow-up in 1 month if not fully resolved    Nathalie Garcia, FRANCY  Dermatology    _______________________________________    CC: Derm Problem (Here to discuss some recent break outs on face and scalp)    HPI:  Mr. Vimal Goldsmith is a(n) 59 year old male who presents today as a return patient for breakouts on the face and scalp. This started on the face and scalp about 2-3 weeks ago. He takes dapsone for celiac/Dermatitis herpetiformis and says that he takes 2 pills daily when flared instead of one. Two weeks ago he started on 2 pills daily of dapsone but this has not helped heal the breakouts.  He also used the triamcinolone BID for the past week and doesn't seem to be helping.     Patient is otherwise feeling well, without additional skin concerns.      Physical Exam:  SKIN: Focused examination of face neck and scalp was performed.  -Excoriated inflammatory papules scattered on the nose, cheeks, forehead, vertex and posterior scalp.  No pustules or bullae.   No diffuse scaling    - No other lesions of concern on areas examined.     Medications:  Current Outpatient Medications   Medication    dapsone (ACZONE) 25 MG tablet    DESCOVY 200-25 MG per tablet    doxycycline hyclate (VIBRAMYCIN) 100 MG capsule    FLUARIX QUADRIVALENT 0.5 ML injection    fluticasone (FLONASE) 50 MCG/ACT nasal spray    ipratropium (ATROVENT) 0.06 % nasal spray    sildenafil (VIAGRA) 50 MG tablet    SPIKEVAX 50 MCG/0.5ML injection    triamcinolone (KENALOG) 0.1 % external ointment    zolpidem (AMBIEN) 5 MG tablet     No current facility-administered medications for this visit.      Past Medical History:   Patient Active Problem List   Diagnosis    Dermatitis herpetiformis    Celiac disease    Tinea pedis    Acne rosacea    BPH (benign prostatic hypertrophy)    Bacterial folliculitis    Chalazion    Therapeutic drug monitoring    Bilateral dry eyes    Adjustment insomnia    Internal bleeding hemorrhoids     Past Medical History:   Diagnosis Date    Benign positional vertigo     Celiac disease 10/23/2011    Chronic kidney disease 1975    diagnosed when I was [age]    Chronic sinusitis     Dermatitis herpetiformis     Gastro-oesophageal reflux disease 1987    had suffered any years ago before  celiac disease was diagno    Hoarseness     Migraines     Nasal polyps     Reduced vision        CC Referred Self, MD  No address on file on close of this encounter.

## 2024-02-21 NOTE — LETTER
2/21/2024         RE: Vimal Goldsmith  [address] [place] [address]        Dear Colleague,    Thank you for referring your patient, Vimal Goldsmith, to the [facility]. Please see a copy of my visit note below.    [facility] Dermatology Note  Encounter Date: Feb 21, 2024  Office Visit     Reviewed patients past medical history and pertinent chart review prior to patients visit today.     Dermatology Problem List:  1. Dermatitis herpetiformis  - Dapsone 25 mg daily, increase to 50 mg daily with flares  - Gluten free diet  - Triamcinolone 0.1%   2. New rash on scalp and face.  Favor infection.  Bacterial culture taken 2/21/2024  ____________________________________________    Assessment & Plan:     #Rash on face and scalp.  Differential diagnosis includes bacterial infection versus seborrheic dermatitis versus dermatitis herpetiformis.  Bacterial culture swab taken from scalp and face lesions.  I will let patient know results when available and we will adjust treatment as indicated.  -Start ketoconazole 2% shampoo daily to the face and scalp leave on for 3 to 5 minutes before rinsing  -Start clindamycin 1% lotion twice daily to lesions on scalp and entire face    Follow-up in 1 month if not fully resolved    Nathalie Garcia, FRANCY  Dermatology    _______________________________________    CC: Derm Problem (Here to discuss some recent break outs on face and scalp)    HPI:  Mr. Vimal Goldsmith is a(n) 59 year old male who presents today as a return patient for breakouts on the face and scalp. This started on the face and scalp about 2-3 weeks ago. He takes dapsone for celiac/Dermatitis herpetiformis and says that he takes 2 pills daily when flared instead of one. Two weeks ago he started on 2 pills daily of dapsone but this has not helped heal the breakouts.  He also used the triamcinolone BID for the past week and doesn't seem to be helping.     Patient is otherwise feeling  well, without additional skin concerns.      Physical Exam:  SKIN: Focused examination of face neck and scalp was performed.  -Excoriated inflammatory papules scattered on the nose, cheeks, forehead, vertex and posterior scalp.  No pustules or bullae.  No diffuse scaling    - No other lesions of concern on areas examined.     Medications:  Current Outpatient Medications   Medication     dapsone (ACZONE) 25 MG tablet     DESCOVY 200-25 MG per tablet     doxycycline hyclate (VIBRAMYCIN) 100 MG capsule     FLUARIX QUADRIVALENT 0.5 ML injection     fluticasone (FLONASE) 50 MCG/ACT nasal spray     ipratropium (ATROVENT) 0.06 % nasal spray     sildenafil (VIAGRA) 50 MG tablet     SPIKEVAX 50 MCG/0.5ML injection     triamcinolone (KENALOG) 0.1 % external ointment     zolpidem (AMBIEN) 5 MG tablet     No current facility-administered medications for this visit.      Past Medical History:   Patient Active Problem List   Diagnosis     Dermatitis herpetiformis     Celiac disease     Tinea pedis     Acne rosacea     BPH (benign prostatic hypertrophy)     Bacterial folliculitis     Chalazion     Therapeutic drug monitoring     Bilateral dry eyes     Adjustment insomnia     Internal bleeding hemorrhoids     Past Medical History:   Diagnosis Date     Benign positional vertigo      Celiac disease 10/23/2011     Chronic kidney disease 1975    diagnosed when I was [age]     Chronic sinusitis      Dermatitis herpetiformis      Gastro-oesophageal reflux disease 1987    had suffered any years ago before  celiac disease was diagno     Hoarseness      Migraines      Nasal polyps      Reduced vision        CC Referred Self, MD  No address on file on close of this encounter.       Again, thank you for allowing me to participate in the care of your patient.        Sincerely,        Ciara Garcia, YUDY CNP

## 2024-02-23 LAB
BACTERIA SKIN AEROBE CULT: ABNORMAL
GRAM STAIN RESULT: ABNORMAL
GRAM STAIN RESULT: ABNORMAL

## 2024-02-26 ENCOUNTER — OFFICE VISIT (OUTPATIENT)
Dept: FAMILY MEDICINE | Facility: CLINIC | Age: 59
End: 2024-02-26
Payer: COMMERCIAL

## 2024-02-26 VITALS
DIASTOLIC BLOOD PRESSURE: 75 MMHG | HEART RATE: 88 BPM | SYSTOLIC BLOOD PRESSURE: 132 MMHG | TEMPERATURE: 98.4 F | BODY MASS INDEX: 25.91 KG/M2 | HEIGHT: 66 IN | RESPIRATION RATE: 14 BRPM | OXYGEN SATURATION: 93 % | WEIGHT: 161.2 LBS

## 2024-02-26 DIAGNOSIS — J01.90 ACUTE SINUSITIS WITH SYMPTOMS > 10 DAYS: Primary | ICD-10-CM

## 2024-02-26 DIAGNOSIS — H10.33 ACUTE CONJUNCTIVITIS OF BOTH EYES, UNSPECIFIED ACUTE CONJUNCTIVITIS TYPE: ICD-10-CM

## 2024-02-26 DIAGNOSIS — R21 RASH: ICD-10-CM

## 2024-02-26 DIAGNOSIS — B37.2 YEAST DERMATITIS: ICD-10-CM

## 2024-02-26 PROCEDURE — 99213 OFFICE O/P EST LOW 20 MIN: CPT | Performed by: FAMILY MEDICINE

## 2024-02-26 RX ORDER — ECONAZOLE NITRATE 10 MG/G
CREAM TOPICAL 2 TIMES DAILY
Qty: 85 G | Refills: 2 | Status: SHIPPED | OUTPATIENT
Start: 2024-02-26

## 2024-02-26 RX ORDER — AMOXICILLIN 500 MG/1
1000 CAPSULE ORAL 2 TIMES DAILY
Qty: 40 CAPSULE | Refills: 0 | Status: SHIPPED | OUTPATIENT
Start: 2024-02-26 | End: 2024-04-19

## 2024-02-27 ENCOUNTER — LAB (OUTPATIENT)
Dept: LAB | Facility: CLINIC | Age: 59
End: 2024-02-27
Attending: OPHTHALMOLOGY
Payer: COMMERCIAL

## 2024-02-27 ENCOUNTER — OFFICE VISIT (OUTPATIENT)
Dept: OPHTHALMOLOGY | Facility: CLINIC | Age: 59
End: 2024-02-27
Attending: OPHTHALMOLOGY
Payer: COMMERCIAL

## 2024-02-27 DIAGNOSIS — H20.00 ACUTE ANTERIOR UVEITIS OF BOTH EYES: ICD-10-CM

## 2024-02-27 DIAGNOSIS — H20.00 ACUTE ANTERIOR UVEITIS OF BOTH EYES: Primary | ICD-10-CM

## 2024-02-27 DIAGNOSIS — R21 FACIAL RASH: ICD-10-CM

## 2024-02-27 PROCEDURE — 99214 OFFICE O/P EST MOD 30 MIN: CPT | Mod: GC | Performed by: OPHTHALMOLOGY

## 2024-02-27 PROCEDURE — 36415 COLL VENOUS BLD VENIPUNCTURE: CPT | Performed by: PATHOLOGY

## 2024-02-27 PROCEDURE — 87389 HIV-1 AG W/HIV-1&-2 AB AG IA: CPT

## 2024-02-27 PROCEDURE — 86481 TB AG RESPONSE T-CELL SUSP: CPT

## 2024-02-27 PROCEDURE — 82164 ANGIOTENSIN I ENZYME TEST: CPT | Mod: 90 | Performed by: PATHOLOGY

## 2024-02-27 PROCEDURE — 85549 MURAMIDASE: CPT | Mod: 90 | Performed by: PATHOLOGY

## 2024-02-27 PROCEDURE — 99214 OFFICE O/P EST MOD 30 MIN: CPT

## 2024-02-27 PROCEDURE — 86593 SYPHILIS TEST NON-TREP QUANT: CPT

## 2024-02-27 PROCEDURE — 99000 SPECIMEN HANDLING OFFICE-LAB: CPT | Performed by: PATHOLOGY

## 2024-02-27 PROCEDURE — 86592 SYPHILIS TEST NON-TREP QUAL: CPT

## 2024-02-27 PROCEDURE — 86780 TREPONEMA PALLIDUM: CPT

## 2024-02-27 RX ORDER — PREDNISOLONE ACETATE 10 MG/ML
1 SUSPENSION/ DROPS OPHTHALMIC
Qty: 10 ML | Refills: 1 | Status: SHIPPED | OUTPATIENT
Start: 2024-02-27 | End: 2024-03-14

## 2024-02-27 RX ORDER — ATROPINE SULFATE 10 MG/ML
1 SOLUTION/ DROPS OPHTHALMIC DAILY
Qty: 5 ML | Refills: 0 | Status: SHIPPED | OUTPATIENT
Start: 2024-02-27 | End: 2024-04-23

## 2024-02-27 ASSESSMENT — TONOMETRY
IOP_METHOD: TONOPEN
OS_IOP_MMHG: 12
OD_IOP_MMHG: 14

## 2024-02-27 ASSESSMENT — SLIT LAMP EXAM - LIDS
COMMENTS: NO LID LESIONS
COMMENTS: NO LID LESIONS

## 2024-02-27 ASSESSMENT — VISUAL ACUITY
OS_SC: 20/30
OS_PH_SC: 20/25
OD_SC: 20/25
OS_SC+: -1
METHOD: SNELLEN - LINEAR

## 2024-02-27 NOTE — PATIENT INSTRUCTIONS
Eyedrops:  Prednisolone (pink or white top) 1 drop EVERY HOUR in both eyes. Shake well.  Atropine (red top) 1 drop once per day in both eyes.   Lubricating eyedrops as needed for comfort    Wait at least 5 minutes between drops.     We placed orders for labs to be drawn and a chest x ray. Please schedule lab visit and have these done at your earliest convenience.     We have also referred you to a dermatologist. You should receive a call in the next few days for scheduling.

## 2024-02-27 NOTE — PROGRESS NOTES
"Ophthalmology Acute Clinic    Date of Visit: 02/27/24    Chief Complaint(s) and History of Present Illness(es)       Red Eye Both Eyes          Comments    Pt states he had a rash on his face and in his scalp since the beginning of February   Currently has an sinusitis   States some AM crusting   Eyes feel tired and eye strain  No eye pain     Jenni Chahal COT 12:39 PM February 27, 2024         HPI:   Vimal Goldsmith is a 59 year old male who presents for red eyes.    ~1 month of facial and scalp rash with erythematous lesions, no palm/sole involvement, along with bilateral eye redness and light sensitivity. No known exposures. No medication changes. Chart review notes \"high risk sexual behavior,\" however patient states he is monogamous with his .       Ocular medications:   - Not using any     Past Ocular history:   - Glasses: bifocals   - Contact lens wear: no  - Ocular Surgical History: none  - Longstanding monocular diplopia, previously followed by Dr. Marcelino  - H/O anisometropia and possible mild amblyopia right eye (Left eye previously with greater myopia)  - S/P laser in situ keratomileusis (LASIK) left eye in Jan 2017      PMH:   Has a history of dermatitis herpetiformis, per dermatology. Managed with dapsone 25 mg daily, gluten free diet, and triamcinolone.     Recent visit with dermatology NP:    #Rash on face and scalp.  Differential diagnosis includes bacterial infection versus seborrheic dermatitis versus dermatitis herpetiformis.  Bacterial culture swab taken from scalp and face lesions.  I will let patient know results when available and we will adjust treatment as indicated.  -Start ketoconazole 2% shampoo daily to the face and scalp leave on for 3 to 5 minutes before rinsing  -Start clindamycin 1% lotion twice daily to lesions on scalp and entire face  Past Medical History:   Diagnosis Date    Benign positional vertigo     Celiac disease 10/23/2011    Chronic kidney disease 1975    diagnosed " "when I was [age]    Chronic sinusitis     Dermatitis herpetiformis     Gastro-oesophageal reflux disease 1987    had suffered any years ago before  celiac disease was diagno    Hoarseness     Migraines     Nasal polyps     Reduced vision       ROS:   Rash involving the ears, scalp, and face.   No fevers, chills, fatigue, weight loss, night sweats, or lymphadenopathy.   No joint aches or swelling, back or pelvic pain.   No headaches or hearing changes.  No genital lesions.     Imaging:  None in clinic today    Assessment & Plan      Vimal Goldsmith is a 59 year old male with the following diagnoses:   1. Acute anterior uveitis of both eyes    2. Facial rash       ~1 month of facial and scalp rash with erythematous lesions, no palm/sole involvement, along with bilateral eye redness and light sensitivity.   Exam today with new acute anterior uveitis: right eye 3-5 wbc/hpf and left eye 5-10 wbc/hpf and trace flare. No vitreitis or posterior inflammation.  No known exposures. No medication changes. Chart review notes \"high risk sexual behavior,\" however patient states he is monogamous with his .      Plan:  -Start Pred forte q1h while awake, both eyes  -Start Atropine daily, both eyes  -Labs: FTA-abs with reflex to RPR, HIV combo, Quant gold, ACE, lysozyme  -Imaging: CXR  -Referral placed to dermatology  -Return and RD precautions discussed     Patient disposition:   Return in about 1 week (around 3/5/2024) for Follow Up in Acute.    Patient seen with Dr. Tirso Ya MD  Resident Physician, PGY-2  Department of Ophthalmology     Attending Physician Attestation:  Complete documentation of historical and exam elements from today's encounter can be found in the full encounter summary report (not reduplicated in this progress note).  I personally obtained the chief complaint(s) and history of present illness.  I confirmed and edited as necessary the review of systems, past medical/surgical history, family history, " social history, and examination findings as documented by others; and I examined the patient myself.  I personally reviewed the relevant tests, images, and reports as documented above.  I formulated and edited as necessary the assessment and plan and discussed the findings and management plan with the patient and family. . - Missael Chahal MD

## 2024-02-27 NOTE — NURSING NOTE
Chief Complaints and History of Present Illnesses   Patient presents with    Red Eye Both Eyes     Chief Complaint(s) and History of Present Illness(es)       Red Eye Both Eyes               Comments    Pt states he had a rash on his face and in his scalp since the beginning of February   Currently has an sinusitis   States some AM crusting   Eyes feel tired and eye strain  No eye pain     Jenni Chahal COT 12:39 PM February 27, 2024

## 2024-02-28 ENCOUNTER — TELEPHONE (OUTPATIENT)
Dept: OPHTHALMOLOGY | Facility: CLINIC | Age: 59
End: 2024-02-28

## 2024-02-28 ENCOUNTER — ANCILLARY PROCEDURE (OUTPATIENT)
Dept: GENERAL RADIOLOGY | Facility: CLINIC | Age: 59
End: 2024-02-28
Payer: COMMERCIAL

## 2024-02-28 DIAGNOSIS — H20.00 ACUTE ANTERIOR UVEITIS OF BOTH EYES: ICD-10-CM

## 2024-02-28 LAB
HIV 1+2 AB+HIV1 P24 AG SERPL QL IA: NONREACTIVE
RPR SER QL: REACTIVE
RPR SER-TITR: ABNORMAL {TITER}
T PALLIDUM AB SER QL: REACTIVE

## 2024-02-28 PROCEDURE — 71046 X-RAY EXAM CHEST 2 VIEWS: CPT | Performed by: RADIOLOGY

## 2024-02-28 NOTE — TELEPHONE ENCOUNTER
[facility] Call Center    Phone Message    May a detailed message be left on voicemail: yes     Reason for Call: Other: Patient saw Dr Ya yesterday 2/27 and we scheduled a follow up apt on 3/5. Patient didn't realize it until today that apt is not scheduled with Dr Ya. Patient would like to know if we can reschedule to Dr Ya instead of Dr Rae since he would like to see the same provider.    Please call patient back at [phone]. Thank you.    Action Taken: Message routed to:  [facility] Eye    Travel Screening: Not Applicable

## 2024-02-28 NOTE — TELEPHONE ENCOUNTER
Called and spoke to Phillip     Explained the resident rotation - Phillip understood     Phillip will keep his appointment with Dr. Kyra Turcios Communication Facilitator on 2/28/2024 at 1:58 PM

## 2024-02-29 ENCOUNTER — OFFICE VISIT (OUTPATIENT)
Dept: DERMATOLOGY | Facility: CLINIC | Age: 59
End: 2024-02-29
Payer: COMMERCIAL

## 2024-02-29 ENCOUNTER — TELEPHONE (OUTPATIENT)
Dept: OPHTHALMOLOGY | Facility: CLINIC | Age: 59
End: 2024-02-29

## 2024-02-29 DIAGNOSIS — L30.9 DERMATITIS: Primary | ICD-10-CM

## 2024-02-29 DIAGNOSIS — L13.0 DERMATITIS HERPETIFORMIS: ICD-10-CM

## 2024-02-29 DIAGNOSIS — A53.9 SYPHILIS: ICD-10-CM

## 2024-02-29 LAB
GAMMA INTERFERON BACKGROUND BLD IA-ACNC: 0.07 IU/ML
M TB IFN-G BLD-IMP: NEGATIVE
M TB IFN-G CD4+ BCKGRND COR BLD-ACNC: 9.93 IU/ML
MITOGEN IGNF BCKGRD COR BLD-ACNC: -0.01 IU/ML
MITOGEN IGNF BCKGRD COR BLD-ACNC: 0 IU/ML
QUANTIFERON MITOGEN: 10 IU/ML
QUANTIFERON NIL TUBE: 0.07 IU/ML
QUANTIFERON TB1 TUBE: 0.06 IU/ML
QUANTIFERON TB2 TUBE: 0.07

## 2024-02-29 PROCEDURE — 88305 TISSUE EXAM BY PATHOLOGIST: CPT | Mod: TC | Performed by: DERMATOLOGY

## 2024-02-29 PROCEDURE — 11104 PUNCH BX SKIN SINGLE LESION: CPT | Performed by: DERMATOLOGY

## 2024-02-29 PROCEDURE — 88305 TISSUE EXAM BY PATHOLOGIST: CPT | Mod: 26 | Performed by: DERMATOLOGY

## 2024-02-29 PROCEDURE — 88342 IMHCHEM/IMCYTCHM 1ST ANTB: CPT | Mod: 26 | Performed by: DERMATOLOGY

## 2024-02-29 PROCEDURE — 96372 THER/PROPH/DIAG INJ SC/IM: CPT | Mod: 59 | Performed by: DERMATOLOGY

## 2024-02-29 PROCEDURE — 88312 SPECIAL STAINS GROUP 1: CPT | Mod: 26 | Performed by: DERMATOLOGY

## 2024-02-29 PROCEDURE — 99214 OFFICE O/P EST MOD 30 MIN: CPT | Mod: 25 | Performed by: DERMATOLOGY

## 2024-02-29 PROCEDURE — 11105 PUNCH BX SKIN EA SEP/ADDL: CPT | Performed by: DERMATOLOGY

## 2024-02-29 NOTE — PATIENT INSTRUCTIONS
Wound Care After a Biopsy    What is a skin biopsy?  A skin biopsy allows the doctor to examine a very small piece of tissue under the microscope to determine the diagnosis and the best treatment for the skin condition. A local anesthetic (numbing medicine) is injected with a very small needle into the skin area to be tested. A small piece of skin is taken from the area. Sometimes a suture (stitch) is used.     What are the risks of a skin biopsy?  I will experience scar, bleeding, swelling, pain, crusting and redness. I may experience incomplete removal or recurrence. Risks of this procedure are excessive bleeding, bruising, infection, nerve damage, numbness, thick (hypertrophic or keloidal) scar and non-diagnostic biopsy.    How should I care for my wound for the first 24 hours?  Keep the wound dry and covered for 24 hours  If it bleeds, hold direct pressure on the area for 15 minutes. If bleeding does not stop, call us or go to the emergency room  Avoid strenuous exercise the first 1-2 days or as your doctor instructs you    How should I care for the wound after 24 hours?  After 24 hours, remove the bandage  You may bathe or shower as normal  If you had a scalp biopsy, you can shampoo as usual and can use shower water to clean the biopsy site daily  Clean the wound once a day with gentle soap and water  Do not scrub, be gentle  Apply white petroleum/Vaseline after cleaning the wound with a cotton swab or a clean finger, and keep the site covered with a Bandaid /bandage. Bandages are not necessary with a scalp biopsy  If you are unable to cover the site with a Bandaid /bandage, re-apply ointment 2-3 times a day to keep the site moist. Moisture will help with healing  Avoid strenuous activity for first 1-2 days  Avoid lakes, rivers, pools, and oceans until the stitches are removed or the site is healed    How do I clean my wound?  Wash hands thoroughly with soap or use hand  before all wound care  Clean  the wound with gentle soap and water  Apply white petroleum/Vaseline  to wound after it is clean  Replace the Bandaid /bandage to keep the wound covered for the first few days or as instructed by your doctor  If you had a scalp biopsy, warm shower water to the area on a daily basis should suffice    What should I use to clean my wound?   Cotton-tipped applicators (Qtips )  White petroleum jelly (Vaseline ). Use a clean new container and use Q-tips to apply.  Bandaids  as needed  Gentle soap     How should I care for my wound long term?  Do not get your wound dirty  Keep up with wound care for one week or until the area is healed.  If you have stitches, stitches need to be removed in 14 days. You may return to our clinic for this or you may have it done locally at your doctor s office.  A small scab will form and fall off by itself when the area is completely healed. The area will be red and will become pink in color as it heals. Sun protection is very important for how your scar will turn out. Sunscreen with an SPF 30 or greater is recommended once the area is healed.  You should have some soreness but it should be mild and slowly go away over several days. Talk to your doctor about using tylenol for pain,    When should I call my doctor?  If you have increased:   Pain or swelling  Pus or drainage (clear or slightly yellow drainage is ok)  Temperature over 100F  Spreading redness or warmth around wound    When will I hear about my results?  The biopsy results can take 2 weeks to come back.  Your results will automatically release to [employer] before your provider has even reviewed them.  The clinic will call you with the results, send you a [employer] message, or have you schedule a follow-up clinic or phone time to discuss the results.  Contact our clinics if you do not hear from us in 2 weeks.    Who should I call with questions?  [facility]: [phone]  [facility]  [facility]: [phone]  For urgent needs outside of business hours call the [facility] at [phone] and ask for the dermatology resident on call

## 2024-02-29 NOTE — LETTER
2/29/2024       RE: Vimal Goldsmith  [address] [place] [address]     Dear Colleague,    Thank you for referring your patient, Vimal Goldsmith, to the [facility] DERMATOLOGY CLINIC [place] at [facility]. Please see a copy of my visit note below.    [facility] Dermatology Note  Encounter Date: Feb 29, 2024  Office Visit     Dermatology Problem List:  1. Dermatitis herpetiformis  - Dapsone 50 mg daily   - Gluten free diet  - Triamcinolone 0.1%    ____________________________________________    Assessment & Plan:     1. 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 The eruption is atypical for primary or secondary syphilis. However, given the recent positive test results, this is a diagnostic consideration.w ould also consider folliculitis or an Eosinophilic Infiltrative Process. Given this differential will perform a punch biopsy of two representative lesions today.  - administered intramuscular Penicillin-G Benzathine 2.4 million units injection  - punch bx x2 performed today; see procedure note below        Procedures Performed:   - Punch Biopsy x2: The risks and benefits of the procedure were described to the patient. These include but are not limited to bleeding, infection, scar, incomplete removal, and non-diagnostic biopsy. Written informed consent was obtained. The area was cleansed with an alcohol pad and injected with lidocaine with epinephrine (<1mL used). Once anesthesia was obtained, a 4 mm punch biopsy was performed. The tissue was placed in a labeled container with formalin and sent to pathology. The site was closed using 4-0 Prolene sutures. Vaseline and a bandage were applied to the wound. The patient tolerated the procedure well and was given post biopsy care instructions.        Follow-up: 6 month(s) in-person, or earlier for new or changing lesions    Staff and Scribe:   Scribe Disclosure:    By signing my name below, I, Dulce Ramirez, attest that this documentation has been prepared under the direction and in the presence of Steven Aragon MD.  - Electronically Signed: Dulce Ramirez 02/29/24       Provider Disclosure:   The documentation recorded by the scribe accurately reflects the services I personally performed and the decisions made by me.    Steven Aragon MD   of Dermatology  Department of Dermatology  [facility]      ____________________________________________    CC: Derm Problem (Phillip is here today for a breakout throughout the face and scalp. )    HPI:  Mr. Vimal Glodsmith is a(n) 59 year old male who presents today as a return patient for follow-up DH. Last seen by Dr. Huffman 07/23/2023.    Patient reports in the first week of February he started to experience an eruption of a rash on his scalp. Toward the middle of February he noticed red-eyes and fatigue, especially during screen time. He was recommended to use eye drops and visited the ophthalmologist per his PCP. He saw a Nurse Practitioner last Wednesday and was prescribed topicals as well as had a culture done. Hx of COVID in December 2023. He was informed he had a Syphilis infection in his eyes causing the redness and breakouts. He was started on an antibiotic on Monday of this week which has provided some improvement.    He has doubled his Dapsone dose to 50 mg.    Patient is otherwise feeling well, without additional skin concerns.     Labs Reviewed:  Aerobic Bacterial Culture 02/21/2024-Positive  Treponema ABS 02/27/2024-Reactive  Rapid Plasma Reagin 02/27/2024-Reactive  Rapid Plasma Reagin 02/27/2024-1:64^      Physical exam:  Vitals: There were no vitals taken for this visit.  GEN: This is a well developed, well-nourished male in no acute distress, in a pleasant mood.    SKIN: Bravo phototype III  - Waist-up skin, which includes the head/face, neck, both arms, chest,  back, abdomen, digits and/or nails was examined.  - numerous brightly erythematous firm papules on the face, scalp, neck, chest, arms including some that are eroded with overlying crust  - No other lesions of concern on areas examined.       Medications:  Current Outpatient Medications   Medication    amoxicillin (AMOXIL) 500 MG capsule    atropine 1 % ophthalmic solution    clindamycin (CLEOCIN T) 1 % external lotion    dapsone (ACZONE) 25 MG tablet    DESCOVY 200-25 MG per tablet    doxycycline hyclate (VIBRAMYCIN) 100 MG capsule    econazole nitrate 1 % external cream    FLUARIX QUADRIVALENT 0.5 ML injection    fluticasone (FLONASE) 50 MCG/ACT nasal spray    ipratropium (ATROVENT) 0.06 % nasal spray    ketoconazole (NIZORAL) 2 % external shampoo    prednisoLONE acetate (PRED FORTE) 1 % ophthalmic suspension    sildenafil (VIAGRA) 50 MG tablet    SPIKEVAX 50 MCG/0.5ML injection    triamcinolone (KENALOG) 0.1 % external ointment    zolpidem (AMBIEN) 5 MG tablet     No current facility-administered medications for this visit.      Past Medical History:   Patient Active Problem List   Diagnosis    Dermatitis herpetiformis    Celiac disease    Tinea pedis    Acne rosacea    BPH (benign prostatic hypertrophy)    Bacterial folliculitis    Chalazion    Therapeutic drug monitoring    Bilateral dry eyes    Adjustment insomnia    Internal bleeding hemorrhoids     Past Medical History:   Diagnosis Date    Benign positional vertigo     Celiac disease 10/23/2011    Chronic kidney disease 1975    diagnosed when I was [age]    Chronic sinusitis     Dermatitis herpetiformis     Gastro-oesophageal reflux disease 1987    had suffered any years ago before  celiac disease was diagno    Hoarseness     Migraines     Nasal polyps     Reduced vision

## 2024-02-29 NOTE — NURSING NOTE
Drug Administration Record    Prior to injection, verified patient identity using patient's name and date of birth.  Due to injection administration, patient instructed to remain in clinic for 15 minutes  afterwards, and to report any adverse reaction to me immediately.    Drug Name: Bicillin  Dose: 2mL  Route administered: IM  NDC #: 94567-953-72  Amount of waste(mL):0  Reason for waste: Single use vial    LOT #: [id number]  SITE: [facility]   : Pf[employer]  EXPIRATION DATE: 02/28/26    Drug Administration Record    Prior to injection, verified patient identity using patient's name and date of birth.  Due to injection administration, patient instructed to remain in clinic for 15 minutes  afterwards, and to report any adverse reaction to me immediately.    Drug Name: Bicillin  Dose: 2mL  Route administered: IM  NDC #: 43564-780-15  Amount of waste(mL):0  Reason for waste: Single use vial    LOT #: [id number]  SITE: [facility]   : Pfizer  EXPIRATION DATE: 02/28/26

## 2024-02-29 NOTE — PROGRESS NOTES
ASSESSMENT/PLAN:   There are no Patient Instructions on file for this visit.      ICD-10-CM    1. Acute sinusitis with symptoms > 10 days  J01.90 amoxicillin (AMOXIL) 500 MG capsule      2. Yeast dermatitis  B37.2 econazole nitrate 1 % external cream      3. Acute conjunctivitis of both eyes, unspecified acute conjunctivitis type  H10.33       4. Rash  R21                 SUBJECTIVE:   Vimal Goldsmith is a 59 year old male who presents to clinic today for the following health issues:  Sinus congestion--has been on and off since christmas.  Congestion, and some cough. Had covid and was treated with paxlovid.  Congestion and cough continued along with new rash and some eye symptoms.  Sputum has been mostly clear until recently--now more yellow/green.  No fever. No shortness of breath.  Likely post-viral sinusitis--will treat.  If not getting better will consider ENT or post-covid clinic  2.   Anal itching/rash--notices some itching and slight rash in the anal area.  Looks like some possible yeast.  Will try some antifungal cream and do more workup if not effective  3. Eye redness--for several weeks.  Has an appointment with optho tomorrow.  4. Rash has some scattered lesion especially on face which is different from his normal dermatitis herpetiformis in distribution and shape.  Has seen derm.  Will treat sinus right now and see if that has any impact on rash.  If no, then will recommend returning to derm.      Problem list and histories reviewed & adjusted, as indicated.  Additional history: as documented    Patient Active Problem List   Diagnosis    Dermatitis herpetiformis    Celiac disease    Tinea pedis    Acne rosacea    BPH (benign prostatic hypertrophy)    Bacterial folliculitis    Chalazion    Therapeutic drug monitoring    Bilateral dry eyes    Adjustment insomnia    Internal bleeding hemorrhoids     Past Surgical History:   Procedure Laterality Date    CATARACT IOL, RT/LT      COLONOSCOPY N/A 7/14/2022     Procedure: COLONOSCOPY, WITH POLYPECTOMY AND BIOPSY;  Surgeon: Cooper Juan MD;  Location:  GI    ENHANCE LASER REFRACTIVE BILATERAL EXISTING PT IN PARAMETERS Left 01/04/2017    ESOPHAGOSCOPY, GASTROSCOPY, DUODENOSCOPY (EGD), COMBINED  2/11/2013    Procedure: COMBINED ESOPHAGOSCOPY, GASTROSCOPY, DUODENOSCOPY (EGD), BIOPSY SINGLE OR MULTIPLE;;  Surgeon: Luke Juan MD;  Location: [facility]    ESOPHAGOSCOPY, GASTROSCOPY, DUODENOSCOPY (EGD), COMBINED N/A 5/23/2023    Procedure: Esophagoscopy, gastroscopy, duodenoscopy (EGD), combined;  Surgeon: Palak Sandoval MD;  Location: [facility] OR    NASAL/SINUS POLYPECTOMY         Social History     Tobacco Use    Smoking status: Never    Smokeless tobacco: Never    Tobacco comments:     NON SMOKING ENVIRONMENT, 10/6/11, [facility].    Substance Use Topics    Alcohol use: No     Comment: a few sips to help sleep     Family History   Problem Relation Age of Onset    Lipids Mother     Kidney Disease Mother     Arthritis Mother     Hypertension Mother         passed away    Osteoporosis Mother     Gastrointestinal Disease Father         maybe celiac    Cancer Father         Leukemia    Heart Disease Father     Hypertension Father         passed away    Cerebrovascular Disease Father         passed    Stomach Problem Father     Other Cancer Father         leukemia    Gastrointestinal Disease Brother         maybe celiac    Kidney Disease Brother         Kidnet stone    Cancer - colorectal No family hx of     Prostate Cancer No family hx of     Melanoma No family hx of     Skin Cancer No family hx of          Current Outpatient Medications   Medication Sig Dispense Refill    amoxicillin (AMOXIL) 500 MG capsule Take 2 capsules (1,000 mg) by mouth 2 times daily 40 capsule 0    clindamycin (CLEOCIN T) 1 % external lotion Apply topically 2 times daily To face and sores on scalp 60 mL 11    dapsone (ACZONE) 25 MG tablet TAKE 1 TABLET BY MOUTH EVERY DAY TAKE 2 TABLET BY MOUTH  DAILY WHEN FLARING 180 tablet 3    DESCOVY 200-25 MG per tablet Take 1 tablet by mouth daily at 2 pm      doxycycline hyclate (VIBRAMYCIN) 100 MG capsule Take 2 capsules (200 mg) by mouth daily as needed (unprotected intercourse--take within 72 hours) 60 capsule 3    econazole nitrate 1 % external cream Apply topically 2 times daily --to anal area for two weeks 85 g 2    FLUARIX QUADRIVALENT 0.5 ML injection       fluticasone (FLONASE) 50 MCG/ACT nasal spray Spray 1 spray into both nostrils daily 18.2 mL 6    ipratropium (ATROVENT) 0.06 % nasal spray Spray 2 sprays into both nostrils 4 times daily as needed for rhinitis 15 mL 1    ketoconazole (NIZORAL) 2 % external shampoo Apply topically daily For rash on face and scalp until clear then stop 120 mL 11    sildenafil (VIAGRA) 50 MG tablet Take 0.5 tablets (25 mg) 30 min to 4 hours before intercourse daily as needed. Never use with nitroglycerin, terazosin or doxazosin. 12 tablet 6    SPIKEVAX 50 MCG/0.5ML injection       triamcinolone (KENALOG) 0.1 % external ointment Apply twice daily as needed for rash on the trunk or extremities 80 g 11    zolpidem (AMBIEN) 5 MG tablet TAKE ONE TABLET BY MOUTH NIGHTLY AS NEEDED FOR SLEEP 30 tablet 3    atropine 1 % ophthalmic solution Place 1 drop into both eyes daily 5 mL 0    prednisoLONE acetate (PRED FORTE) 1 % ophthalmic suspension Place 1 drop into both eyes every hour 10 mL 1     Allergies   Allergen Reactions    Gluten Meal Rash and GI Disturbance    Nkda [No Known Drug Allergy]        Reviewed and updated as needed this visit by clinical staff   Tobacco  Allergies  Meds            Reviewed and updated as needed this visit by Provider                   ROS:  Constitutional, HEENT, cardiovascular, pulmonary, gi and gu systems are negative, except as otherwise noted.    OBJECTIVE:     /75 (BP Location: Left arm, Patient Position: Sitting, Cuff Size: Adult Regular)   Pulse 88   Temp 98.4  F (36.9  C) (Oral)    "Resp 14    1.676 m (5' 6\")   Wt 73.1 kg (161 lb 3.2 oz)   SpO2 93%   BMI 26.02 kg/m    Body mass index is 26.02 kg/m .  GENERAL: alert and no distress  EYES: Eyes grossly normal to inspection except some injection of the schlera bilaterally  HENT: ear canals and TM's normal, nose and mouth without ulcers or lesions  RESP: lungs clear to auscultation - no rales, rhonchi or wheezes  CV: regular rate and rhythm, normal S1 S2, no S3 or S4, no murmur, click or rub, no peripheral edema  Anal area: some slight erythema with some scattered papules  Derm: Scattered small hyperpigmented areas especially on face.    Diagnostic Test Results:  No results found for any visits on 02/26/24.        Armando Banks MD  [facility]   "

## 2024-02-29 NOTE — NURSING NOTE
Dermatology Rooming Note    Vimal Goldsmith's goals for this visit include:   Chief Complaint   Patient presents with    Derm Problem     Phillip is here today for a breakout throughout the face and scalp.      Sunny JIMENEZ, CMA

## 2024-02-29 NOTE — NURSING NOTE
Lidocaine-epinephrine 1-1:795238 % injection   3mL once for one use, starting 2/29/2024 ending 2/29/2024,  2mL disp, R-0, injection  Injected by Dr. Aragon

## 2024-02-29 NOTE — PROGRESS NOTES
[facility] Dermatology Note  Encounter Date: Feb 29, 2024  Office Visit     Dermatology Problem List:  1. Dermatitis herpetiformis  - Dapsone 50 mg daily   - Gluten free diet  - Triamcinolone 0.1%    ____________________________________________    Assessment & Plan:     1. 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 The eruption is atypical for primary or secondary syphilis. However, given the recent positive test results, this is a diagnostic consideration.w ould also consider folliculitis or an Eosinophilic Infiltrative Process. Given this differential will perform a punch biopsy of two representative lesions today.  - administered intramuscular Penicillin-G Benzathine 2.4 million units injection  - punch bx x2 performed today; see procedure note below        Procedures Performed:   - Punch Biopsy x2: The risks and benefits of the procedure were described to the patient. These include but are not limited to bleeding, infection, scar, incomplete removal, and non-diagnostic biopsy. Written informed consent was obtained. The area was cleansed with an alcohol pad and injected with lidocaine with epinephrine (<1mL used). Once anesthesia was obtained, a 4 mm punch biopsy was performed. The tissue was placed in a labeled container with formalin and sent to pathology. The site was closed using 4-0 Prolene sutures. Vaseline and a bandage were applied to the wound. The patient tolerated the procedure well and was given post biopsy care instructions.        Follow-up: 6 month(s) in-person, or earlier for new or changing lesions    Staff and Scribe:   Scribe Disclosure:   By signing my name below, I, Dulce Ramirez, attest that this documentation has been prepared under the direction and in the presence of Steven Aragon MD.  - Electronically Signed: Dulce Ramirez 02/29/24       Provider Disclosure:   The documentation recorded by the scribe accurately reflects the services I  personally performed and the decisions made by me.    Steven Aragon MD   of Dermatology  Department of Dermatology  [facility] [employer]      ____________________________________________    CC: Derm Problem (Phillip is here today for a breakout throughout the face and scalp. )    HPI:  Mr. Vimal Goldsmith is a(n) 59 year old male who presents today as a return patient for follow-up DH. Last seen by Dr. Huffman 07/23/2023.    Patient reports in the first week of February he started to experience an eruption of a rash on his scalp. Toward the middle of February he noticed red-eyes and fatigue, especially during screen time. He was recommended to use eye drops and visited the ophthalmologist per his PCP. He saw a Nurse Practitioner last Wednesday and was prescribed topicals as well as had a culture done. Hx of COVID in December 2023. He was informed he had a Syphilis infection in his eyes causing the redness and breakouts. He was started on an antibiotic on Monday of this week which has provided some improvement.    He has doubled his Dapsone dose to 50 mg.    Patient is otherwise feeling well, without additional skin concerns.     Labs Reviewed:  Aerobic Bacterial Culture 02/21/2024-Positive  Treponema ABS 02/27/2024-Reactive  Rapid Plasma Reagin 02/27/2024-Reactive  Rapid Plasma Reagin 02/27/2024-1:64^      Physical exam:  Vitals: There were no vitals taken for this visit.  GEN: This is a well developed, well-nourished male in no acute distress, in a pleasant mood.    SKIN: Bravo phototype III  - Waist-up skin, which includes the head/face, neck, both arms, chest, back, abdomen, digits and/or nails was examined.  - numerous brightly erythematous firm papules on the face, scalp, neck, chest, arms including some that are eroded with overlying crust  - No other lesions of concern on areas examined.       Medications:  Current Outpatient Medications   Medication    amoxicillin  (AMOXIL) 500 MG capsule    atropine 1 % ophthalmic solution    clindamycin (CLEOCIN T) 1 % external lotion    dapsone (ACZONE) 25 MG tablet    DESCOVY 200-25 MG per tablet    doxycycline hyclate (VIBRAMYCIN) 100 MG capsule    econazole nitrate 1 % external cream    FLUARIX QUADRIVALENT 0.5 ML injection    fluticasone (FLONASE) 50 MCG/ACT nasal spray    ipratropium (ATROVENT) 0.06 % nasal spray    ketoconazole (NIZORAL) 2 % external shampoo    prednisoLONE acetate (PRED FORTE) 1 % ophthalmic suspension    sildenafil (VIAGRA) 50 MG tablet    SPIKEVAX 50 MCG/0.5ML injection    triamcinolone (KENALOG) 0.1 % external ointment    zolpidem (AMBIEN) 5 MG tablet     No current facility-administered medications for this visit.      Past Medical History:   Patient Active Problem List   Diagnosis    Dermatitis herpetiformis    Celiac disease    Tinea pedis    Acne rosacea    BPH (benign prostatic hypertrophy)    Bacterial folliculitis    Chalazion    Therapeutic drug monitoring    Bilateral dry eyes    Adjustment insomnia    Internal bleeding hemorrhoids     Past Medical History:   Diagnosis Date    Benign positional vertigo     Celiac disease 10/23/2011    Chronic kidney disease 1975    diagnosed when I was [age]    Chronic sinusitis     Dermatitis herpetiformis     Gastro-oesophageal reflux disease 1987    had suffered any years ago before  celiac disease was diagno    Hoarseness     Migraines     Nasal polyps     Reduced vision

## 2024-03-01 LAB — ACE SERPL-CCNC: 49 U/L

## 2024-03-01 NOTE — TELEPHONE ENCOUNTER
Called patient to discuss his questions following clinic appointment. Patient unable to answer the phone. Left voicemail and answered his posed questions. Direct callback number given. Patient is to follow up on 3/5/24. Will further review labs/imaging, recent derm visit, and plan at that time.       Adam Ya MD  Resident Physician, PGY-2  Department of Ophthalmology

## 2024-03-05 ENCOUNTER — OFFICE VISIT (OUTPATIENT)
Dept: OPHTHALMOLOGY | Facility: CLINIC | Age: 59
End: 2024-03-05
Attending: OPHTHALMOLOGY
Payer: COMMERCIAL

## 2024-03-05 ENCOUNTER — TELEPHONE (OUTPATIENT)
Dept: INFECTIOUS DISEASES | Facility: CLINIC | Age: 59
End: 2024-03-05
Payer: COMMERCIAL

## 2024-03-05 DIAGNOSIS — H20.00 ACUTE ANTERIOR UVEITIS OF BOTH EYES: Primary | ICD-10-CM

## 2024-03-05 DIAGNOSIS — A53.0 POSITIVE RPR TEST: ICD-10-CM

## 2024-03-05 PROCEDURE — 99213 OFFICE O/P EST LOW 20 MIN: CPT | Mod: GC | Performed by: OPHTHALMOLOGY

## 2024-03-05 PROCEDURE — 99213 OFFICE O/P EST LOW 20 MIN: CPT | Performed by: STUDENT IN AN ORGANIZED HEALTH CARE EDUCATION/TRAINING PROGRAM

## 2024-03-05 ASSESSMENT — CUP TO DISC RATIO
OD_RATIO: 0.2
OS_RATIO: 0.2

## 2024-03-05 ASSESSMENT — VISUAL ACUITY
METHOD: SNELLEN - LINEAR
OS_SC: 20/30
OS_SC+: -3
OD_SC: 20/40
OS_PH_SC: 20/25
OD_PH_SC: 20/25

## 2024-03-05 ASSESSMENT — SLIT LAMP EXAM - LIDS
COMMENTS: NO LID LESIONS
COMMENTS: NO LID LESIONS

## 2024-03-05 ASSESSMENT — TONOMETRY
IOP_METHOD: TONOPEN
OS_IOP_MMHG: 14
OD_IOP_MMHG: 17

## 2024-03-05 NOTE — PROGRESS NOTES
Continuity Clinic Note  Patient Name: Vimal Goldsmith  Patient : 1965  Today's Date: 2024      CC: Uveitis follow up      HPI:   Vimal Goldsmith  presents for 1 week follow up of acute bilateral anterior uveitis. He has been taking his prednisolone drops in both eyes 6x/day. He saw Dermatology who performed a biopsy of the lesion on his neck and gave an injection of antibiotic in the muscle.       Past Medical History:   Diagnosis Date    Benign positional vertigo     Celiac disease 10/23/2011    Chronic kidney disease 1975    diagnosed when I was [age]    Chronic sinusitis     Dermatitis herpetiformis     Gastro-oesophageal reflux disease     had suffered any years ago before  celiac disease was diagno    Hoarseness     Migraines     Nasal polyps     Reduced vision        Current Outpatient Medications   Medication    amoxicillin (AMOXIL) 500 MG capsule    atropine 1 % ophthalmic solution    clindamycin (CLEOCIN T) 1 % external lotion    dapsone (ACZONE) 25 MG tablet    DESCOVY 200-25 MG per tablet    doxycycline hyclate (VIBRAMYCIN) 100 MG capsule    econazole nitrate 1 % external cream    FLUARIX QUADRIVALENT 0.5 ML injection    fluticasone (FLONASE) 50 MCG/ACT nasal spray    ipratropium (ATROVENT) 0.06 % nasal spray    ketoconazole (NIZORAL) 2 % external shampoo    prednisoLONE acetate (PRED FORTE) 1 % ophthalmic suspension    sildenafil (VIAGRA) 50 MG tablet    SPIKEVAX 50 MCG/0.5ML injection    triamcinolone (KENALOG) 0.1 % external ointment    zolpidem (AMBIEN) 5 MG tablet     No current facility-administered medications for this visit.     Facility-Administered Medications Ordered in Other Visits   Medication    sodium chloride 0.9 % bag TABLE SOLN       Social history:  . [employer] Professor    Review of systems: Healing spot on neck, no genital sores, no sores on palms    Base Eye Exam       Visual Acuity (Snellen - Linear)         Right Left    Dist sc 20/40 20/30 -3    Dist  ph sc 20/25 20/25              Tonometry (Tonopen, 11:23 AM)         Right Left    Pressure 17 14              Pupils         Dark    Right 7    Left 7              Neuro/Psych       Oriented x3: Yes    Mood/Affect: Normal                  Slit Lamp and Fundus Exam       External Exam         Right Left    External lateral brow with raised erythematous plaque ~1cm in size with excoriations. Similar smaller lesions between the brows and on the face Small erythematous lesions between the brows and on the face              Slit Lamp Exam         Right Left    Lids/Lashes no lid lesions no lid lesions    Conjunctiva/Sclera Trace nasal and temporal injection, slightly prominent episcleral vessels Trace nasal and temporal injection, slightly prominent episcleral vessels    Cornea Clear, Trace PEEs, no KP LASIK flap, trace PEEs, no KP    Anterior Chamber Deep, no cells, no flare Deep, rare cells, no flare    Iris Dilated, no synechiae Dilated, no synechiae    Lens 1+ NS 1-2+ Nuclear sclerosis cataract, IT peripheral cortical change  Nearing axis    Vitreous Normal, no cell or haze Normal, no cell or haze              Fundus Exam         Right Left    Disc Normal, pink, flat, sharp margins tilted, pink, sharp margins    C/D Ratio 0.2 0.2    Macula Normal, flat, small hypopigmented spot superior macula Normal, flat, no lesions    Vessels Normal, no hemes or sheathing Normal, no hemes or sheathing    Periphery Normal, no lesions or whitening Normal, no lesions or whitening                      Assessment & Plan     Vimal Goldsmith is a 59 year old male with the following diagnoses:   1. Acute anterior uveitis of both eyes    2. Positive RPR test       -1 month of facial and scalp rash with erythematous lesions, no palm/sole involvement, along with bilateral eye redness and light sensitivity  - Pt presented  2/27 with acute anterior uveitis: right eye 3-5 wbc/hpf and left eye 5-10 wbc/hpf and trace flare. No vitreitis or posterior  inflammation  - lysozyme still in process  - HIV negative, quant gold negative, ACE wnl, CXR neative  - FTA-abs positive with positive RPR titer of 1:64  - pt saw dermatology 2/29 who treated with IM bicillin 2 ml and performed a punch biopsy of the skin lesions, results still pending  - discussed with ID faculty Dr. Yeh who recommends treating this as neurosyphilis, we discussed the options of inpatient vs outpatient treatment and outpatient treatment with IV penicillin can be arranged this week and is the preferred option, pt should expect a call from their team shortly     Plan:  - Decrease prednisolone to 4x/day, both eyes  - Stop atropine  - Return and RD precautions discussed   - ID consulted, appreciate recommendations  - OCT macula screen at next follow up for posterior placoid lesions    Patient disposition:   Return in about 1 week (around 3/12/2024) for acute VT. OCT mac     Seen and discussed with Dr. John Rae MD  Ophthalmology Resident, PGY-4  [facility]    Attending Physician Attestation:  Complete documentation of historical and exam elements from today's encounter can be found in the full encounter summary report (not reduplicated in this progress note). I reviewed the chief complaint(s) and history of present illness, and  confirmed and edited as necessary the review of systems, past medical/surgical history, family history, social history, and examination findings as documented by others and the treating Resident or Fellow Physician.    I examined the patient myself, discussed the findings, reviewed all ancillary testing data and modified these results and reports along with the assessment and plan with the Treating Resident or Fellow Physician. I agree with the note as detailed above.   Isiah Lopez M.D.  Uveitis and Medical Retina  March 5, 2024

## 2024-03-05 NOTE — NURSING NOTE
Chief Complaints and History of Present Illnesses   Patient presents with    Follow Up     Chief Complaint(s) and History of Present Illness(es)       Follow Up              Laterality: both eyes    Associated symptoms: photophobia.  Negative for flashes and floaters    Treatments tried: eye drops    Pain scale: 0/10              Comments    The patient notes that his symptoms of red eyes and discharge are improving.  The patient notes blurred vision and he wonders if that is due to the eyedrops.   The patient reports a light buzzing in his ears that he noticed yesterday.  He is using Prednisolone six or more times daily in both eyes.  He uses Atropine once daily in both eyes.  He notes a dull headache at times.  Bisi Rodriguez, COA, COA 11:17 AM 03/05/2024

## 2024-03-05 NOTE — TELEPHONE ENCOUNTER
Called patient to schedule urgent infectious disease appointment. Offered patient an appointment this Thursday at 8am, however patient unfortunately has an important meeting at that time he is unable to switch. Will keep patient on cancellation list.

## 2024-03-05 NOTE — LETTER
3/5/2024       RE: Vimal Goldsmith  [address] [place] [address]     Dear Colleague,    Thank you for referring your patient, Vimal Goldsmith, to the [facility] [place] at [facility]. Please see a copy of my visit note below.    Continuity Clinic Note  Patient Name: Vimal Goldsmith  Patient : 1965  Today's Date: 2024      CC: Uveitis follow up      HPI:   Vimal Goldsmith  presents for 1 week follow up of acute bilateral anterior uveitis. He has been taking his prednisolone drops in both eyes 6x/day. He saw Dermatology who performed a biopsy of the lesion on his neck and gave an injection of antibiotic in the muscle.       Past Medical History:   Diagnosis Date    Benign positional vertigo     Celiac disease 10/23/2011    Chronic kidney disease 1975    diagnosed when I was [age]    Chronic sinusitis     Dermatitis herpetiformis     Gastro-oesophageal reflux disease     had suffered any years ago before  celiac disease was diagno    Hoarseness     Migraines     Nasal polyps     Reduced vision        Current Outpatient Medications   Medication    amoxicillin (AMOXIL) 500 MG capsule    atropine 1 % ophthalmic solution    clindamycin (CLEOCIN T) 1 % external lotion    dapsone (ACZONE) 25 MG tablet    DESCOVY 200-25 MG per tablet    doxycycline hyclate (VIBRAMYCIN) 100 MG capsule    econazole nitrate 1 % external cream    FLUARIX QUADRIVALENT 0.5 ML injection    fluticasone (FLONASE) 50 MCG/ACT nasal spray    ipratropium (ATROVENT) 0.06 % nasal spray    ketoconazole (NIZORAL) 2 % external shampoo    prednisoLONE acetate (PRED FORTE) 1 % ophthalmic suspension    sildenafil (VIAGRA) 50 MG tablet    SPIKEVAX 50 MCG/0.5ML injection    triamcinolone (KENALOG) 0.1 % external ointment    zolpidem (AMBIEN) 5 MG tablet     No current facility-administered medications for this visit.     Facility-Administered Medications Ordered in Other Visits    Medication    sodium chloride 0.9 % bag TABLE SOLN       Social history:  . [employer] Professor    Review of systems: Healing spot on neck, no genital sores, no sores on palms    Base Eye Exam       Visual Acuity (Snellen - Linear)         Right Left    Dist sc 20/40 20/30 -3    Dist ph sc 20/25 20/25              Tonometry (Tonopen, 11:23 AM)         Right Left    Pressure 17 14              Pupils         Dark    Right 7    Left 7              Neuro/Psych       Oriented x3: Yes    Mood/Affect: Normal                  Slit Lamp and Fundus Exam       External Exam         Right Left    External lateral brow with raised erythematous plaque ~1cm in size with excoriations. Similar smaller lesions between the brows and on the face Small erythematous lesions between the brows and on the face              Slit Lamp Exam         Right Left    Lids/Lashes no lid lesions no lid lesions    Conjunctiva/Sclera Trace nasal and temporal injection, slightly prominent episcleral vessels Trace nasal and temporal injection, slightly prominent episcleral vessels    Cornea Clear, Trace PEEs, no KP LASIK flap, trace PEEs, no KP    Anterior Chamber Deep, no cells, no flare Deep, rare cells, no flare    Iris Dilated, no synechiae Dilated, no synechiae    Lens 1+ NS 1-2+ Nuclear sclerosis cataract, IT peripheral cortical change  Nearing axis    Vitreous Normal, no cell or haze Normal, no cell or haze              Fundus Exam         Right Left    Disc Normal, pink, flat, sharp margins tilted, pink, sharp margins    C/D Ratio 0.2 0.2    Macula Normal, flat, small hypopigmented spot superior macula Normal, flat, no lesions    Vessels Normal, no hemes or sheathing Normal, no hemes or sheathing    Periphery Normal, no lesions or whitening Normal, no lesions or whitening                      Assessment & Plan     Vimal Goldsmith is a 59 year old male with the following diagnoses:   1. Acute anterior uveitis of both eyes    2.  Positive RPR test       -1 month of facial and scalp rash with erythematous lesions, no palm/sole involvement, along with bilateral eye redness and light sensitivity  - Pt presented  2/27 with acute anterior uveitis: right eye 3-5 wbc/hpf and left eye 5-10 wbc/hpf and trace flare. No vitreitis or posterior inflammation  - lysozyme still in process  - HIV negative, quant gold negative, ACE wnl, CXR neative  - FTA-abs positive with positive RPR titer of 1:64  - pt saw dermatology 2/29 who treated with IM bicillin 2 ml and performed a punch biopsy of the skin lesions, results still pending  - discussed with ID faculty Dr. Yeh who recommends treating this as neurosyphilis, we discussed the options of inpatient vs outpatient treatment and outpatient treatment with IV penicillin can be arranged this week and is the preferred option, pt should expect a call from their team shortly     Plan:  - Decrease prednisolone to 4x/day, both eyes  - Stop atropine  - Return and RD precautions discussed   - ID consulted, appreciate recommendations  - OCT macula screen at next follow up for posterior placoid lesions    Patient disposition:   Return in about 1 week (around 3/12/2024) for acute VT. OCT mac     Seen and discussed with Dr. John Rae MD  Ophthalmology Resident, PGY-4  [facility]    Attending Physician Attestation:  Complete documentation of historical and exam elements from today's encounter can be found in the full encounter summary report (not reduplicated in this progress note). I reviewed the chief complaint(s) and history of present illness, and  confirmed and edited as necessary the review of systems, past medical/surgical history, family history, social history, and examination findings as documented by others and the treating Resident or Fellow Physician.    I examined the patient myself, discussed the findings, reviewed all ancillary testing data and modified these results and  reports along with the assessment and plan with the Treating Resident or Fellow Physician. I agree with the note as detailed above.   Isiah Lopez M.D.  Uveitis and Medical Retina  March 5, 2024    Again, thank you for allowing me to participate in the care of your patient.      Sincerely,    Isiah Lopez MD  [facility] Dept of Ophthalmology  Uveitis and Medical Retina

## 2024-03-05 NOTE — TELEPHONE ENCOUNTER
Called and offered patient an appointment tomorrow. Patient accepted and was provided with appointment details. No further questions at this time.

## 2024-03-06 ENCOUNTER — OFFICE VISIT (OUTPATIENT)
Dept: INFECTIOUS DISEASES | Facility: CLINIC | Age: 59
End: 2024-03-06
Attending: INTERNAL MEDICINE
Payer: COMMERCIAL

## 2024-03-06 ENCOUNTER — DOCUMENTATION ONLY (OUTPATIENT)
Dept: INFECTIOUS DISEASES | Facility: CLINIC | Age: 59
End: 2024-03-06

## 2024-03-06 ENCOUNTER — MYC MEDICAL ADVICE (OUTPATIENT)
Dept: INFECTIOUS DISEASES | Facility: CLINIC | Age: 59
End: 2024-03-06

## 2024-03-06 ENCOUNTER — HOME INFUSION (PRE-WILLOW HOME INFUSION) (OUTPATIENT)
Dept: PHARMACY | Facility: CLINIC | Age: 59
End: 2024-03-06

## 2024-03-06 ENCOUNTER — LAB (OUTPATIENT)
Dept: LAB | Facility: CLINIC | Age: 59
End: 2024-03-06
Payer: COMMERCIAL

## 2024-03-06 VITALS
DIASTOLIC BLOOD PRESSURE: 72 MMHG | SYSTOLIC BLOOD PRESSURE: 126 MMHG | BODY MASS INDEX: 25.63 KG/M2 | OXYGEN SATURATION: 93 % | WEIGHT: 158.8 LBS | HEART RATE: 91 BPM

## 2024-03-06 DIAGNOSIS — A53.9 SYPHILIS: ICD-10-CM

## 2024-03-06 DIAGNOSIS — A52.71 OCULAR SYPHILIS: Primary | ICD-10-CM

## 2024-03-06 DIAGNOSIS — A52.71 OCULAR SYPHILIS: ICD-10-CM

## 2024-03-06 DIAGNOSIS — H20.00 ACUTE ANTERIOR UVEITIS OF BOTH EYES: ICD-10-CM

## 2024-03-06 DIAGNOSIS — A53.9 SYPHILIS: Primary | ICD-10-CM

## 2024-03-06 LAB
ALBUMIN SERPL BCG-MCNC: 4 G/DL (ref 3.5–5.2)
ALP SERPL-CCNC: 272 U/L (ref 40–150)
ALT SERPL W P-5'-P-CCNC: 60 U/L (ref 0–70)
ANION GAP SERPL CALCULATED.3IONS-SCNC: 8 MMOL/L (ref 7–15)
AST SERPL W P-5'-P-CCNC: 26 U/L (ref 0–45)
BASOPHILS # BLD AUTO: 0.1 10E3/UL (ref 0–0.2)
BASOPHILS NFR BLD AUTO: 1 %
BILIRUB SERPL-MCNC: 0.4 MG/DL
BUN SERPL-MCNC: 13.8 MG/DL (ref 8–23)
CALCIUM SERPL-MCNC: 9.4 MG/DL (ref 8.6–10)
CHLORIDE SERPL-SCNC: 104 MMOL/L (ref 98–107)
CREAT SERPL-MCNC: 0.86 MG/DL (ref 0.67–1.17)
DEPRECATED HCO3 PLAS-SCNC: 25 MMOL/L (ref 22–29)
EGFRCR SERPLBLD CKD-EPI 2021: >90 ML/MIN/1.73M2
EOSINOPHIL # BLD AUTO: 0.1 10E3/UL (ref 0–0.7)
EOSINOPHIL NFR BLD AUTO: 1 %
ERYTHROCYTE [DISTWIDTH] IN BLOOD BY AUTOMATED COUNT: 14.9 % (ref 10–15)
GLUCOSE SERPL-MCNC: 118 MG/DL (ref 70–99)
HCT VFR BLD AUTO: 38.5 % (ref 40–53)
HGB BLD-MCNC: 12.8 G/DL (ref 13.3–17.7)
IMM GRANULOCYTES # BLD: 0 10E3/UL
IMM GRANULOCYTES NFR BLD: 0 %
LYMPHOCYTES # BLD AUTO: 1.3 10E3/UL (ref 0.8–5.3)
LYMPHOCYTES NFR BLD AUTO: 23 %
LYSOZYME SERPL-MCNC: 1.61 UG/ML
MCH RBC QN AUTO: 30.1 PG (ref 26.5–33)
MCHC RBC AUTO-ENTMCNC: 33.2 G/DL (ref 31.5–36.5)
MCV RBC AUTO: 91 FL (ref 78–100)
MONOCYTES # BLD AUTO: 0.4 10E3/UL (ref 0–1.3)
MONOCYTES NFR BLD AUTO: 7 %
NEUTROPHILS # BLD AUTO: 4.1 10E3/UL (ref 1.6–8.3)
NEUTROPHILS NFR BLD AUTO: 68 %
NRBC # BLD AUTO: 0 10E3/UL
NRBC BLD AUTO-RTO: 0 /100
PLATELET # BLD AUTO: 324 10E3/UL (ref 150–450)
POTASSIUM SERPL-SCNC: 4.3 MMOL/L (ref 3.4–5.3)
PROT SERPL-MCNC: 7.8 G/DL (ref 6.4–8.3)
RBC # BLD AUTO: 4.25 10E6/UL (ref 4.4–5.9)
SODIUM SERPL-SCNC: 137 MMOL/L (ref 135–145)
WBC # BLD AUTO: 6 10E3/UL (ref 4–11)

## 2024-03-06 PROCEDURE — 36415 COLL VENOUS BLD VENIPUNCTURE: CPT | Performed by: PATHOLOGY

## 2024-03-06 PROCEDURE — 99213 OFFICE O/P EST LOW 20 MIN: CPT | Mod: 25 | Performed by: INTERNAL MEDICINE

## 2024-03-06 PROCEDURE — 80053 COMPREHEN METABOLIC PANEL: CPT | Performed by: PATHOLOGY

## 2024-03-06 PROCEDURE — 85025 COMPLETE CBC W/AUTO DIFF WBC: CPT | Performed by: PATHOLOGY

## 2024-03-06 PROCEDURE — 96372 THER/PROPH/DIAG INJ SC/IM: CPT | Performed by: INTERNAL MEDICINE

## 2024-03-06 PROCEDURE — 99205 OFFICE O/P NEW HI 60 MIN: CPT | Performed by: INTERNAL MEDICINE

## 2024-03-06 PROCEDURE — 250N000011 HC RX IP 250 OP 636: Performed by: INTERNAL MEDICINE

## 2024-03-06 RX ORDER — HEPARIN SODIUM (PORCINE) LOCK FLUSH IV SOLN 100 UNIT/ML 100 UNIT/ML
5 SOLUTION INTRAVENOUS
OUTPATIENT
Start: 2024-03-06

## 2024-03-06 RX ORDER — DIPHENHYDRAMINE HYDROCHLORIDE 50 MG/ML
50 INJECTION INTRAMUSCULAR; INTRAVENOUS
Start: 2024-03-06

## 2024-03-06 RX ORDER — HEPARIN SODIUM,PORCINE 10 UNIT/ML
5-20 VIAL (ML) INTRAVENOUS DAILY PRN
OUTPATIENT
Start: 2024-03-06

## 2024-03-06 RX ORDER — ALBUTEROL SULFATE 90 UG/1
1-2 AEROSOL, METERED RESPIRATORY (INHALATION)
Start: 2024-03-06

## 2024-03-06 RX ORDER — EPINEPHRINE 1 MG/ML
0.3 INJECTION, SOLUTION, CONCENTRATE INTRAVENOUS EVERY 5 MIN PRN
OUTPATIENT
Start: 2024-03-06

## 2024-03-06 RX ORDER — METHYLPREDNISOLONE SODIUM SUCCINATE 125 MG/2ML
125 INJECTION, POWDER, LYOPHILIZED, FOR SOLUTION INTRAMUSCULAR; INTRAVENOUS
Start: 2024-03-06

## 2024-03-06 RX ORDER — MEPERIDINE HYDROCHLORIDE 25 MG/ML
25 INJECTION INTRAMUSCULAR; INTRAVENOUS; SUBCUTANEOUS EVERY 30 MIN PRN
OUTPATIENT
Start: 2024-03-06

## 2024-03-06 RX ORDER — ALBUTEROL SULFATE 0.83 MG/ML
2.5 SOLUTION RESPIRATORY (INHALATION)
OUTPATIENT
Start: 2024-03-06

## 2024-03-06 RX ADMIN — PENICILLIN G BENZATHINE 2.4 MILLION UNITS: 1200000 INJECTION, SUSPENSION INTRAMUSCULAR at 10:29

## 2024-03-06 NOTE — LETTER
3/6/2024       RE: Vimal Goldsmith  [address] [place] [address]     Dear Colleague,    Thank you for referring your patient, Vimal Goldsmith, to the [facility] [place] at [facility]. Please see a copy of my visit note below.      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   Ongoing high risk sexual activity       RECOMMENDATION:  Start treatment with IV penicillin G 24 million units q 24 hours by continuous infusion x 14 days. will need to make arrangements for a PICC line to be placed and a home health and home infusion referral.   Bicillin-LA 2.4 million units x 1 today.  Referral placed for counseling about high risk sexual activity, at the patients request.   4.  Plan RTC in 2 weeks.   Phillip was seen today for recheck.    Diagnoses and all orders for this visit:    Ocular syphilis  -     penicillin G benzathine (BICILLIN L-A) injection 2.4 Million Units  -     Cancel: IR Referral; Future  -     Comprehensive metabolic panel; Future  -     CBC with platelets and differential; Future  -     Adult Mental Health  Referral; Future  -     Cancel: IR PICC Vascular; Future  -     IR PICC Placement > 5 Yrs of Age; Future    Syphilis  -     penicillin G benzathine (BICILLIN L-A) injection 2.4 Million Units  -     Adult Infectious Disease  Referral  -     Cancel: IR Referral; Future  -     Cancel: IR PICC Vascular; Future  -     IR PICC Placement > 5 Yrs of Age; Future  -     Enroll patient in outpatient parenteral antimicrobial therapy    Acute anterior uveitis of both eyes  -     Adult Infectious Disease   Referral  -     Cancel: IR Referral; Future  -     Cancel: IR PICC Vascular; Future  -     IR PICC Placement > 5 Yrs of Age; Future    Other orders  -     Adult Infectious Disease Clinic Follow-Up Order; Future  -     sodium chloride 0.9% BOLUS 250 mL  -     sodium chloride (PF) 0.9% PF flush 3-20 mL  -     heparin lock flush 10 UNIT/ML injection 5-20 mL  -     heparin 100 unit/mL injection 5 mL  -     alteplase (CATHFLO ACTIVASE) injection 2 mg  -     diphenhydrAMINE (BENADRYL) injection 50 mg  -     famotidine (PEPCID) injection 20 mg  -     methylPREDNISolone sodium succinate (solu-MEDROL) injection 125 mg  -     EPINEPHrine PF (ADRENALIN) injection 0.3 mg  -     sodium chloride 0.9% BOLUS 500 mL  -     albuterol (PROVENTIL HFA/VENTOLIN HFA) inhaler  -     albuterol (PROVENTIL) neb solution 2.5 mg  -     meperidine (DEMEROL) injection 25 mg  -     penicillin G potassium 24 Million Units in sodium chloride 0.9 % 100 mL intermittent infusion        Latasha Gil MD        ________________________________________________________________    Consult Question: Please treat ocular syphilis. Patient referred by eye clinic MD.            History of Present Illness:   Vimal Goldsmith is a 59 year old male who presents with new diagnosis of ocular syphilis.  He is referred for infectious disease consultation for ocular syphilis treatment recommendations.  This diagnosis was made in the ophthalmology clinic after he presented there and with trouble with his eyes and was diagnosed with acute uveitis.  Blood test sent by the ophthalmologist revealed that he had a high RPR titer of 1:64.  He has been screened previously for syphilis and always tested positive.  He began having symptoms of a skin breakout in January.  He then developed a sore throat with sinusitis symptoms in early February patient breakout was spots on his scalp and face that were dark brown pigmentation that his eye symptoms began.  He states  that he went on a trip to [place] in November and had unprotected sex there.  On September 15 though his RPR was nonreactive.  Then he had a another skin to skin sexual encounter with casein in the time.  Late December to mid January it was after that that the rash and sore throat sinusitis and uveitis symptoms developed.  He has already recently been treated with a course of amoxicillin for his sore throat and sinusitis.  Additionally he was given 1 dose of IM penicillin by the dermatologist he saw 1 week ago seen biopsies were obtained and sent for diagnosis at that time as well those results are pending.    Past medical history is notable for past medical history is notable for celiac disease.  He is on dapsone.  He has also been on Descovy for HIV preexposure prophylaxis he has been tested for HIV several times and I was tested negative.    Social history: The patient is a  [employer].  He is  and has had unprotected sex with his  during the past several months.  After Phillip's diagnosis of syphilis his  was tested and tested negative.      Recent culture results include:  Culture Micro   Date Value Ref Range Status   12/07/2007 No growth  Final   05/07/2007 No growth  Final   05/07/2007 No growth  Final   01/20/2005   Final    No Salmonella, Shigella, Campylobacter or E coli 0:157 isolated.              Review of Systems:   CONSTITUTIONAL:  No fevers or chills  EYES: + for uveitis symptoms.  ENT:  negative for hearing loss, tinnitus and sore throat  RESPIRATORY:  negative for cough with sputum and dyspnea  CARDIOVASCULAR:  negative for chest pain, dyspnea  GASTROINTESTINAL:  negative for nausea, vomiting, diarrhea and constipation  GENITOURINARY:  negative for dysuria  HEME:  No easy bruising  INTEGUMENT:  + for skin rash  NEURO:  Negative for headache           Past Medical History:     Past Medical History:   Diagnosis Date    Benign positional  vertigo     Celiac disease 10/23/2011    Chronic kidney disease 1975    diagnosed when I was [age]    Chronic sinusitis     Dermatitis herpetiformis     Gastro-oesophageal reflux disease 1987    had suffered any years ago before  celiac disease was diagno    Hoarseness     Migraines     Nasal polyps     Reduced vision             Past Surgical History:     Past Surgical History:   Procedure Laterality Date    CATARACT IOL, RT/LT      COLONOSCOPY N/A 7/14/2022    Procedure: COLONOSCOPY, WITH POLYPECTOMY AND BIOPSY;  Surgeon: Cooper Juan MD;  Location:  GI    ENHANCE LASER REFRACTIVE BILATERAL EXISTING PT IN PARAMETERS Left 01/04/2017    ESOPHAGOSCOPY, GASTROSCOPY, DUODENOSCOPY (EGD), COMBINED  2/11/2013    Procedure: COMBINED ESOPHAGOSCOPY, GASTROSCOPY, DUODENOSCOPY (EGD), BIOPSY SINGLE OR MULTIPLE;;  Surgeon: Luke Juan MD;  Location:  GI    ESOPHAGOSCOPY, GASTROSCOPY, DUODENOSCOPY (EGD), COMBINED N/A 5/23/2023    Procedure: Esophagoscopy, gastroscopy, duodenoscopy (EGD), combined;  Surgeon: Palak Sandoval MD;  Location: [facility] OR    INSERT PICC LINE Left 3/7/2024    Procedure: Insert picc line;  Surgeon: Tricia Albarran PA-C;  Location: [facility] OR    IR PICC PLACEMENT > 5 YRS OF AGE  3/7/2024    NASAL/SINUS POLYPECTOMY              Family History:     Family History   Problem Relation Age of Onset    Lipids Mother     Kidney Disease Mother     Arthritis Mother     Hypertension Mother         passed away    Osteoporosis Mother     Gastrointestinal Disease Father         maybe celiac    Cancer Father         Leukemia    Heart Disease Father     Hypertension Father         passed away    Cerebrovascular Disease Father         passed    Stomach Problem Father     Other Cancer Father         leukemia    Gastrointestinal Disease Brother         maybe celiac    Kidney Disease Brother         Kidnet stone    Cancer - colorectal No family hx of     Prostate Cancer No family hx of     Melanoma No  family hx of     Skin Cancer No family hx of             Social History:     Social History     Tobacco Use    Smoking status: Never    Smokeless tobacco: Never    Tobacco comments:     NON SMOKING ENVIRONMENT, 10/6/11, [facility].    Substance Use Topics    Alcohol use: No     Comment: a few sips to help sleep     History   Sexual Activity    Sexual activity: Yes    Partners: Male    Birth control/ protection: Pull-out method, Condom, None     Comment: unprotected sex with my partner (), sometimes others            Current Medications (antimicrobials listed in bold):     Current Outpatient Medications:     amoxicillin (AMOXIL) 500 MG capsule, Take 2 capsules (1,000 mg) by mouth 2 times daily, Disp: 40 capsule, Rfl: 0    atropine 1 % ophthalmic solution, Place 1 drop into both eyes daily, Disp: 5 mL, Rfl: 0    clindamycin (CLEOCIN T) 1 % external lotion, Apply topically 2 times daily To face and sores on scalp, Disp: 60 mL, Rfl: 11    dapsone (ACZONE) 25 MG tablet, TAKE 1 TABLET BY MOUTH EVERY DAY TAKE 2 TABLET BY MOUTH DAILY WHEN FLARING, Disp: 180 tablet, Rfl: 3    DESCOVY 200-25 MG per tablet, Take 1 tablet by mouth daily at 2 pm, Disp: , Rfl:     doxycycline hyclate (VIBRAMYCIN) 100 MG capsule, Take 2 capsules (200 mg) by mouth daily as needed (unprotected intercourse--take within 72 hours), Disp: 60 capsule, Rfl: 3    econazole nitrate 1 % external cream, Apply topically 2 times daily --to anal area for two weeks, Disp: 85 g, Rfl: 2    FLUARIX QUADRIVALENT 0.5 ML injection, , Disp: , Rfl:     fluticasone (FLONASE) 50 MCG/ACT nasal spray, Spray 1 spray into both nostrils daily, Disp: 18.2 mL, Rfl: 6    ipratropium (ATROVENT) 0.06 % nasal spray, Spray 2 sprays into both nostrils 4 times daily as needed for rhinitis, Disp: 15 mL, Rfl: 1    ketoconazole (NIZORAL) 2 % external shampoo, Apply topically daily For rash on face and scalp until clear then stop, Disp: 120 mL, Rfl: 11    prednisoLONE acetate (PRED FORTE) 1  % ophthalmic suspension, Place 1 drop into both eyes every hour, Disp: 10 mL, Rfl: 1    sildenafil (VIAGRA) 50 MG tablet, Take 0.5 tablets (25 mg) 30 min to 4 hours before intercourse daily as needed. Never use with nitroglycerin, terazosin or doxazosin., Disp: 12 tablet, Rfl: 6    SPIKEVAX 50 MCG/0.5ML injection, , Disp: , Rfl:     triamcinolone (KENALOG) 0.1 % external ointment, Apply twice daily as needed for rash on the trunk or extremities, Disp: 80 g, Rfl: 11    zolpidem (AMBIEN) 5 MG tablet, TAKE ONE TABLET BY MOUTH NIGHTLY AS NEEDED FOR SLEEP, Disp: 30 tablet, Rfl: 3           Allergies:     Allergies   Allergen Reactions    Gluten Meal Rash and GI Disturbance    Nkda [No Known Drug Allergy]             Physical Exam:   Vitals were reviewed  /72   Pulse 91   Wt 72 kg (158 lb 12.8 oz)   SpO2 93%   BMI 25.63 kg/m      Ranges for his vital signs:       Physical Examination:  GENERAL:  well-developed, well-nourished, in bed in no acute distress.   HEENT:  Head is normocephalic, atraumatic   EYES:  Eyes have anicteric sclerae without conjunctival injection or stigmata of endocarditis.    ENT:  Oropharynx is moist without exudates or ulcers. Tongue is midline. No oral lesions noted.   NECK:  Supple. No  Cervical lymphadenopathy  LUNGS:  Clear to auscultation bilateral.   CARDIOVASCULAR:  Regular rate and rhythm with no murmurs, gallops or rubs.  ABDOMEN:  Normal bowel sounds, soft, nontender. No appreciable hepatosplenomegaly  SKIN:  He has a few small scattered brown pigmented patches on his scalp. He has a few larger round dime sized brown pigmented lesions on his face. No genital lesions. On red/brown skin lesion in the perianal area.   NEUROLOGIC:  Grossly nonfocal. Active x4 extremities  EXTREMITIES: No edema.          Laboratory Data:     Inflammatory Markers  No lab results found.    Hematology Studies    Recent Labs   Lab Test 03/06/24  1041 12/16/23  0842 07/26/23  1425 07/10/23  1027  01/05/23  0854 11/19/21  0857 01/07/20  0923 11/19/18  1011 11/29/17  1719 11/28/16  1058 05/31/16  1538   WBC 6.0 4.4 5.4 6.1 4.1 4.9   < > 6.5 6.5 4.4 6.2   ANEU  --   --   --   --   --   --   --  3.9 4.1 2.5 3.7   AEOS  --   --   --   --   --   --   --  0.1 0.1 0.1 0.1   HGB 12.8* 14.9 14.3 14.9 14.9 15.5   < > 14.9 14.5 15.8 15.5   MCV 91 90 89 92 92 90   < > 91 89 89 89    195 247 206 187 221   < > 239 224 210 207    < > = values in this interval not displayed.       Metabolic Studies     Recent Labs   Lab Test 03/06/24  1041 12/16/23  0842 07/26/23  1425 01/05/23  0854 11/19/21  0857    137 139 142 137   POTASSIUM 4.3 4.2 3.7 4.2 4.1   CHLORIDE 104 104 104 108* 107   CO2 25 24 23 22 24   BUN 13.8 13.6 14.3 18.9 17   CR 0.86 0.90 0.93 0.90 0.93   GFRESTIMATED >90 >90 >90 >90 >90       Hepatic Studies    Recent Labs   Lab Test 03/06/24  1041 12/16/23  0842 07/26/23  1425 01/05/23  0854 11/19/21  0857 01/07/20  0923   BILITOTAL 0.4 1.0 0.4 1.3* 0.6 0.7   ALKPHOS 272* 54 63 47 62 54   ALBUMIN 4.0 4.3 4.3 4.2 3.7 3.8   AST 26 19 18 30 18 16   ALT 60 18 20 25 35 32       Microbiology:  Culture Micro   Date Value Ref Range Status   12/07/2007 No growth  Final   05/07/2007 No growth  Final   05/07/2007 No growth  Final   01/20/2005   Final    No Salmonella, Shigella, Campylobacter or E coli 0:157 isolated.       Urine Studies  No lab results found.    Virology:  Hepatitis B Testing No lab results found.  Hepatitis C Testing     Hepatitis C Antibody   Date Value Ref Range Status   11/28/2016  NR Final    Nonreactive   Assay performance characteristics have not been established for newborns,   infants, and children     12/02/2015  NR Final    Nonreactive   Assay performance characteristics have not been established for newborns,   infants, and children       HIV Testing   HIV Antigen Antibody Combo   Date Value Ref Range Status   02/27/2024 Nonreactive Nonreactive Final     Comment:     Negative HIV-1/-2  antigen and antibody screening test results usually indicate the absence of HIV-1 and HIV-2 infection. However, such negative results do not rule-out acute HIV infection.  If acute HIV-1 or HIV-2 infection is suspected, detection of HIV-1 or HIV-2 RNA  is recommended.    01/07/2020 Nonreactive NR^Nonreactive     Final     Comment:     HIV-1 p24 Ag & HIV-1/HIV-2 Ab Not Detected     HIV 1&2 Antibody   Date Value Ref Range Status   12/06/2013 Negative NEG Final          Latasha Gil MD

## 2024-03-07 ENCOUNTER — ANCILLARY PROCEDURE (OUTPATIENT)
Dept: RADIOLOGY | Facility: AMBULATORY SURGERY CENTER | Age: 59
End: 2024-03-07
Attending: INTERNAL MEDICINE
Payer: COMMERCIAL

## 2024-03-07 ENCOUNTER — HOSPITAL ENCOUNTER (OUTPATIENT)
Facility: AMBULATORY SURGERY CENTER | Age: 59
Discharge: HOME | End: 2024-03-07
Attending: PHYSICIAN ASSISTANT | Admitting: PHYSICIAN ASSISTANT
Payer: COMMERCIAL

## 2024-03-07 VITALS
DIASTOLIC BLOOD PRESSURE: 80 MMHG | BODY MASS INDEX: 25.71 KG/M2 | WEIGHT: 160 LBS | HEIGHT: 66 IN | TEMPERATURE: 97.7 F | HEART RATE: 65 BPM | OXYGEN SATURATION: 95 % | SYSTOLIC BLOOD PRESSURE: 122 MMHG | RESPIRATION RATE: 16 BRPM

## 2024-03-07 DIAGNOSIS — A52.71 OCULAR SYPHILIS: ICD-10-CM

## 2024-03-07 DIAGNOSIS — A53.9 SYPHILIS: ICD-10-CM

## 2024-03-07 DIAGNOSIS — H20.00 ACUTE ANTERIOR UVEITIS OF BOTH EYES: ICD-10-CM

## 2024-03-07 PROCEDURE — 36573 INSJ PICC RS&I 5 YR+: CPT | Mod: LT | Performed by: PHYSICIAN ASSISTANT

## 2024-03-07 DEVICE — CATH VA POWER PICC 5FR 70CM SL 3175155: Type: IMPLANTABLE DEVICE | Site: ARM | Status: FUNCTIONAL

## 2024-03-07 RX ORDER — HEPARIN SODIUM (PORCINE) LOCK FLUSH IV SOLN 100 UNIT/ML 100 UNIT/ML
SOLUTION INTRAVENOUS DAILY PRN
Status: DISCONTINUED | OUTPATIENT
Start: 2024-03-07 | End: 2024-03-07 | Stop reason: HOSPADM

## 2024-03-07 RX ORDER — HEPARIN SODIUM,PORCINE 10 UNIT/ML
5-20 VIAL (ML) INTRAVENOUS
Status: CANCELLED | OUTPATIENT
Start: 2024-03-07

## 2024-03-07 RX ORDER — HEPARIN SODIUM,PORCINE 10 UNIT/ML
5-20 VIAL (ML) INTRAVENOUS EVERY 24 HOURS
Status: CANCELLED | OUTPATIENT
Start: 2024-03-07

## 2024-03-07 RX ORDER — LIDOCAINE HYDROCHLORIDE 10 MG/ML
INJECTION, SOLUTION EPIDURAL; INFILTRATION; INTRACAUDAL; PERINEURAL DAILY PRN
Status: DISCONTINUED | OUTPATIENT
Start: 2024-03-07 | End: 2024-03-07 | Stop reason: HOSPADM

## 2024-03-07 NOTE — BRIEF OP NOTE
[facility]    Brief Operative Note    Pre-operative diagnosis: Ocular syphilis [A52.71]  Post-operative diagnosis Same as pre-operative diagnosis    Procedure: Insert picc line, Left - Arm Upper    Proceduralist:  Tricia Albraran PA-C   Anesthesia: Local   Estimated Blood Loss: Minimal    Drains: None  Specimens: * No specimens in log *  Findings:  Completed ultrasound and fluoro guided placement of 5 FR single lumen 50 cm PICC via left basilic vein with tip in RA.  External length is 1 cm.  Stayfix, biopatch and tagaderm placed over site.  Flushes and aspirates well, heplocked and ready to be used immediately.  No complications.      Complications: None.  Implants:   Implant Name Type Inv. Item Serial No.  Lot No. LRB No. Used Action   CATH VA POWER PICC 5FR 70CM SL [id number] - [id number] Catheter CATH VA POWER PICC 5FR 70CM SL [id number]  DEVONTE ALEXX [id number] Left 1 Used as a Supply

## 2024-03-07 NOTE — DISCHARGE INSTRUCTIONS
A collaboration between [facility] and [facility]  Experts in minimally invasive, targeted treatments performed using imaging guidance    Venous Access Device, Line Placement    Today you had a procedure today to install a venous access device.    After you go home:  Drink plenty of fluids.  Generally 6-8 (8 ounce) glasses a day is recommended.  Resume your regular diet unless otherwise ordered by a medical provider.  Keep any applied tape/gauze dressings clean and dry.  Change tape/gauze dressings if they get wet or soiled.  You may shower the following day after procedure, however cover and protect from moisture any tape/gauze dressings.    Do not perform strenuous activities or lift greater than 10 pounds for the next three days.  If there is bleeding or oozing from the procedure site, apply firm pressure to the area for 5-10 minutes.  If the bleeding continues seek medical advice at the numbers below.  Mild procedure site discomfort can be treated with an ice pack and over-the-counter pain relievers.        Call our Interventional Radiology (IR) service if:  If you start bleeding from the procedure site.  If you do start to bleed from the site, lie down and hold some pressure on the site.  Our radiology provider can help you decide if you need to return to the hospital.  If you have new or worsening pain related to the procedure.  If you have concerning swelling at the procedure site.  If you develop persistent nausea or vomiting.  If you develop hives or a rash or any unexplained itching.  If you have a fever of greater than 100.5  F and chills in the first 5 days after procedure.  Any other concerns related to your procedure.      [facility]  Interventional Radiology (IR)  [address]  [address] [facility] Waiting Room  [place] [id number]    Contact Number:  [phone]  (IR Nurse Triage)  Monday - Friday 7am - 4pm    After  hours for urgent concerns:  [phone]  After 4pm Monday - Friday, Weekends and Holidays.   Ask for Interventional Radiology on-call.  Someone is available 24 hours a day.  [facility] toll free number:  [phone]

## 2024-03-08 NOTE — PROGRESS NOTES
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   2. Ongoing high risk sexual activity       RECOMMENDATION:  1. Start treatment with IV penicillin G 24 million units q 24 hours by continuous infusion x 14 days. will need to make arrangements for a PICC line to be placed and a home health and home infusion referral.   2. Bicillin-LA 2.4 million units x 1 today.  3. Referral placed for counseling about high risk sexual activity, at the patients request.   4.  Plan RTC in 2 weeks.   Phillip was seen today for recheck.    Diagnoses and all orders for this visit:    Ocular syphilis  -     penicillin G benzathine (BICILLIN L-A) injection 2.4 Million Units  -     Cancel: IR Referral; Future  -     Comprehensive metabolic panel; Future  -     CBC with platelets and differential; Future  -     Adult Mental Health  Referral; Future  -     Cancel: IR PICC Vascular; Future  -     IR PICC Placement > 5 Yrs of Age; Future    Syphilis  -     penicillin G benzathine (BICILLIN L-A) injection 2.4 Million Units  -     Adult Infectious Disease  Referral  -     Cancel: IR Referral; Future  -     Cancel: IR PICC Vascular; Future  -     IR PICC Placement > 5 Yrs of Age; Future  -     Enroll patient in outpatient parenteral antimicrobial therapy    Acute anterior uveitis of both eyes  -     Adult Infectious Disease  Referral  -     Cancel: IR Referral; Future  -     Cancel: IR PICC Vascular; Future  -     IR PICC Placement > 5 Yrs of Age; Future    Other orders  -     Adult Infectious Disease Clinic Follow-Up Order; Future  -     sodium chloride 0.9% BOLUS 250 mL  -     sodium chloride (PF) 0.9% PF flush 3-20  mL  -     heparin lock flush 10 UNIT/ML injection 5-20 mL  -     heparin 100 unit/mL injection 5 mL  -     alteplase (CATHFLO ACTIVASE) injection 2 mg  -     diphenhydrAMINE (BENADRYL) injection 50 mg  -     famotidine (PEPCID) injection 20 mg  -     methylPREDNISolone sodium succinate (solu-MEDROL) injection 125 mg  -     EPINEPHrine PF (ADRENALIN) injection 0.3 mg  -     sodium chloride 0.9% BOLUS 500 mL  -     albuterol (PROVENTIL HFA/VENTOLIN HFA) inhaler  -     albuterol (PROVENTIL) neb solution 2.5 mg  -     meperidine (DEMEROL) injection 25 mg  -     penicillin G potassium 24 Million Units in sodium chloride 0.9 % 100 mL intermittent infusion        Latasha Gil MD        ________________________________________________________________    Consult Question: Please treat ocular syphilis. Patient referred by eye clinic MD.            History of Present Illness:   Vimal Goldsmith is a 59 year old male who presents with new diagnosis of ocular syphilis.  He is referred for infectious disease consultation for ocular syphilis treatment recommendations.  This diagnosis was made in the ophthalmology clinic after he presented there and with trouble with his eyes and was diagnosed with acute uveitis.  Blood test sent by the ophthalmologist revealed that he had a high RPR titer of 1:64.  He has been screened previously for syphilis and always tested positive.  He began having symptoms of a skin breakout in January.  He then developed a sore throat with sinusitis symptoms in early February patient breakout was spots on his scalp and face that were dark brown pigmentation that his eye symptoms began.  He states that he went on a trip to [place] in November and had unprotected sex there.  On September 15 though his RPR was nonreactive.  Then he had a another skin to skin sexual encounter with casein in the time.  Late December to mid January it was after that that the rash and sore throat sinusitis and uveitis  symptoms developed.  He has already recently been treated with a course of amoxicillin for his sore throat and sinusitis.  Additionally he was given 1 dose of IM penicillin by the dermatologist he saw 1 week ago seen biopsies were obtained and sent for diagnosis at that time as well those results are pending.    Past medical history is notable for past medical history is notable for celiac disease.  He is on dapsone.  He has also been on Descovy for HIV preexposure prophylaxis he has been tested for HIV several times and I was tested negative.    Social history: The patient is a  [facility].  He is  and has had unprotected sex with his  during the past several months.  After Phillip's diagnosis of syphilis his  was tested and tested negative.      Recent culture results include:  Culture Micro   Date Value Ref Range Status   12/07/2007 No growth  Final   05/07/2007 No growth  Final   05/07/2007 No growth  Final   01/20/2005   Final    No Salmonella, Shigella, Campylobacter or E coli 0:157 isolated.              Review of Systems:   CONSTITUTIONAL:  No fevers or chills  EYES: + for uveitis symptoms.  ENT:  negative for hearing loss, tinnitus and sore throat  RESPIRATORY:  negative for cough with sputum and dyspnea  CARDIOVASCULAR:  negative for chest pain, dyspnea  GASTROINTESTINAL:  negative for nausea, vomiting, diarrhea and constipation  GENITOURINARY:  negative for dysuria  HEME:  No easy bruising  INTEGUMENT:  + for skin rash  NEURO:  Negative for headache           Past Medical History:     Past Medical History:   Diagnosis Date     Benign positional vertigo      Celiac disease 10/23/2011     Chronic kidney disease 1975    diagnosed when I was [age]     Chronic sinusitis      Dermatitis herpetiformis      Gastro-oesophageal reflux disease 1987    had suffered any years ago before  celiac disease was diagno     Hoarseness      Migraines      Nasal polyps       Reduced vision             Past Surgical History:     Past Surgical History:   Procedure Laterality Date     CATARACT IOL, RT/LT       COLONOSCOPY N/A 7/14/2022    Procedure: COLONOSCOPY, WITH POLYPECTOMY AND BIOPSY;  Surgeon: Cooper Juan MD;  Location:  GI     ENHANCE LASER REFRACTIVE BILATERAL EXISTING PT IN PARAMETERS Left 01/04/2017     ESOPHAGOSCOPY, GASTROSCOPY, DUODENOSCOPY (EGD), COMBINED  2/11/2013    Procedure: COMBINED ESOPHAGOSCOPY, GASTROSCOPY, DUODENOSCOPY (EGD), BIOPSY SINGLE OR MULTIPLE;;  Surgeon: Luke Juan MD;  Location:  GI     ESOPHAGOSCOPY, GASTROSCOPY, DUODENOSCOPY (EGD), COMBINED N/A 5/23/2023    Procedure: Esophagoscopy, gastroscopy, duodenoscopy (EGD), combined;  Surgeon: Palak Sandoval MD;  Location: [facility] OR     INSERT PICC LINE Left 3/7/2024    Procedure: Insert picc line;  Surgeon: Tricia Albarran PA-C;  Location: [facility] OR     IR PICC PLACEMENT > 5 YRS OF AGE  3/7/2024     NASAL/SINUS POLYPECTOMY              Family History:     Family History   Problem Relation Age of Onset     Lipids Mother      Kidney Disease Mother      Arthritis Mother      Hypertension Mother         passed away     Osteoporosis Mother      Gastrointestinal Disease Father         maybe celiac     Cancer Father         Leukemia     Heart Disease Father      Hypertension Father         passed away     Cerebrovascular Disease Father         passed     Stomach Problem Father      Other Cancer Father         leukemia     Gastrointestinal Disease Brother         maybe celiac     Kidney Disease Brother         Kidnet stone     Cancer - colorectal No family hx of      Prostate Cancer No family hx of      Melanoma No family hx of      Skin Cancer No family hx of             Social History:     Social History     Tobacco Use     Smoking status: Never     Smokeless tobacco: Never     Tobacco comments:     NON SMOKING ENVIRONMENT, 10/6/11, [facility].    Substance Use Topics     Alcohol use: No      Comment: a few sips to help sleep     History   Sexual Activity     Sexual activity: Yes     Partners: Male     Birth control/ protection: Pull-out method, Condom, None     Comment: unprotected sex with my partner (), sometimes others            Current Medications (antimicrobials listed in bold):     Current Outpatient Medications:      amoxicillin (AMOXIL) 500 MG capsule, Take 2 capsules (1,000 mg) by mouth 2 times daily, Disp: 40 capsule, Rfl: 0     atropine 1 % ophthalmic solution, Place 1 drop into both eyes daily, Disp: 5 mL, Rfl: 0     clindamycin (CLEOCIN T) 1 % external lotion, Apply topically 2 times daily To face and sores on scalp, Disp: 60 mL, Rfl: 11     dapsone (ACZONE) 25 MG tablet, TAKE 1 TABLET BY MOUTH EVERY DAY TAKE 2 TABLET BY MOUTH DAILY WHEN FLARING, Disp: 180 tablet, Rfl: 3     DESCOVY 200-25 MG per tablet, Take 1 tablet by mouth daily at 2 pm, Disp: , Rfl:      doxycycline hyclate (VIBRAMYCIN) 100 MG capsule, Take 2 capsules (200 mg) by mouth daily as needed (unprotected intercourse--take within 72 hours), Disp: 60 capsule, Rfl: 3     econazole nitrate 1 % external cream, Apply topically 2 times daily --to anal area for two weeks, Disp: 85 g, Rfl: 2     FLUARIX QUADRIVALENT 0.5 ML injection, , Disp: , Rfl:      fluticasone (FLONASE) 50 MCG/ACT nasal spray, Spray 1 spray into both nostrils daily, Disp: 18.2 mL, Rfl: 6     ipratropium (ATROVENT) 0.06 % nasal spray, Spray 2 sprays into both nostrils 4 times daily as needed for rhinitis, Disp: 15 mL, Rfl: 1     ketoconazole (NIZORAL) 2 % external shampoo, Apply topically daily For rash on face and scalp until clear then stop, Disp: 120 mL, Rfl: 11     prednisoLONE acetate (PRED FORTE) 1 % ophthalmic suspension, Place 1 drop into both eyes every hour, Disp: 10 mL, Rfl: 1     sildenafil (VIAGRA) 50 MG tablet, Take 0.5 tablets (25 mg) 30 min to 4 hours before intercourse daily as needed. Never use with nitroglycerin, terazosin or doxazosin.,  Disp: 12 tablet, Rfl: 6     SPIKEVAX 50 MCG/0.5ML injection, , Disp: , Rfl:      triamcinolone (KENALOG) 0.1 % external ointment, Apply twice daily as needed for rash on the trunk or extremities, Disp: 80 g, Rfl: 11     zolpidem (AMBIEN) 5 MG tablet, TAKE ONE TABLET BY MOUTH NIGHTLY AS NEEDED FOR SLEEP, Disp: 30 tablet, Rfl: 3           Allergies:     Allergies   Allergen Reactions     Gluten Meal Rash and GI Disturbance     Nkda [No Known Drug Allergy]             Physical Exam:   Vitals were reviewed  /72   Pulse 91   Wt 72 kg (158 lb 12.8 oz)   SpO2 93%   BMI 25.63 kg/m      Ranges for his vital signs:       Physical Examination:  GENERAL:  well-developed, well-nourished, in bed in no acute distress.   HEENT:  Head is normocephalic, atraumatic   EYES:  Eyes have anicteric sclerae without conjunctival injection or stigmata of endocarditis.    ENT:  Oropharynx is moist without exudates or ulcers. Tongue is midline. No oral lesions noted.   NECK:  Supple. No  Cervical lymphadenopathy  LUNGS:  Clear to auscultation bilateral.   CARDIOVASCULAR:  Regular rate and rhythm with no murmurs, gallops or rubs.  ABDOMEN:  Normal bowel sounds, soft, nontender. No appreciable hepatosplenomegaly  SKIN:  He has a few small scattered brown pigmented patches on his scalp. He has a few larger round dime sized brown pigmented lesions on his face. No genital lesions. On red/brown skin lesion in the perianal area.   NEUROLOGIC:  Grossly nonfocal. Active x4 extremities  EXTREMITIES: No edema.          Laboratory Data:     Inflammatory Markers  No lab results found.    Hematology Studies    Recent Labs   Lab Test 03/06/24  1041 12/16/23  0842 07/26/23  1425 07/10/23  1027 01/05/23  0854 11/19/21  0857 01/07/20  0923 11/19/18  1011 11/29/17  1719 11/28/16  1058 05/31/16  1538   WBC 6.0 4.4 5.4 6.1 4.1 4.9   < > 6.5 6.5 4.4 6.2   ANEU  --   --   --   --   --   --   --  3.9 4.1 2.5 3.7   AEOS  --   --   --   --   --   --   --  0.1  0.1 0.1 0.1   HGB 12.8* 14.9 14.3 14.9 14.9 15.5   < > 14.9 14.5 15.8 15.5   MCV 91 90 89 92 92 90   < > 91 89 89 89    195 247 206 187 221   < > 239 224 210 207    < > = values in this interval not displayed.       Metabolic Studies     Recent Labs   Lab Test 03/06/24  1041 12/16/23  0842 07/26/23  1425 01/05/23  0854 11/19/21  0857    137 139 142 137   POTASSIUM 4.3 4.2 3.7 4.2 4.1   CHLORIDE 104 104 104 108* 107   CO2 25 24 23 22 24   BUN 13.8 13.6 14.3 18.9 17   CR 0.86 0.90 0.93 0.90 0.93   GFRESTIMATED >90 >90 >90 >90 >90       Hepatic Studies    Recent Labs   Lab Test 03/06/24  1041 12/16/23  0842 07/26/23  1425 01/05/23  0854 11/19/21  0857 01/07/20  0923   BILITOTAL 0.4 1.0 0.4 1.3* 0.6 0.7   ALKPHOS 272* 54 63 47 62 54   ALBUMIN 4.0 4.3 4.3 4.2 3.7 3.8   AST 26 19 18 30 18 16   ALT 60 18 20 25 35 32       Microbiology:  Culture Micro   Date Value Ref Range Status   12/07/2007 No growth  Final   05/07/2007 No growth  Final   05/07/2007 No growth  Final   01/20/2005   Final    No Salmonella, Shigella, Campylobacter or E coli 0:157 isolated.       Urine Studies  No lab results found.    Virology:  Hepatitis B Testing No lab results found.  Hepatitis C Testing     Hepatitis C Antibody   Date Value Ref Range Status   11/28/2016  NR Final    Nonreactive   Assay performance characteristics have not been established for newborns,   infants, and children     12/02/2015  NR Final    Nonreactive   Assay performance characteristics have not been established for newborns,   infants, and children       HIV Testing   HIV Antigen Antibody Combo   Date Value Ref Range Status   02/27/2024 Nonreactive Nonreactive Final     Comment:     Negative HIV-1/-2 antigen and antibody screening test results usually indicate the absence of HIV-1 and HIV-2 infection. However, such negative results do not rule-out acute HIV infection.  If acute HIV-1 or HIV-2 infection is suspected, detection of HIV-1 or HIV-2 RNA  is  recommended.    01/07/2020 Nonreactive NR^Nonreactive     Final     Comment:     HIV-1 p24 Ag & HIV-1/HIV-2 Ab Not Detected     HIV 1&2 Antibody   Date Value Ref Range Status   12/06/2013 Negative NEG Final

## 2024-03-12 ENCOUNTER — LAB REQUISITION (OUTPATIENT)
Dept: LAB | Facility: CLINIC | Age: 59
End: 2024-03-12
Payer: COMMERCIAL

## 2024-03-12 DIAGNOSIS — A52.71 LATE SYPHILITIC OCULOPATHY: ICD-10-CM

## 2024-03-12 LAB
ALBUMIN SERPL BCG-MCNC: 4 G/DL (ref 3.5–5.2)
ALP SERPL-CCNC: 173 U/L (ref 40–150)
ALT SERPL W P-5'-P-CCNC: 31 U/L (ref 0–70)
ANION GAP SERPL CALCULATED.3IONS-SCNC: 11 MMOL/L (ref 7–15)
AST SERPL W P-5'-P-CCNC: 21 U/L (ref 0–45)
BASOPHILS # BLD AUTO: 0.1 10E3/UL (ref 0–0.2)
BASOPHILS NFR BLD AUTO: 1 %
BILIRUB SERPL-MCNC: 0.4 MG/DL
BUN SERPL-MCNC: 18.1 MG/DL (ref 8–23)
CALCIUM SERPL-MCNC: 9.4 MG/DL (ref 8.6–10)
CHLORIDE SERPL-SCNC: 103 MMOL/L (ref 98–107)
CREAT SERPL-MCNC: 0.78 MG/DL (ref 0.67–1.17)
DEPRECATED HCO3 PLAS-SCNC: 24 MMOL/L (ref 22–29)
EGFRCR SERPLBLD CKD-EPI 2021: >90 ML/MIN/1.73M2
EOSINOPHIL # BLD AUTO: 0.2 10E3/UL (ref 0–0.7)
EOSINOPHIL NFR BLD AUTO: 2 %
ERYTHROCYTE [DISTWIDTH] IN BLOOD BY AUTOMATED COUNT: 14.6 % (ref 10–15)
GLUCOSE SERPL-MCNC: 98 MG/DL (ref 70–99)
HCT VFR BLD AUTO: 39 % (ref 40–53)
HGB BLD-MCNC: 12.9 G/DL (ref 13.3–17.7)
IMM GRANULOCYTES # BLD: 0 10E3/UL
IMM GRANULOCYTES NFR BLD: 0 %
LYMPHOCYTES # BLD AUTO: 1.7 10E3/UL (ref 0.8–5.3)
LYMPHOCYTES NFR BLD AUTO: 24 %
MCH RBC QN AUTO: 30.4 PG (ref 26.5–33)
MCHC RBC AUTO-ENTMCNC: 33.1 G/DL (ref 31.5–36.5)
MCV RBC AUTO: 92 FL (ref 78–100)
MONOCYTES # BLD AUTO: 0.4 10E3/UL (ref 0–1.3)
MONOCYTES NFR BLD AUTO: 5 %
NEUTROPHILS # BLD AUTO: 4.9 10E3/UL (ref 1.6–8.3)
NEUTROPHILS NFR BLD AUTO: 68 %
NRBC # BLD AUTO: 0 10E3/UL
NRBC BLD AUTO-RTO: 0 /100
PLATELET # BLD AUTO: 263 10E3/UL (ref 150–450)
POTASSIUM SERPL-SCNC: 4.1 MMOL/L (ref 3.4–5.3)
PROT SERPL-MCNC: 7.7 G/DL (ref 6.4–8.3)
RBC # BLD AUTO: 4.25 10E6/UL (ref 4.4–5.9)
SODIUM SERPL-SCNC: 138 MMOL/L (ref 135–145)
WBC # BLD AUTO: 7.2 10E3/UL (ref 4–11)

## 2024-03-12 PROCEDURE — 85004 AUTOMATED DIFF WBC COUNT: CPT | Performed by: INTERNAL MEDICINE

## 2024-03-12 PROCEDURE — 80053 COMPREHEN METABOLIC PANEL: CPT | Performed by: INTERNAL MEDICINE

## 2024-03-14 ENCOUNTER — OFFICE VISIT (OUTPATIENT)
Dept: OPHTHALMOLOGY | Facility: CLINIC | Age: 59
End: 2024-03-14
Attending: OPHTHALMOLOGY
Payer: COMMERCIAL

## 2024-03-14 ENCOUNTER — ALLIED HEALTH/NURSE VISIT (OUTPATIENT)
Dept: DERMATOLOGY | Facility: CLINIC | Age: 59
End: 2024-03-14
Payer: COMMERCIAL

## 2024-03-14 ENCOUNTER — TELEPHONE (OUTPATIENT)
Dept: DERMATOLOGY | Facility: CLINIC | Age: 59
End: 2024-03-14

## 2024-03-14 DIAGNOSIS — A53.9 SYPHILIS: ICD-10-CM

## 2024-03-14 DIAGNOSIS — H20.00 ACUTE ANTERIOR UVEITIS OF BOTH EYES: Primary | ICD-10-CM

## 2024-03-14 DIAGNOSIS — Z48.02 VISIT FOR SUTURE REMOVAL: Primary | ICD-10-CM

## 2024-03-14 PROCEDURE — 99213 OFFICE O/P EST LOW 20 MIN: CPT

## 2024-03-14 PROCEDURE — 92134 CPTRZ OPH DX IMG PST SGM RTA: CPT

## 2024-03-14 PROCEDURE — 99214 OFFICE O/P EST MOD 30 MIN: CPT | Mod: GC

## 2024-03-14 PROCEDURE — 99207 PR NO CHARGE LOS: CPT

## 2024-03-14 PROCEDURE — 92134 CPTRZ OPH DX IMG PST SGM RTA: CPT | Mod: 26

## 2024-03-14 RX ORDER — PREDNISOLONE ACETATE 10 MG/ML
1 SUSPENSION/ DROPS OPHTHALMIC
Qty: 10 ML | Refills: 1 | Status: SHIPPED | OUTPATIENT
Start: 2024-03-14 | End: 2024-03-14

## 2024-03-14 RX ORDER — PREDNISOLONE ACETATE 10 MG/ML
SUSPENSION/ DROPS OPHTHALMIC
Qty: 10 ML | Refills: 1 | Status: SHIPPED | OUTPATIENT
Start: 2024-03-14

## 2024-03-14 ASSESSMENT — CUP TO DISC RATIO
OS_RATIO: 0.2
OD_RATIO: 0.2

## 2024-03-14 ASSESSMENT — SLIT LAMP EXAM - LIDS
COMMENTS: NO LID LESIONS
COMMENTS: NO LID LESIONS

## 2024-03-14 ASSESSMENT — TONOMETRY
OD_IOP_MMHG: 10
IOP_METHOD: ICARE
OS_IOP_MMHG: 9

## 2024-03-14 ASSESSMENT — VISUAL ACUITY
METHOD: SNELLEN - LINEAR
OS_SC: 20/40
OS_PH_SC+: -2
OD_SC+: -1
OD_PH_SC+: -1
OD_PH_SC: 20/25
OD_SC: 20/40
OS_SC+: -1
OS_PH_SC: 20/25

## 2024-03-14 NOTE — PROGRESS NOTES
Vimal Goldsmith comes into clinic today at the request of Dr Aragon Ordering Provider for Suture Removal. 4 total interrupted sutures were removed. 2 sutures were removed from behind R ear and 2 sutures were removed from R arm. Sites were clean, without drainage, without smell, and not painful.       This service provided today was under the supervising provider of the day Dr. Garza, who was available if needed.    Removed 4 sutures, from behind ear and arm without complication.  Patient tolerated procedure well, and understood all followup instructions.    Stephanie Mendoza, EMT

## 2024-03-14 NOTE — PROGRESS NOTES
Continuity Clinic Note  Patient Name: Vimal Goldsmith  Patient : 1965  Today's Date: 2024      CC: Uveitis follow up      HPI:   Vimal Goldsmith  presents for 1 week follow up of acute bilateral anterior uveitis. He has been taking his prednisolone drops in both eyes 6x/day. He saw Dermatology who performed a biopsy of the lesion on his neck and gave an injection of antibiotic in the muscle.     Interval 24: Cutaneous manifestations have largely subsided, patient states that his vision is still blurry from the atropine but overall feels like condition has improved. Is now feeling a constant humming in the ears. Currently taking pred four times a day both eyes and penicillin constant infusion.     Social history:  . [employer] Professor    Review of systems: Healing spot on neck, no genital sores, no sores on palms    Assessment & Plan     Vimal Goldsmith is a 59 year old male with the following diagnoses:   1. Acute anterior uveitis of both eyes    2. Syphilis       - Initially presented with 1 month of facial and scalp rash with erythematous lesions, no palm/sole involvement, along with bilateral eye redness and light sensitivity  - Pt presented   with acute anterior uveitis: right eye 3-5 wbc/hpf and left eye 5-10 wbc/hpf and trace flare. No vitreitis or posterior inflammation  - HIV negative, quant gold negative, ACE wnl, CXR neative  - FTA-abs positive with positive RPR titer of 1:64  - pt saw dermatology  who treated with IM bicillin 2 ml and performed a punch biopsy of the skin lesions, results still pending  - Currently undergoing treatment with IV penicillin on constant infusion  - Rapid improvement with definitive antibiotic treatment and topical drops     Plan:  - Start to taper prednisolone drops: use 3 times per day for one week, 2 times per day for one week, 1 time per day for one week  - Follow up in one month  - Return and RD precautions discussed   - ID  consulted, appreciate recommendations  - OCT macula prior to home today; if unable today, can do so at follow up    Patient disposition:   Return in about 1 month (around 4/14/2024) for VT only, Refraction.     Patient seen and discussed with Dr. Tirso Lundberg MD  PGY-2, Ophthalmology  [facility]  03/14/24      Attending Physician Attestation:  Complete documentation of historical and exam elements from today's encounter can be found in the full encounter summary report (not reduplicated in this progress note).  I personally obtained the chief complaint(s) and history of present illness.  I confirmed and edited as necessary the review of systems, past medical/surgical history, family history, social history, and examination findings as documented by others; and I examined the patient myself.  I personally reviewed the relevant tests, images, and reports as documented above.  I formulated and edited as necessary the assessment and plan and discussed the findings and management plan with the patient and family. . -Attending Physician Image/Tesing Attestation: I personally reviewed the ophthalmic test(s) associated with this encounter, agree with the interpretation(s) as documented by the resident/fellow, and have edited the corresponding report(s) as necessary.   Missael Chahal MD

## 2024-03-14 NOTE — PATIENT INSTRUCTIONS
Start to taper prednisolone drops: use 3 times per day for one week, 2 times per day for one week, 1 time per day for one week

## 2024-03-17 LAB
PATH REPORT.COMMENTS IMP SPEC: NORMAL
PATH REPORT.FINAL DX SPEC: NORMAL
PATH REPORT.GROSS SPEC: NORMAL
PATH REPORT.MICROSCOPIC SPEC OTHER STN: NORMAL
PATH REPORT.RELEVANT HX SPEC: NORMAL

## 2024-03-19 ENCOUNTER — LAB REQUISITION (OUTPATIENT)
Dept: LAB | Facility: CLINIC | Age: 59
End: 2024-03-19
Payer: COMMERCIAL

## 2024-03-19 ENCOUNTER — TELEPHONE (OUTPATIENT)
Dept: DERMATOLOGY | Facility: CLINIC | Age: 59
End: 2024-03-19

## 2024-03-19 DIAGNOSIS — A52.71 LATE SYPHILITIC OCULOPATHY: ICD-10-CM

## 2024-03-19 LAB
ALBUMIN SERPL BCG-MCNC: 4.3 G/DL (ref 3.5–5.2)
ALP SERPL-CCNC: 139 U/L (ref 40–150)
ALT SERPL W P-5'-P-CCNC: 26 U/L (ref 0–70)
ANION GAP SERPL CALCULATED.3IONS-SCNC: 11 MMOL/L (ref 7–15)
AST SERPL W P-5'-P-CCNC: 21 U/L (ref 0–45)
BASOPHILS # BLD AUTO: 0.1 10E3/UL (ref 0–0.2)
BASOPHILS NFR BLD AUTO: 1 %
BILIRUB SERPL-MCNC: 0.3 MG/DL
BUN SERPL-MCNC: 15.5 MG/DL (ref 8–23)
CALCIUM SERPL-MCNC: 9.6 MG/DL (ref 8.6–10)
CHLORIDE SERPL-SCNC: 103 MMOL/L (ref 98–107)
CREAT SERPL-MCNC: 0.74 MG/DL (ref 0.67–1.17)
DEPRECATED HCO3 PLAS-SCNC: 25 MMOL/L (ref 22–29)
EGFRCR SERPLBLD CKD-EPI 2021: >90 ML/MIN/1.73M2
EOSINOPHIL # BLD AUTO: 0.2 10E3/UL (ref 0–0.7)
EOSINOPHIL NFR BLD AUTO: 4 %
ERYTHROCYTE [DISTWIDTH] IN BLOOD BY AUTOMATED COUNT: 14.2 % (ref 10–15)
GLUCOSE SERPL-MCNC: 105 MG/DL (ref 70–99)
HCT VFR BLD AUTO: 38.4 % (ref 40–53)
HGB BLD-MCNC: 13 G/DL (ref 13.3–17.7)
IMM GRANULOCYTES # BLD: 0 10E3/UL
IMM GRANULOCYTES NFR BLD: 0 %
LYMPHOCYTES # BLD AUTO: 1.6 10E3/UL (ref 0.8–5.3)
LYMPHOCYTES NFR BLD AUTO: 30 %
MCH RBC QN AUTO: 30.7 PG (ref 26.5–33)
MCHC RBC AUTO-ENTMCNC: 33.9 G/DL (ref 31.5–36.5)
MCV RBC AUTO: 91 FL (ref 78–100)
MONOCYTES # BLD AUTO: 0.4 10E3/UL (ref 0–1.3)
MONOCYTES NFR BLD AUTO: 7 %
NEUTROPHILS # BLD AUTO: 2.9 10E3/UL (ref 1.6–8.3)
NEUTROPHILS NFR BLD AUTO: 58 %
NRBC # BLD AUTO: 0 10E3/UL
NRBC BLD AUTO-RTO: 0 /100
PLATELET # BLD AUTO: 223 10E3/UL (ref 150–450)
POTASSIUM SERPL-SCNC: 4.1 MMOL/L (ref 3.4–5.3)
PROT SERPL-MCNC: 7.8 G/DL (ref 6.4–8.3)
RBC # BLD AUTO: 4.23 10E6/UL (ref 4.4–5.9)
SODIUM SERPL-SCNC: 139 MMOL/L (ref 135–145)
WBC # BLD AUTO: 5.1 10E3/UL (ref 4–11)

## 2024-03-19 PROCEDURE — 85025 COMPLETE CBC W/AUTO DIFF WBC: CPT | Performed by: INTERNAL MEDICINE

## 2024-03-19 PROCEDURE — 82040 ASSAY OF SERUM ALBUMIN: CPT | Performed by: INTERNAL MEDICINE

## 2024-03-19 NOTE — TELEPHONE ENCOUNTER
Dermatological Path Order and Indications: Patient Communication     Edit Comments   Add Notifications     Mr. Goldsmith,  The biopsies came back a little different from one another. The spot on the scalp came back suggestive of (but not definitive for) partially treated syphilis. The spot on the arm came back as a nonspecific irritant reaction, possibly due to a medication. How is the rash doing now?  Thank you,  Steven Aragon MD, FAAD   of Dermatology  Department of Dermatology  [facility]   Written by Steven Aragon MD on 3/18/2024  2:01 PM CDT

## 2024-03-19 NOTE — TELEPHONE ENCOUNTER
Patient states the lesions on the scalp resolved after the injections. The lesions on his face are still there but with a lot of improvement.    Sunny JIMENEZ CMA

## 2024-03-20 ENCOUNTER — OFFICE VISIT (OUTPATIENT)
Dept: INFECTIOUS DISEASES | Facility: CLINIC | Age: 59
End: 2024-03-20
Attending: INTERNAL MEDICINE
Payer: COMMERCIAL

## 2024-03-20 ENCOUNTER — LAB (OUTPATIENT)
Dept: LAB | Facility: CLINIC | Age: 59
End: 2024-03-20
Payer: COMMERCIAL

## 2024-03-20 VITALS
HEIGHT: 66 IN | WEIGHT: 162.2 LBS | BODY MASS INDEX: 26.07 KG/M2 | OXYGEN SATURATION: 94 % | HEART RATE: 81 BPM | RESPIRATION RATE: 16 BRPM | DIASTOLIC BLOOD PRESSURE: 78 MMHG | SYSTOLIC BLOOD PRESSURE: 123 MMHG | TEMPERATURE: 97.6 F

## 2024-03-20 DIAGNOSIS — A52.71 OCULAR SYPHILIS: Primary | ICD-10-CM

## 2024-03-20 DIAGNOSIS — A53.9 SYPHILIS: ICD-10-CM

## 2024-03-20 DIAGNOSIS — A52.71 OCULAR SYPHILIS: ICD-10-CM

## 2024-03-20 LAB
ANION GAP SERPL CALCULATED.3IONS-SCNC: 10 MMOL/L (ref 7–15)
BUN SERPL-MCNC: 14.7 MG/DL (ref 8–23)
CALCIUM SERPL-MCNC: 9.3 MG/DL (ref 8.6–10)
CHLORIDE SERPL-SCNC: 103 MMOL/L (ref 98–107)
CREAT SERPL-MCNC: 0.78 MG/DL (ref 0.67–1.17)
DEPRECATED HCO3 PLAS-SCNC: 25 MMOL/L (ref 22–29)
EGFRCR SERPLBLD CKD-EPI 2021: >90 ML/MIN/1.73M2
ERYTHROCYTE [DISTWIDTH] IN BLOOD BY AUTOMATED COUNT: 14.2 % (ref 10–15)
GLUCOSE SERPL-MCNC: 113 MG/DL (ref 70–99)
HCT VFR BLD AUTO: 39.2 % (ref 40–53)
HGB BLD-MCNC: 13.4 G/DL (ref 13.3–17.7)
MCH RBC QN AUTO: 30.6 PG (ref 26.5–33)
MCHC RBC AUTO-ENTMCNC: 34.2 G/DL (ref 31.5–36.5)
MCV RBC AUTO: 90 FL (ref 78–100)
PLATELET # BLD AUTO: 215 10E3/UL (ref 150–450)
POTASSIUM SERPL-SCNC: 4.2 MMOL/L (ref 3.4–5.3)
RBC # BLD AUTO: 4.38 10E6/UL (ref 4.4–5.9)
SODIUM SERPL-SCNC: 138 MMOL/L (ref 135–145)
WBC # BLD AUTO: 5.9 10E3/UL (ref 4–11)

## 2024-03-20 PROCEDURE — 86592 SYPHILIS TEST NON-TREP QUAL: CPT | Performed by: INTERNAL MEDICINE

## 2024-03-20 PROCEDURE — 85027 COMPLETE CBC AUTOMATED: CPT | Performed by: PATHOLOGY

## 2024-03-20 PROCEDURE — 86780 TREPONEMA PALLIDUM: CPT | Performed by: INTERNAL MEDICINE

## 2024-03-20 PROCEDURE — 36415 COLL VENOUS BLD VENIPUNCTURE: CPT | Performed by: PATHOLOGY

## 2024-03-20 PROCEDURE — 99000 SPECIMEN HANDLING OFFICE-LAB: CPT | Performed by: PATHOLOGY

## 2024-03-20 PROCEDURE — 99213 OFFICE O/P EST LOW 20 MIN: CPT | Performed by: INTERNAL MEDICINE

## 2024-03-20 PROCEDURE — 86593 SYPHILIS TEST NON-TREP QUANT: CPT | Performed by: INTERNAL MEDICINE

## 2024-03-20 PROCEDURE — 99214 OFFICE O/P EST MOD 30 MIN: CPT | Performed by: INTERNAL MEDICINE

## 2024-03-20 PROCEDURE — 80048 BASIC METABOLIC PNL TOTAL CA: CPT | Performed by: PATHOLOGY

## 2024-03-20 ASSESSMENT — PAIN SCALES - GENERAL: PAINLEVEL: NO PAIN (0)

## 2024-03-20 NOTE — NURSING NOTE
"Chief Complaint   Patient presents with    RECHECK     Ocular syphilis     Vital signs:  Temp: 97.6  F (36.4  C) Temp src: Oral BP: 123/78 Pulse: 81   Resp: 16 SpO2: 94 %     Height: 167.6 cm (5' 5.98\") Weight: 73.6 kg (162 lb 3.2 oz)  Estimated body mass index is 26.19 kg/m  as calculated from the following:    Height as of this encounter: 1.676 m (5' 5.98\").    Weight as of this encounter: 73.6 kg (162 lb 3.2 oz).      Angie Chopra, [facility]  3/20/2024 10:33 AM    "

## 2024-03-20 NOTE — PATIENT INSTRUCTIONS
Continue IV penicillin to complete 14 day course on Friday 3/22/24, then pull PICC line, call home health nurse to coordinate pulling the PICC line.

## 2024-03-20 NOTE — LETTER
3/20/2024       RE: Vimal Goldsmith  [address] [place] [address]     Dear Colleague,    Thank you for referring your patient, Vimal Goldsmith, to the [facility] INFECTIOUS DISEASE [facility]IN[facility] [place] at [facility]. Please see a copy of my visit note below.      Infectious Disease Clinic: PROGRESS NOTE     Patient:  Vimal Goldsmith, Date of birth 1965, Medical record number [id number]  Date of Visit:  03/20/2024          Assessment and Recommendations:   Assessment:  Ocular syphilis.  Patient has been on home IV penicillin for 12 days now via PICC line and he is tolerating it well.    Recommendations:  Continue IV penicillin to complete 14 day course on Friday 3/22/24, then pull PICC line, call home health nurse to coordinate pulling the PICC line.   RTC in one month after repeat blood test RPR with titer in one month, 3 months, 6 months, 1 year and two years     I have reviewed today's Medications, Vital Signs, Labs, Medical History, and EMR    Latasha Gil MD  ID Staff Physician  Pager [phone]    [facility]  [phone]        Interval History:   3/20/2024 the patient presents for follow-up of ocular syphilis, being treated with home IV penicillin G for 14 days.  He had a PICC line placed and initiated the IV penicillin 2 weeks ago.  He will complete the full 14-day course of IV penicillin this Friday, March 22.  He has had no adverse reaction to the penicillin.  His syphilis rash on his face and head is fading.  His eyes still feel a little fuzzy he says and he notices some humming sound in his ears.  Otherwise he has no acute complaints today.  His  was treated with 1 dose of IM penicillin at the recommendation of the [place] Department [employer].  This was recommended due to his exposure even if his blood test for syphilis was negative.             Review of Systems:   CV: NEGATIVE for chest pain, palpitations or peripheral  edema  C: NEGATIVE for fever, chills, change in weight  E/M: NEGATIVE for ear, mouth and throat problems  R: NEGATIVE for significant cough or SOB  Patient denies nausea, vomiting, diarrhea, and abdominal pain.          Current Medications       Current Outpatient Medications:     atropine 1 % ophthalmic solution, Place 1 drop into both eyes daily, Disp: 5 mL, Rfl: 0    clindamycin (CLEOCIN T) 1 % external lotion, Apply topically 2 times daily To face and sores on scalp, Disp: 60 mL, Rfl: 11    dapsone (ACZONE) 25 MG tablet, TAKE 1 TABLET BY MOUTH EVERY DAY TAKE 2 TABLET BY MOUTH DAILY WHEN FLARING, Disp: 180 tablet, Rfl: 3    DESCOVY 200-25 MG per tablet, Take 1 tablet by mouth daily at 2 pm, Disp: , Rfl:     econazole nitrate 1 % external cream, Apply topically 2 times daily --to anal area for two weeks, Disp: 85 g, Rfl: 2    fluticasone (FLONASE) 50 MCG/ACT nasal spray, Spray 1 spray into both nostrils daily, Disp: 18.2 mL, Rfl: 6    hypromellose (ARTIFICIAL TEARS) 0.5 % SOLN ophthalmic solution, 1 drop every hour as needed for dry eyes, Disp: , Rfl:     ipratropium (ATROVENT) 0.06 % nasal spray, Spray 2 sprays into both nostrils 4 times daily as needed for rhinitis, Disp: 15 mL, Rfl: 1    ketoconazole (NIZORAL) 2 % external shampoo, Apply topically daily For rash on face and scalp until clear then stop, Disp: 120 mL, Rfl: 11    prednisoLONE acetate (PRED FORTE) 1 % ophthalmic suspension, Apply 1-2 drops to both eyes: 3 times per day for one week, 2 times per day for one week, 1 time per day for one week, Disp: 10 mL, Rfl: 1    sildenafil (VIAGRA) 50 MG tablet, Take 0.5 tablets (25 mg) 30 min to 4 hours before intercourse daily as needed. Never use with nitroglycerin, terazosin or doxazosin., Disp: 12 tablet, Rfl: 6    triamcinolone (KENALOG) 0.1 % external ointment, Apply twice daily as needed for rash on the trunk or extremities, Disp: 80 g, Rfl: 11    zolpidem (AMBIEN) 5 MG tablet, TAKE ONE TABLET BY MOUTH  NIGHTLY AS NEEDED FOR SLEEP, Disp: 30 tablet, Rfl: 3    amoxicillin (AMOXIL) 500 MG capsule, Take 2 capsules (1,000 mg) by mouth 2 times daily (Patient not taking: Reported on 3/20/2024), Disp: 40 capsule, Rfl: 0    doxycycline hyclate (VIBRAMYCIN) 100 MG capsule, Take 2 capsules (200 mg) by mouth daily as needed (unprotected intercourse--take within 72 hours) (Patient not taking: Reported on 3/20/2024), Disp: 60 capsule, Rfl: 3    FLUARIX QUADRIVALENT 0.5 ML injection, , Disp: , Rfl:     SPIKEVAX 50 MCG/0.5ML injection, , Disp: , Rfl:            Physical Exam:   Ranges for vital signs:    Temp:  [97.6  F (36.4  C)] 97.6  F (36.4  C)  Pulse:  [81] 81  Resp:  [16] 16  BP: (123)/(78) 123/78  SpO2:  [94 %] 94 %    General: Alert and Oriented, NAD  HEENT: Head: atraumatic, normocephalic   Oropharynx: No mucosal lesions   Neck: supple, no cervical adenopathy  Chest: Clear to auscultation  CV: Regular rate and rhythm, S1S2, without mumur  Abdomen: +BS, soft, non-tender, no hepatosplenomegaly, no masses, no guarding or rebound.  Lymph Nodes: No palpable adenopathy  Skin: Brown macular patches of skin on the face and behind the ear are fading.   Neuro: Alert and Oriented, CN II-XII intact, grossly non-focal , moves all 4 extremities with normal strength  Mood: Stable           Laboratory Data:     Lab Requisition on 03/19/2024   Component Date Value Ref Range Status    Sodium 03/19/2024 139  135 - 145 mmol/L Final    Reference intervals for this test were updated on 09/26/2023 to more accurately reflect our healthy population. There may be differences in the flagging of prior results with similar values performed with this method. Interpretation of those prior results can be made in the context of the updated reference intervals.     Potassium 03/19/2024 4.1  3.4 - 5.3 mmol/L Final    Carbon Dioxide (CO2) 03/19/2024 25  22 - 29 mmol/L Final    Anion Gap 03/19/2024 11  7 - 15 mmol/L Final    Urea Nitrogen 03/19/2024 15.5   8.0 - 23.0 mg/dL Final    Creatinine 03/19/2024 0.74  0.67 - 1.17 mg/dL Final    GFR Estimate 03/19/2024 >90  >60 mL/min/1.73m2 Final    Calcium 03/19/2024 9.6  8.6 - 10.0 mg/dL Final    Chloride 03/19/2024 103  98 - 107 mmol/L Final    Glucose 03/19/2024 105 (H)  70 - 99 mg/dL Final    Alkaline Phosphatase 03/19/2024 139  40 - 150 U/L Final    Reference intervals for this test were updated on 11/14/2023 to more accurately reflect our healthy population. There may be differences in the flagging of prior results with similar values performed with this method. Interpretation of those prior results can be made in the context of the updated reference intervals.    AST 03/19/2024 21  0 - 45 U/L Final    Reference intervals for this test were updated on 6/12/2023 to more accurately reflect our healthy population. There may be differences in the flagging of prior results with similar values performed with this method. Interpretation of those prior results can be made in the context of the updated reference intervals.    ALT 03/19/2024 26  0 - 70 U/L Final    Reference intervals for this test were updated on 6/12/2023 to more accurately reflect our healthy population. There may be differences in the flagging of prior results with similar values performed with this method. Interpretation of those prior results can be made in the context of the updated reference intervals.      Protein Total 03/19/2024 7.8  6.4 - 8.3 g/dL Final    Albumin 03/19/2024 4.3  3.5 - 5.2 g/dL Final    Bilirubin Total 03/19/2024 0.3  <=1.2 mg/dL Final    WBC Count 03/19/2024 5.1  4.0 - 11.0 10e3/uL Final    RBC Count 03/19/2024 4.23 (L)  4.40 - 5.90 10e6/uL Final    Hemoglobin 03/19/2024 13.0 (L)  13.3 - 17.7 g/dL Final    Hematocrit 03/19/2024 38.4 (L)  40.0 - 53.0 % Final    MCV 03/19/2024 91  78 - 100 fL Final    MCH 03/19/2024 30.7  26.5 - 33.0 pg Final    MCHC 03/19/2024 33.9  31.5 - 36.5 g/dL Final    RDW 03/19/2024 14.2  10.0 - 15.0 % Final     Platelet Count 03/19/2024 223  150 - 450 10e3/uL Final    % Neutrophils 03/19/2024 58  % Final    % Lymphocytes 03/19/2024 30  % Final    % Monocytes 03/19/2024 7  % Final    % Eosinophils 03/19/2024 4  % Final    % Basophils 03/19/2024 1  % Final    % Immature Granulocytes 03/19/2024 0  % Final    NRBCs per 100 WBC 03/19/2024 0  <1 /100 Final    Absolute Neutrophils 03/19/2024 2.9  1.6 - 8.3 10e3/uL Final    Absolute Lymphocytes 03/19/2024 1.6  0.8 - 5.3 10e3/uL Final    Absolute Monocytes 03/19/2024 0.4  0.0 - 1.3 10e3/uL Final    Absolute Eosinophils 03/19/2024 0.2  0.0 - 0.7 10e3/uL Final    Absolute Basophils 03/19/2024 0.1  0.0 - 0.2 10e3/uL Final    Absolute Immature Granulocytes 03/19/2024 0.0  <=0.4 10e3/uL Final    Absolute NRBCs 03/19/2024 0.0  10e3/uL Final        Culture data:  7-Day Micro Results       No results found for the last 168 hours.                  Imaging:   IR PICC Placement > 5 Yrs of Age  Narrative: PROCEDURE: Peripherally Inserted Central Catheter (PICC) placement    Procedural Personnel  Advanced practice provider: Tricia Albarran PA-C    Procedure Date: 3/7/2024    Pre-procedure diagnosis: Ocular Syphilis  Post-procedure diagnosis: Same  Indication: IV antibiotics    Complications: No immediate complications.  Impression: IMPRESSION:  Completed image-guided placement of 5 Hungarian, 50 cm single lumen PICC  via left basilic with tip in right atrium. Aspirates and flushes  freely, heparin locked and ready for immediate use. No immediate  complication.    Plan:   The catheter may be used immediately.  _______________________________________________________________    PROCEDURE SUMMARY:  - Venous access with ultrasound guidance  - PICC insertion with fluoroscopic guidance    PROCEDURE DETAILS:  Pre-procedure  Consent: Informed consent for the procedure including risks, benefits  and alternatives was obtained and time-out was performed prior to the  procedure.  Preparation (MIPS): The site was  prepared and draped using all  elements of maximal sterile barrier technique including sterile  gloves, sterile gown, cap, mask, large sterile sheet, sterile  ultrasound probe cover, hand hygiene and cutaneous antisepsis with 2%  chlorhexidine.     Anesthesia/sedation  Level of anesthesia/sedation: 1% lidocaine    Access  Local anesthesia was administered. The vessel was sonographically  evaluated and determined to be patent. Real time ultrasound was used  to visualize needle entry into the vessel and a permanent image was  stored.  Laterality: left  Vein accessed: Basilic vein  Access technique: Micropuncture set with 21 gauge needle    Catheter placement  The catheter was trimmed to appropriate length and placed into the  vein under fluoroscopic guidance via a peel-away sheath. Catheter tip  location was fluoroscopically verified and a permanent image was  stored. A sterile dressing was applied.  Catheter placed: BD PowerPICC  Catheter size (Amharic): 5FR  Catheter intravascular length (cm): 50  Lumens: Single-lumen   Power injectable: Yes  Catheter tip: right atrium  Catheter flush: Heparin (100 units/mL)  Catheter securement technique: Stat-Lock    Radiation Dose  Fluoro time: 0.4 minutes    Additional Details  Specimens removed: None  Estimated blood loss (mL): Less than 10    Attestation    ANIL BOYER PA-C         SYSTEM ID:  [id number]     Sincerely,    Latasha Gil MD

## 2024-03-20 NOTE — PROGRESS NOTES
Infectious Disease Clinic: PROGRESS NOTE     Patient:  Vimal Goldsmith, Date of birth 1965, Medical record number [id number]  Date of Visit:  03/20/2024          Assessment and Recommendations:   Assessment:  Ocular syphilis.  Patient has been on home IV penicillin for 12 days now via PICC line and he is tolerating it well.    Recommendations:  Continue IV penicillin to complete 14 day course on Friday 3/22/24, then pull PICC line, call home health nurse to coordinate pulling the PICC line.   RTC in one month after repeat blood test RPR with titer in one month, 3 months, 6 months, 1 year and two years     I have reviewed today's Medications, Vital Signs, Labs, Medical History, and EMR    Latasha Gil MD  ID Staff Physician  Pager [phone]    [facility]  [phone]        Interval History:   3/20/2024 the patient presents for follow-up of ocular syphilis, being treated with home IV penicillin G for 14 days.  He had a PICC line placed and initiated the IV penicillin 2 weeks ago.  He will complete the full 14-day course of IV penicillin this Friday, March 22.  He has had no adverse reaction to the penicillin.  His syphilis rash on his face and head is fading.  His eyes still feel a little fuzzy he says and he notices some humming sound in his ears.  Otherwise he has no acute complaints today.  His  was treated with 1 dose of IM penicillin at the recommendation of the [place] Department [facility] [employer].  This was recommended due to his exposure even if his blood test for syphilis was negative.             Review of Systems:   CV: NEGATIVE for chest pain, palpitations or peripheral edema  C: NEGATIVE for fever, chills, change in weight  E/M: NEGATIVE for ear, mouth and throat problems  R: NEGATIVE for significant cough or SOB  Patient denies nausea, vomiting, diarrhea, and abdominal pain.          Current Medications       Current Outpatient Medications:     atropine 1 % ophthalmic solution, Place  1 drop into both eyes daily, Disp: 5 mL, Rfl: 0    clindamycin (CLEOCIN T) 1 % external lotion, Apply topically 2 times daily To face and sores on scalp, Disp: 60 mL, Rfl: 11    dapsone (ACZONE) 25 MG tablet, TAKE 1 TABLET BY MOUTH EVERY DAY TAKE 2 TABLET BY MOUTH DAILY WHEN FLARING, Disp: 180 tablet, Rfl: 3    DESCOVY 200-25 MG per tablet, Take 1 tablet by mouth daily at 2 pm, Disp: , Rfl:     econazole nitrate 1 % external cream, Apply topically 2 times daily --to anal area for two weeks, Disp: 85 g, Rfl: 2    fluticasone (FLONASE) 50 MCG/ACT nasal spray, Spray 1 spray into both nostrils daily, Disp: 18.2 mL, Rfl: 6    hypromellose (ARTIFICIAL TEARS) 0.5 % SOLN ophthalmic solution, 1 drop every hour as needed for dry eyes, Disp: , Rfl:     ipratropium (ATROVENT) 0.06 % nasal spray, Spray 2 sprays into both nostrils 4 times daily as needed for rhinitis, Disp: 15 mL, Rfl: 1    ketoconazole (NIZORAL) 2 % external shampoo, Apply topically daily For rash on face and scalp until clear then stop, Disp: 120 mL, Rfl: 11    prednisoLONE acetate (PRED FORTE) 1 % ophthalmic suspension, Apply 1-2 drops to both eyes: 3 times per day for one week, 2 times per day for one week, 1 time per day for one week, Disp: 10 mL, Rfl: 1    sildenafil (VIAGRA) 50 MG tablet, Take 0.5 tablets (25 mg) 30 min to 4 hours before intercourse daily as needed. Never use with nitroglycerin, terazosin or doxazosin., Disp: 12 tablet, Rfl: 6    triamcinolone (KENALOG) 0.1 % external ointment, Apply twice daily as needed for rash on the trunk or extremities, Disp: 80 g, Rfl: 11    zolpidem (AMBIEN) 5 MG tablet, TAKE ONE TABLET BY MOUTH NIGHTLY AS NEEDED FOR SLEEP, Disp: 30 tablet, Rfl: 3    amoxicillin (AMOXIL) 500 MG capsule, Take 2 capsules (1,000 mg) by mouth 2 times daily (Patient not taking: Reported on 3/20/2024), Disp: 40 capsule, Rfl: 0    doxycycline hyclate (VIBRAMYCIN) 100 MG capsule, Take 2 capsules (200 mg) by mouth daily as needed  (unprotected intercourse--take within 72 hours) (Patient not taking: Reported on 3/20/2024), Disp: 60 capsule, Rfl: 3    FLUARIX QUADRIVALENT 0.5 ML injection, , Disp: , Rfl:     SPIKEVAX 50 MCG/0.5ML injection, , Disp: , Rfl:            Physical Exam:   Ranges for vital signs:    Temp:  [97.6  F (36.4  C)] 97.6  F (36.4  C)  Pulse:  [81] 81  Resp:  [16] 16  BP: (123)/(78) 123/78  SpO2:  [94 %] 94 %    General: Alert and Oriented, NAD  HEENT: Head: atraumatic, normocephalic   Oropharynx: No mucosal lesions   Neck: supple, no cervical adenopathy  Chest: Clear to auscultation  CV: Regular rate and rhythm, S1S2, without mumur  Abdomen: +BS, soft, non-tender, no hepatosplenomegaly, no masses, no guarding or rebound.  Lymph Nodes: No palpable adenopathy  Skin: Brown macular patches of skin on the face and behind the ear are fading.   Neuro: Alert and Oriented, CN II-XII intact, grossly non-focal , moves all 4 extremities with normal strength  Mood: Stable           Laboratory Data:     Lab Requisition on 03/19/2024   Component Date Value Ref Range Status    Sodium 03/19/2024 139  135 - 145 mmol/L Final    Reference intervals for this test were updated on 09/26/2023 to more accurately reflect our healthy population. There may be differences in the flagging of prior results with similar values performed with this method. Interpretation of those prior results can be made in the context of the updated reference intervals.     Potassium 03/19/2024 4.1  3.4 - 5.3 mmol/L Final    Carbon Dioxide (CO2) 03/19/2024 25  22 - 29 mmol/L Final    Anion Gap 03/19/2024 11  7 - 15 mmol/L Final    Urea Nitrogen 03/19/2024 15.5  8.0 - 23.0 mg/dL Final    Creatinine 03/19/2024 0.74  0.67 - 1.17 mg/dL Final    GFR Estimate 03/19/2024 >90  >60 mL/min/1.73m2 Final    Calcium 03/19/2024 9.6  8.6 - 10.0 mg/dL Final    Chloride 03/19/2024 103  98 - 107 mmol/L Final    Glucose 03/19/2024 105 (H)  70 - 99 mg/dL Final    Alkaline Phosphatase  03/19/2024 139  40 - 150 U/L Final    Reference intervals for this test were updated on 11/14/2023 to more accurately reflect our healthy population. There may be differences in the flagging of prior results with similar values performed with this method. Interpretation of those prior results can be made in the context of the updated reference intervals.    AST 03/19/2024 21  0 - 45 U/L Final    Reference intervals for this test were updated on 6/12/2023 to more accurately reflect our healthy population. There may be differences in the flagging of prior results with similar values performed with this method. Interpretation of those prior results can be made in the context of the updated reference intervals.    ALT 03/19/2024 26  0 - 70 U/L Final    Reference intervals for this test were updated on 6/12/2023 to more accurately reflect our healthy population. There may be differences in the flagging of prior results with similar values performed with this method. Interpretation of those prior results can be made in the context of the updated reference intervals.      Protein Total 03/19/2024 7.8  6.4 - 8.3 g/dL Final    Albumin 03/19/2024 4.3  3.5 - 5.2 g/dL Final    Bilirubin Total 03/19/2024 0.3  <=1.2 mg/dL Final    WBC Count 03/19/2024 5.1  4.0 - 11.0 10e3/uL Final    RBC Count 03/19/2024 4.23 (L)  4.40 - 5.90 10e6/uL Final    Hemoglobin 03/19/2024 13.0 (L)  13.3 - 17.7 g/dL Final    Hematocrit 03/19/2024 38.4 (L)  40.0 - 53.0 % Final    MCV 03/19/2024 91  78 - 100 fL Final    MCH 03/19/2024 30.7  26.5 - 33.0 pg Final    MCHC 03/19/2024 33.9  31.5 - 36.5 g/dL Final    RDW 03/19/2024 14.2  10.0 - 15.0 % Final    Platelet Count 03/19/2024 223  150 - 450 10e3/uL Final    % Neutrophils 03/19/2024 58  % Final    % Lymphocytes 03/19/2024 30  % Final    % Monocytes 03/19/2024 7  % Final    % Eosinophils 03/19/2024 4  % Final    % Basophils 03/19/2024 1  % Final    % Immature Granulocytes 03/19/2024 0  % Final    NRBCs  per 100 WBC 03/19/2024 0  <1 /100 Final    Absolute Neutrophils 03/19/2024 2.9  1.6 - 8.3 10e3/uL Final    Absolute Lymphocytes 03/19/2024 1.6  0.8 - 5.3 10e3/uL Final    Absolute Monocytes 03/19/2024 0.4  0.0 - 1.3 10e3/uL Final    Absolute Eosinophils 03/19/2024 0.2  0.0 - 0.7 10e3/uL Final    Absolute Basophils 03/19/2024 0.1  0.0 - 0.2 10e3/uL Final    Absolute Immature Granulocytes 03/19/2024 0.0  <=0.4 10e3/uL Final    Absolute NRBCs 03/19/2024 0.0  10e3/uL Final        Culture data:  7-Day Micro Results       No results found for the last 168 hours.                  Imaging:   IR PICC Placement > 5 Yrs of Age  Narrative: PROCEDURE: Peripherally Inserted Central Catheter (PICC) placement    Procedural Personnel  Advanced practice provider: Tricia Albarran PA-C    Procedure Date: 3/7/2024    Pre-procedure diagnosis: Ocular Syphilis  Post-procedure diagnosis: Same  Indication: IV antibiotics    Complications: No immediate complications.  Impression: IMPRESSION:  Completed image-guided placement of 5 Danish, 50 cm single lumen PICC  via left basilic with tip in right atrium. Aspirates and flushes  freely, heparin locked and ready for immediate use. No immediate  complication.    Plan:   The catheter may be used immediately.  _______________________________________________________________    PROCEDURE SUMMARY:  - Venous access with ultrasound guidance  - PICC insertion with fluoroscopic guidance    PROCEDURE DETAILS:  Pre-procedure  Consent: Informed consent for the procedure including risks, benefits  and alternatives was obtained and time-out was performed prior to the  procedure.  Preparation (MIPS): The site was prepared and draped using all  elements of maximal sterile barrier technique including sterile  gloves, sterile gown, cap, mask, large sterile sheet, sterile  ultrasound probe cover, hand hygiene and cutaneous antisepsis with 2%  chlorhexidine.     Anesthesia/sedation  Level of anesthesia/sedation: 1%  lidocaine    Access  Local anesthesia was administered. The vessel was sonographically  evaluated and determined to be patent. Real time ultrasound was used  to visualize needle entry into the vessel and a permanent image was  stored.  Laterality: left  Vein accessed: Basilic vein  Access technique: Micropuncture set with 21 gauge needle    Catheter placement  The catheter was trimmed to appropriate length and placed into the  vein under fluoroscopic guidance via a peel-away sheath. Catheter tip  location was fluoroscopically verified and a permanent image was  stored. A sterile dressing was applied.  Catheter placed: BD PowerPICC  Catheter size (Angolan): 5FR  Catheter intravascular length (cm): 50  Lumens: Single-lumen   Power injectable: Yes  Catheter tip: right atrium  Catheter flush: Heparin (100 units/mL)  Catheter securement technique: Stat-Lock    Radiation Dose  Fluoro time: 0.4 minutes    Additional Details  Specimens removed: None  Estimated blood loss (mL): Less than 10    Attestation    ANIL BOYER PA-C         SYSTEM ID:  [id number]

## 2024-03-21 LAB
RPR SER QL: REACTIVE
RPR SER-TITR: ABNORMAL {TITER}
T PALLIDUM AB SER QL: REACTIVE

## 2024-03-22 ENCOUNTER — TELEPHONE (OUTPATIENT)
Dept: INFECTIOUS DISEASES | Facility: CLINIC | Age: 59
End: 2024-03-22
Payer: COMMERCIAL

## 2024-04-04 DIAGNOSIS — N52.9 ERECTILE DYSFUNCTION, UNSPECIFIED ERECTILE DYSFUNCTION TYPE: ICD-10-CM

## 2024-04-04 DIAGNOSIS — F51.02 ADJUSTMENT INSOMNIA: ICD-10-CM

## 2024-04-04 RX ORDER — SILDENAFIL 50 MG/1
TABLET, FILM COATED ORAL
Qty: 12 TABLET | Refills: 0 | Status: SHIPPED | OUTPATIENT
Start: 2024-04-04 | End: 2024-07-16

## 2024-04-06 RX ORDER — ZOLPIDEM TARTRATE 5 MG/1
TABLET ORAL
Qty: 30 TABLET | Refills: 1 | Status: SHIPPED | OUTPATIENT
Start: 2024-04-06

## 2024-04-18 ENCOUNTER — OFFICE VISIT (OUTPATIENT)
Dept: OPHTHALMOLOGY | Facility: CLINIC | Age: 59
End: 2024-04-18
Attending: OPHTHALMOLOGY
Payer: COMMERCIAL

## 2024-04-18 DIAGNOSIS — F19.20 ADDICTION (H): Primary | ICD-10-CM

## 2024-04-18 DIAGNOSIS — A52.3 NEUROSYPHILIS IN ADULT: ICD-10-CM

## 2024-04-18 DIAGNOSIS — H04.123 BILATERAL DRY EYES: ICD-10-CM

## 2024-04-18 DIAGNOSIS — A53.9 SYPHILIS: ICD-10-CM

## 2024-04-18 DIAGNOSIS — H02.88B MEIBOMIAN GLAND DYSFUNCTION (MGD), BILATERAL, BOTH UPPER AND LOWER LIDS: ICD-10-CM

## 2024-04-18 DIAGNOSIS — H02.88A MEIBOMIAN GLAND DYSFUNCTION (MGD), BILATERAL, BOTH UPPER AND LOWER LIDS: ICD-10-CM

## 2024-04-18 DIAGNOSIS — H20.00 ACUTE ANTERIOR UVEITIS OF BOTH EYES: Primary | ICD-10-CM

## 2024-04-18 DIAGNOSIS — H53.001 AMBLYOPIA, RIGHT: ICD-10-CM

## 2024-04-18 PROCEDURE — 92015 DETERMINE REFRACTIVE STATE: CPT

## 2024-04-18 PROCEDURE — 99213 OFFICE O/P EST LOW 20 MIN: CPT | Performed by: OPHTHALMOLOGY

## 2024-04-18 RX ORDER — MULTIVITAMIN,THERAPEUTIC
1 TABLET ORAL DAILY
COMMUNITY

## 2024-04-18 RX ORDER — LIFITEGRAST 50 MG/ML
1 SOLUTION/ DROPS OPHTHALMIC 2 TIMES DAILY
Qty: 90 EACH | Refills: 3 | Status: SHIPPED | OUTPATIENT
Start: 2024-04-18

## 2024-04-18 ASSESSMENT — REFRACTION_MANIFEST
OS_AXIS: 040
OD_SPHERE: PLANO
OD_ADD: +2.50
OS_ADD: +2.50
OD_AXIS: 135
OS_CYLINDER: +0.50
OD_CYLINDER: +0.75
OS_SPHERE: +0.25

## 2024-04-18 ASSESSMENT — VISUAL ACUITY
OD_SC+: +2
OD_PH_SC+: -1
METHOD: SNELLEN - LINEAR
OS_SC+: -1
OS_SC: 20/25
OD_PH_SC: 20/25
OD_SC: 20/40

## 2024-04-18 ASSESSMENT — SLIT LAMP EXAM - LIDS
COMMENTS: NO LID LESIONS
COMMENTS: NO LID LESIONS

## 2024-04-18 ASSESSMENT — TONOMETRY
OD_IOP_MMHG: 12
OS_IOP_MMHG: 12
IOP_METHOD: TONOPEN

## 2024-04-18 NOTE — NURSING NOTE
Chief Complaints and History of Present Illnesses   Patient presents with    Follow Up     Acute anterior uveitis of both eyes     Chief Complaint(s) and History of Present Illness(es)       Follow Up              Laterality: both eyes    Associated symptoms: dryness and photophobia.  Negative for eye pain (intermitent, strain towards evening) and tearing    Treatments tried: artificial tears    Pain scale: 0/10    Comments: Acute anterior uveitis of both eyes              Comments    He states that his distance vision continues to be blurred in each eye.  His light sensitivity is improving.    He states that he has only use the steroid drop a few times in the past week, when his eyes have felt strained.    Latasha Miranda, COT 2:53 PM  April 18, 2024

## 2024-04-18 NOTE — PROGRESS NOTES
Chief Complaint(s) and History of Present Illness(es)     Follow Up            Laterality: both eyes    Associated symptoms: dryness and photophobia.  Negative for eye pain   (intermitent, strain towards evening) and tearing    Treatments tried: artificial tears    Pain scale: 0/10    Comments: Acute anterior uveitis of both eyes          Comments    He states that his distance vision continues to be blurred in each eye.    His light sensitivity is improving.    He states that he has only use the steroid drop a few times in the past   week, when his eyes have felt strained.    Latasha Miranda, COT 2:53 PM  April 18, 2024          Review of systems for the eyes was negative other than the pertinent positives/negatives listed in the HPI.      Assessment & Plan      Vimal Goldsmith is a 59 year old male with the following diagnoses:   1. Acute anterior uveitis of both eyes    2. Syphilis    3. Amblyopia, right    4. Meibomian gland dysfunction (MGD), bilateral, both upper and lower lids    5. Bilateral dry eyes         Stopped prednisolone about 1-2 weeks ago  Noting some afternoon strain/irritation  Quiescent today   Dry eye contributing to symptoms   Start artificial tears twice a day and as needed   Warm compresses daily   Xiidra twice a day both eyes     Refractive options reviewed  Refraction given     Patient disposition:   Return in about 6 months (around 10/18/2024) for DFE.           Attending Physician Attestation:  Complete documentation of historical and exam elements from today's encounter can be found in the full encounter summary report (not reduplicated in this progress note).  I personally obtained the chief complaint(s) and history of present illness.  I confirmed and edited as necessary the review of systems, past medical/surgical history, family history, social history, and examination findings as documented by others; and I examined the patient myself.  I personally reviewed the relevant tests, images,  and reports as documented above.  I formulated and edited as necessary the assessment and plan and discussed the findings and management plan with the patient and family. . - Missael Chahal MD

## 2024-04-19 ENCOUNTER — LAB (OUTPATIENT)
Dept: LAB | Facility: CLINIC | Age: 59
End: 2024-04-19
Payer: COMMERCIAL

## 2024-04-19 ENCOUNTER — OFFICE VISIT (OUTPATIENT)
Dept: FAMILY MEDICINE | Facility: CLINIC | Age: 59
End: 2024-04-19
Payer: COMMERCIAL

## 2024-04-19 VITALS
RESPIRATION RATE: 16 BRPM | OXYGEN SATURATION: 92 % | SYSTOLIC BLOOD PRESSURE: 113 MMHG | DIASTOLIC BLOOD PRESSURE: 74 MMHG | HEART RATE: 76 BPM | BODY MASS INDEX: 24.1 KG/M2 | TEMPERATURE: 97.8 F | HEIGHT: 68 IN | WEIGHT: 159 LBS

## 2024-04-19 DIAGNOSIS — A52.3 NEUROSYPHILIS: ICD-10-CM

## 2024-04-19 DIAGNOSIS — R63.4 WEIGHT LOSS: ICD-10-CM

## 2024-04-19 DIAGNOSIS — Z57.2 OCCUPATIONAL EXPOSURE TO COAL DUST: ICD-10-CM

## 2024-04-19 DIAGNOSIS — H93.12 TINNITUS, LEFT: Primary | ICD-10-CM

## 2024-04-19 DIAGNOSIS — A52.71 OCULAR SYPHILIS: ICD-10-CM

## 2024-04-19 DIAGNOSIS — A53.9 SYPHILIS: ICD-10-CM

## 2024-04-19 DIAGNOSIS — F42.9 COMPULSIVE DISORDER: ICD-10-CM

## 2024-04-19 LAB
ERYTHROCYTE [DISTWIDTH] IN BLOOD BY AUTOMATED COUNT: 12.9 % (ref 10–15)
HCT VFR BLD AUTO: 41.2 % (ref 40–53)
HGB BLD-MCNC: 14.4 G/DL (ref 13.3–17.7)
MCH RBC QN AUTO: 31.6 PG (ref 26.5–33)
MCHC RBC AUTO-ENTMCNC: 35 G/DL (ref 31.5–36.5)
MCV RBC AUTO: 91 FL (ref 78–100)
PLATELET # BLD AUTO: 242 10E3/UL (ref 150–450)
RBC # BLD AUTO: 4.55 10E6/UL (ref 4.4–5.9)
WBC # BLD AUTO: 4.6 10E3/UL (ref 4–11)

## 2024-04-19 PROCEDURE — 99215 OFFICE O/P EST HI 40 MIN: CPT | Performed by: FAMILY MEDICINE

## 2024-04-19 PROCEDURE — 80048 BASIC METABOLIC PNL TOTAL CA: CPT

## 2024-04-19 PROCEDURE — 85027 COMPLETE CBC AUTOMATED: CPT

## 2024-04-19 PROCEDURE — 36415 COLL VENOUS BLD VENIPUNCTURE: CPT

## 2024-04-19 SDOH — HEALTH STABILITY - PHYSICAL HEALTH: OCCUPATIONAL EXPOSURE TO DUST: Z57.2

## 2024-04-19 NOTE — PROGRESS NOTES
ASSESSMENT/PLAN:   There are no Patient Instructions on file for this visit.      ICD-10-CM    1. Tinnitus, left  H93.12       2. Occupational exposure to coal dust  Z57.2       3. 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 Compulsive disorder  F42.9       6. Weight loss  R63.4           See discussions below. Over 50 minutes spent with that patient today and in documentation      SUBJECTIVE:   Vimal Goldsmith is a 59 year old male who presents to clinic today for the following health issues:  Syphilis/Sex Addiction--patient had several questions about his diagnosis, when he could have contracted this, whether it is a new strain, etc. Talked through these.  Not having intercourse outside of his relationship per his report.  Not currently taking Descovy but encouraged him to restart.  Also has doxy for PEP and he realizes that this is only partially protective for syphilis.  Still having some occasional headaches and some ear ringing.  Overall feeling better though.  Rash is clear.  Has lost about 6-7 pounds.  Weight loss--6-7 pounds but feels a good appetite.  Does exercise and watches his diet.  Is eating some chino chi to try to help his microbiome.  Will follow.  If losing more weight, may do a workup but generally asymptomatic right now (feeling pretty good) so will see how he does from her.  Sex addiction (Compulsive Disorder): is trying to see a specialist (referral placed).  We did also talk about support groups and how to find them.  O2 sat 92% and some mild sob with exertion--no real change.  Does have a history of childhood tb and childhood coal exposure for cooking and warmth.  Reviewed chest x-rays that don't show any concern.  Urged him to let me know if this is progressing and we would do PFT's and possibly CT and/or pulm referral.    Tinnitus--mainly on the left.  Worsened since the syphilis.  Will see how the next couple of months go.  If not improved will refer to Audiology and  possibly ENT      Problem list and histories reviewed & adjusted, as indicated.  Additional history: as documented    Patient Active Problem List   Diagnosis    Dermatitis herpetiformis    Celiac disease    Tinea pedis    Acne rosacea    BPH (benign prostatic hypertrophy)    Bacterial folliculitis    Chalazion    Therapeutic drug monitoring    Bilateral dry eyes    Adjustment insomnia    Internal bleeding hemorrhoids    Syphilis    Tinnitus, left    Occupational exposure to coal dust    Ocular syphilis    Compulsive disorder     Past Surgical History:   Procedure Laterality Date    CATARACT IOL, RT/LT      COLONOSCOPY N/A 7/14/2022    Procedure: COLONOSCOPY, WITH POLYPECTOMY AND BIOPSY;  Surgeon: Cooper Juan MD;  Location:  GI    ENHANCE LASER REFRACTIVE BILATERAL EXISTING PT IN PARAMETERS Left 01/04/2017    ESOPHAGOSCOPY, GASTROSCOPY, DUODENOSCOPY (EGD), COMBINED  2/11/2013    Procedure: COMBINED ESOPHAGOSCOPY, GASTROSCOPY, DUODENOSCOPY (EGD), BIOPSY SINGLE OR MULTIPLE;;  Surgeon: Luke Juan MD;  Location:  GI    ESOPHAGOSCOPY, GASTROSCOPY, DUODENOSCOPY (EGD), COMBINED N/A 5/23/2023    Procedure: Esophagoscopy, gastroscopy, duodenoscopy (EGD), combined;  Surgeon: Palak Sandoval MD;  Location: [facility] OR    INSERT PICC LINE Left 3/7/2024    Procedure: Insert picc line;  Surgeon: Tricia Albarran PA-C;  Location: [facility] OR    IR PICC PLACEMENT > 5 YRS OF AGE  3/7/2024    NASAL/SINUS POLYPECTOMY         Social History     Tobacco Use    Smoking status: Never    Smokeless tobacco: Never    Tobacco comments:     NON SMOKING ENVIRONMENT, 10/6/11, [facility].    Substance Use Topics    Alcohol use: No     Comment: a few sips to help sleep     Family History   Problem Relation Age of Onset    Lipids Mother     Kidney Disease Mother     Arthritis Mother     Hypertension Mother         passed away    Osteoporosis Mother     Glaucoma Father     Gastrointestinal Disease Father         maybe celiac    Cancer  Father         Leukemia    Heart Disease Father     Hypertension Father         passed away    Cerebrovascular Disease Father         passed    Stomach Problem Father     Other Cancer Father         leukemia    Gastrointestinal Disease Brother         maybe celiac    Kidney Disease Brother         Kidnet stone    Glaucoma Other     Cancer - colorectal No family hx of     Prostate Cancer No family hx of     Melanoma No family hx of     Skin Cancer No family hx of          Current Outpatient Medications   Medication Sig Dispense Refill    atropine 1 % ophthalmic solution Place 1 drop into both eyes daily 5 mL 0    clindamycin (CLEOCIN T) 1 % external lotion Apply topically 2 times daily To face and sores on scalp 60 mL 11    dapsone (ACZONE) 25 MG tablet TAKE 1 TABLET BY MOUTH EVERY DAY TAKE 2 TABLET BY MOUTH DAILY WHEN FLARING 180 tablet 3    DESCOVY 200-25 MG per tablet Take 1 tablet by mouth daily at 2 pm      econazole nitrate 1 % external cream Apply topically 2 times daily --to anal area for two weeks 85 g 2    FLUARIX QUADRIVALENT 0.5 ML injection       fluticasone (FLONASE) 50 MCG/ACT nasal spray Spray 1 spray into both nostrils daily 18.2 mL 6    hypromellose (ARTIFICIAL TEARS) 0.5 % SOLN ophthalmic solution 1 drop every hour as needed for dry eyes      ipratropium (ATROVENT) 0.06 % nasal spray Spray 2 sprays into both nostrils 4 times daily as needed for rhinitis 15 mL 1    ketoconazole (NIZORAL) 2 % external shampoo Apply topically daily For rash on face and scalp until clear then stop 120 mL 11    lifitegrast (XIIDRA) 5 % opthalmic solution Apply 1 drop to eye 2 times daily 90 each 3    multivitamin, therapeutic (THERA-VIT) TABS tablet Take 1 tablet by mouth daily      prednisoLONE acetate (PRED FORTE) 1 % ophthalmic suspension Apply 1-2 drops to both eyes: 3 times per day for one week, 2 times per day for one week, 1 time per day for one week 10 mL 1    sildenafil (VIAGRA) 50 MG tablet Take 0.5 tablets  "(25 mg) 30 min to 4 hours before intercourse daily as needed. Never use with nitroglycerin, terazosin or doxazosin. 12 tablet 0    SPIKEVAX 50 MCG/0.5ML injection       triamcinolone (KENALOG) 0.1 % external ointment Apply twice daily as needed for rash on the trunk or extremities 80 g 11    zolpidem (AMBIEN) 5 MG tablet TAKE ONE TABLET BY MOUTH NIGHTLY AS NEEDED FOR SLEEP 30 tablet 1    doxycycline hyclate (VIBRAMYCIN) 100 MG capsule Take 2 capsules (200 mg) by mouth daily as needed (unprotected intercourse--take within 72 hours) (Patient not taking: Reported on 3/20/2024) 60 capsule 3     Allergies   Allergen Reactions    Gluten Meal Rash and GI Disturbance    Nkda [No Known Drug Allergy]        Reviewed and updated as needed this visit by clinical staff   Tobacco  Allergies  Meds            Reviewed and updated as needed this visit by Provider                   ROS:  Constitutional, HEENT, cardiovascular, pulmonary, gi and gu systems are negative, except as otherwise noted.    OBJECTIVE:     /74 (BP Location: Left arm, Patient Position: Sitting, Cuff Size: Adult Large)   Pulse 76   Temp 97.8  F (36.6  C) (Oral)   Resp 16   Ht 1.727 m (5' 8\")   Wt 72.1 kg (159 lb)   SpO2 92%   BMI 24.18 kg/m    Body mass index is 24.18 kg/m .  GENERAL: alert and no distress  HENT: ear canals normal, TM's slightly grayish but no erythema or bulge.  Left TM shows a little whitish discoloration in a streak along it--possible mucus.  ABDOMEN: soft, nontender, no hepatosplenomegaly, no masses and bowel sounds normal  MS: no gross musculoskeletal defects noted, no edema  SKIN: no suspicious lesions or rashes    Diagnostic Test Results:  Results for orders placed or performed in visit on 04/19/24   CBC with platelets     Status: Normal   Result Value Ref Range    WBC Count 4.6 4.0 - 11.0 10e3/uL    RBC Count 4.55 4.40 - 5.90 10e6/uL    Hemoglobin 14.4 13.3 - 17.7 g/dL    Hematocrit 41.2 40.0 - 53.0 %    MCV 91 78 - 100 fL "    MCH 31.6 26.5 - 33.0 pg    MCHC 35.0 31.5 - 36.5 g/dL    RDW 12.9 10.0 - 15.0 %    Platelet Count 242 150 - 450 10e3/uL           Armando Banks MD  [facility]

## 2024-04-20 LAB
ANION GAP SERPL CALCULATED.3IONS-SCNC: 11 MMOL/L (ref 7–15)
BUN SERPL-MCNC: 17.4 MG/DL (ref 8–23)
CALCIUM SERPL-MCNC: 10.2 MG/DL (ref 8.6–10)
CHLORIDE SERPL-SCNC: 102 MMOL/L (ref 98–107)
CREAT SERPL-MCNC: 0.96 MG/DL (ref 0.67–1.17)
DEPRECATED HCO3 PLAS-SCNC: 25 MMOL/L (ref 22–29)
EGFRCR SERPLBLD CKD-EPI 2021: >90 ML/MIN/1.73M2
GLUCOSE SERPL-MCNC: 105 MG/DL (ref 70–99)
POTASSIUM SERPL-SCNC: 4.2 MMOL/L (ref 3.4–5.3)
SODIUM SERPL-SCNC: 138 MMOL/L (ref 135–145)

## 2024-04-23 ENCOUNTER — OFFICE VISIT (OUTPATIENT)
Dept: INFECTIOUS DISEASES | Facility: CLINIC | Age: 59
End: 2024-04-23
Attending: INTERNAL MEDICINE
Payer: COMMERCIAL

## 2024-04-23 ENCOUNTER — LAB (OUTPATIENT)
Dept: LAB | Facility: CLINIC | Age: 59
End: 2024-04-23
Payer: COMMERCIAL

## 2024-04-23 VITALS
BODY MASS INDEX: 23.95 KG/M2 | OXYGEN SATURATION: 94 % | WEIGHT: 158 LBS | HEIGHT: 68 IN | HEART RATE: 76 BPM | TEMPERATURE: 98.3 F | SYSTOLIC BLOOD PRESSURE: 108 MMHG | DIASTOLIC BLOOD PRESSURE: 72 MMHG

## 2024-04-23 DIAGNOSIS — A53.9 SYPHILIS: ICD-10-CM

## 2024-04-23 DIAGNOSIS — A52.71 OCULAR SYPHILIS: ICD-10-CM

## 2024-04-23 DIAGNOSIS — N40.0 ENLARGED PROSTATE: ICD-10-CM

## 2024-04-23 DIAGNOSIS — R73.9 HYPERGLYCEMIA: ICD-10-CM

## 2024-04-23 DIAGNOSIS — A52.71 OCULAR SYPHILIS: Primary | ICD-10-CM

## 2024-04-23 LAB
ANION GAP SERPL CALCULATED.3IONS-SCNC: 11 MMOL/L (ref 7–15)
BUN SERPL-MCNC: 16.2 MG/DL (ref 8–23)
CALCIUM SERPL-MCNC: 9.5 MG/DL (ref 8.6–10)
CHLORIDE SERPL-SCNC: 106 MMOL/L (ref 98–107)
CREAT SERPL-MCNC: 0.9 MG/DL (ref 0.67–1.17)
DEPRECATED HCO3 PLAS-SCNC: 26 MMOL/L (ref 22–29)
EGFRCR SERPLBLD CKD-EPI 2021: >90 ML/MIN/1.73M2
ERYTHROCYTE [DISTWIDTH] IN BLOOD BY AUTOMATED COUNT: 12.9 % (ref 10–15)
GLUCOSE SERPL-MCNC: 103 MG/DL (ref 70–99)
HBA1C MFR BLD: 4.8 %
HCT VFR BLD AUTO: 39.6 % (ref 40–53)
HGB BLD-MCNC: 13.9 G/DL (ref 13.3–17.7)
MCH RBC QN AUTO: 31.3 PG (ref 26.5–33)
MCHC RBC AUTO-ENTMCNC: 35.1 G/DL (ref 31.5–36.5)
MCV RBC AUTO: 89 FL (ref 78–100)
PLATELET # BLD AUTO: 201 10E3/UL (ref 150–450)
POTASSIUM SERPL-SCNC: 4 MMOL/L (ref 3.4–5.3)
PSA SERPL DL<=0.01 NG/ML-MCNC: 2.23 NG/ML (ref 0–3.5)
RBC # BLD AUTO: 4.44 10E6/UL (ref 4.4–5.9)
SODIUM SERPL-SCNC: 143 MMOL/L (ref 135–145)
T PALLIDUM AB SER QL: REACTIVE
WBC # BLD AUTO: 5.3 10E3/UL (ref 4–11)

## 2024-04-23 PROCEDURE — 85027 COMPLETE CBC AUTOMATED: CPT | Performed by: PATHOLOGY

## 2024-04-23 PROCEDURE — 86593 SYPHILIS TEST NON-TREP QUANT: CPT | Performed by: INTERNAL MEDICINE

## 2024-04-23 PROCEDURE — 86780 TREPONEMA PALLIDUM: CPT | Performed by: INTERNAL MEDICINE

## 2024-04-23 PROCEDURE — 80048 BASIC METABOLIC PNL TOTAL CA: CPT | Performed by: PATHOLOGY

## 2024-04-23 PROCEDURE — 99214 OFFICE O/P EST MOD 30 MIN: CPT | Performed by: INTERNAL MEDICINE

## 2024-04-23 PROCEDURE — 86592 SYPHILIS TEST NON-TREP QUAL: CPT | Performed by: INTERNAL MEDICINE

## 2024-04-23 PROCEDURE — 36415 COLL VENOUS BLD VENIPUNCTURE: CPT | Performed by: PATHOLOGY

## 2024-04-23 PROCEDURE — G0103 PSA SCREENING: HCPCS | Performed by: PATHOLOGY

## 2024-04-23 PROCEDURE — 83036 HEMOGLOBIN GLYCOSYLATED A1C: CPT | Performed by: FAMILY MEDICINE

## 2024-04-23 PROCEDURE — 99213 OFFICE O/P EST LOW 20 MIN: CPT | Performed by: INTERNAL MEDICINE

## 2024-04-23 PROCEDURE — 99000 SPECIMEN HANDLING OFFICE-LAB: CPT | Performed by: PATHOLOGY

## 2024-04-23 ASSESSMENT — PAIN SCALES - GENERAL: PAINLEVEL: NO PAIN (0)

## 2024-04-23 NOTE — LETTER
4/23/2024       RE: Vimal Goldsmith  [address]  [place] [address]     Dear Colleague,    Thank you for referring your patient, Vimal Goldsmith, to the [facility] [place] at [facility]. Please see a copy of my visit note below.      Infectious Disease Clinic: PROGRESS NOTE     Patient:  Vimal Goldsmith, Date of birth 1965, Medical record number [id number]  Date of Visit:  04/23/2024          Assessment and Recommendations:   Assessment:  Ocular syphilis now s/p treatment with IV penicillin G X 14 days. Rash is better vision is improving . Continued humming in his ears raises concern for possible otic syphilis as well. He has already had the needed treatment for CNS syphilis though, so no additional treatment needed at this time.     Recommendations:  Recheck RPR with titer today and again in 3 months.   RTC 3 months.   Referral to ENT clinic to evaluate innitus after treatment for neurosyphilis.      I have reviewed today's Medications, Vital Signs, Labs, and EMR. Total time in patient care day of visit = 32 minutes.     Latasha Gil MD  ID Staff Physician  Pager [phone]    [facility]  [phone]        Interval History:   4/23/2024 the patient presents for follow-up after treatment for ocular syphilis. He c/o continued constant humming in his ears since he had his first PCN shot.  He said his eye doctor said the inflammation in his eyes are gone now. His visio is still somewhat blurry though.            Review of Systems:   CV: NEGATIVE for chest pain, palpitations or peripheral edema  C: NEGATIVE for fever, chills, change in weight  E/M: NEGATIVE for ear, mouth and throat problems  R: NEGATIVE for significant cough or SOB  Patient denies nausea, vomiting, diarrhea, and abdominal pain.          Current Medications       Current Outpatient Medications:     dapsone (ACZONE) 25 MG tablet, TAKE 1 TABLET BY MOUTH  EVERY DAY TAKE 2 TABLET BY MOUTH DAILY WHEN FLARING, Disp: 180 tablet, Rfl: 3    DESCOVY 200-25 MG per tablet, Take 1 tablet by mouth daily at 2 pm, Disp: , Rfl:     doxycycline hyclate (VIBRAMYCIN) 100 MG capsule, Take 2 capsules (200 mg) by mouth daily as needed (unprotected intercourse--take within 72 hours), Disp: 60 capsule, Rfl: 3    econazole nitrate 1 % external cream, Apply topically 2 times daily --to anal area for two weeks, Disp: 85 g, Rfl: 2    FLUARIX QUADRIVALENT 0.5 ML injection, , Disp: , Rfl:     fluticasone (FLONASE) 50 MCG/ACT nasal spray, Spray 1 spray into both nostrils daily, Disp: 18.2 mL, Rfl: 6    hypromellose (ARTIFICIAL TEARS) 0.5 % SOLN ophthalmic solution, 1 drop every hour as needed for dry eyes, Disp: , Rfl:     ipratropium (ATROVENT) 0.06 % nasal spray, Spray 2 sprays into both nostrils 4 times daily as needed for rhinitis, Disp: 15 mL, Rfl: 1    ketoconazole (NIZORAL) 2 % external shampoo, Apply topically daily For rash on face and scalp until clear then stop, Disp: 120 mL, Rfl: 11    lifitegrast (XIIDRA) 5 % opthalmic solution, Apply 1 drop to eye 2 times daily, Disp: 90 each, Rfl: 3    multivitamin, therapeutic (THERA-VIT) TABS tablet, Take 1 tablet by mouth daily, Disp: , Rfl:     prednisoLONE acetate (PRED FORTE) 1 % ophthalmic suspension, Apply 1-2 drops to both eyes: 3 times per day for one week, 2 times per day for one week, 1 time per day for one week, Disp: 10 mL, Rfl: 1    sildenafil (VIAGRA) 50 MG tablet, Take 0.5 tablets (25 mg) 30 min to 4 hours before intercourse daily as needed. Never use with nitroglycerin, terazosin or doxazosin., Disp: 12 tablet, Rfl: 0    SPIKEVAX 50 MCG/0.5ML injection, , Disp: , Rfl:     triamcinolone (KENALOG) 0.1 % external ointment, Apply twice daily as needed for rash on the trunk or extremities, Disp: 80 g, Rfl: 11    zolpidem (AMBIEN) 5 MG tablet, TAKE ONE TABLET BY MOUTH NIGHTLY AS NEEDED FOR SLEEP, Disp: 30 tablet, Rfl: 1            Physical Exam:   Ranges for vital signs:    Temp:  [98.3  F (36.8  C)] 98.3  F (36.8  C)  Pulse:  [76] 76  BP: (108)/(72) 108/72  SpO2:  [94 %] 94 %    General: Alert and Oriented, NAD  HEENT: Head: atraumatic, normocephalic   Oropharynx: No mucosal lesions   Neck: supple, no cervical adenopathy  Chest: Clear to auscultation  CV: Regular rate and rhythm, S1S2, without mumur  Abdomen: +BS, soft, non-tender, no hepatosplenomegaly, no masses, no guarding or rebound.  Lymph Nodes: No palpable adenopathy  Skin: No rash, a few darker pigmented spots on the face where syphilis lesions were, now fading I intensity though.   Neuro: Alert and Oriented, CN II-XII intact, grossly non-focal , moves all 4 extremities with normal strength  Mood: Stable           Laboratory Data:     Lab on 04/19/2024   Component Date Value Ref Range Status    WBC Count 04/19/2024 4.6  4.0 - 11.0 10e3/uL Final    RBC Count 04/19/2024 4.55  4.40 - 5.90 10e6/uL Final    Hemoglobin 04/19/2024 14.4  13.3 - 17.7 g/dL Final    Hematocrit 04/19/2024 41.2  40.0 - 53.0 % Final    MCV 04/19/2024 91  78 - 100 fL Final    MCH 04/19/2024 31.6  26.5 - 33.0 pg Final    MCHC 04/19/2024 35.0  31.5 - 36.5 g/dL Final    RDW 04/19/2024 12.9  10.0 - 15.0 % Final    Platelet Count 04/19/2024 242  150 - 450 10e3/uL Final    Sodium 04/19/2024 138  135 - 145 mmol/L Final    Reference intervals for this test were updated on 09/26/2023 to more accurately reflect our healthy population. There may be differences in the flagging of prior results with similar values performed with this method. Interpretation of those prior results can be made in the context of the updated reference intervals.     Potassium 04/19/2024 4.2  3.4 - 5.3 mmol/L Final    Chloride 04/19/2024 102  98 - 107 mmol/L Final    Carbon Dioxide (CO2) 04/19/2024 25  22 - 29 mmol/L Final    Anion Gap 04/19/2024 11  7 - 15 mmol/L Final    Urea Nitrogen 04/19/2024 17.4  8.0 - 23.0 mg/dL Final    Creatinine  04/19/2024 0.96  0.67 - 1.17 mg/dL Final    GFR Estimate 04/19/2024 >90  >60 mL/min/1.73m2 Final    Calcium 04/19/2024 10.2 (H)  8.6 - 10.0 mg/dL Final    Glucose 04/19/2024 105 (H)  70 - 99 mg/dL Final        Culture data:  7-Day Micro Results       No results found for the last 168 hours.                  Imaging:   IR PICC Placement > 5 Yrs of Age  Narrative: PROCEDURE: Peripherally Inserted Central Catheter (PICC) placement    Procedural Personnel  Advanced practice provider: Tricia Albarran PA-C    Procedure Date: 3/7/2024    Pre-procedure diagnosis: Ocular Syphilis  Post-procedure diagnosis: Same  Indication: IV antibiotics    Complications: No immediate complications.  Impression: IMPRESSION:  Completed image-guided placement of 5 [place]ian, 50 cm single lumen PICC  via left basilic with tip in right atrium. Aspirates and flushes  freely, heparin locked and ready for immediate use. No immediate  complication.    Plan:   The catheter may be used immediately.  _______________________________________________________________    PROCEDURE SUMMARY:  - Venous access with ultrasound guidance  - PICC insertion with fluoroscopic guidance    PROCEDURE DETAILS:  Pre-procedure  Consent: Informed consent for the procedure including risks, benefits  and alternatives was obtained and time-out was performed prior to the  procedure.  Preparation (MIPS): The site was prepared and draped using all  elements of maximal sterile barrier technique including sterile  gloves, sterile gown, cap, mask, large sterile sheet, sterile  ultrasound probe cover, hand hygiene and cutaneous antisepsis with 2%  chlorhexidine.     Anesthesia/sedation  Level of anesthesia/sedation: 1% lidocaine    Access  Local anesthesia was administered. The vessel was sonographically  evaluated and determined to be patent. Real time ultrasound was used  to visualize needle entry into the vessel and a permanent image was  stored.  Laterality: left  Vein accessed:  Basilic vein  Access technique: Micropuncture set with 21 gauge needle    Catheter placement  The catheter was trimmed to appropriate length and placed into the  vein under fluoroscopic guidance via a peel-away sheath. Catheter tip  location was fluoroscopically verified and a permanent image was  stored. A sterile dressing was applied.  Catheter placed: BD PowerPICC  Catheter size (Danish): 5FR  Catheter intravascular length (cm): 50  Lumens: Single-lumen   Power injectable: Yes  Catheter tip: right atrium  Catheter flush: Heparin (100 units/mL)  Catheter securement technique: Stat-Lock    Radiation Dose  Fluoro time: 0.4 minutes    Additional Details  Specimens removed: None  Estimated blood loss (mL): Less than 10    Attestation    ANIL BOYER PA-C         SYSTEM ID:  [id number]     Sincerely,    Latasha Gil MD

## 2024-04-23 NOTE — PROGRESS NOTES
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 Rash is better vision is improving . Continued humming in his ears raises concern for possible otic syphilis as well. He has already had the needed treatment for CNS syphilis though, so no additional treatment needed at this time.     Recommendations:  Recheck RPR with titer today and again in 3 months.   RTC 3 months.   Referral to ENT clinic to evaluate innitus after treatment for neurosyphilis.      I have reviewed today's Medications, Vital Signs, Labs, and EMR. Total time in patient care day of visit = 32 minutes.     Latasha Gil MD  ID Staff Physician  Pager [phone]    [facility]  [phone]        Interval History:   4/23/2024 the patient presents for follow-up after treatment for ocular syphilis. He c/o continued constant humming in his ears since he had his first PCN shot.  He said his eye doctor said the inflammation in his eyes are gone now. His visio is still somewhat blurry though.            Review of Systems:   CV: NEGATIVE for chest pain, palpitations or peripheral edema  C: NEGATIVE for fever, chills, change in weight  E/M: NEGATIVE for ear, mouth and throat problems  R: NEGATIVE for significant cough or SOB  Patient denies nausea, vomiting, diarrhea, and abdominal pain.          Current Medications       Current Outpatient Medications:     dapsone (ACZONE) 25 MG tablet, TAKE 1 TABLET BY MOUTH EVERY DAY TAKE 2 TABLET BY MOUTH DAILY WHEN FLARING, Disp: 180 tablet, Rfl: 3    DESCOVY 200-25 MG per tablet, Take 1 tablet by mouth daily at 2 pm, Disp: , Rfl:     doxycycline hyclate (VIBRAMYCIN) 100 MG capsule, Take 2 capsules (200 mg) by mouth daily as needed (unprotected intercourse--take within 72 hours), Disp:  60 capsule, Rfl: 3    econazole nitrate 1 % external cream, Apply topically 2 times daily --to anal area for two weeks, Disp: 85 g, Rfl: 2    FLUARIX QUADRIVALENT 0.5 ML injection, , Disp: , Rfl:     fluticasone (FLONASE) 50 MCG/ACT nasal spray, Spray 1 spray into both nostrils daily, Disp: 18.2 mL, Rfl: 6    hypromellose (ARTIFICIAL TEARS) 0.5 % SOLN ophthalmic solution, 1 drop every hour as needed for dry eyes, Disp: , Rfl:     ipratropium (ATROVENT) 0.06 % nasal spray, Spray 2 sprays into both nostrils 4 times daily as needed for rhinitis, Disp: 15 mL, Rfl: 1    ketoconazole (NIZORAL) 2 % external shampoo, Apply topically daily For rash on face and scalp until clear then stop, Disp: 120 mL, Rfl: 11    lifitegrast (XIIDRA) 5 % opthalmic solution, Apply 1 drop to eye 2 times daily, Disp: 90 each, Rfl: 3    multivitamin, therapeutic (THERA-VIT) TABS tablet, Take 1 tablet by mouth daily, Disp: , Rfl:     prednisoLONE acetate (PRED FORTE) 1 % ophthalmic suspension, Apply 1-2 drops to both eyes: 3 times per day for one week, 2 times per day for one week, 1 time per day for one week, Disp: 10 mL, Rfl: 1    sildenafil (VIAGRA) 50 MG tablet, Take 0.5 tablets (25 mg) 30 min to 4 hours before intercourse daily as needed. Never use with nitroglycerin, terazosin or doxazosin., Disp: 12 tablet, Rfl: 0    SPIKEVAX 50 MCG/0.5ML injection, , Disp: , Rfl:     triamcinolone (KENALOG) 0.1 % external ointment, Apply twice daily as needed for rash on the trunk or extremities, Disp: 80 g, Rfl: 11    zolpidem (AMBIEN) 5 MG tablet, TAKE ONE TABLET BY MOUTH NIGHTLY AS NEEDED FOR SLEEP, Disp: 30 tablet, Rfl: 1           Physical Exam:   Ranges for vital signs:    Temp:  [98.3  F (36.8  C)] 98.3  F (36.8  C)  Pulse:  [76] 76  BP: (108)/(72) 108/72  SpO2:  [94 %] 94 %    General: Alert and Oriented, NAD  HEENT: Head: atraumatic, normocephalic   Oropharynx: No mucosal lesions   Neck: supple, no cervical adenopathy  Chest: Clear to  auscultation  CV: Regular rate and rhythm, S1S2, without mumur  Abdomen: +BS, soft, non-tender, no hepatosplenomegaly, no masses, no guarding or rebound.  Lymph Nodes: No palpable adenopathy  Skin: No rash, a few darker pigmented spots on the face where syphilis lesions were, now fading I intensity though.   Neuro: Alert and Oriented, CN II-XII intact, grossly non-focal , moves all 4 extremities with normal strength  Mood: Stable           Laboratory Data:     Lab on 04/19/2024   Component Date Value Ref Range Status    WBC Count 04/19/2024 4.6  4.0 - 11.0 10e3/uL Final    RBC Count 04/19/2024 4.55  4.40 - 5.90 10e6/uL Final    Hemoglobin 04/19/2024 14.4  13.3 - 17.7 g/dL Final    Hematocrit 04/19/2024 41.2  40.0 - 53.0 % Final    MCV 04/19/2024 91  78 - 100 fL Final    MCH 04/19/2024 31.6  26.5 - 33.0 pg Final    MCHC 04/19/2024 35.0  31.5 - 36.5 g/dL Final    RDW 04/19/2024 12.9  10.0 - 15.0 % Final    Platelet Count 04/19/2024 242  150 - 450 10e3/uL Final    Sodium 04/19/2024 138  135 - 145 mmol/L Final    Reference intervals for this test were updated on 09/26/2023 to more accurately reflect our healthy population. There may be differences in the flagging of prior results with similar values performed with this method. Interpretation of those prior results can be made in the context of the updated reference intervals.     Potassium 04/19/2024 4.2  3.4 - 5.3 mmol/L Final    Chloride 04/19/2024 102  98 - 107 mmol/L Final    Carbon Dioxide (CO2) 04/19/2024 25  22 - 29 mmol/L Final    Anion Gap 04/19/2024 11  7 - 15 mmol/L Final    Urea Nitrogen 04/19/2024 17.4  8.0 - 23.0 mg/dL Final    Creatinine 04/19/2024 0.96  0.67 - 1.17 mg/dL Final    GFR Estimate 04/19/2024 >90  >60 mL/min/1.73m2 Final    Calcium 04/19/2024 10.2 (H)  8.6 - 10.0 mg/dL Final    Glucose 04/19/2024 105 (H)  70 - 99 mg/dL Final        Culture data:  7-Day Micro Results       No results found for the last 168 hours.                  Imaging:   IR  PICC Placement > 5 Yrs of Age  Narrative: PROCEDURE: Peripherally Inserted Central Catheter (PICC) placement    Procedural Personnel  Advanced practice provider: Tricia Albarran PA-C    Procedure Date: 3/7/2024    Pre-procedure diagnosis: Ocular Syphilis  Post-procedure diagnosis: Same  Indication: IV antibiotics    Complications: No immediate complications.  Impression: IMPRESSION:  Completed image-guided placement of 5 Turkish, 50 cm single lumen PICC  via left basilic with tip in right atrium. Aspirates and flushes  freely, heparin locked and ready for immediate use. No immediate  complication.    Plan:   The catheter may be used immediately.  _______________________________________________________________    PROCEDURE SUMMARY:  - Venous access with ultrasound guidance  - PICC insertion with fluoroscopic guidance    PROCEDURE DETAILS:  Pre-procedure  Consent: Informed consent for the procedure including risks, benefits  and alternatives was obtained and time-out was performed prior to the  procedure.  Preparation (MIPS): The site was prepared and draped using all  elements of maximal sterile barrier technique including sterile  gloves, sterile gown, cap, mask, large sterile sheet, sterile  ultrasound probe cover, hand hygiene and cutaneous antisepsis with 2%  chlorhexidine.     Anesthesia/sedation  Level of anesthesia/sedation: 1% lidocaine    Access  Local anesthesia was administered. The vessel was sonographically  evaluated and determined to be patent. Real time ultrasound was used  to visualize needle entry into the vessel and a permanent image was  stored.  Laterality: left  Vein accessed: Basilic vein  Access technique: Micropuncture set with 21 gauge needle    Catheter placement  The catheter was trimmed to appropriate length and placed into the  vein under fluoroscopic guidance via a peel-away sheath. Catheter tip  location was fluoroscopically verified and a permanent image was  stored. A sterile dressing was  applied.  Catheter placed: BD PowerPICC  Catheter size (Mauritanian): 5FR  Catheter intravascular length (cm): 50  Lumens: Single-lumen   Power injectable: Yes  Catheter tip: right atrium  Catheter flush: Heparin (100 units/mL)  Catheter securement technique: Stat-Lock    Radiation Dose  Fluoro time: 0.4 minutes    Additional Details  Specimens removed: None  Estimated blood loss (mL): Less than 10    Attestation    ANIL BOYER PA-C         SYSTEM ID:  [id number]

## 2024-04-23 NOTE — NURSING NOTE
"Chief Complaint   Patient presents with    Follow Up     /72   Pulse 76   Temp 98.3  F (36.8  C) (Oral)   Ht 1.727 m (5' 8\")   Wt 71.7 kg (158 lb)   SpO2 94%   BMI 24.02 kg/m    Jeremy Ferris MA on 4/23/2024 at 12:13 PM    "

## 2024-04-24 LAB
RPR SER QL: REACTIVE
RPR SER-TITR: ABNORMAL {TITER}

## 2024-04-25 ENCOUNTER — VIRTUAL VISIT (OUTPATIENT)
Dept: CARE COORDINATION | Facility: CLINIC | Age: 59
End: 2024-04-25
Payer: COMMERCIAL

## 2024-04-25 DIAGNOSIS — Z71.89 COUNSELING AND COORDINATION OF CARE: Primary | ICD-10-CM

## 2024-04-25 NOTE — PROGRESS NOTES
Compulsive Sexual Behavior (CSB) Screening    This worker spoke with Phillip regarding [facility]. Based on information shared by Phillip, he is a good fit for the program. This worker will collaborate with larger CSB team to determine therapist placement.  to call and get Phillip Scheduled once provider is determined.       LORI Templeton  Social Work Care Coordinator  [facility]  [phone]  [email or web address]  Pronouns: They/He

## 2024-04-25 NOTE — RESULT ENCOUNTER NOTE
Good morning Dr. Louise Fisher wanted you to know that your RPR titer is coming down, which means your syphilis has been effectively treated. She would like you to recheck your lab work again in 3 months. Let us know if you have any further questions.     Brad Stewart RN  Infectious Disease on 4/25/2024 at 7:32 AM

## 2024-05-02 ENCOUNTER — MEDICAL CORRESPONDENCE (OUTPATIENT)
Dept: HEALTH INFORMATION MANAGEMENT | Facility: CLINIC | Age: 59
End: 2024-05-02

## 2024-05-20 ENCOUNTER — MYC MEDICAL ADVICE (OUTPATIENT)
Dept: GASTROENTEROLOGY | Facility: CLINIC | Age: 59
End: 2024-05-20
Payer: COMMERCIAL

## 2024-05-30 NOTE — TELEPHONE ENCOUNTER
Called to remind patient of their upcoming appointment with our GI clinic, on Wed June 5th at 11:20am with Dr. Palak Sandoval. This appointment is scheduled as an in-person appt. Please arrive 15 minutes early to check in for your appointment. , if your appointment is virtual (video or telephone) you need to be in [place] for the visit. To reschedule or cancel patient to call [phone].    Nilam Connolly

## 2024-06-05 ENCOUNTER — OFFICE VISIT (OUTPATIENT)
Dept: GASTROENTEROLOGY | Facility: CLINIC | Age: 59
End: 2024-06-05
Payer: COMMERCIAL

## 2024-06-05 VITALS
BODY MASS INDEX: 24.4 KG/M2 | DIASTOLIC BLOOD PRESSURE: 61 MMHG | HEIGHT: 68 IN | HEART RATE: 67 BPM | OXYGEN SATURATION: 100 % | WEIGHT: 161 LBS | SYSTOLIC BLOOD PRESSURE: 106 MMHG

## 2024-06-05 DIAGNOSIS — K90.0 CELIAC DISEASE: Primary | ICD-10-CM

## 2024-06-05 PROCEDURE — 99215 OFFICE O/P EST HI 40 MIN: CPT | Performed by: INTERNAL MEDICINE

## 2024-06-05 ASSESSMENT — PAIN SCALES - GENERAL: PAINLEVEL: NO PAIN (0)

## 2024-06-05 NOTE — LETTER
6/5/2024      Vimal Goldsmith  [address] [place] [address]      Dear Colleague,    Thank you for referring your patient, Vimal Goldsmith, to the [facility] [place]. Please see a copy of my visit note below.    GI CLINIC VISIT    CC: follow-up      ASSESSMENT/PLAN:  (K90.0) Celiac disease     Celiac disease on gluten-free diet with no evidence of active disease on duodenal biopsies in May 2023. Normal serologies in July 2023 as well as normal vit A, B12, D, E, zinc, iron, and ferritin.    Currently feeling well.    Due for annual celiac labs which we ordered today.    Noted hx of hemorrhoids on colonoscopy with prior limited episodes of BRBPR associated with hard stools. Reviewed strategies for optimizing stool consistency. If marked bleeding which persists and/or marked pain would consider colorectal surgery clinic referral at that time for possible banding. Certainly if bleeding recurs or other concerning associated symptoms develop, could consider repeat colonoscopy.     Plan: Adult GI Clinic Follow-Up Order - RTC 1 Year,         Zinc, Vitamin D Deficiency, Iron, Ferritin,         Tissue transglutaminase myrna IgA and IgG, CBC         with platelets              It was a pleasure to participate in the care of this patient; please contact us with any further questions.  A total of 19 face-to-face minutes was spent with this patient, >50% of which was counseling regarding the above delineated issues. An additional 25 minutes was spent on the date of the encounter doing chart review, documentation, care coordination, and further activities as noted above.    Palak Sandoval MD   of Medicine  Division of Gastroenterology, Hepatology and Nutrition  [facility]    ---------------------------------------------------------------------------------------------------  HPI:    Mr. 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 He established care with our clinic in June 2023.       Celiac Disease:  Taken/summarized from prior GI documentation:  Diagnosed with celiac disease in 2001 after many years diarrhea, weight loss, and skin rashes. He recalls even episodes of vomiting and pain as a kid as well. Diagnosis was reportedly via celiac serologies and confirmed with [facility] (though [facility] to [facility] EMR).  Since his diagnosis he has been on a gluten-free diet and he reports repeat endoscopy 3 months after diagnosis with no evidence of active celiac disease. He recalls experiencing weight gain after he went gluten-free. He does not recall if any nutritional evaluation was done at that time.     Review of records with normal Vit A level in 2007 and recent Hgb 14.9 with MCV 92. Had a normal DXA scan in 2007. From available EMR records he had normal TTG and anti-gliadin abs in 2005. We also have a pathology report from 2/11/2013 which showed normal duodenal histology (no signs of celiac disease). Dermatology recommended an EGD and re-establishing care with a GI clinic in 2023. EGD done by [facility] 5/23/23 which was normal with normal duodenal biopsies.     Today, Mr. Goldsmith reports he is doing well. He continues on a gluten-free diet and hasn't had much in the way of GI symptoms. He thinks he had inadvertent gluten exposure at a conference. Typical exposure symptoms are a small rash; he may have abdominal pain and bloating, but this is rare.        Colorectal cancer screening:  Screening colonoscopy July 2022 - 5 mm TA was resected. Exam with diverticulosis and internal hemorrhoids. Due for repeat exam in [id number].     BRBPR:  Infrequently has harder (Dawson 2-3 stools). Prior to GI clinic visit in June 2023 he reported BRBPR with small amount in toilet bowl after passing a hard stool. Noted some blood coating stool  or with wiping for a few days thereafter.  Ultimately he presented to the ED and was told this was likely due to internal hemorrhoids. He did not note any anal/rectal pain or itching. No prolapsed tissue. Bleeding had resolved at the time of his GI visit. Use of stool softeners was recommended.     Today,  he reports this is not currently an issue. He recalls another episode of BRB with hard stools several months ago. Again, small amount of BRB and he treated this with stool softeners and increase fiber intake with good response.        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  All refills should be approved by this provider, or covering partner.    Medication(s): Ambien.   Maximum quantity per month:   Clinic visit frequency required:      Controlled substance agreement on file: No     Last [facility] website verification:  done on 6/11/2019  [email or web address]  '      Therapeutic drug monitoring 09/07/2013     Priority: Medium    Chalazion 05/20/2013     Priority: Medium    Bacterial folliculitis 01/06/2013     Priority: Medium    BPH (benign prostatic hypertrophy) 02/08/2012     Priority: Medium    Tinea pedis 01/23/2012     Priority: Medium    Acne rosacea 01/23/2012     Priority: Medium    Celiac disease 10/23/2011     Priority: Medium    Dermatitis herpetiformis 10/06/2011     Priority: Medium       PERTINENT  MEDICATIONS:  Current Outpatient Medications   Medication Sig Dispense Refill    dapsone (ACZONE) 25 MG tablet TAKE 1 TABLET BY MOUTH EVERY DAY TAKE 2 TABLET BY MOUTH DAILY WHEN FLARING 180 tablet 3    DESCOVY 200-25 MG per tablet Take 1 tablet by mouth daily at 2 pm      doxycycline hyclate (VIBRAMYCIN) 100 MG capsule Take 2 capsules (200 mg) by mouth daily as needed (unprotected intercourse--take within 72 hours) 60 capsule 3    econazole nitrate 1 % external cream Apply topically 2 times daily --to anal area for two weeks 85 g 2    FLUARIX QUADRIVALENT 0.5 ML injection       fluticasone (FLONASE) 50 MCG/ACT nasal spray Spray 1 spray into both nostrils daily 18.2 mL 6    hypromellose (ARTIFICIAL TEARS) 0.5 % SOLN ophthalmic solution 1 drop every hour as needed for dry eyes      ipratropium (ATROVENT) 0.06 % nasal spray Spray 2 sprays into both nostrils 4 times daily as needed for rhinitis 15 mL 1    ketoconazole (NIZORAL) 2 % external shampoo Apply topically daily For rash on face and scalp until clear then stop 120 mL 11    lifitegrast (XIIDRA) 5 % opthalmic solution Apply 1 drop to eye 2 times daily 90 each 3    multivitamin, therapeutic (THERA-VIT) TABS tablet Take 1 tablet by mouth daily      prednisoLONE acetate (PRED FORTE) 1 % ophthalmic suspension Apply 1-2 drops to both eyes: 3 times per day for one week, 2 times per day for one week, 1 time per day for one week 10 mL 1    sildenafil (VIAGRA) 50 MG tablet Take 0.5 tablets (25 mg) 30 min to 4 hours before intercourse daily as needed. Never use with nitroglycerin, terazosin or doxazosin. 12 tablet 0    SPIKEVAX 50 MCG/0.5ML injection       triamcinolone (KENALOG) 0.1 % external ointment Apply twice daily as needed for rash on the trunk or extremities 80 g 11    zolpidem (AMBIEN) 5 MG tablet TAKE ONE TABLET BY MOUTH NIGHTLY AS NEEDED FOR SLEEP 30 tablet 1     No current facility-administered medications for this visit.         PHYSICAL EXAMINATION:  Vitals  "/61   Pulse 67   Ht 1.727 m (5' 8\")   Wt 73 kg (161 lb)   SpO2 100%   BMI 24.48 kg/m     Wt   Wt Readings from Last 2 Encounters:   06/05/24 73 kg (161 lb)   04/23/24 71.7 kg (158 lb)      Gen: Pt sitting up in NAD, interactive and cooperative on exam  Eyes: sclerae anicteric, no injection  ENT:  MMM  Resp: Breathing comfortably on exam, speaking in full sentences  Skin: No jaundice  Neuro: alert, oriented, answers questions appropriately        PERTINENT STUDIES:    Lab on 04/23/2024   Component Date Value Ref Range Status    Prostate Specific Antigen Screen 04/23/2024 2.23  0.00 - 3.50 ng/mL Final    Hemoglobin A1C 04/23/2024 4.8  <5.7 % Final    WBC Count 04/23/2024 5.3  4.0 - 11.0 10e3/uL Final    RBC Count 04/23/2024 4.44  4.40 - 5.90 10e6/uL Final    Hemoglobin 04/23/2024 13.9  13.3 - 17.7 g/dL Final    Hematocrit 04/23/2024 39.6 (L)  40.0 - 53.0 % Final    MCV 04/23/2024 89  78 - 100 fL Final    MCH 04/23/2024 31.3  26.5 - 33.0 pg Final    MCHC 04/23/2024 35.1  31.5 - 36.5 g/dL Final    RDW 04/23/2024 12.9  10.0 - 15.0 % Final    Platelet Count 04/23/2024 201  150 - 450 10e3/uL Final    Sodium 04/23/2024 143  135 - 145 mmol/L Final    Potassium 04/23/2024 4.0  3.4 - 5.3 mmol/L Final    Chloride 04/23/2024 106  98 - 107 mmol/L Final    Carbon Dioxide (CO2) 04/23/2024 26  22 - 29 mmol/L Final    Anion Gap 04/23/2024 11  7 - 15 mmol/L Final    Urea Nitrogen 04/23/2024 16.2  8.0 - 23.0 mg/dL Final    Creatinine 04/23/2024 0.90  0.67 - 1.17 mg/dL Final    GFR Estimate 04/23/2024 >90  >60 mL/min/1.73m2 Final    Calcium 04/23/2024 9.5  8.6 - 10.0 mg/dL Final    Glucose 04/23/2024 103 (H)  70 - 99 mg/dL Final    Treponema Antibody Total 04/23/2024 Reactive (A)  Nonreactive Final    Rapid Plasma Reagin 04/23/2024 Reactive (A)  Nonreactive Final    Rapid Plasma Reagin Titer 04/23/2024 1:16 (H)  Non Reactive Final       Again, thank you for allowing me to participate in the care of your patient.  "     Sincerely,    Palak Sandoval MD

## 2024-06-05 NOTE — PROGRESS NOTES
GI CLINIC VISIT    CC: follow-up      ASSESSMENT/PLAN:  (K90.0) Celiac disease     Celiac disease on gluten-free diet with no evidence of active disease on duodenal biopsies in May 2023. Normal serologies in July 2023 as well as normal vit A, B12, D, E, zinc, iron, and ferritin.    Currently feeling well.    Due for annual celiac labs which we ordered today.    Noted hx of hemorrhoids on colonoscopy with prior limited episodes of BRBPR associated with hard stools. Reviewed strategies for optimizing stool consistency. If marked bleeding which persists and/or marked pain would consider colorectal surgery clinic referral at that time for possible banding. Certainly if bleeding recurs or other concerning associated symptoms develop, could consider repeat colonoscopy.     Plan: Adult GI Clinic Follow-Up Order - RTC 1 Year,         Zinc, Vitamin D Deficiency, Iron, Ferritin,         Tissue transglutaminase myrna IgA and IgG, CBC         with platelets              It was a pleasure to participate in the care of this patient; please contact us with any further questions.  A total of 19 face-to-face minutes was spent with this patient, >50% of which was counseling regarding the above delineated issues. An additional 25 minutes was spent on the date of the encounter doing chart review, documentation, care coordination, and further activities as noted above.    Palak Sandoval MD   of Medicine  Division of Gastroenterology, Hepatology and Nutrition  [facility]    ---------------------------------------------------------------------------------------------------  HPI:    Mr. 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 He established care with our clinic in June 2023.       Celiac Disease:  Taken/summarized from prior GI documentation:  Diagnosed with celiac disease in 2001 after many years  diarrhea, weight loss, and skin rashes. He recalls even episodes of vomiting and pain as a kid as well. Diagnosis was reportedly via celiac serologies and confirmed with [facility] (though prior to [facility] EMR).  Since his diagnosis he has been on a gluten-free diet and he reports repeat endoscopy 3 months after diagnosis with no evidence of active celiac disease. He recalls experiencing weight gain after he went gluten-free. He does not recall if any nutritional evaluation was done at that time.     Review of records with normal Vit A level in 2007 and recent Hgb 14.9 with MCV 92. Had a normal DXA scan in 2007. From available EMR records he had normal TTG and anti-gliadin abs in 2005. We also have a pathology report from 2/11/2013 which showed normal duodenal histology (no signs of celiac disease). Dermatology recommended an EGD and re-establishing care with a GI clinic in 2023. EGD done by [facility] 5/23/23 which was normal with normal duodenal biopsies.     Today, Mr. Goldsmith reports he is doing well. He continues on a gluten-free diet and hasn't had much in the way of GI symptoms. He thinks he had inadvertent gluten exposure at a conference. Typical exposure symptoms are a small rash; he may have abdominal pain and bloating, but this is rare.        Colorectal cancer screening:  Screening colonoscopy July 2022 - 5 mm TA was resected. Exam with diverticulosis and internal hemorrhoids. Due for repeat exam in [id number].     BRBPR:  Infrequently has harder ([place] 2-3 stools). Prior to GI clinic visit in June 2023 he reported BRBPR with small amount in toilet bowl after passing a hard stool. Noted some blood coating stool or with wiping for a few days thereafter.  Ultimately he presented to the ED and was told this was likely due to internal hemorrhoids. He did not note any anal/rectal pain or itching. No prolapsed tissue. Bleeding had resolved at the time of his GI visit.  Use of stool softeners was recommended.     Today,  he reports this is not currently an issue. He recalls another episode of BRB with hard stools several months ago. Again, small amount of BRB and he treated this with stool softeners and increase fiber intake with good response.        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  All refills should be approved by this provider, or covering partner.    Medication(s): Ambien.   Maximum quantity per month:   Clinic visit frequency required:      Controlled substance agreement on file: No     Last [facility] website verification:  done on 6/11/2019  [email or web address]  '      Therapeutic drug monitoring 09/07/2013     Priority: Medium    Chalazion 05/20/2013     Priority: Medium    Bacterial folliculitis 01/06/2013     Priority: Medium    BPH (benign prostatic hypertrophy) 02/08/2012     Priority: Medium    Tinea pedis 01/23/2012     Priority: Medium    Acne rosacea 01/23/2012     Priority: Medium    Celiac disease 10/23/2011     Priority: Medium    Dermatitis herpetiformis 10/06/2011     Priority: Medium       PERTINENT MEDICATIONS:  Current Outpatient Medications   Medication Sig Dispense Refill    dapsone (ACZONE) 25 MG tablet TAKE 1 TABLET BY MOUTH EVERY DAY TAKE 2 TABLET BY MOUTH DAILY WHEN FLARING 180 tablet 3    DESCOVY 200-25 MG per tablet Take 1 tablet by mouth daily at 2 pm       "doxycycline hyclate (VIBRAMYCIN) 100 MG capsule Take 2 capsules (200 mg) by mouth daily as needed (unprotected intercourse--take within 72 hours) 60 capsule 3    econazole nitrate 1 % external cream Apply topically 2 times daily --to anal area for two weeks 85 g 2    FLUARIX QUADRIVALENT 0.5 ML injection       fluticasone (FLONASE) 50 MCG/ACT nasal spray Spray 1 spray into both nostrils daily 18.2 mL 6    hypromellose (ARTIFICIAL TEARS) 0.5 % SOLN ophthalmic solution 1 drop every hour as needed for dry eyes      ipratropium (ATROVENT) 0.06 % nasal spray Spray 2 sprays into both nostrils 4 times daily as needed for rhinitis 15 mL 1    ketoconazole (NIZORAL) 2 % external shampoo Apply topically daily For rash on face and scalp until clear then stop 120 mL 11    lifitegrast (XIIDRA) 5 % opthalmic solution Apply 1 drop to eye 2 times daily 90 each 3    multivitamin, therapeutic (THERA-VIT) TABS tablet Take 1 tablet by mouth daily      prednisoLONE acetate (PRED FORTE) 1 % ophthalmic suspension Apply 1-2 drops to both eyes: 3 times per day for one week, 2 times per day for one week, 1 time per day for one week 10 mL 1    sildenafil (VIAGRA) 50 MG tablet Take 0.5 tablets (25 mg) 30 min to 4 hours before intercourse daily as needed. Never use with nitroglycerin, terazosin or doxazosin. 12 tablet 0    SPIKEVAX 50 MCG/0.5ML injection       triamcinolone (KENALOG) 0.1 % external ointment Apply twice daily as needed for rash on the trunk or extremities 80 g 11    zolpidem (AMBIEN) 5 MG tablet TAKE ONE TABLET BY MOUTH NIGHTLY AS NEEDED FOR SLEEP 30 tablet 1     No current facility-administered medications for this visit.         PHYSICAL EXAMINATION:  Vitals /61   Pulse 67   Ht 1.727 m (5' 8\")   Wt 73 kg (161 lb)   SpO2 100%   BMI 24.48 kg/m     Wt   Wt Readings from Last 2 Encounters:   06/05/24 73 kg (161 lb)   04/23/24 71.7 kg (158 lb)      Gen: Pt sitting up in NAD, interactive and cooperative on exam  Eyes: " sclerae anicteric, no injection  ENT:  MMM  Resp: Breathing comfortably on exam, speaking in full sentences  Skin: No jaundice  Neuro: alert, oriented, answers questions appropriately        PERTINENT STUDIES:    Lab on 04/23/2024   Component Date Value Ref Range Status    Prostate Specific Antigen Screen 04/23/2024 2.23  0.00 - 3.50 ng/mL Final    Hemoglobin A1C 04/23/2024 4.8  <5.7 % Final    WBC Count 04/23/2024 5.3  4.0 - 11.0 10e3/uL Final    RBC Count 04/23/2024 4.44  4.40 - 5.90 10e6/uL Final    Hemoglobin 04/23/2024 13.9  13.3 - 17.7 g/dL Final    Hematocrit 04/23/2024 39.6 (L)  40.0 - 53.0 % Final    MCV 04/23/2024 89  78 - 100 fL Final    MCH 04/23/2024 31.3  26.5 - 33.0 pg Final    MCHC 04/23/2024 35.1  31.5 - 36.5 g/dL Final    RDW 04/23/2024 12.9  10.0 - 15.0 % Final    Platelet Count 04/23/2024 201  150 - 450 10e3/uL Final    Sodium 04/23/2024 143  135 - 145 mmol/L Final    Potassium 04/23/2024 4.0  3.4 - 5.3 mmol/L Final    Chloride 04/23/2024 106  98 - 107 mmol/L Final    Carbon Dioxide (CO2) 04/23/2024 26  22 - 29 mmol/L Final    Anion Gap 04/23/2024 11  7 - 15 mmol/L Final    Urea Nitrogen 04/23/2024 16.2  8.0 - 23.0 mg/dL Final    Creatinine 04/23/2024 0.90  0.67 - 1.17 mg/dL Final    GFR Estimate 04/23/2024 >90  >60 mL/min/1.73m2 Final    Calcium 04/23/2024 9.5  8.6 - 10.0 mg/dL Final    Glucose 04/23/2024 103 (H)  70 - 99 mg/dL Final    Treponema Antibody Total 04/23/2024 Reactive (A)  Nonreactive Final    Rapid Plasma Reagin 04/23/2024 Reactive (A)  Nonreactive Final    Rapid Plasma Reagin Titer 04/23/2024 1:16 (H)  Non Reactive Final

## 2024-06-05 NOTE — PATIENT INSTRUCTIONS
- stop at the lab for a blood draw at your earliest convenience  - schedule a follow-up GI clinic visit in one year     If you have any questions, please don't hesitate to contact me through our GI RN Clinic Coordinator, Cornelia Ulloa, at [phone].

## 2024-06-05 NOTE — NURSING NOTE
"chief complaint    Vitals:    06/05/24 1125   BP: 106/61   Pulse: 67   SpO2: 100%   Weight: 73 kg (161 lb)   Height: 1.727 m (5' 8\")       Body mass index is 24.48 kg/m .    Bina Whitehead MA    "

## 2024-06-06 ENCOUNTER — OFFICE VISIT (OUTPATIENT)
Dept: PSYCHOLOGY | Facility: CLINIC | Age: 59
End: 2024-06-06
Payer: COMMERCIAL

## 2024-06-06 DIAGNOSIS — F43.21 ADJUSTMENT DISORDER WITH DEPRESSED MOOD: Primary | ICD-10-CM

## 2024-06-06 PROCEDURE — 90791 PSYCH DIAGNOSTIC EVALUATION: CPT | Mod: HN

## 2024-06-06 ASSESSMENT — COLUMBIA-SUICIDE SEVERITY RATING SCALE - C-SSRS
TOTAL  NUMBER OF ABORTED OR SELF INTERRUPTED ATTEMPTS LIFETIME: NO
2. HAVE YOU ACTUALLY HAD ANY THOUGHTS OF KILLING YOURSELF?: NO
ATTEMPT LIFETIME: NO
TOTAL  NUMBER OF INTERRUPTED ATTEMPTS LIFETIME: NO
1. HAVE YOU WISHED YOU WERE DEAD OR WISHED YOU COULD GO TO SLEEP AND NOT WAKE UP?: YES
1. IN THE PAST MONTH, HAVE YOU WISHED YOU WERE DEAD OR WISHED YOU COULD GO TO SLEEP AND NOT WAKE UP?: NO
6. HAVE YOU EVER DONE ANYTHING, STARTED TO DO ANYTHING, OR PREPARED TO DO ANYTHING TO END YOUR LIFE?: NO

## 2024-06-06 NOTE — PROGRESS NOTES
"  [facility]  Awa Zamarripa, Doctoral Psychology Intern (PsyD)  Supervisor's Name: Jg Vazquez, PhD, LP    PATIENT'S NAME: Vimal Goldsmith  PREFERRED NAME: Phillip  PRONOUNS:  He/Him/His     MRN: [id number]  : 1965 , Age: 59 year old  DATE OF SERVICE: 24  START TIME: 2:00 PM  END TIME: 2:50 PM  PREFERRED PHONE: [phone] (home) [phone] (work)  May we leave a program related message: Yes  SERVICE MODALITY:  In-person    UNIVERSAL ADULT MENTAL HEALTH DIAGNOSTIC ASSESSMENT    Reviewed confidentiality, informed consent, and relevant [facility] policies.    Identifying Information:  Patient is a 59 year old year old,  cis-gender gipson male. The pronoun use throughout this assessment reflects the patient's chosen pronoun. Patient was referred for an assessment by primary care provider, Armando Banks MD at [facility]. Patient attended the session alone.    Chief Concern:   The reason for seeking services at this time is: \"sexual addiction.\" The problems with self-reported concerns about compulsive sexual behavior began in his 30s when the patient started dating men and came out as gipson. He noted problems were \"always there\" and desired to have sexual encounters with multiple partners while in a primary relationship. The patient indicated his sexual behavior became more problematic once he  his  (\"not faithful\") and exacerbated during the COVID-19 pandemic when he was required to travel more often for work where he would use hookup apps. Patient has not attempted to resolve these concerns in the past, and this is the first time he is seeking treatment.    Social/Family History:  Patient reported he grew up in [facility]. He is the youngest child of two children born by his biological parents. Parents were always together and remained . His parents passed away in  due to physical health illnesses. He reported growing up close " "with his mother, who often asked when he would get  and have children. His relationship with his father was \"good but not very close.\" Further, he discussed that his brother was academically ahead of him and that their father focused more on his brother. In 1997, the patient moved to [place] for college and noted that he became more distant with his family due to the distance. In the early 2000s, he returned to [place] and came out to his parents, who kept his sexuality to themselves and private. The patient reported that their childhood was \"carefree, but he grew up poor and were more interested in boys than girls after puberty.\" Patient described he is close with his younger brother.    The patient describes their cultural background as  and gipson. Cultural influences and impact on patient's life structure, values, norms, and healthcare: Racial or Ethnic Self-Identification - Patient identified as  and discussed it having an impact on his sexual health, specifically coming out later in adulthood (\"always felt bad about being gipson\"). Contextual influences on patient's health include: N/A. Cultural, Contextual, and socioeconomic factors do not affect the patient's access to services. These factors will be addressed in the Preliminary Treatment plan. Patient identified their preferred language to be English. Patient reported he does not need the assistance of an  or other support involved in therapy.     The patient reported having no significant delays in developmental tasks. Patient identified the following learning problems: speech (never formally diagnosed but struggled with symptoms of dyslexia). He reported attending college in [place] and moved to [place] in 1997 to pursue his PhD. He eventually transferred in 1991 to the [employer], where he earned his PhD in Computer Science. He noted that is when he initially gained access to pornography, but he was not engaging in any " "problematic sexual behaviors at this time. Overall, he indicated doing academically well. Modifications will not be used to assist communication in therapy. Patient reports he can understand written materials.    The patient reported that his relationship history consisted of two serious dating relationships in his early 30s. He noted his first relationship was more open, where they both had multiple sexual partners at a time. His second serious relationship focused on one partner, which he described as unsatisfying. The patient's current relationship status is  to his , who identifies as a White cisgender gipson male, since  2015; previously, they were in a long-distance relationship for ten years before marriage. He reported their relationship having \"ups and downs\" before marriage, where he sought sexual encounters with multiple partners outside of the relationship using [employer] and [employer]. The patient described a pattern of feeling bad about sexual behavior, stopping for a while, and then feeling an intense urge for sexual encounters with men outside of their monogamous relationship. In February 2024, patient reported testing positive for syphilis and informed his , resulting in discord in the relationship. Patient expressed his behaviors have created damage and a risk to his . Patient reported having zero children. He identifies his partner as a part of his support system and identifies the quality of this relationship as good.     Patient's current living/housing situation involves staying in his own home. Patient lives with his  and he reported that housing is stable.     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 Patient reports his finances are obtained through employment. Patient does not identify finances as a current stressor. He noted having to travel often for his job, which has been an antecedent to his " compulsive sexual behavior problems.    Patient reported that he has not been involved with the legal system. Patient denies being on probation / parole / under the jurisdiction of the court.    Compulsive Sexual Behavior (CSB) Program-Specific Information     Chief Complaint/ History of Presenting Problem and Goals:  Unable to complete due to time, will plan to assess at next appointment.     Current Significant Relationship/Partner information:  Unable to complete due to time, will plan to assess at next appointment.     Endorsed problematic behaviors include:   Unable to complete due to time, will plan to assess at next appointment.     Any paraphilic behaviors:  Unable to complete due to time, will plan to assess at next appointment.     Patient denied gender identity concerns.    Patient's Strengths and Limitations:  Patient identified the following strengths or resources that will help him succeed in treatment: intelligence, positive work environment, motivation, and work ethic. Things that may interfere with the patient's success in treatment include: few friends, lack of family support, and lack of social support.     Personal and Family Medical History:  Patient does not report a family history of mental health concerns. Patient reports family history includes Arthritis in his mother; Cancer in his father; Cerebrovascular Disease in his father; Gastrointestinal Disease in his brother and father; Glaucoma in his father and another family member; Heart Disease in his father; Hypertension in his father and mother; Kidney Disease in his brother and mother; Lipids in his mother; Osteoporosis in his mother; Other Cancer in his father; Stomach Problem in his father.    Patient has not been previously diagnosed with a mental health diagnosis.   Patient has received the following mental health services in the past: medication from physician in college for anxiety-related symptoms (unable to recall prescription). He  noted the medication helped decrease his anxiousness when speaking with colleagues  Patient is not currently receiving any mental health services.  Hospitalizations: None.   Previous/Current commitments: None.     Patient has had a physical exam to rule out medical causes for current symptoms. Date of last physical exam was 12/21/23. The patient has a [facility] Primary Care Provider, who is named Armando Banks MD. Patient reports the following current medical concerns: celiac disease, syphilis, and tinnitus in left ear. Patient indicated his syphilis has been effectively treated and plans to recheck his lab work again in three months.      Patient denies any issues with pain. There are not significant appetite / nutritional concerns / weight changes. Patient reports the following sleep concerns: infrequent bedtime and awake time. Patient does not report a history of head injury / trauma / cognitive impairment.     Patient reports not taking any current medications    Patient Allergies:    Allergies   Allergen Reactions    Gluten Meal Rash and GI Disturbance    Nkda [No Known Drug Allergy]        Medical History:    Past Medical History:   Diagnosis Date    Benign positional vertigo     Celiac disease 10/23/2011    Chronic kidney disease 1975    diagnosed when I was [age]    Chronic sinusitis     Dermatitis herpetiformis     Gastro-oesophageal reflux disease 1987    had suffered any years ago before  celiac disease was diagno    Hoarseness     Migraines     Nasal polyps     Reduced vision      Current Mental Status Exam:   Appearance:   Appropriate    Eye Contact:   Good   Psychomotor:   Restless       Gait / station:   No problem  Attitude / Demeanor:  Cooperative  Friendly Pleasant  Speech      Rate / Production:  Normal/ Responsive      Volume:   Normal  volume      Language:   Intact and No problems  Mood:    Sad   Affect:    Appropriate    Thought Content:  Clear   Thought Process:  Coherent  Logical        "Associations:  No loosening of associations  Insight:    Fair   Judgment:   Intact   Orientation:   All  Attention/concentration: Good    Substance Use:  Patient did not report a family history of substance use concerns; see medical history section for details. Patient has not received chemical dependency treatment in the past. Patient has not ever been to detox. Patient is not currently receiving any chemical dependency treatment. Patient reported no problems as a result of his substance use.    Alcohol: Reported past use of alcohol (1 drink once every 3 months) to help with sleep difficulties and when traveling.  Nicotine: Denied past or current use.  Cannabis: Denied past or current use.  Caffeine: Currently uses caffeine (8 cups of coffee daily); last used on 5/28/24. Age of first use was 22.  Street Drugs: Denied past or current use.  Prescription Drugs: Denied past or current use.    CAGE: None of the patient's responses to the CAGE screening were positive / Negative CAGE score     Based on the negative CAGE score and clinical interview there are not indications of drug or alcohol abuse.    Significant Losses / Trauma / Abuse / Neglect Issues:   Patient denies  serving in the .  There are indications or report of significant loss, trauma, abuse or neglect issues related to: death of parents in 2015 and relationship conflict.  Concerns for possible neglect are not present.     Safety Assessment:   Current Safety Concerns:  [place] Suicide Severity Rating Scale (Lifetime/Recent)      3/7/2024    11:12 AM 6/6/2024     2:05 PM   [place] Suicide Severity Rating (Lifetime/Recent)   Q1 Wished to be Dead (Past Month) 0-->no    Q2 Suicidal Thoughts (Past Month) 0-->no    Q6 Suicide Behavior (Lifetime) 0-->no    Level of Risk per Screen no risks indicated    Q1 Wish to be Dead (Lifetime)  Y   Wish to be Dead Description (Lifetime)  \"Late 20s and being gipson in [place] was difficult.\"   1. Wish to be Dead (Past 1 " Month)  N   Q2 Non-Specific Active Suicidal Thoughts (Lifetime)  N   Actual Attempt (Lifetime)  N   Has subject engaged in non-suicidal self-injurious behavior? (Lifetime)  N   Interrupted Attempts (Lifetime)  N   Aborted or Self-Interrupted Attempt (Lifetime)  N   Preparatory Acts or Behavior (Lifetime)  N   Calculated C-SSRS Risk Score (Lifetime/Recent)  No Risk Indicated     Patient denies current homicidal ideation and behaviors.  Patient denies current self-injurious ideation and behaviors.    Patient denied risk behaviors associated with substance use.  Patient denies any high risk behaviors associated with mental health symptoms.  Patient reports the following current concerns for their personal safety: None.  Patient reports there are not firearms in the house.    History of Safety Concerns:  Patient denied a history of homicidal ideation.     Patient denied a history of personal safety concerns.    Patient denied a history of assaultive behaviors.    Patient denied a history of sexual assault behaviors.     Patient denied a history of risk behaviors associated with substance use.  Patient denies any history of high risk behaviors associated with mental health symptoms.  Patient reports the following protective factors: forward or future oriented thinking; safe and stable environment; regular sleep; purpose; abstinence from substances; living with other people; sense of meaning; financial stability    Risk Plan:  See Recommendations for Safety and Risk Management Plan    Review of Symptoms per patient report:  Depression: Change in sleep and Change in energy level    Patient reports the following compulsive behaviors and treatment history: Compulsive sexual behavior. Will continue to assess severity and impact.      Diagnostic Criteria:   F43.21  A.  The development of emotional or behavioral symptoms in response to an identifiable stressor(s) occurring within 3 months of the onset of the stressor(s).  B.   The symptoms or behaviors are clinically significant, as evidenced by one or both of the followin.  Marked distress that is out of proportion to the severity or intensity of the stressor, taking into account the external context and the cultural factors that might influence symptom severity and presentation.             2.  Significant impairment in social, occupational, or other important areas of functioning.  C.  The stress-related disturbance does not meet the criteria for another mental disorder and is not merely an exacerbation of a pre-existing mental disorder.  D.  The symptoms do not represent normal bereavement.  E.  Once the stressor or its consequences have terminated, the symptoms do not persist for more than an additional 6 months.  -With depression: low mood, tearfulness, or feeling of hopelessness are predominant.  *Adjustment Disorder, With depressed mood    PROMIS-10 Scores  Global Mental Health Score: (P) 14  Global Physical Health Score: (P) 13   PROMIS TOTAL - SUBSCORES: (P) 27      Psychiatric Diagnosis:  Adjustment Disorder, With depressed mood    Provisional Diagnostic Hypothesis:  Other specified disruptive, impulse control, and conduct disorder (hypersexuality)- Further assessment needed.     Conclusions/Recommendations/Initial Treatment Goals:   - Bi-weekly therapy with this provider to assess and treat concerns with stressors and compulsive sexual behavior  - Consider weekly group for compulsive sexual behavior  - Collateral session with client's current partner to establish rapport and continue assessing relationship/sexual concerns.    Interactive Complexity  Communication difficulties present during the current psychiatric procedure included: None    Signature/Title:     Awa Zamarripa, Doctoral Psychology Intern ([place])

## 2024-06-13 ENCOUNTER — OFFICE VISIT (OUTPATIENT)
Dept: PSYCHOLOGY | Facility: CLINIC | Age: 59
End: 2024-06-13
Payer: COMMERCIAL

## 2024-06-13 DIAGNOSIS — F43.21 ADJUSTMENT DISORDER WITH DEPRESSED MOOD: Primary | ICD-10-CM

## 2024-06-13 PROCEDURE — 90834 PSYTX W PT 45 MINUTES: CPT | Mod: HN

## 2024-06-13 NOTE — PROGRESS NOTES
[facility] - Progress Note    Date of Service: 24   Name: Vimal Goldsmith  : 1965  Medical Record Number: [id number]  Treating Provider: Awa Zamarripa, Doctoral Psychology Intern ([place])  Type of Session: Individual  Present in Session: Patient and Trainee  Session Start and Stop Time: 10:00 AM-10:50 AM  Number of Minutes:  50 minutes    SERVICE MODALITY:  In-person    DSM-5 Diagnoses:  Adjustment Disorder, With depressed mood     Current Reported Symptoms and Status update:  Changes since last session: Reported feeling stressed with a work project, sleep patterns is inconsistent, and is working on reconciling his marriage.     Progress Toward Treatment Goals:   Minimal progress - ACTION (Actively working towards change); Intervened by reinforcing change plan / affirming steps taken.    Therapeutic Interventions/Treatment Strategies:    The focus of today's session was continued assessment of patient's sexual concerns (see below). We also worked on building therapeutic rapport. Trainee provided information about compulsive sexual behavior and treatment. CSB diagnosis deferred until further assessing his relationship desires.    Psychotherapist offered support, feedback and validation and reinforced use of skills.  Treatment modalities used include: Motivational Interviewing, Cognitive Behavioral Therapy, and Sexual Health and Wellness Education.  Provided Support, Feedback, Clarification, and Education.     Patient Response:   Patient responded to session by accepting feedback, listening, accepting support, verbalizing understanding, and actively engaged.  Possible barriers to participation / learning include: physical illness/complaints/pain and lack of family support.    Current Mental Status Exam:   Appearance:  Appropriate   Eye Contact:  Good   Attitude / Demeanor: Cooperative  Friendly Pleasant  Speech      Rate / Production: Normal/ Responsive      Volume:  Normal   volume  Orientation:  All  Mood:   Sad   Affect:   Appropriate   Thought Content: Clear   Insight:   Fair     Plan/Need for Future Services:  Return for therapy in 2 weeks to treat diagnosed problems.    Patient will have a treatment plan initiated next session.     Referral / Collaboration:  Referral to another professional/service is not indicated at this time..  Emergency Services Needed?  No    Assignment:  Patient will start to consider therapeutic treatment goals.    Interactive Complexity:  There are four specific communication difficulties that complicate the work of the primary psychiatric procedure.  Interactive complexity (+[id number]) may be reported when at least one of these difficulties is present.    Communication difficulties present during current the psychiatric procedure include:  None.      Signature/Title:    Awa Zamarripa, Doctoral Psychology Intern (PsyD)        Chief Complaint/ History of Presenting Problem and Goals:  - Patient's problems with out of control, driven, impulsive, compulsive sexual behavior began around the age of 30s when he came out as gipson. He reported being in a polyamorous relationship prior to his . He noted that his behaviors of having sexual encounters became problematic when starting to date his  around the age of 39, when  desired a monogamous relationship. Patient started being dishonest and not forthcoming with his  within a year into their relationship.  - Patient made the following efforts to change problematic behavior: resort to internet pornography use to reduce his desire to engage in multiple sexual encounters, distraction through activities (watch TV and eat), and discussed polyamory with .  - Patient reported these behaviors have negatively impacted his life by hurting his , wasting his time through using hookup apps, sleep hygiene, physical health (STI), and makes him feel unhappy for lying to  and still feels like  "\"a piece is missing.\"    Current Significant Relationship/Partner information:  - Patient reported that the partner is aware of the following problematic behaviors: having sex outside of a monogamous relationship, using hookup apps, and the use of internet ponography.  - Patient reported the partner became aware of these concerns through discovering sexual pictures of patient and a sexual partner on a flash drive 15 years ago and disclosed to  in February 2024 after testing positive for syphilis.   - Patient reported that partner's reaction to becoming aware of these problematic behaviors was being upset (broke up for a few months when partner found out 15 years ago), arguing, hurt, and contemplating on getting a divorce after testing positive for syphilis.  - Patient reported that the partner is not aware of the following problematic behaviors: flirting in public settings and attending sex clubs when traveling to work.  - Patient reported that he is open to having his partner join him in some upcoming sessions.  - Patient reported that he is unsure about his partner being open to joining him in some upcoming sessions.     Endorsed problematic behaviors include:   - Having multiple sexual encounters (1 new sexual encounter per week).  - Sex outside of a monogamous relationship.  - Hookup apps ([employer], [employer] - around 4 hours daily).  - Attending sex clubs when traveling for work.  - History of anonymous hookups, not present concerns since November 2023.  - Frequent flirting in public settings (gym).  - Internet pornography use (3-4x week, ranges from 30 minutes- 2 hours), currently a present concern.  - Spending hours watching and re-watching TV shows or looking for pictures on internet after seeing an actor and/or gannon who he finds attractive (2x a year).    Any paraphilic behaviors:  - Patient denied paraphilic concerns.  - Patient denies sexual orientation concerns.  - Patient reports sexual " functioning concerns. He reported using Viagra often (during most sexual activity) to maintain an erection. Patient reported using it for the past 10 years.   - Patient denies gender identity concerns.

## 2024-06-14 ENCOUNTER — OFFICE VISIT (OUTPATIENT)
Dept: DERMATOLOGY | Facility: CLINIC | Age: 59
End: 2024-06-14
Attending: FAMILY MEDICINE
Payer: COMMERCIAL

## 2024-06-14 DIAGNOSIS — L13.0 DERMATITIS HERPETIFORMIS: ICD-10-CM

## 2024-06-14 DIAGNOSIS — L73.9 FOLLICULITIS: Primary | ICD-10-CM
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
## 2024-06-14 PROCEDURE — G2211 COMPLEX E/M VISIT ADD ON: HCPCS | Performed by: STUDENT IN AN ORGANIZED HEALTH CARE EDUCATION/TRAINING PROGRAM

## 2024-06-14 PROCEDURE — 99214 OFFICE O/P EST MOD 30 MIN: CPT | Performed by: STUDENT IN AN ORGANIZED HEALTH CARE EDUCATION/TRAINING PROGRAM

## 2024-06-14 RX ORDER — CLOBETASOL PROPIONATE 0.5 MG/G
OINTMENT TOPICAL 2 TIMES DAILY
Qty: 60 G | Refills: 3 | Status: SHIPPED | OUTPATIENT
Start: 2024-06-14

## 2024-06-14 RX ORDER — CLINDAMYCIN PHOSPHATE 11.9 MG/ML
SOLUTION TOPICAL
Qty: 60 ML | Refills: 1 | Status: SHIPPED | OUTPATIENT
Start: 2024-06-14

## 2024-06-14 RX ORDER — DAPSONE 25 MG/1
TABLET ORAL
Qty: 180 TABLET | Refills: 3 | Status: SHIPPED | OUTPATIENT
Start: 2024-06-14

## 2024-06-14 NOTE — PATIENT INSTRUCTIONS
"Look up \"bite away\" product for bug bites on amaz[employer]    Consider benzoyl peroxide wash in the shower for body folliculitis   "

## 2024-06-14 NOTE — LETTER
6/14/2024       RE: Vimal Goldsmith  [address]  [place] [address]     Dear Colleague,    Thank you for referring your patient, Vimal Goldsmith, to the [facility] [place] at [facility]. Please see a copy of my visit note below.    [facility] Dermatology Note    Encounter Date: Jun 14, 2024    Dermatology Problem List:  #Syphilis w/ anterior uveitsi   #DH; dapsone 50mg/d     Major PMHx  #Celiac disease  ______________________________________    Impression/Plan:  Phillip was seen today for derm problem.    Diagnoses and all orders for this visit:    Folliculitis  -     clindamycin (CLEOCIN T) 1 % external solution; To the scalp  - scalp    Dermatitis herpetiformis (Continuing focal point for medical care services related to serious/complex condition)  -     Adult Dermatology  Referral  -     dapsone (ACZONE) 25 MG tablet; TAKE 1 TABLET BY MOUTH EVERY DAY TAKE 2 TABLET BY MOUTH DAILY WHEN FLARING  - due blood work soon  - Most recent hemoglobin and MCV were within normal limits  - Denies sensation of numbness tingling or weakness in fingers and feet    Arthropod bite, initial encounter  -     clobetasol (TEMOVATE) 0.05 % external ointment; Apply topically 2 times daily  -Apply twice daily  - Also recommend over-the-counter product bite away    Other orders  -     Adult Dermatology Clinic Follow-Up Order (Blank); Future        Follow-up in 1 yEAR.       Staff Involved:  Staff Only    Prieto Panchal MD   of Dermatology  Department of Dermatology  [facility]      CC:   Chief Complaint   Patient presents with    Derm Problem     Dermatitis recheck- he notes that it is stable        History of Present Illness:  Mr. Vimal Goldsmith is a 59 year old male who presents as a return patient.    Pt presents today for concerns about spots on trunk and extremities        Labs:      Physical exam:  Vitals: There were no vitals taken for this visit.  GEN: well developed, well-nourished, in no acute distress, in a pleasant mood.     SKIN: Bravo phototype 1  - Full skin, which includes the head/face, both arms, chest, back, abdomen,both legs, genitalia and/or groin buttocks, digits and/or nails, was examined.  -Resolving pink edematous bumps on trunk and extremities  - No other lesions of concern on areas examined.     Past Medical History:   Past Medical History:   Diagnosis Date    Benign positional vertigo     Celiac disease 10/23/2011    Chronic kidney disease 1975    diagnosed when I was [age]    Chronic sinusitis     Dermatitis herpetiformis     Gastro-oesophageal reflux disease 1987    had suffered any years ago before  celiac disease was diagno    Hoarseness     Migraines     Nasal polyps     Reduced vision      Past Surgical History:   Procedure Laterality Date    CATARACT IOL, RT/LT      COLONOSCOPY N/A 7/14/2022    Procedure: COLONOSCOPY, WITH POLYPECTOMY AND BIOPSY;  Surgeon: Cooper Juan MD;  Location:  GI    ENHANCE LASER REFRACTIVE BILATERAL EXISTING PT IN PARAMETERS Left 01/04/2017    ESOPHAGOSCOPY, GASTROSCOPY, DUODENOSCOPY (EGD), COMBINED  2/11/2013    Procedure: COMBINED ESOPHAGOSCOPY, GASTROSCOPY, DUODENOSCOPY (EGD), BIOPSY SINGLE OR MULTIPLE;;  Surgeon: Luke Juan MD;  Location: [facility]    ESOPHAGOSCOPY, GASTROSCOPY, DUODENOSCOPY (EGD), COMBINED N/A 5/23/2023    Procedure: Esophagoscopy, gastroscopy, duodenoscopy (EGD), combined;  Surgeon: Palak Sandoval MD;  Location: [facility] OR    INSERT PICC LINE Left 3/7/2024    Procedure: Insert picc line;  Surgeon: Tricia Albarran PA-C;  Location: [facility] OR    IR PICC PLACEMENT > 5 YRS OF AGE  3/7/2024    NASAL/SINUS POLYPECTOMY         Social History:   reports that he has never smoked. He has never used smokeless tobacco. He reports that he does not drink alcohol and does not use  drugs.    Family History:  Family History   Problem Relation Age of Onset    Lipids Mother     Kidney Disease Mother     Arthritis Mother     Hypertension Mother         passed away    Osteoporosis Mother     Glaucoma Father     Gastrointestinal Disease Father         maybe celiac    Cancer Father         Leukemia    Heart Disease Father     Hypertension Father         passed away    Cerebrovascular Disease Father         passed    Stomach Problem Father     Other Cancer Father         leukemia    Gastrointestinal Disease Brother         maybe celiac    Kidney Disease Brother         Casimiro stone    Glaucoma Other     Cancer - colorectal No family hx of     Prostate Cancer No family hx of     Melanoma No family hx of     Skin Cancer No family hx of        Medications:  Current Outpatient Medications   Medication Sig Dispense Refill    clindamycin (CLEOCIN T) 1 % external solution To the scalp 60 mL 1    clobetasol (TEMOVATE) 0.05 % external ointment Apply topically 2 times daily 60 g 3    dapsone (ACZONE) 25 MG tablet TAKE 1 TABLET BY MOUTH EVERY DAY TAKE 2 TABLET BY MOUTH DAILY WHEN FLARING 180 tablet 3    DESCOVY 200-25 MG per tablet Take 1 tablet by mouth daily at 2 pm      doxycycline hyclate (VIBRAMYCIN) 100 MG capsule Take 2 capsules (200 mg) by mouth daily as needed (unprotected intercourse--take within 72 hours) 60 capsule 3    econazole nitrate 1 % external cream Apply topically 2 times daily --to anal area for two weeks 85 g 2    FLUARIX QUADRIVALENT 0.5 ML injection       fluticasone (FLONASE) 50 MCG/ACT nasal spray Spray 1 spray into both nostrils daily 18.2 mL 6    hypromellose (ARTIFICIAL TEARS) 0.5 % SOLN ophthalmic solution 1 drop every hour as needed for dry eyes      ipratropium (ATROVENT) 0.06 % nasal spray Spray 2 sprays into both nostrils 4 times daily as needed for rhinitis 15 mL 1    ketoconazole (NIZORAL) 2 % external shampoo Apply topically daily For rash on face and scalp until clear then  stop 120 mL 11    lifitegrast (XIIDRA) 5 % opthalmic solution Apply 1 drop to eye 2 times daily 90 each 3    multivitamin, therapeutic (THERA-VIT) TABS tablet Take 1 tablet by mouth daily      prednisoLONE acetate (PRED FORTE) 1 % ophthalmic suspension Apply 1-2 drops to both eyes: 3 times per day for one week, 2 times per day for one week, 1 time per day for one week 10 mL 1    sildenafil (VIAGRA) 50 MG tablet Take 0.5 tablets (25 mg) 30 min to 4 hours before intercourse daily as needed. Never use with nitroglycerin, terazosin or doxazosin. 12 tablet 0    SPIKEVAX 50 MCG/0.5ML injection       triamcinolone (KENALOG) 0.1 % external ointment Apply twice daily as needed for rash on the trunk or extremities 80 g 11    zolpidem (AMBIEN) 5 MG tablet TAKE ONE TABLET BY MOUTH NIGHTLY AS NEEDED FOR SLEEP 30 tablet 1     Allergies   Allergen Reactions    Gluten Meal Rash and GI Disturbance    Nkda [No Known Drug Allergy]

## 2024-06-14 NOTE — PROGRESS NOTES
[facility] Dermatology Note    Encounter Date: Jun 14, 2024    Dermatology Problem List:  #Syphilis w/ anterior uveitsi   #DH; dapsone 50mg/d     Major PMHx  #Celiac disease  ______________________________________    Impression/Plan:  Phillip was seen today for derm problem.    Diagnoses and all orders for this visit:    Folliculitis  -     clindamycin (CLEOCIN T) 1 % external solution; To the scalp  - scalp    Dermatitis herpetiformis (Continuing focal point for medical care services related to serious/complex condition)  -     Adult Dermatology  Referral  -     dapsone (ACZONE) 25 MG tablet; TAKE 1 TABLET BY MOUTH EVERY DAY TAKE 2 TABLET BY MOUTH DAILY WHEN FLARING  - due blood work soon  - Most recent hemoglobin and MCV were within normal limits  - Denies sensation of numbness tingling or weakness in fingers and feet    Arthropod bite, initial encounter  -     clobetasol (TEMOVATE) 0.05 % external ointment; Apply topically 2 times daily  -Apply twice daily  - Also recommend over-the-counter product bite away    Other orders  -     Adult Dermatology Clinic Follow-Up Order (Blank); Future        Follow-up in 1 yEAR.       Staff Involved:  Staff Only    Prieto Panchal MD   of Dermatology  Department of Dermatology  [facility]      CC:   Chief Complaint   Patient presents with    Derm Problem     Dermatitis recheck- he notes that it is stable        History of Present Illness:  Mr. Vimal Goldsmith is a 59 year old male who presents as a return patient.    Pt presents today for concerns about spots on trunk and extremities       Labs:      Physical exam:  Vitals: There were no vitals taken for this visit.  GEN: well developed, well-nourished, in no acute distress, in a pleasant mood.     SKIN: Bravo phototype 1  - Full skin, which includes the head/face, both arms, chest, back, abdomen,both legs, genitalia and/or groin buttocks, digits  and/or nails, was examined.  -Resolving pink edematous bumps on trunk and extremities  - No other lesions of concern on areas examined.     Past Medical History:   Past Medical History:   Diagnosis Date    Benign positional vertigo     Celiac disease 10/23/2011    Chronic kidney disease 1975    diagnosed when I was [age]    Chronic sinusitis     Dermatitis herpetiformis     Gastro-oesophageal reflux disease 1987    had suffered any years ago before  celiac disease was diagno    Hoarseness     Migraines     Nasal polyps     Reduced vision      Past Surgical History:   Procedure Laterality Date    CATARACT IOL, RT/LT      COLONOSCOPY N/A 7/14/2022    Procedure: COLONOSCOPY, WITH POLYPECTOMY AND BIOPSY;  Surgeon: Cooper Juan MD;  Location:  GI    ENHANCE LASER REFRACTIVE BILATERAL EXISTING PT IN PARAMETERS Left 01/04/2017    ESOPHAGOSCOPY, GASTROSCOPY, DUODENOSCOPY (EGD), COMBINED  2/11/2013    Procedure: COMBINED ESOPHAGOSCOPY, GASTROSCOPY, DUODENOSCOPY (EGD), BIOPSY SINGLE OR MULTIPLE;;  Surgeon: Luke Juan MD;  Location:  GI    ESOPHAGOSCOPY, GASTROSCOPY, DUODENOSCOPY (EGD), COMBINED N/A 5/23/2023    Procedure: Esophagoscopy, gastroscopy, duodenoscopy (EGD), combined;  Surgeon: Palak Sandoval MD;  Location: [facility] OR    INSERT PICC LINE Left 3/7/2024    Procedure: Insert picc line;  Surgeon: Tricia Albarran PA-C;  Location: [facility] OR    IR PICC PLACEMENT > 5 YRS OF AGE  3/7/2024    NASAL/SINUS POLYPECTOMY         Social History:   reports that he has never smoked. He has never used smokeless tobacco. He reports that he does not drink alcohol and does not use drugs.    Family History:  Family History   Problem Relation Age of Onset    Lipids Mother     Kidney Disease Mother     Arthritis Mother     Hypertension Mother         passed away    Osteoporosis Mother     Glaucoma Father     Gastrointestinal Disease Father         maybe celiac    Cancer Father         Leukemia    Heart Disease Father      Hypertension Father         passed away    Cerebrovascular Disease Father         passed    Stomach Problem Father     Other Cancer Father         leukemia    Gastrointestinal Disease Brother         maybe celiac    Kidney Disease Brother         Kidnet stone    Glaucoma Other     Cancer - colorectal No family hx of     Prostate Cancer No family hx of     Melanoma No family hx of     Skin Cancer No family hx of        Medications:  Current Outpatient Medications   Medication Sig Dispense Refill    clindamycin (CLEOCIN T) 1 % external solution To the scalp 60 mL 1    clobetasol (TEMOVATE) 0.05 % external ointment Apply topically 2 times daily 60 g 3    dapsone (ACZONE) 25 MG tablet TAKE 1 TABLET BY MOUTH EVERY DAY TAKE 2 TABLET BY MOUTH DAILY WHEN FLARING 180 tablet 3    DESCOVY 200-25 MG per tablet Take 1 tablet by mouth daily at 2 pm      doxycycline hyclate (VIBRAMYCIN) 100 MG capsule Take 2 capsules (200 mg) by mouth daily as needed (unprotected intercourse--take within 72 hours) 60 capsule 3    econazole nitrate 1 % external cream Apply topically 2 times daily --to anal area for two weeks 85 g 2    FLUARIX QUADRIVALENT 0.5 ML injection       fluticasone (FLONASE) 50 MCG/ACT nasal spray Spray 1 spray into both nostrils daily 18.2 mL 6    hypromellose (ARTIFICIAL TEARS) 0.5 % SOLN ophthalmic solution 1 drop every hour as needed for dry eyes      ipratropium (ATROVENT) 0.06 % nasal spray Spray 2 sprays into both nostrils 4 times daily as needed for rhinitis 15 mL 1    ketoconazole (NIZORAL) 2 % external shampoo Apply topically daily For rash on face and scalp until clear then stop 120 mL 11    lifitegrast (XIIDRA) 5 % opthalmic solution Apply 1 drop to eye 2 times daily 90 each 3    multivitamin, therapeutic (THERA-VIT) TABS tablet Take 1 tablet by mouth daily      prednisoLONE acetate (PRED FORTE) 1 % ophthalmic suspension Apply 1-2 drops to both eyes: 3 times per day for one week, 2 times per day for one  week, 1 time per day for one week 10 mL 1    sildenafil (VIAGRA) 50 MG tablet Take 0.5 tablets (25 mg) 30 min to 4 hours before intercourse daily as needed. Never use with nitroglycerin, terazosin or doxazosin. 12 tablet 0    SPIKEVAX 50 MCG/0.5ML injection       triamcinolone (KENALOG) 0.1 % external ointment Apply twice daily as needed for rash on the trunk or extremities 80 g 11    zolpidem (AMBIEN) 5 MG tablet TAKE ONE TABLET BY MOUTH NIGHTLY AS NEEDED FOR SLEEP 30 tablet 1     Allergies   Allergen Reactions    Gluten Meal Rash and GI Disturbance    Nkda [No Known Drug Allergy]

## 2024-06-16 ENCOUNTER — TELEPHONE (OUTPATIENT)
Dept: GASTROENTEROLOGY | Facility: CLINIC | Age: 59
End: 2024-06-16
Payer: COMMERCIAL

## 2024-06-16 NOTE — TELEPHONE ENCOUNTER
left voice mail with patient (1st attempt) to schedule from active request order created by Dr. Sandoval.         [facility] number to schedule per request - [phone] option 1.    See request for suggested appt date, visit type, and need for any additional testing/imaging/labs required.

## 2024-06-21 ENCOUNTER — TELEPHONE (OUTPATIENT)
Dept: DERMATOLOGY | Facility: CLINIC | Age: 59
End: 2024-06-21
Payer: COMMERCIAL

## 2024-06-27 ENCOUNTER — OFFICE VISIT (OUTPATIENT)
Dept: PSYCHOLOGY | Facility: CLINIC | Age: 59
End: 2024-06-27
Payer: COMMERCIAL

## 2024-06-27 DIAGNOSIS — F91.9 DISRUPTIVE BEHAVIOR: Primary | ICD-10-CM

## 2024-06-27 DIAGNOSIS — F43.21 ADJUSTMENT DISORDER WITH DEPRESSED MOOD: ICD-10-CM

## 2024-06-27 PROCEDURE — 90834 PSYTX W PT 45 MINUTES: CPT | Mod: HN

## 2024-06-27 NOTE — PROGRESS NOTES
[facility] - Progress Note    Date of Service: 24   Name: Vimal Goldsmith  : 1965  Medical Record Number: [id number]  Treating Provider: Awa Zamarripa, Doctoral Psychology Intern (Luis[place])  Type of Session: Individual  Present in Session: Patient and Trainee  Session Start and Stop Time: 1:00 PM-1:50 PM  Number of Minutes:  50 minutes    SERVICE MODALITY:  In-person    DSM-5 Diagnoses:  Other Specified Disruptive, Impulse-Control, and Conduct Disorder (hypersexuality)  Adjustment Disorder, With depressed mood     Current Reported Symptoms and Status update:  Changes since last session: Reported experiencing some sexual urges within the past week and instead used pornography as a means of coping.    Progress Toward Treatment Goals:   Minimal progress - ACTION (Actively working towards change); Intervened by reinforcing change plan / affirming steps taken.    Therapeutic Interventions/Treatment Strategies:    The focus of today's session was on creating a collaborative treatment plan with patient (see below). The patient was diagnosed with Other Specified Disruptive, Impulse-Control, and Conduct Disorder (hypersexuality). We discussed session on 24 at 1:00 PM being a collateral session with patient's partner and supervisor. Next session is to review his assigned homework of completing a sexual behavior inventory.    Psychotherapist offered support, feedback and validation and reinforced use of skills.  Treatment modalities used include: Motivational Interviewing, Cognitive Behavioral Therapy, and Sexual Health and Wellness Education.  Provided Support, Feedback, Clarification, and Education.     Patient Response:   Patient responded to session by accepting feedback, listening, accepting support, verbalizing understanding, and actively engaged.  Possible barriers to participation / learning include: physical illness/complaints/pain and lack of family support.    Current Mental Status  Exam:   Appearance:  Appropriate   Eye Contact:  Good   Attitude / Demeanor: Cooperative  Friendly Pleasant  Speech      Rate / Production: Normal/ Responsive      Volume:  Normal  volume  Orientation:  All  Mood:   Sad   Affect:   Appropriate   Thought Content: Clear   Insight:   Fair     Plan/Need for Future Services:  Return for therapy in 2 weeks to treat diagnosed problems.    Patient has an initial individualized treatment plan that was created as part of their diagnostic assessment / admission process.  A master individualized treatment plan is in the process of being developed with the patient and multi-disciplinary care team.     Referral / Collaboration:  Referral to another professional/service is not indicated at this time..  Emergency Services Needed?  No    Assignment:  Patient will complete his Sexual Behavior Inventory.    Interactive Complexity:  There are four specific communication difficulties that complicate the work of the primary psychiatric procedure.  Interactive complexity (+[id number]) may be reported when at least one of these difficulties is present.    Communication difficulties present during current the psychiatric procedure include:  None.      Signature/Title:    Awa Zamarripa, Doctoral Psychology Intern (Ps)    [facility]:  Individualized Treatment Plan     Date of Plan: 24  Name: Vimal Goldsmith MRN: [id number]  : 1965     Date of Creation: 24  DSM5 Diagnoses: Adjustment Disorder, With depressed mood   Psychosocial / Contextual Factors: Born in [place], [place], Parent-Child Difficulties, Passing of Both Parents, Current Marital Conflict, and Problems Related to Lifestyle.  PROMIS (reviewed every 90 days): PHQ9:       2024    11:18 AM   PHQ-9 SCORE   PHQ-9 Total Score MyChart 1 (Minimal depression)   PHQ-9 Total Score 1     GAD7:       2024    11:31 AM   CYNTHIA-7 SCORE   Total Score 2 (minimal anxiety)   Total Score 2     PROMIS 10-Global Health  "(only subscores and total score):       5/28/2024    11:29 AM 5/30/2024    12:33 PM   PROMIS-10 Scores Only   Global Mental Health Score 13 14   Global Physical Health Score 14 13   PROMIS TOTAL - SUBSCORES 27 27     Referral / Collaboration:  Referral to another professional/service is not indicated at this time..  Anticipated number of session for this episode of care: 12-24  Anticipation frequency of session: bi-weekly  Anticipated Duration of each session: 60 mins  Treatment plan will be reviewed in 180 days or when goals have been changed.    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    Client Participation in Plan:  Contributed to goals and plan   Agrees with plan     Areas of Vulnerability:  Patient reported that limitations include becoming fixated on work or certain things, limited social and family support, sleep difficulties, feeling isolated, and being work dependent.    Long-Term Goals:  Effective management of compulsivity  Improvement in coping  Improvement in depression-related symptoms    Discharge Criteria:  Improvement re: identified problems and symptoms      Areas of Treatment Focus     Why are you seeking treatment/What do you want to focus on during treatment? \"I want to learn the underlying cause for me to engage in sex with others to understand myself, I want to learn in ways to not engage in risky behaviors, and not hurt my partner.\"     Area of Treatment Focus:   Understand Program Structure  Start Date:    6/27/24    Goal:                                Target Date: 12/27/24                                          Status: Active  Provide psychoeducation on compulsive " sexual behavior and treatment services provided.    Progress:          Intervention(s):  Therapist will provide information/packet about treatment services  Therapist will provide information about compulsive sexual behavior  Bring partner to at least one session  Coordinate care as-needed       Area of Treatment Focus:  Understand Patterns of CSB  Start Date:    6/27/24    Goal:                                Target Date: 12/27/24                                          Status: Active  Support client's understanding of factors contributing to CSB symptoms. Develop awareness of triggers and thoughts, feelings, and behaviors contributing to CSB.    Progress:          Intervention(s):  Therapist will help client explore factors contributing to CSB cycles  Therapist will use problem-solving strategies to create a plan for client to interrupt negative cycles  Therapist will facilitate client checking in about fantasies, sexual behaviors, moods, and other relevant issues.     Awa Zamarripa, Doctoral Psychology Intern (PsyD)

## 2024-07-10 ENCOUNTER — OFFICE VISIT (OUTPATIENT)
Dept: PSYCHOLOGY | Facility: CLINIC | Age: 59
End: 2024-07-10
Payer: COMMERCIAL

## 2024-07-10 DIAGNOSIS — F43.21 ADJUSTMENT DISORDER WITH DEPRESSED MOOD: ICD-10-CM

## 2024-07-10 DIAGNOSIS — F91.9 DISRUPTIVE BEHAVIOR: Primary | ICD-10-CM

## 2024-07-10 PROCEDURE — 90834 PSYTX W PT 45 MINUTES: CPT | Mod: HN

## 2024-07-10 NOTE — PROGRESS NOTES
[facility] - Progress Note    Date of Service: 7/10/24   Name: Vimal Goldsmith  : 1965  Medical Record Number: [id number]  Treating Provider: Awa Zamarripa, Doctoral Psychology Intern ([place])  Type of Session: Individual  Present in Session: Patient and Trainee  Session Start and Stop Time: 2:00 PM-2:50 PM  Number of Minutes:  50 minutes    SERVICE MODALITY:  In-person    DSM-5 Diagnoses:  Other Specified Disruptive, Impulse-Control, and Conduct Disorder (hypersexuality)  Adjustment Disorder, With depressed mood     Current Reported Symptoms and Status update:  Changes since last session: Reported a decline in sexual urges, using pornography as a means of coping, and completed his Sexual Behavior Inventory for the past 2 weeks.    Progress Toward Treatment Goals:   Minimal progress - ACTION (Actively working towards change); Intervened by reinforcing change plan / affirming steps taken.    Therapeutic Interventions/Treatment Strategies:    Reviewed the patient's completed Sexual Behavior Inventory for the past two weeks, which detailed his sexual fantasies, masturbation habits, and sexual encounters with his . Addressed the patient's inquiries regarding the Integrative Biopsychosocial and Sex Positive model of CSB. Patient disclosed early childhood memories of exploring his sexual well-being and his experiences of puberty while growing up in [place]. We processed the thoughts and emotions that arose from reflecting on these early childhood memories. Provided psychoeducation on the CBT model and collaboratively conducted a therapeutic exercise to identify situations, thoughts, feelings, and behaviors related to his CSB urges. Patient appeared engaged in this activity. Suggested that he continues tracking situations, thoughts, feelings, and behaviors associated with CSB urges, especially since he will be traveling for work, a context in which he has previously engaged in sexual encounters.  Ended session by discussing the goal of having a collateral session with the patient's partner, exploring what he feels comfortable and uncomfortable sharing at this time, and reinforcing his rights to privacy.    Psychotherapist offered support, feedback and validation and reinforced use of skills.  Treatment modalities used include: Motivational Interviewing, Cognitive Behavioral Therapy, and Sexual Health and Wellness Education.  Provided Support, Feedback, Clarification, and Education.     Patient Response:   Patient responded to session by accepting feedback, listening, accepting support, verbalizing understanding, and actively engaged.  Possible barriers to participation / learning include: physical illness/complaints/pain and lack of family support.    Current Mental Status Exam:   Appearance:  Appropriate   Eye Contact:  Good   Attitude / Demeanor: Cooperative  Friendly Pleasant  Speech      Rate / Production: Normal/ Responsive      Volume:  Normal  volume  Orientation:  All  Mood:   Sad  Affect:   Appropriate   Thought Content: Clear   Insight:   Fair     Plan/Need for Future Services:  Return for therapy in 2 weeks to treat diagnosed problems.    Patient has an initial individualized treatment plan that was created as part of their diagnostic assessment / admission process.  A master individualized treatment plan is in the process of being developed with the patient and multi-disciplinary care team.     Referral / Collaboration:  Referral to another professional/service is not indicated at this time..  Emergency Services Needed?  No    Assignment:  Patient will track situations, thoughts, feelings, and behaviors to CSB urges.    Interactive Complexity:  There are four specific communication difficulties that complicate the work of the primary psychiatric procedure.  Interactive complexity (+06173) may be reported when at least one of these difficulties is present.    Communication difficulties present  during current the psychiatric procedure include:  None.      Signature/Title:    Awa Zamarripa, Doctoral Psychology Intern (PsyD)

## 2024-07-16 ENCOUNTER — IMMUNIZATION (OUTPATIENT)
Dept: FAMILY MEDICINE | Facility: CLINIC | Age: 59
End: 2024-07-16
Payer: COMMERCIAL

## 2024-07-16 ENCOUNTER — LAB (OUTPATIENT)
Dept: LAB | Facility: CLINIC | Age: 59
End: 2024-07-16
Payer: COMMERCIAL

## 2024-07-16 DIAGNOSIS — Z23 ENCOUNTER FOR IMMUNIZATION: Primary | ICD-10-CM

## 2024-07-16 DIAGNOSIS — K90.0 CELIAC DISEASE: ICD-10-CM

## 2024-07-16 DIAGNOSIS — A53.9 SYPHILIS: ICD-10-CM

## 2024-07-16 DIAGNOSIS — N52.9 ERECTILE DYSFUNCTION, UNSPECIFIED ERECTILE DYSFUNCTION TYPE: ICD-10-CM

## 2024-07-16 LAB
ANION GAP SERPL CALCULATED.3IONS-SCNC: 11 MMOL/L (ref 7–15)
BUN SERPL-MCNC: 14.4 MG/DL (ref 8–23)
CALCIUM SERPL-MCNC: 10.1 MG/DL (ref 8.8–10.4)
CHLORIDE SERPL-SCNC: 104 MMOL/L (ref 98–107)
CREAT SERPL-MCNC: 0.9 MG/DL (ref 0.67–1.17)
EGFRCR SERPLBLD CKD-EPI 2021: >90 ML/MIN/1.73M2
ERYTHROCYTE [DISTWIDTH] IN BLOOD BY AUTOMATED COUNT: 12.4 % (ref 10–15)
FERRITIN SERPL-MCNC: 102 NG/ML (ref 31–409)
GLUCOSE SERPL-MCNC: 120 MG/DL (ref 70–99)
HCO3 SERPL-SCNC: 23 MMOL/L (ref 22–29)
HCT VFR BLD AUTO: 40.9 % (ref 40–53)
HGB BLD-MCNC: 14.4 G/DL (ref 13.3–17.7)
IRON SERPL-MCNC: 104 UG/DL (ref 61–157)
MCH RBC QN AUTO: 31.3 PG (ref 26.5–33)
MCHC RBC AUTO-ENTMCNC: 35.2 G/DL (ref 31.5–36.5)
MCV RBC AUTO: 89 FL (ref 78–100)
PLATELET # BLD AUTO: 216 10E3/UL (ref 150–450)
POTASSIUM SERPL-SCNC: 4.2 MMOL/L (ref 3.4–5.3)
RBC # BLD AUTO: 4.6 10E6/UL (ref 4.4–5.9)
SODIUM SERPL-SCNC: 138 MMOL/L (ref 135–145)
VIT D+METAB SERPL-MCNC: 30 NG/ML (ref 20–50)
WBC # BLD AUTO: 5.2 10E3/UL (ref 4–11)

## 2024-07-16 PROCEDURE — 91320 SARSCV2 VAC 30MCG TRS-SUC IM: CPT

## 2024-07-16 PROCEDURE — 99207 PR NO CHARGE NURSE ONLY: CPT

## 2024-07-16 PROCEDURE — 36415 COLL VENOUS BLD VENIPUNCTURE: CPT

## 2024-07-16 PROCEDURE — 80048 BASIC METABOLIC PNL TOTAL CA: CPT

## 2024-07-16 PROCEDURE — 82306 VITAMIN D 25 HYDROXY: CPT

## 2024-07-16 PROCEDURE — 84630 ASSAY OF ZINC: CPT | Mod: 90

## 2024-07-16 PROCEDURE — 86364 TISS TRNSGLTMNASE EA IG CLAS: CPT

## 2024-07-16 PROCEDURE — 85027 COMPLETE CBC AUTOMATED: CPT

## 2024-07-16 PROCEDURE — 99000 SPECIMEN HANDLING OFFICE-LAB: CPT

## 2024-07-16 PROCEDURE — 83540 ASSAY OF IRON: CPT

## 2024-07-16 PROCEDURE — 90480 ADMN SARSCOV2 VAC 1/ONLY CMP: CPT

## 2024-07-16 PROCEDURE — 82728 ASSAY OF FERRITIN: CPT

## 2024-07-16 RX ORDER — SILDENAFIL 50 MG/1
TABLET, FILM COATED ORAL
Qty: 12 TABLET | Refills: 0 | Status: SHIPPED | OUTPATIENT
Start: 2024-07-16

## 2024-07-16 NOTE — PROGRESS NOTES
Prior to immunization administration, verified patients identity using patient s name and date of birth. Please see Immunization Activity for additional information.     Screening Questionnaire for Adult Immunization    Are you sick today?   No   Do you have allergies to medications, food, a vaccine component or latex?   No   Have you ever had a serious reaction after receiving a vaccination?   No   Do you have a long-term health problem with heart, lung, kidney, or metabolic disease (e.g., diabetes), asthma, a blood disorder, no spleen, complement component deficiency, a cochlear implant, or a spinal fluid leak?  Are you on long-term aspirin therapy?   No   Do you have cancer, leukemia, HIV/AIDS, or any other immune system problem?   No   Do you have a parent, brother, or sister with an immune system problem?   No   In the past 3 months, have you taken medications that affect  your immune system, such as prednisone, other steroids, or anticancer drugs; drugs for the treatment of rheumatoid arthritis, Crohn s disease, or psoriasis; or have you had radiation treatments?   No   Have you had a seizure, or a brain or other nervous system problem?   No   During the past year, have you received a transfusion of blood or blood    products, or been given immune (gamma) globulin or antiviral drug?   No   For women: Are you pregnant or is there a chance you could become       pregnant during the next month?   No   Have you received any vaccinations in the past 4 weeks?   No     Immunization questionnaire answers were all negative.    I have reviewed the following standing orders:   This patient is due and qualifies for the Covid-19 vaccine.     Click here for COVID-19 Standing Order    I have reviewed the vaccines inclusion and exclusion criteria; No concerns regarding eligibility.     Patient instructed to remain in clinic for 15 minutes afterwards, and to report any adverse reactions.     Screening performed by Raeann TRINH  BAYRON Noguera on 7/16/2024 at 8:46 AM.

## 2024-07-17 LAB — ZINC SERPL-MCNC: 75.9 UG/DL

## 2024-07-18 LAB
TTG IGA SER-ACNC: 0.9 U/ML
TTG IGG SER-ACNC: 0.8 U/ML

## 2024-07-25 ENCOUNTER — OFFICE VISIT (OUTPATIENT)
Dept: PSYCHOLOGY | Facility: CLINIC | Age: 59
End: 2024-07-25
Payer: COMMERCIAL

## 2024-07-25 DIAGNOSIS — F91.9 DISRUPTIVE BEHAVIOR: Primary | ICD-10-CM

## 2024-07-25 DIAGNOSIS — F43.21 ADJUSTMENT DISORDER WITH DEPRESSED MOOD: ICD-10-CM

## 2024-07-25 PROCEDURE — 90837 PSYTX W PT 60 MINUTES: CPT | Mod: HN

## 2024-07-25 NOTE — PROGRESS NOTES
"[facility] - Progress Note    Date of Service: 24   Name: Vimal Goldsmith  : 1965  Medical Record Number: [id number]  Treating Provider: Awa Zamarripa, Doctoral Psychology Intern ([place])  Type of Session: Individual with Support Person (Partner - Kg) present in session  Present in Session: Patient, Patient's Partner (Kg), Trainee, and Trainee's Supervisor (Jg Barrientos, PhD, LP)  Session Start and Stop Time: 1:00 PM-1:55 PM  Number of Minutes:  55 minutes    SERVICE MODALITY:  In-person    DSM-5 Diagnoses:  Other Specified Disruptive, Impulse-Control, and Conduct Disorder (hypersexuality)  Adjustment Disorder, With depressed mood     Current Reported Symptoms and Status update:  Changes since last session: Reported returning from work trip, continual relationship stress, and will be attending another trip with partner in August.    Progress Toward Treatment Goals:   Minimal progress - ACTION (Actively working towards change); Intervened by reinforcing change plan / affirming steps taken.    Therapeutic Interventions/Treatment Strategies:    Today's session focused on a collateral meeting with the patient's partner, Kg. The supervisor introduced himself as the trainee's supervisor, a licensed psychologist, and the patient's future therapist. Information about privacy, confidentiality, charting, and billing under the patient's record was provided, and both the patient and Kg expressed understanding and agreement (see signed collateral form in the patient's record). Details about the CSB program were also shared. Supervisor assessed Kg's concerns regarding the patient's CSB and mental health. Patient discussed his history of a high sex drive and his desire to change his CSB behavior. Kg shared their relationship history, the current state of their relationship (\"freezing him out\"), and his desires for honesty, trust, and for the patient to live authentically. He also expressed a " preference for a monogamous relationship and not knowing the details of the patient's CSB behavior. Supervisor and trainee validated and supported both parties' perspectives. Kg was encouraged to return periodically to share concerns and receive treatment updates, and both the patient and Kg agreed.    Psychotherapist offered support, feedback and validation and reinforced use of skills.  Treatment modalities used include: Motivational Interviewing, Cognitive Behavioral Therapy, and Sexual Health and Wellness Education.  Provided Support, Feedback, Clarification, and Education.     Patient Response:   Patient responded to session by accepting feedback, listening, accepting support, verbalizing understanding, and actively engaged.  Possible barriers to participation / learning include: physical illness/complaints/pain and lack of family support.    Current Mental Status Exam:   Appearance:  Appropriate   Eye Contact:  Good   Attitude / Demeanor: Cooperative  Friendly Pleasant  Speech      Rate / Production: Normal/ Responsive      Volume:  Normal  volume  Orientation:  All  Mood:   Appropriate  Affect:   Appropriate   Thought Content: Clear   Insight:   Fair     Plan/Need for Future Services:  Return for therapy in September 11th with Jg Barrientos to treat diagnosed problems.    Patient has an initial individualized treatment plan that was created as part of their diagnostic assessment / admission process.  A master individualized treatment plan is in the process of being developed with the patient and multi-disciplinary care team.     Referral / Collaboration:  Referral to another professional/service is not indicated at this time.  Emergency Services Needed?  No    Assignment:  Patient will track situations, thoughts, feelings, and behaviors to CSB urges.    Interactive Complexity:  There are four specific communication difficulties that complicate the work of the primary psychiatric procedure.  Interactive  complexity (+32328) may be reported when at least one of these difficulties is present.    Communication difficulties present during current the psychiatric procedure include:  None.      Signature/Title:    Awa Zamarripa, Doctoral Psychology Intern (PsyD)

## 2024-07-25 NOTE — PROGRESS NOTES
[facility] - Progress [facility]    Date of Service: 24   Name: Vimal Goldsmith  : 1965  Medical Record Number: [id number]  Treating Provider: Awa Zamarripa, Doctoral Psychology Intern ([place])  Type of Session: Individual with Support Person (partner - ____) present  Present in Session: Patient, Patient's Partner (___), Trainee, and Trainee's Supervisor (Jg Barrientos, PhD, LP)  Session Start and Stop Time: 1:00 PM-1:50 PM  Number of Minutes:  50 minutes    SERVICE MODALITY:  In-person    DSM-5 Diagnoses:  Other Specified Disruptive, Impulse-Control, and Conduct Disorder (hypersexuality)  Adjustment Disorder, With depressed mood     Current Reported Symptoms and Status update:  Changes since last session: Reported a decline in sexual urges, using pornography as a means of coping, and completed his Sexual Behavior Inventory for the past 2 weeks.    Changes since last session includes increased sexual behavior for coping/numbing, relationship stress, avoidance. Started Wellbutrin for depression.     Progress Toward Treatment Goals:   Minimal progress - ACTION (Actively working towards change); Intervened by reinforcing change plan / affirming steps taken.    Therapeutic Interventions/Treatment Strategies:    The focus of today's session was a collateral session with patient's partner, ____. Supervisor and trainee provided information about privacy/confidentiality, as well as that charting and billing will occur under patient's record. Therapist provided information about the CSB program. Therapist assessed ____'s concerns about patient's CSB and mental health. He shared his history of concurrent romantic/sexual relationships as well as ongoing sexting and pornography use. Partner shared their relationship history and ways in which he has felt lied to, gaslit, and hurt by patient's behavior. Patient expressed agreement and understanding. Patient shared ways in which he is trying to earn trust  "by not \"doing things\" again. Partner shared how he keeps discovering patterns of behavior patient engages in. Supervisor and trainee expressed validation and support. Supervisor and trainee encouraged ___ to return periodically to share concerns and receive treatment updates. Patient and ____ agreed. Therapist assigned client to create a table with one column listing things he needs to do to earn trust back and stabilize CSB concerns, the other listing what he has actually done.     Psychotherapist offered support, feedback and validation and reinforced use of skills.  Treatment modalities used include: Motivational Interviewing, Cognitive Behavioral Therapy, and Sexual Health and Wellness Education.  Provided Support, Feedback, Clarification, and Education.     Patient Response:   Patient responded to session by accepting feedback, listening, accepting support, verbalizing understanding, and actively engaged.  Possible barriers to participation / learning include: physical illness/complaints/pain and lack of family support.    Current Mental Status Exam:   Appearance:  Appropriate   Eye Contact:  Good   Attitude / Demeanor: Cooperative  Friendly Pleasant  Speech      Rate / Production: Normal/ Responsive      Volume:  Normal  volume  Orientation:  All  Mood:   Sad  Affect:   Appropriate   Thought Content: Clear   Insight:   Fair     Plan/Need for Future Services:  Return for therapy in 2 weeks to treat diagnosed problems.    Patient has an initial individualized treatment plan that was created as part of their diagnostic assessment / admission process.  A master individualized treatment plan is in the process of being developed with the patient and multi-disciplinary care team.     Referral / Collaboration:  Referral to another professional/service is not indicated at this time.  Emergency Services Needed?  No    Assignment:  Patient will track situations, thoughts, feelings, and behaviors to CSB " urges.    Interactive Complexity:  There are four specific communication difficulties that complicate the work of the primary psychiatric procedure.  Interactive complexity (+23750) may be reported when at least one of these difficulties is present.    Communication difficulties present during current the psychiatric procedure include:  None.      Signature/Title:    Awa Zamarripa, Doctoral Psychology Intern (PsyD)

## 2024-07-31 ENCOUNTER — DOCUMENTATION ONLY (OUTPATIENT)
Dept: PSYCHOLOGY | Facility: CLINIC | Age: 59
End: 2024-07-31
Payer: COMMERCIAL

## 2024-07-31 ENCOUNTER — LAB (OUTPATIENT)
Dept: LAB | Facility: CLINIC | Age: 59
End: 2024-07-31
Attending: INTERNAL MEDICINE
Payer: COMMERCIAL

## 2024-07-31 ENCOUNTER — OFFICE VISIT (OUTPATIENT)
Dept: INFECTIOUS DISEASES | Facility: CLINIC | Age: 59
End: 2024-07-31
Attending: INTERNAL MEDICINE
Payer: COMMERCIAL

## 2024-07-31 VITALS
DIASTOLIC BLOOD PRESSURE: 69 MMHG | OXYGEN SATURATION: 94 % | HEART RATE: 73 BPM | SYSTOLIC BLOOD PRESSURE: 131 MMHG | WEIGHT: 164.8 LBS | BODY MASS INDEX: 25.06 KG/M2

## 2024-07-31 DIAGNOSIS — A53.9 SYPHILIS: Primary | ICD-10-CM

## 2024-07-31 DIAGNOSIS — A53.9 SYPHILIS: ICD-10-CM

## 2024-07-31 LAB — HOLD SPECIMEN: NORMAL

## 2024-07-31 PROCEDURE — 86593 SYPHILIS TEST NON-TREP QUANT: CPT | Performed by: INTERNAL MEDICINE

## 2024-07-31 PROCEDURE — 99000 SPECIMEN HANDLING OFFICE-LAB: CPT | Performed by: PATHOLOGY

## 2024-07-31 PROCEDURE — 86780 TREPONEMA PALLIDUM: CPT | Performed by: INTERNAL MEDICINE

## 2024-07-31 PROCEDURE — 36415 COLL VENOUS BLD VENIPUNCTURE: CPT | Performed by: PATHOLOGY

## 2024-07-31 PROCEDURE — 86592 SYPHILIS TEST NON-TREP QUAL: CPT | Performed by: INTERNAL MEDICINE

## 2024-07-31 PROCEDURE — G2211 COMPLEX E/M VISIT ADD ON: HCPCS | Performed by: INTERNAL MEDICINE

## 2024-07-31 PROCEDURE — 99213 OFFICE O/P EST LOW 20 MIN: CPT | Performed by: INTERNAL MEDICINE

## 2024-07-31 PROCEDURE — 99214 OFFICE O/P EST MOD 30 MIN: CPT | Performed by: INTERNAL MEDICINE

## 2024-07-31 NOTE — PROGRESS NOTES
Infectious Disease Clinic: PROGRESS NOTE     Patient:  Vimal Goldsmith, Date of birth 1965, Medical record number [id number]  Date of Visit:  07/31/2024   The longitudinal plan of care for the diagnosis(es)/condition(s) as documented were addressed during this visit. Due to the added complexity in care, I will continue to support Phillip in the subsequent management and with ongoing continuity of care.       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    Recommendations:  Recheck treponemal serology and RPR titer today.  If the titer is stable and or continuing to decline,  I will plan to see him back in 6 months with a repeat titer again in 6 months.    Phillip was seen today for recheck.    Diagnoses and all orders for this visit:    Syphilis  -     Syphilis Antibody with Reflex to RPR and Titer; Future  -     Treponema Abs w Reflex to RPR and Titer; Future    Other orders  -     Adult Infectious Disease Clinic Follow-Up Order  -     Adult Infectious Disease Clinic Follow-Up Order; Future         I have reviewed today's Medications, Vital Signs, Labs, and EMR. Total time in patient care today including documentation and care coordination = 33 minutes.    Latasha Gil MD  ID Staff Physician  Pager [phone]    [facility]  [phone]        Interval History:   7/31/2024 the patient presents for follow-up visit to recheck syphilis serology titer after treatment for ocular and generalized syphilis skin lesions last February.  At the time of his initial diagnosis in February 1 titer was 1: 64.  Last titer in April was down to 1: 16.  The patient states his eyes are better.  He just has dry eyes now for which he uses artificial tears.  Also had problems with ringing or humming in his ears, but this has improved and is now almost gone.  Otherwise is feeling  recovered from his syphilis and the skin lesions primarily on his head and face have resolved.           Review of Systems:   CV: NEGATIVE for chest pain, palpitations or peripheral edema  C: NEGATIVE for fever, chills, change in weight  E/M: NEGATIVE for ear, mouth and throat problems  R: NEGATIVE for significant cough or SOB  Patient denies nausea, vomiting, diarrhea, and abdominal pain.          Current Medications       Current Outpatient Medications:     clindamycin (CLEOCIN T) 1 % external solution, To the scalp, Disp: 60 mL, Rfl: 1    clobetasol (TEMOVATE) 0.05 % external ointment, Apply topically 2 times daily, Disp: 60 g, Rfl: 3    dapsone (ACZONE) 25 MG tablet, TAKE 1 TABLET BY MOUTH EVERY DAY TAKE 2 TABLET BY MOUTH DAILY WHEN FLARING, Disp: 180 tablet, Rfl: 3    DESCOVY 200-25 MG per tablet, Take 1 tablet by mouth daily at 2 pm, Disp: , Rfl:     doxycycline hyclate (VIBRAMYCIN) 100 MG capsule, Take 2 capsules (200 mg) by mouth daily as needed (unprotected intercourse--take within 72 hours), Disp: 60 capsule, Rfl: 3    econazole nitrate 1 % external cream, Apply topically 2 times daily --to anal area for two weeks, Disp: 85 g, Rfl: 2    FLUARIX QUADRIVALENT 0.5 ML injection, , Disp: , Rfl:     fluticasone (FLONASE) 50 MCG/ACT nasal spray, Spray 1 spray into both nostrils daily, Disp: 18.2 mL, Rfl: 6    hypromellose (ARTIFICIAL TEARS) 0.5 % SOLN ophthalmic solution, 1 drop every hour as needed for dry eyes, Disp: , Rfl:     ipratropium (ATROVENT) 0.06 % nasal spray, Spray 2 sprays into both nostrils 4 times daily as needed for rhinitis, Disp: 15 mL, Rfl: 1    ketoconazole (NIZORAL) 2 % external shampoo, Apply topically daily For rash on face and scalp until clear then stop, Disp: 120 mL, Rfl: 11    multivitamin, therapeutic (THERA-VIT) TABS tablet, Take 1 tablet by mouth daily, Disp: , Rfl:     sildenafil (VIAGRA) 50 MG tablet, Take 0.5 tablets (25 mg) 30 min to 4 hours before intercourse daily as needed.  Never use with nitroglycerin, terazosin or doxazosin., Disp: 12 tablet, Rfl: 0    SPIKEVAX 50 MCG/0.5ML injection, , Disp: , Rfl:     triamcinolone (KENALOG) 0.1 % external ointment, Apply twice daily as needed for rash on the trunk or extremities, Disp: 80 g, Rfl: 11    zolpidem (AMBIEN) 5 MG tablet, TAKE ONE TABLET BY MOUTH NIGHTLY AS NEEDED FOR SLEEP, Disp: 30 tablet, Rfl: 1    lifitegrast (XIIDRA) 5 % opthalmic solution, Apply 1 drop to eye 2 times daily (Patient not taking: Reported on 7/31/2024), Disp: 90 each, Rfl: 3    prednisoLONE acetate (PRED FORTE) 1 % ophthalmic suspension, Apply 1-2 drops to both eyes: 3 times per day for one week, 2 times per day for one week, 1 time per day for one week (Patient not taking: Reported on 7/31/2024), Disp: 10 mL, Rfl: 1           Physical Exam:   Ranges for vital signs:    Pulse:  [73] 73  BP: (131)/(69) 131/69  SpO2:  [94 %] 94 %    General: Alert and Oriented, NAD  HEENT: Head: atraumatic, normocephalic. Eyes: normal conjunctiva.    Oropharynx: No mucosal lesions   Neck: supple, no cervical adenopathy  Chest: Clear to auscultation  CV: Regular rate and rhythm, S1S2, without mumur  Abdomen: +BS, soft, non-tender, no hepatosplenomegaly, no masses, no guarding or rebound.  Lymph Nodes: No palpable adenopathy  Skin: No rash- resolution of prior skin patches on his face and scalp and arms.  Neuro: Alert and Oriented, CN II-XII intact, grossly non-focal , moves all 4 extremities with normal strength  Mood: Stable           Laboratory Data:     Lab on 07/16/2024   Component Date Value Ref Range Status    WBC Count 07/16/2024 5.2  4.0 - 11.0 10e3/uL Final    RBC Count 07/16/2024 4.60  4.40 - 5.90 10e6/uL Final    Hemoglobin 07/16/2024 14.4  13.3 - 17.7 g/dL Final    Hematocrit 07/16/2024 40.9  40.0 - 53.0 % Final    MCV 07/16/2024 89  78 - 100 fL Final    MCH 07/16/2024 31.3  26.5 - 33.0 pg Final    MCHC 07/16/2024 35.2  31.5 - 36.5 g/dL Final    RDW 07/16/2024 12.4  10.0 -  15.0 % Final    Platelet Count 07/16/2024 216  150 - 450 10e3/uL Final    Sodium 07/16/2024 138  135 - 145 mmol/L Final    Potassium 07/16/2024 4.2  3.4 - 5.3 mmol/L Final    Chloride 07/16/2024 104  98 - 107 mmol/L Final    Carbon Dioxide (CO2) 07/16/2024 23  22 - 29 mmol/L Final    Anion Gap 07/16/2024 11  7 - 15 mmol/L Final    Urea Nitrogen 07/16/2024 14.4  8.0 - 23.0 mg/dL Final    Creatinine 07/16/2024 0.90  0.67 - 1.17 mg/dL Final    GFR Estimate 07/16/2024 >90  >60 mL/min/1.73m2 Final    eGFR calculated using 2021 CKD-EPI equation.    Calcium 07/16/2024 10.1  8.8 - 10.4 mg/dL Final    Reference intervals for this test were updated on 7/16/2024 to reflect our healthy population more accurately. There may be differences in the flagging of prior results with similar values performed with this method. Those prior results can be interpreted in the context of the updated reference intervals.    Glucose 07/16/2024 120 (H)  70 - 99 mg/dL Final    Zinc, Serum/Plasma 07/16/2024 75.9  60.0 - 120.0 ug/dL Final    Comment: INTERPRETIVE INFORMATION: Zinc, Serum or Plasma    Elevated results may be due to skin or collection-related   contamination, including the use of a noncertified   metal-free collection/transport tube. If contamination   concerns exist due to elevated levels of serum/plasma zinc,   confirmation with a second specimen collected in a   certified metal-free tube is recommended.    Circulating zinc concentrations are dependent on albumin   status and are depressed with malnutrition.  Zinc may also   be lowered with infection, inflammation, stress, oral   contraceptives, and pregnancy.  Zinc may be elevated with   zinc supplementation or fasting.  Elevated zinc   concentrations may interfere with copper absorption.     This test was developed and its performance characteristics   determined by Mr. Number. It has not been cleared or   approved by the [place] Food and Drug Administration. This test   was  performed in a CLIA certified laboratory and is   intended for clinical                            purposes.  Performed By: [facility]  [address] [place] [id number]  : Steven Chauhan MD, PhD  CLIA Number: [id number]    Vitamin D, Total (25-Hydroxy) 07/16/2024 30  20 - 50 ng/mL Final    optimum levels    Iron 07/16/2024 104  61 - 157 ug/dL Final    Ferritin 07/16/2024 102  31 - 409 ng/mL Final    Tissue Transglutaminase Antibody I* 07/16/2024 0.9  <7.0 U/mL Final    Negative- The tTG-IgA assay has limited utility for patients with decreased levels of IgA. Screening for celiac disease should include IgA testing to rule out selective IgA deficiency and to guide selection and interpretation of serological testing. tTG-IgG testing may be positive in celiac disease patients with IgA deficiency.    Tissue Transglutaminase Antibody I* 07/16/2024 0.8  <7.0 U/mL Final    Negative        Culture data:  7-Day Micro Results       No results found for the last 168 hours.                  Imaging:   IR PICC Placement > 5 Yrs of Age  Narrative: PROCEDURE: Peripherally Inserted Central Catheter (PICC) placement    Procedural Personnel  Advanced practice provider: Tricia Albarran PA-C    Procedure Date: 3/7/2024    Pre-procedure diagnosis: Ocular Syphilis  Post-procedure diagnosis: Same  Indication: IV antibiotics    Complications: No immediate complications.  Impression: IMPRESSION:  Completed image-guided placement of 5 Malay, 50 cm single lumen PICC  via left basilic with tip in right atrium. Aspirates and flushes  freely, heparin locked and ready for immediate use. No immediate  complication.    Plan:   The catheter may be used immediately.  _______________________________________________________________    PROCEDURE SUMMARY:  - Venous access with ultrasound guidance  - PICC insertion with fluoroscopic guidance    PROCEDURE DETAILS:  Pre-procedure  Consent: Informed consent for the  procedure including risks, benefits  and alternatives was obtained and time-out was performed prior to the  procedure.  Preparation (MIPS): The site was prepared and draped using all  elements of maximal sterile barrier technique including sterile  gloves, sterile gown, cap, mask, large sterile sheet, sterile  ultrasound probe cover, hand hygiene and cutaneous antisepsis with 2%  chlorhexidine.     Anesthesia/sedation  Level of anesthesia/sedation: 1% lidocaine    Access  Local anesthesia was administered. The vessel was sonographically  evaluated and determined to be patent. Real time ultrasound was used  to visualize needle entry into the vessel and a permanent image was  stored.  Laterality: left  Vein accessed: Basilic vein  Access technique: Micropuncture set with 21 gauge needle    Catheter placement  The catheter was trimmed to appropriate length and placed into the  vein under fluoroscopic guidance via a peel-away sheath. Catheter tip  location was fluoroscopically verified and a permanent image was  stored. A sterile dressing was applied.  Catheter placed: BD PowerPICC  Catheter size (Chinese): 5FR  Catheter intravascular length (cm): 50  Lumens: Single-lumen   Power injectable: Yes  Catheter tip: right atrium  Catheter flush: Heparin (100 units/mL)  Catheter securement technique: Stat-Lock    Radiation Dose  Fluoro time: 0.4 minutes    Additional Details  Specimens removed: None  Estimated blood loss (mL): Less than 10    Attestation    ANIL BOYER PA-C         SYSTEM ID:  [id number]

## 2024-07-31 NOTE — PROGRESS NOTES
"  [facility]    Discharge Summary          Multiple Sessions    Client Name: Vimal Goldsmith MRN#: [id number] YOB: 1965      Intake / Discharge Date: 6/6/24 and 7/31/24      DSM5 Diagnoses: (Sustained by DSM5 Criteria Listed Above)  Diagnoses:    Other Specified Disruptive, Impulse-Control, and Conduct Disorder (hypersexuality)   Adjustment Disorder, With depressed mood   Presenting Concern:  The reason for seeking services at this time is: \"sexual addiction.\" The problems with self-reported concerns about compulsive sexual behavior began in his 30s when the patient started dating men and came out as gipson. He noted problems were \"always there\" and desired to have sexual encounters with multiple partners while in a primary relationship.    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      Disposition at Time of Last Encounter:   Comments:   N/A.     Risk Management:   Client denies a history of suicidal ideation, suicide attempts, self-injurious behavior, homicidal ideation, homicidal behavior, and and other safety concerns.  Recommended that patient call 911 or go to the local ED should there be a change in any of these risk factors.      Referred To:  [facility]. Patient will establish care with new provider on 9/11/24.      Awa Zamarripa, Psychology Doctoral Intern (PsyD)  7/31/2024   "

## 2024-07-31 NOTE — LETTER
7/31/2024       RE: Vimal Goldsmith  [address] [address]     Dear Colleague,    Thank you for referring your patient, Vimal Goldsmith, to the [facility] INFECTIOUS DISEASE CLINIC [place] at [facility]. Please see a copy of my visit note below.      Infectious Disease Clinic: PROGRESS NOTE     Patient:  Vimal Goldsmith, Date of birth 1965, Medical record number [id number]  Date of Visit:  07/31/2024   The longitudinal plan of care for the diagnosis(es)/condition(s) as documented were addressed during this visit. Due to the added complexity in care, I will continue to support Phillip in the subsequent management and with ongoing continuity of care.       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    Recommendations:  Recheck treponemal serology and RPR titer today.  If the titer is stable and or continuing to decline,  I will plan to see him back in 6 months with a repeat titer again in 6 months.    Phillip was seen today for recheck.    Diagnoses and all orders for this visit:    Syphilis  -     Syphilis Antibody with Reflex to RPR and Titer; Future  -     Treponema Abs w Reflex to RPR and Titer; Future    Other orders  -     Adult Infectious Disease Clinic Follow-Up Order  -     Adult Infectious Disease Clinic Follow-Up Order; Future         I have reviewed today's Medications, Vital Signs, Labs, and EMR. Total time in patient care today including documentation and care coordination = 33 minutes.    Latasha Gil MD  ID Staff Physician  Pager [phone]    [facility]  [phone]        Interval History:   7/31/2024 the patient presents for follow-up visit to recheck syphilis serology titer after treatment for ocular and generalized syphilis skin lesions last February.  At the time of  his initial diagnosis in February 1 titer was 1: 64.  Last titer in April was down to 1: 16.  The patient states his eyes are better.  He just has dry eyes now for which he uses artificial tears.  Also had problems with ringing or humming in his ears, but this has improved and is now almost gone.  Otherwise is feeling recovered from his syphilis and the skin lesions primarily on his head and face have resolved.           Review of Systems:   CV: NEGATIVE for chest pain, palpitations or peripheral edema  C: NEGATIVE for fever, chills, change in weight  E/M: NEGATIVE for ear, mouth and throat problems  R: NEGATIVE for significant cough or SOB  Patient denies nausea, vomiting, diarrhea, and abdominal pain.          Current Medications       Current Outpatient Medications:      clindamycin (CLEOCIN T) 1 % external solution, To the scalp, Disp: 60 mL, Rfl: 1     clobetasol (TEMOVATE) 0.05 % external ointment, Apply topically 2 times daily, Disp: 60 g, Rfl: 3     dapsone (ACZONE) 25 MG tablet, TAKE 1 TABLET BY MOUTH EVERY DAY TAKE 2 TABLET BY MOUTH DAILY WHEN FLARING, Disp: 180 tablet, Rfl: 3     DESCOVY 200-25 MG per tablet, Take 1 tablet by mouth daily at 2 pm, Disp: , Rfl:      doxycycline hyclate (VIBRAMYCIN) 100 MG capsule, Take 2 capsules (200 mg) by mouth daily as needed (unprotected intercourse--take within 72 hours), Disp: 60 capsule, Rfl: 3     econazole nitrate 1 % external cream, Apply topically 2 times daily --to anal area for two weeks, Disp: 85 g, Rfl: 2     FLUARIX QUADRIVALENT 0.5 ML injection, , Disp: , Rfl:      fluticasone (FLONASE) 50 MCG/ACT nasal spray, Spray 1 spray into both nostrils daily, Disp: 18.2 mL, Rfl: 6     hypromellose (ARTIFICIAL TEARS) 0.5 % SOLN ophthalmic solution, 1 drop every hour as needed for dry eyes, Disp: , Rfl:      ipratropium (ATROVENT) 0.06 % nasal spray, Spray 2 sprays into both nostrils 4 times daily as needed for rhinitis, Disp: 15 mL, Rfl: 1     ketoconazole (NIZORAL)  2 % external shampoo, Apply topically daily For rash on face and scalp until clear then stop, Disp: 120 mL, Rfl: 11     multivitamin, therapeutic (THERA-VIT) TABS tablet, Take 1 tablet by mouth daily, Disp: , Rfl:      sildenafil (VIAGRA) 50 MG tablet, Take 0.5 tablets (25 mg) 30 min to 4 hours before intercourse daily as needed. Never use with nitroglycerin, terazosin or doxazosin., Disp: 12 tablet, Rfl: 0     SPIKEVAX 50 MCG/0.5ML injection, , Disp: , Rfl:      triamcinolone (KENALOG) 0.1 % external ointment, Apply twice daily as needed for rash on the trunk or extremities, Disp: 80 g, Rfl: 11     zolpidem (AMBIEN) 5 MG tablet, TAKE ONE TABLET BY MOUTH NIGHTLY AS NEEDED FOR SLEEP, Disp: 30 tablet, Rfl: 1     lifitegrast (XIIDRA) 5 % opthalmic solution, Apply 1 drop to eye 2 times daily (Patient not taking: Reported on 7/31/2024), Disp: 90 each, Rfl: 3     prednisoLONE acetate (PRED FORTE) 1 % ophthalmic suspension, Apply 1-2 drops to both eyes: 3 times per day for one week, 2 times per day for one week, 1 time per day for one week (Patient not taking: Reported on 7/31/2024), Disp: 10 mL, Rfl: 1           Physical Exam:   Ranges for vital signs:    Pulse:  [73] 73  BP: (131)/(69) 131/69  SpO2:  [94 %] 94 %    General: Alert and Oriented, NAD  HEENT: Head: atraumatic, normocephalic. Eyes: normal conjunctiva.    Oropharynx: No mucosal lesions   Neck: supple, no cervical adenopathy  Chest: Clear to auscultation  CV: Regular rate and rhythm, S1S2, without mumur  Abdomen: +BS, soft, non-tender, no hepatosplenomegaly, no masses, no guarding or rebound.  Lymph Nodes: No palpable adenopathy  Skin: No rash- resolution of prior skin patches on his face and scalp and arms.  Neuro: Alert and Oriented, CN II-XII intact, grossly non-focal , moves all 4 extremities with normal strength  Mood: Stable           Laboratory Data:     Lab on 07/16/2024   Component Date Value Ref Range Status     WBC Count 07/16/2024 5.2  4.0 - 11.0  10e3/uL Final     RBC Count 07/16/2024 4.60  4.40 - 5.90 10e6/uL Final     Hemoglobin 07/16/2024 14.4  13.3 - 17.7 g/dL Final     Hematocrit 07/16/2024 40.9  40.0 - 53.0 % Final     MCV 07/16/2024 89  78 - 100 fL Final     MCH 07/16/2024 31.3  26.5 - 33.0 pg Final     MCHC 07/16/2024 35.2  31.5 - 36.5 g/dL Final     RDW 07/16/2024 12.4  10.0 - 15.0 % Final     Platelet Count 07/16/2024 216  150 - 450 10e3/uL Final     Sodium 07/16/2024 138  135 - 145 mmol/L Final     Potassium 07/16/2024 4.2  3.4 - 5.3 mmol/L Final     Chloride 07/16/2024 104  98 - 107 mmol/L Final     Carbon Dioxide (CO2) 07/16/2024 23  22 - 29 mmol/L Final     Anion Gap 07/16/2024 11  7 - 15 mmol/L Final     Urea Nitrogen 07/16/2024 14.4  8.0 - 23.0 mg/dL Final     Creatinine 07/16/2024 0.90  0.67 - 1.17 mg/dL Final     GFR Estimate 07/16/2024 >90  >60 mL/min/1.73m2 Final    eGFR calculated using 2021 CKD-EPI equation.     Calcium 07/16/2024 10.1  8.8 - 10.4 mg/dL Final    Reference intervals for this test were updated on 7/16/2024 to reflect our healthy population more accurately. There may be differences in the flagging of prior results with similar values performed with this method. Those prior results can be interpreted in the context of the updated reference intervals.     Glucose 07/16/2024 120 (H)  70 - 99 mg/dL Final     Zinc, Serum/Plasma 07/16/2024 75.9  60.0 - 120.0 ug/dL Final    Comment: INTERPRETIVE INFORMATION: Zinc, Serum or Plasma    Elevated results may be due to skin or collection-related   contamination, including the use of a noncertified   metal-free collection/transport tube. If contamination   concerns exist due to elevated levels of serum/plasma zinc,   confirmation with a second specimen collected in a   certified metal-free tube is recommended.    Circulating zinc concentrations are dependent on albumin   status and are depressed with malnutrition.  Zinc may also   be lowered with infection, inflammation, stress, oral    contraceptives, and pregnancy.  Zinc may be elevated with   zinc supplementation or fasting.  Elevated zinc   concentrations may interfere with copper absorption.     This test was developed and its performance characteristics   determined by [facility]. It has not been cleared or   approved by the US Food and Drug Administration. This test   was performed in a CLIA certified laboratory and is   intended for clinical                            purposes.  Performed By: [facility]  [address] [place] [address]  : Steven Chauhan MD, PhD  CLIA Number: [id number]     Vitamin D, Total (25-Hydroxy) 07/16/2024 30  20 - 50 ng/mL Final    optimum levels     Iron 07/16/2024 104  61 - 157 ug/dL Final     Ferritin 07/16/2024 102  31 - 409 ng/mL Final     Tissue Transglutaminase Antibody I* 07/16/2024 0.9  <7.0 U/mL Final    Negative- The tTG-IgA assay has limited utility for patients with decreased levels of IgA. Screening for celiac disease should include IgA testing to rule out selective IgA deficiency and to guide selection and interpretation of serological testing. tTG-IgG testing may be positive in celiac disease patients with IgA deficiency.     Tissue Transglutaminase Antibody I* 07/16/2024 0.8  <7.0 U/mL Final    Negative        Culture data:  7-Day Micro Results       No results found for the last 168 hours.                  Imaging:   IR PICC Placement > 5 Yrs of Age  Narrative: PROCEDURE: Peripherally Inserted Central Catheter (PICC) placement    Procedural Personnel  Advanced practice provider: Tricia Albarran PA-C    Procedure Date: 3/7/2024    Pre-procedure diagnosis: Ocular Syphilis  Post-procedure diagnosis: Same  Indication: IV antibiotics    Complications: No immediate complications.  Impression: IMPRESSION:  Completed image-guided placement of 5 Setswana, 50 cm single lumen PICC  via left basilic with tip in right atrium. Aspirates and flushes  freely, heparin  locked and ready for immediate use. No immediate  complication.    Plan:   The catheter may be used immediately.  _______________________________________________________________    PROCEDURE SUMMARY:  - Venous access with ultrasound guidance  - PICC insertion with fluoroscopic guidance    PROCEDURE DETAILS:  Pre-procedure  Consent: Informed consent for the procedure including risks, benefits  and alternatives was obtained and time-out was performed prior to the  procedure.  Preparation (MIPS): The site was prepared and draped using all  elements of maximal sterile barrier technique including sterile  gloves, sterile gown, cap, mask, large sterile sheet, sterile  ultrasound probe cover, hand hygiene and cutaneous antisepsis with 2%  chlorhexidine.     Anesthesia/sedation  Level of anesthesia/sedation: 1% lidocaine    Access  Local anesthesia was administered. The vessel was sonographically  evaluated and determined to be patent. Real time ultrasound was used  to visualize needle entry into the vessel and a permanent image was  stored.  Laterality: left  Vein accessed: Basilic vein  Access technique: Micropuncture set with 21 gauge needle    Catheter placement  The catheter was trimmed to appropriate length and placed into the  vein under fluoroscopic guidance via a peel-away sheath. Catheter tip  location was fluoroscopically verified and a permanent image was  stored. A sterile dressing was applied.  Catheter placed: BD PowerPICC  Catheter size (Kiswahili): 5FR  Catheter intravascular length (cm): 50  Lumens: Single-lumen   Power injectable: Yes  Catheter tip: right atrium  Catheter flush: Heparin (100 units/mL)  Catheter securement technique: Stat-Lock    Radiation Dose  Fluoro time: 0.4 minutes    Additional Details  Specimens removed: None  Estimated blood loss (mL): Less than 10    Attestation    ANIL BOYER PA-C         SYSTEM ID:  [id number]         Again, thank you for allowing me to participate in the care of  your patient.      Sincerely,    Latasha Gil MD

## 2024-08-01 LAB
RPR SER QL: REACTIVE
RPR SER-TITR: NORMAL {TITER}
T PALLIDUM AB SER QL: REACTIVE

## 2024-08-20 NOTE — TELEPHONE ENCOUNTER
FUTURE VISIT INFORMATION      FUTURE VISIT INFORMATION:  Date: 9/10/24  Time: 1:10PM  Location: [facility]  REFERRAL INFORMATION:  Referring provider:  Latasha Gil MD  Referring providers clinic:  [facility]   Reason for visit/diagnosis  Tinnitus- Referred by Latasha Gil MD in [facility]     RECORDS REQUESTED FROM:       Clinic name Comments Records Status Imaging Status   [facility]   4/19/24- ov Latasha Baker MD EPIC

## 2024-09-10 ENCOUNTER — PRE VISIT (OUTPATIENT)
Dept: OTOLARYNGOLOGY | Facility: CLINIC | Age: 59
End: 2024-09-10

## 2024-09-17 ENCOUNTER — OFFICE VISIT (OUTPATIENT)
Dept: PSYCHOLOGY | Facility: CLINIC | Age: 59
End: 2024-09-17
Payer: COMMERCIAL

## 2024-09-17 DIAGNOSIS — F43.21 ADJUSTMENT DISORDER WITH DEPRESSED MOOD: ICD-10-CM

## 2024-09-17 DIAGNOSIS — F91.9 DISRUPTIVE BEHAVIOR: Primary | ICD-10-CM

## 2024-09-17 PROCEDURE — 90837 PSYTX W PT 60 MINUTES: CPT | Performed by: STUDENT IN AN ORGANIZED HEALTH CARE EDUCATION/TRAINING PROGRAM

## 2024-09-17 NOTE — PROGRESS NOTES
[facility] - Progress Note    Date of Service: 24   Name: Vimal Goldsmith  : 1965  Medical Record Number: [id number]  Treating Provider: Jg Barrientos, PhD  Type of Session: Individual  Present in Session: Client and therapist  Session Start and Stop Time: 11:00am-11:57am  Number of Minutes:  57    SERVICE MODALITY:  In-person    DSM-5 Diagnoses:  Encounter Diagnoses   Name Primary?    Other Specified Disruptive, Impulse-Control, and Conduct Disorder (hypersexuality) Yes    Adjustment Disorder, With depressed mood          Current Reported Symptoms and Status update:  Changes since last session includes working on relationship and intimacy functioning, improved control over impulses/urges, exploring relationship wants/needs, considering factors contributing to compulsivity, maintaining health.     Progress Toward Treatment Goals:   Satisfactory progress     Therapeutic Interventions/Treatment Strategies:    Area(s) of treatment focus addressed in this session included Symptom Management, Interpersonal Relationship Skills, and Sexual Health and Wellness    The goal of today's session was to establish therapeutic rapport and assess current symptoms and concerns. Client shared his sexual and identity-development history. He also clarified current concerns, shared how he and his  are working on their relationship, and explained ways in which he is responding to urges and impulses different. We distinguished between compulsivity, sexuality, and relationship structure/concerns as 3 separate but interacting factors. We planned for future sessions. Client has goals related to better understanding himself and compulsivity and working to improve his relationship.     Psychotherapist offered support, feedback and validation and reinforced use of skills Treatment modalities used include Cognitive Behavioral Therapy  Support, Feedback, and Clarification    Patient Response:   Patient  responded to session by accepting feedback, giving feedback, listening, focusing on goals, and accepting support  Possible barriers to participation / learning include: N/A    Current Mental Status Exam:   Appearance:  Appropriate   Eye Contact:  Good   Attitude / Demeanor: Friendly  Speech      Rate / Production: Normal/ Responsive      Volume:  Normal  volume  Orientation:  All  Mood:   Normal  Affect:   Appropriate   Thought Content: Clear   Insight:   Good         Plan/Need for Future Services:  Return for therapy in 2 weeks to treat diagnosed problems.    Patient has a current master individualized treatment plan.  See Epic treatment plan for more information.    Referral / Collaboration:  Referral to another professional/service is not indicated at this time..  Emergency Services Needed?  No    Assignment:  Schedule follow-up    Interactive Complexity:  There are four specific communication difficulties that complicate the work of the primary psychiatric procedure.  Interactive complexity (+30732) may be reported when at least one of these difficulties is present.    Communication difficulties present during current the psychiatric procedure include:  None.      Signature/Title:    Jg Barrientos, PhD, LP    [facility], Sexual and Gender Health Clinic  Department of Family Medicine and Community Health  [facility]

## 2024-10-02 ENCOUNTER — OFFICE VISIT (OUTPATIENT)
Dept: PSYCHOLOGY | Facility: CLINIC | Age: 59
End: 2024-10-02
Payer: COMMERCIAL

## 2024-10-02 DIAGNOSIS — F43.21 ADJUSTMENT DISORDER WITH DEPRESSED MOOD: ICD-10-CM

## 2024-10-02 DIAGNOSIS — F91.9 DISRUPTIVE BEHAVIOR: Primary | ICD-10-CM

## 2024-10-02 PROCEDURE — 90837 PSYTX W PT 60 MINUTES: CPT | Performed by: STUDENT IN AN ORGANIZED HEALTH CARE EDUCATION/TRAINING PROGRAM

## 2024-10-02 NOTE — PROGRESS NOTES
"[facility] - [facility]    Date of Service: 10/02/24   Name: Vimal Goldsmith  : 1965  Medical Record Number: [id number]  Treating Provider: Jg Barrientos, PhD  Type of Session: Individual  Present in Session: Client and therapist  Session Start and Stop Time: 4:00pm-4:55pm  Number of Minutes:  55    SERVICE MODALITY:  In-person    DSM-5 Diagnoses:  Encounter Diagnoses   Name Primary?    Other Specified Disruptive, Impulse-Control, and Conduct Disorder (hypersexuality) Yes    Adjustment Disorder, With depressed mood          Current Reported Symptoms and Status update:  Changes since last session includes urges, fantasies, and \"obsessive\" thoughts related to boundary/relationship violations. Recently completed a large project proposal and changes in work-related stress. Contemplating change.    Compulsive Sexual Behavior:   - Persistent pattern of failure or difficulty controlling intense, repetitive sexual impulses or urges.  - Continued difficulties controlling sexual urges/impulses despite adverse consequences.  - Sexual activities becoming a central focus to life to the point of neglecting other interests, activities, or responsibilities.  - Pattern has persisted for an extended period of time (greater than 6 months)  - Impairment in relationship functioning.    Adjustment Disorder with Depressed Mood:  - Mild depressive symptoms secondary to health concerns.     Progress Toward Treatment Goals:   Satisfactory progress     Therapeutic Interventions/Treatment Strategies:    Area(s) of treatment focus addressed in this session included Symptom Management, Interpersonal Relationship Skills, and Sexual Health and Wellness    Client shared goals for change. He also shared challenges with change, including strong desire for novelty, feeling rewarded by behaviors, boredom proneness, high drive, and values conflict. We explored motivations for change. Client also expressed desire to grow " in self-care and social support. We discussed potential for psychiatry consult; therapist will discuss with psychiatry provider. Therapist provided psychoeducation on compulsive sexual behavior and potential underlying factors. Client discussed an upcoming trip and potential risk factors. We discussed decisions and motivations for decisions related to risk factors.     Psychotherapist offered support, feedback and validation and reinforced use of skills Treatment modalities used include Cognitive Behavioral Therapy  Support and Feedback    Patient Response:   Patient responded to session by accepting feedback, giving feedback, listening, focusing on goals, and accepting support  Possible barriers to participation / learning include: N/A    Current Mental Status Exam:   Appearance:  Appropriate   Eye Contact:  Good   Attitude / Demeanor: Friendly  Speech      Rate / Production: Normal/ Responsive      Volume:  Normal  volume  Orientation:  All  Mood:   Normal  Affect:   Appropriate   Thought Content: Clear   Insight:   Good         Plan/Need for Future Services:  Return for therapy in 2 weeks to treat diagnosed problems.    Patient has a current master individualized treatment plan.  See Epic treatment plan for more information.    Referral / Collaboration:  Referral to another professional/service is not indicated at this time..  Emergency Services Needed?  No    Assignment:  Contemplate decisions for change    Interactive Complexity:  There are four specific communication difficulties that complicate the work of the primary psychiatric procedure.  Interactive complexity (+45511) may be reported when at least one of these difficulties is present.    Communication difficulties present during current the psychiatric procedure include:  None.      Signature/Title:    Jg Barrientos, PhD, LP    [facility], Sexual and Gender Health Clinic  Department of Family Medicine and  Community Health  [facility]

## 2024-10-08 DIAGNOSIS — F51.02 ADJUSTMENT INSOMNIA: ICD-10-CM

## 2024-10-08 RX ORDER — ZOLPIDEM TARTRATE 5 MG/1
TABLET ORAL
Qty: 30 TABLET | Refills: 1 | Status: SHIPPED | OUTPATIENT
Start: 2024-10-08

## 2024-10-12 ENCOUNTER — TRANSFERRED RECORDS (OUTPATIENT)
Dept: HEALTH INFORMATION MANAGEMENT | Facility: CLINIC | Age: 59
End: 2024-10-12
Payer: COMMERCIAL

## 2024-10-27 ENCOUNTER — MYC MEDICAL ADVICE (OUTPATIENT)
Dept: FAMILY MEDICINE | Facility: CLINIC | Age: 59
End: 2024-10-27
Payer: COMMERCIAL

## 2024-10-27 DIAGNOSIS — Z13.9 SCREENING FOR CONDITION: ICD-10-CM

## 2024-10-27 DIAGNOSIS — R73.03 PREDIABETES: ICD-10-CM

## 2024-10-27 DIAGNOSIS — Z29.81 ENCOUNTER FOR HIV PRE-EXPOSURE PROPHYLAXIS: Primary | ICD-10-CM

## 2024-10-27 DIAGNOSIS — N40.0 ENLARGED PROSTATE: ICD-10-CM

## 2024-10-27 DIAGNOSIS — L13.0 DERMATITIS HERPETIFORMIS: ICD-10-CM

## 2024-10-30 ENCOUNTER — TELEPHONE (OUTPATIENT)
Dept: GASTROENTEROLOGY | Facility: CLINIC | Age: 59
End: 2024-10-30
Payer: COMMERCIAL

## 2024-10-30 NOTE — TELEPHONE ENCOUNTER
Left Voicemail (1st Attempt) for the patient to call back and schedule the following:     Appointment type: return celiac   Provider: Dr. Amaral   Return date: next available  Specialty phone number: [phone]  Additional appointment(s) needed:   Additonal Notes:

## 2024-11-01 ENCOUNTER — TELEPHONE (OUTPATIENT)
Dept: GASTROENTEROLOGY | Facility: CLINIC | Age: 59
End: 2024-11-01
Payer: COMMERCIAL

## 2024-11-01 NOTE — TELEPHONE ENCOUNTER
Left Voicemail (2nd Attempt) for the patient to call back and schedule the following:     Appointment type: return    Provider:Dr. Amaral   Return date: next available  Specialty phone number: [phone]   Additional appointment(s) needed:   Additonal Notes:          *Hello   Would you be able to reach out to this patient to schedule a return celiac appointment with Dr. Amaral.   He is aware you will be calling to schedule. Once scheduled ok to cancel the appointment with Dr. Sandoval.     Cornelia

## 2024-11-05 ENCOUNTER — OFFICE VISIT (OUTPATIENT)
Dept: PSYCHOLOGY | Facility: CLINIC | Age: 59
End: 2024-11-05
Payer: COMMERCIAL

## 2024-11-05 DIAGNOSIS — F91.9 DISRUPTIVE BEHAVIOR: Primary | ICD-10-CM

## 2024-11-05 DIAGNOSIS — F43.21 ADJUSTMENT DISORDER WITH DEPRESSED MOOD: ICD-10-CM

## 2024-11-05 PROCEDURE — 90837 PSYTX W PT 60 MINUTES: CPT | Performed by: STUDENT IN AN ORGANIZED HEALTH CARE EDUCATION/TRAINING PROGRAM

## 2024-11-05 NOTE — PROGRESS NOTES
[facility] - Progress Note    Date of Service: 24   Name: Vimal Goldsmith  : 1965  Medical Record Number: [id number]  Treating Provider: Jg Barrientos, PhD  Type of Session: Individual  Present in Session: Client and therapist  Session Start and Stop Time: 11:00am-11:55am  Number of Minutes:  55    SERVICE MODALITY:  In-person    DSM-5 Diagnoses:  Encounter Diagnoses   Name Primary?    Other Specified Disruptive, Impulse-Control, and Conduct Disorder (hypersexuality) Yes    Adjustment Disorder, With depressed mood          Current Reported Symptoms and Status update:  Changes since last session includes recent boundary violations, working to inhibit or change responses to urges, some depression and sadness.    Compulsive Sexual Behavior:   - Persistent pattern of failure or difficulty controlling intense, repetitive sexual impulses or urges.  - Continued difficulties controlling sexual urges/impulses despite adverse consequences.  - Sexual activities becoming a central focus to life to the point of neglecting other interests, activities, or responsibilities.  - Pattern has persisted for an extended period of time (greater than 6 months)  - Impairment in relationship functioning.     Adjustment Disorder with Depressed Mood:  - Mild depressive symptoms secondary to health concerns.     Progress Toward Treatment Goals:   Satisfactory progress     Therapeutic Interventions/Treatment Strategies:    Area(s) of treatment focus addressed in this session included Symptom Management and Sexual Health and Wellness    Client shared and processed recent sexual boundary violations. We identified contributing factors, including situations, thoughts, boredom, and interest. Client shared ways in which he is working to reduce risk situations. Therapist introduced alternative coping strategies, including engaging in sexual outlets in line with values, urge surfing and mindfulness/meditation,  exercise, and social support. Informed client therapist will be out until December 2.     Psychotherapist offered support, feedback and validation and reinforced use of skills Treatment modalities used include Cognitive Behavioral Therapy  Support and Feedback    Patient Response:   Patient responded to session by accepting feedback, giving feedback, listening, focusing on goals, and accepting support  Possible barriers to participation / learning include: N/A    Current Mental Status Exam:   Appearance:  Appropriate   Eye Contact:  Good   Attitude / Demeanor: Friendly  Speech      Rate / Production: Normal/ Responsive      Volume:  Normal  volume  Orientation:  All  Mood:   Normal  Affect:   Appropriate   Thought Content: Clear   Insight:   Good         Plan/Need for Future Services:  Return for therapy in 2-4 weeks to treat diagnosed problems.    Patient has a current master individualized treatment plan.  See Epic treatment plan for more information.    Referral / Collaboration:  Referral to another professional/service is not indicated at this time..  Emergency Services Needed?  No    Assignment:  Practice meditation/mindfulness    Interactive Complexity:  There are four specific communication difficulties that complicate the work of the primary psychiatric procedure.  Interactive complexity (+01439) may be reported when at least one of these difficulties is present.    Communication difficulties present during current the psychiatric procedure include:  None.      Signature/Title:    Jg Barrientos, PhD, LP    [facility], [facility]  Department of Family Medicine and Community Health  [employer]

## 2024-12-06 ENCOUNTER — DOCUMENTATION ONLY (OUTPATIENT)
Dept: LAB | Facility: CLINIC | Age: 59
End: 2024-12-06
Payer: COMMERCIAL

## 2024-12-06 NOTE — PROGRESS NOTES
Vimal Goldmsith has an upcoming lab appointment:    Future Appointments   Date Time Provider Department Center   12/16/2024  1:00 PM Jg Barrientos, PhD [facility]   12/18/2024  8:15 AM [facility]   12/23/2024  1:20 PM Armando Banks MD [facility]   1/16/2025 11:00 AM Jg Barrientos, PhD [facility] Sex [facility]   1/23/2025  9:00 AM [facility] LAB [facility]   1/28/2025 11:00 AM Latasha Gil MD [facility]   1/29/2025  2:00 PM Jg Barrientos, PhD [facility] Sex [facility]   7/2/2025 11:20 AM Palak Sandoval MD [facility]   9/11/2025  1:00 PM Steven Aragon MD [facility]     Patient is scheduled for the following lab(s): stool testing    There is no order available. Please review and place either future orders or HMPO (Review of Health Maintenance Protocol Orders), as appropriate.    Health Maintenance Due   Topic    ANNUAL REVIEW OF HM ORDERS      Bernie Hinojosa

## 2024-12-16 ENCOUNTER — OFFICE VISIT (OUTPATIENT)
Dept: PSYCHOLOGY | Facility: CLINIC | Age: 59
End: 2024-12-16
Payer: COMMERCIAL

## 2024-12-16 DIAGNOSIS — F91.9 DISRUPTIVE BEHAVIOR: Primary | ICD-10-CM

## 2024-12-16 DIAGNOSIS — F43.21 ADJUSTMENT DISORDER WITH DEPRESSED MOOD: ICD-10-CM

## 2024-12-16 NOTE — PROGRESS NOTES
[facility] - Progress Note    Date of Service: 24   Name: Vimal Goldsmith  : 1965  Medical Record Number: [id number]  Treating Provider: Jg Barrientos, PhD  Type of Session: Individual  Present in Session: Client and therapist  Session Start and Stop Time: 1:03pm-2:00pm  Number of Minutes:  57    SERVICE MODALITY:  In-person    DSM-5 Diagnoses:  Encounter Diagnoses   Name Primary?    Other Specified Disruptive, Impulse-Control, and Conduct Disorder (hypersexuality) Yes    Adjustment Disorder, With depressed mood          Current Reported Symptoms and Status update:  Changes since last session includes no boundary violations, exploring sexual health goals, improvement in adjustment/depression.    Compulsive Sexual Behavior:   - Persistent pattern of failure or difficulty controlling intense, repetitive sexual impulses or urges.  - Continued difficulties controlling sexual urges/impulses despite adverse consequences.  - Sexual activities becoming a central focus to life to the point of neglecting other interests, activities, or responsibilities.  - Pattern has persisted for an extended period of time (greater than 6 months)  - Impairment in relationship functioning.     Adjustment Disorder with Depressed Mood:  - Mild depressive symptoms secondary to health concerns.     Progress Toward Treatment Goals:   Satisfactory progress     Therapeutic Interventions/Treatment Strategies:    Area(s) of treatment focus addressed in this session included Symptom Management, Interpersonal Relationship Skills, and Sexual Health and Wellness    Client shared that he has been aligned with his sexual values/goals since last session. We discussed his interests and urges, and ways in which he can experience sexual health and wellness while also managing compulsivity. We discussed mindfulness/meditation as a foundation for additional coping resources. Provided client with check-in sheets to review  coping, self-care, and assess CSB-related concerns. We also assessed client's treatment needs, including if group is an option for him due to schedule.     Psychotherapist offered support, feedback and validation and reinforced use of skills Treatment modalities used include Cognitive Behavioral Therapy  Support, Feedback, and Education    Patient Response:   Patient responded to session by accepting feedback, giving feedback, listening, focusing on goals, and accepting support  Possible barriers to participation / learning include: N/A    Current Mental Status Exam:   Appearance:  Appropriate   Eye Contact:  Good   Attitude / Demeanor: Friendly  Speech      Rate / Production: Normal/ Responsive      Volume:  Normal  volume  Orientation:  All  Mood:   Normal  Affect:   Appropriate   Thought Content: Clear   Insight:   Good       Plan/Need for Future Services:  Return for therapy in 2-4 weeks to treat diagnosed problems.    Patient has a current master individualized treatment plan.  See Epic treatment plan for more information.    Referral / Collaboration:  Referral to another professional/service is not indicated at this time..  Emergency Services Needed?  No    Assignment:  Meditation/mindfulness  Check-in sheets    Interactive Complexity:  There are four specific communication difficulties that complicate the work of the primary psychiatric procedure.  Interactive complexity (+73834) may be reported when at least one of these difficulties is present.    Communication difficulties present during current the psychiatric procedure include:  None.      Jg Barrientos, PhD, LP    [facility], [facility]  Department of Family Medicine and Community Health  [employer]

## 2024-12-18 ENCOUNTER — LAB (OUTPATIENT)
Dept: LAB | Facility: CLINIC | Age: 59
End: 2024-12-18
Payer: COMMERCIAL

## 2024-12-18 DIAGNOSIS — L13.0 DERMATITIS HERPETIFORMIS: ICD-10-CM

## 2024-12-18 DIAGNOSIS — N40.0 ENLARGED PROSTATE: ICD-10-CM

## 2024-12-18 DIAGNOSIS — A53.9 SYPHILIS: ICD-10-CM

## 2024-12-18 DIAGNOSIS — R73.03 PREDIABETES: ICD-10-CM

## 2024-12-18 DIAGNOSIS — Z13.9 SCREENING FOR CONDITION: ICD-10-CM

## 2024-12-18 DIAGNOSIS — Z29.81 ENCOUNTER FOR HIV PRE-EXPOSURE PROPHYLAXIS: ICD-10-CM

## 2024-12-18 LAB
ALBUMIN SERPL BCG-MCNC: 4.4 G/DL (ref 3.5–5.2)
ALP SERPL-CCNC: 59 U/L (ref 40–150)
ALT SERPL W P-5'-P-CCNC: 25 U/L (ref 0–70)
ANION GAP SERPL CALCULATED.3IONS-SCNC: 9 MMOL/L (ref 7–15)
AST SERPL W P-5'-P-CCNC: 22 U/L (ref 0–45)
BILIRUB SERPL-MCNC: 0.6 MG/DL
BUN SERPL-MCNC: 19.6 MG/DL (ref 8–23)
CALCIUM SERPL-MCNC: 9.1 MG/DL (ref 8.8–10.4)
CHLORIDE SERPL-SCNC: 105 MMOL/L (ref 98–107)
CREAT SERPL-MCNC: 0.88 MG/DL (ref 0.67–1.17)
EGFRCR SERPLBLD CKD-EPI 2021: >90 ML/MIN/1.73M2
ERYTHROCYTE [DISTWIDTH] IN BLOOD BY AUTOMATED COUNT: 12.3 % (ref 10–15)
EST. AVERAGE GLUCOSE BLD GHB EST-MCNC: 91 MG/DL
GLUCOSE SERPL-MCNC: 112 MG/DL (ref 70–99)
HBA1C MFR BLD: 4.8 % (ref 0–5.6)
HCO3 SERPL-SCNC: 24 MMOL/L (ref 22–29)
HCT VFR BLD AUTO: 43.4 % (ref 40–53)
HGB BLD-MCNC: 15.2 G/DL (ref 13.3–17.7)
HIV 1+2 AB+HIV1 P24 AG SERPL QL IA: NONREACTIVE
HOLD SPECIMEN: NORMAL
MCH RBC QN AUTO: 31.1 PG (ref 26.5–33)
MCHC RBC AUTO-ENTMCNC: 35 G/DL (ref 31.5–36.5)
MCV RBC AUTO: 89 FL (ref 78–100)
PLATELET # BLD AUTO: 196 10E3/UL (ref 150–450)
POTASSIUM SERPL-SCNC: 4.4 MMOL/L (ref 3.4–5.3)
PROT SERPL-MCNC: 7.3 G/DL (ref 6.4–8.3)
PSA SERPL DL<=0.01 NG/ML-MCNC: 1.58 NG/ML (ref 0–3.5)
RBC # BLD AUTO: 4.89 10E6/UL (ref 4.4–5.9)
SODIUM SERPL-SCNC: 138 MMOL/L (ref 135–145)
WBC # BLD AUTO: 4.7 10E3/UL (ref 4–11)

## 2024-12-18 PROCEDURE — 85027 COMPLETE CBC AUTOMATED: CPT

## 2024-12-18 PROCEDURE — 87389 HIV-1 AG W/HIV-1&-2 AB AG IA: CPT

## 2024-12-18 PROCEDURE — 36415 COLL VENOUS BLD VENIPUNCTURE: CPT

## 2024-12-18 PROCEDURE — G0103 PSA SCREENING: HCPCS

## 2024-12-18 PROCEDURE — 83036 HEMOGLOBIN GLYCOSYLATED A1C: CPT

## 2024-12-18 PROCEDURE — 86780 TREPONEMA PALLIDUM: CPT

## 2024-12-18 PROCEDURE — 86593 SYPHILIS TEST NON-TREP QUANT: CPT

## 2024-12-18 PROCEDURE — 86592 SYPHILIS TEST NON-TREP QUAL: CPT

## 2024-12-18 PROCEDURE — 80053 COMPREHEN METABOLIC PANEL: CPT

## 2024-12-19 LAB
RPR SER QL: REACTIVE
RPR SER-TITR: NORMAL {TITER}
T PALLIDUM AB SER QL: REACTIVE

## 2024-12-23 ENCOUNTER — OFFICE VISIT (OUTPATIENT)
Dept: FAMILY MEDICINE | Facility: CLINIC | Age: 59
End: 2024-12-23
Payer: COMMERCIAL

## 2024-12-23 VITALS
HEART RATE: 83 BPM | RESPIRATION RATE: 16 BRPM | WEIGHT: 169 LBS | SYSTOLIC BLOOD PRESSURE: 125 MMHG | HEIGHT: 68 IN | TEMPERATURE: 98.6 F | DIASTOLIC BLOOD PRESSURE: 77 MMHG | OXYGEN SATURATION: 95 % | BODY MASS INDEX: 25.61 KG/M2

## 2024-12-23 DIAGNOSIS — F51.02 ADJUSTMENT INSOMNIA: ICD-10-CM

## 2024-12-23 DIAGNOSIS — J31.0 NON-ALLERGIC RHINITIS: ICD-10-CM

## 2024-12-23 DIAGNOSIS — H93.13 TINNITUS, BILATERAL: ICD-10-CM

## 2024-12-23 DIAGNOSIS — L13.0 DERMATITIS HERPETIFORMIS: ICD-10-CM

## 2024-12-23 DIAGNOSIS — Z72.51 HIGH RISK SEXUAL BEHAVIOR, UNSPECIFIED TYPE: ICD-10-CM

## 2024-12-23 DIAGNOSIS — Z00.00 ROUTINE GENERAL MEDICAL EXAMINATION AT A HEALTH CARE FACILITY: Primary | ICD-10-CM

## 2024-12-23 DIAGNOSIS — A53.9 SYPHILIS: ICD-10-CM

## 2024-12-23 PROCEDURE — 99213 OFFICE O/P EST LOW 20 MIN: CPT | Mod: 25 | Performed by: FAMILY MEDICINE

## 2024-12-23 PROCEDURE — 90632 HEPA VACCINE ADULT IM: CPT | Performed by: FAMILY MEDICINE

## 2024-12-23 PROCEDURE — 90471 IMMUNIZATION ADMIN: CPT | Performed by: FAMILY MEDICINE

## 2024-12-23 PROCEDURE — 99396 PREV VISIT EST AGE 40-64: CPT | Mod: 25 | Performed by: FAMILY MEDICINE

## 2024-12-23 RX ORDER — IPRATROPIUM BROMIDE 42 UG/1
SPRAY, METERED NASAL
Qty: 15 ML | Refills: 1 | Status: SHIPPED | OUTPATIENT
Start: 2024-12-23

## 2024-12-23 RX ORDER — ZOLPIDEM TARTRATE 5 MG/1
TABLET ORAL
Qty: 30 TABLET | Refills: 1 | Status: SHIPPED | OUTPATIENT
Start: 2024-12-23

## 2024-12-23 RX ORDER — DAPSONE 25 MG/1
TABLET ORAL
Qty: 180 TABLET | Refills: 3 | Status: SHIPPED | OUTPATIENT
Start: 2024-12-23

## 2024-12-23 RX ORDER — TRAZODONE HYDROCHLORIDE 50 MG/1
25 TABLET, FILM COATED ORAL
Qty: 20 TABLET | Refills: 3 | Status: SHIPPED | OUTPATIENT
Start: 2024-12-23

## 2024-12-23 RX ORDER — FLUTICASONE PROPIONATE 50 MCG
1 SPRAY, SUSPENSION (ML) NASAL DAILY
Qty: 18.2 ML | Refills: 6 | Status: SHIPPED | OUTPATIENT
Start: 2024-12-23

## 2024-12-23 SDOH — HEALTH STABILITY: PHYSICAL HEALTH: ON AVERAGE, HOW MANY MINUTES DO YOU ENGAGE IN EXERCISE AT THIS LEVEL?: 20 MIN

## 2024-12-23 SDOH — HEALTH STABILITY: PHYSICAL HEALTH: ON AVERAGE, HOW MANY DAYS PER WEEK DO YOU ENGAGE IN MODERATE TO STRENUOUS EXERCISE (LIKE A BRISK WALK)?: 4 DAYS

## 2024-12-23 ASSESSMENT — SOCIAL DETERMINANTS OF HEALTH (SDOH): HOW OFTEN DO YOU GET TOGETHER WITH FRIENDS OR RELATIVES?: NEVER

## 2024-12-23 NOTE — PATIENT INSTRUCTIONS
Patient Education   Preventive Care Advice   This is general advice given by our system to help you stay healthy. However, your care team may have specific advice just for you. Please talk to your care team about your preventive care needs.  Nutrition  Eat 5 or more servings of fruits and vegetables each day.  Try wheat bread, brown rice and whole grain pasta (instead of white bread, rice, and pasta).  Get enough calcium and vitamin D. Check the label on foods and aim for 100% of the RDA (recommended daily allowance).  Lifestyle  Exercise at least 150 minutes each week  (30 minutes a day, 5 days a week).  Do muscle strengthening activities 2 days a week. These help control your weight and prevent disease.  No smoking.  Wear sunscreen to prevent skin cancer.  Have a dental exam and cleaning every 6 months.  Yearly exams  See your health care team every year to talk about:  Any changes in your health.  Any medicines your care team has prescribed.  Preventive care, family planning, and ways to prevent chronic diseases.  Shots (vaccines)   HPV shots (up to age 26), if you've never had them before.  Hepatitis B shots (up to age 59), if you've never had them before.  COVID-19 shot: Get this shot when it's due.  Flu shot: Get a flu shot every year.  Tetanus shot: Get a tetanus shot every 10 years.  Pneumococcal, hepatitis A, and RSV shots: Ask your care team if you need these based on your risk.  Shingles shot (for age 50 and up)  General health tests  Diabetes screening:  Starting at age 35, Get screened for diabetes at least every 3 years.  If you are younger than age 35, ask your care team if you should be screened for diabetes.  Cholesterol test: At age 39, start having a cholesterol test every 5 years, or more often if advised.  Bone density scan (DEXA): At age 50, ask your care team if you should have this scan for osteoporosis (brittle bones).  Hepatitis C: Get tested at least once in your life.  STIs (sexually  transmitted infections)  Before age 24: Ask your care team if you should be screened for STIs.  After age 24: Get screened for STIs if you're at risk. You are at risk for STIs (including HIV) if:  You are sexually active with more than one person.  You don't use condoms every time.  You or a partner was diagnosed with a sexually transmitted infection.  If you are at risk for HIV, ask about PrEP medicine to prevent HIV.  Get tested for HIV at least once in your life, whether you are at risk for HIV or not.  Cancer screening tests  Cervical cancer screening: If you have a cervix, begin getting regular cervical cancer screening tests starting at age 21.  Breast cancer scan (mammogram): If you've ever had breasts, begin having regular mammograms starting at age 40. This is a scan to check for breast cancer.  Colon cancer screening: It is important to start screening for colon cancer at age 45.  Have a colonoscopy test every 10 years (or more often if you're at risk) Or, ask your provider about stool tests like a FIT test every year or Cologuard test every 3 years.  To learn more about your testing options, visit:   .  For help making a decision, visit:   https://bit.ly/rm80372.  Prostate cancer screening test: If you have a prostate, ask your care team if a prostate cancer screening test (PSA) at age 55 is right for you.  Lung cancer screening: If you are a current or former smoker ages 50 to 80, ask your care team if ongoing lung cancer screenings are right for you.  For informational purposes only. Not to replace the advice of your health care provider. Copyright   2023 [facility]. All rights reserved. Clinically reviewed by the [facility]. [employer] [id number] - REV 01/24.  Safer Sex: Care Instructions  Overview  Safer sex is a way to reduce your risk of getting a sexually transmitted infection (STI). It can also help prevent pregnancy.  Several products can help you practice  safer sex and reduce your chance of STIs. One of the best is a condom. There are internal and external condoms. You can use a special rubber sheet (dental dam) for protection during oral sex. Disposable gloves can keep your hands from touching blood, semen, or other body fluids that can carry infections.  Remember that birth control methods such as diaphragms, IUDs, foams, and birth control pills do not stop you from getting STIs.  Follow-up care is a key part of your treatment and safety. Be sure to make and go to all appointments, and call your doctor if you are having problems. It's also a good idea to know your test results and keep a list of the medicines you take.  How can you care for yourself at home?  Think about getting vaccinated to help prevent hepatitis A, hepatitis B, and human papillomavirus (HPV). They can be spread through sex.  Use a condom every time you have sex. Use an external condom, which goes on the penis. Or use an internal condom, which goes into the vagina or anus.  Make sure you use the right size external condom. A condom that's too small can break easily. A condom that's too big can slip off during sex.  Use a new condom each time you have sex. Be careful not to poke a hole in the condom when you open the wrapper.  Don't use an internal condom and an external condom at the same time.  Never use petroleum jelly (such as Vaseline), grease, hand lotion, baby oil, or anything with oil in it. These products can make holes in the condom.  After intercourse, hold the edge of the condom as you remove it. This will help keep semen from spilling out of the condom.  Do not have sex with anyone who has symptoms of an STI, such as sores on the genitals or mouth.  Do not drink a lot of alcohol or use drugs before sex.  Limit your sex partners. Sex with one partner who has sex only with you can reduce your risk of getting an STI.  Don't share sex toys. But if you do share them, use a condom and clean  "the sex toys between each use.  Talk to any partners before you have sex. Talk about what you feel comfortable with and whether you have any boundaries with sex. And find out if your partner or partners may be at risk for any STI. Keep in mind that a person may be able to spread an STI even if they do not have symptoms. You and any partners may want to get tested for STIs.  Where can you learn more?  Go to https://www.[employer].net/patiented  Enter B608 in the search box to learn more about \"Safer Sex: Care Instructions.\"  Current as of: April 30, 2024  Content Version: 14.3    2024 [employer]   Care instructions adapted under license by your healthcare professional. If you have questions about a medical condition or this instruction, always ask your healthcare professional. [employer] disclaims any warranty or liability for your use of this information.       "

## 2024-12-23 NOTE — PROGRESS NOTES
"Preventive Care Visit  [facility] DOMI Banks MD, Family Medicine  Dec 23, 2024      Assessment & Plan     Routine general medical examination at a health care facility  Doing generally well.  Exercises.  Concerned about weight gain and we discussed resistance activities    Adjustment insomnia  Will try low dose trazadone as an alternative to ambien.  May try doxepin as another alternative if this doesn't work  - zolpidem (AMBIEN) 5 MG tablet; TAKE 1 TABLET BY MOUTH EVERY NIGHT AS NEEDED FOR SLEEP  - traZODone (DESYREL) 50 MG tablet; Take 0.5 tablets (25 mg) by mouth nightly as needed for sleep.    Dermatitis herpetiformis  Follows with derm.  Encouraged to see yearly.  - dapsone (ACZONE) 25 MG tablet; TAKE 1 TABLET BY MOUTH EVERY DAY TAKE 2 TABLET BY MOUTH DAILY WHEN FLARING    Non-allergic rhinitis  Has some signs of this today but says it feels pretty good currently.  May have an impact on hearing.  Will try his sprays.  - ipratropium (ATROVENT) 0.06 % nasal spray; Spray 2 sprays into both nostrils 4 times daily as needed for rhinitis  - fluticasone (FLONASE) 50 MCG/ACT nasal spray; Spray 1 spray into both nostrils daily.    Tinnitus, bilateral  Has some cerumen and also has some eustacean tube dysfunction.  Still would recommend audiology to look at hearing.  Possible referral to ENT depending on results.  - Adult Audiology  Referral; Future    Syphilis  Expected decrease in RPR. Discussed.  Follows with ID.    High risk sexual behavior, unspecified type  Uses doxy PEP.  Stopped Discovy for now but urged him to consider pulse therapy if ID feels it would be reasonable.            BMI  Estimated body mass index is 25.7 kg/m  as calculated from the following:    Height as of this encounter: 1.727 m (5' 8\").    Weight as of this encounter: 76.7 kg (169 lb).   Weight management plan: Discussed healthy diet and exercise guidelines    Counseling  Appropriate preventive services were " addressed with this patient via screening, questionnaire, or discussion as appropriate for fall prevention, nutrition, physical activity, Tobacco-use cessation, social engagement, weight loss and cognition.  Checklist reviewing preventive services available has been given to the patient.          Return in about 53 weeks (around 12/29/2025) for Annual Wellness Visit.    Cyndi Fisher is a 59 year old, presenting for the following:  Physical (General check up )        12/23/2024     1:28 PM   Additional Questions   Roomed by Kamille   Accompanied by self         12/23/2024    Information    services provided? No          HPI  See discussions above  Health Care Directive  Patient does not have a Health Care Directive: Discussed advance care planning with patient; information given to patient to review.      12/23/2024   General Health   How would you rate your overall physical health? Good   Feel stress (tense, anxious, or unable to sleep) Only a little   (!) STRESS CONCERN      12/23/2024   Nutrition   Three or more servings of calcium each day? Yes   Diet: Gluten-free/reduced   How many servings of fruit and vegetables per day? (!) 2-3   How many sweetened beverages each day? 0-1         12/23/2024   Exercise   Days per week of moderate/strenous exercise 4 days   Average minutes spent exercising at this level 20 min         12/23/2024   Social Factors   Frequency of gathering with friends or relatives Never   Worry food won't last until get money to buy more No   Food not last or not have enough money for food? No   Do you have housing? (Housing is defined as stable permanent housing and does not include staying ouside in a car, in a tent, in an abandoned building, in an overnight shelter, or couch-surfing.) Yes   Are you worried about losing your housing? No   Lack of transportation? No   Unable to get utilities (heat,electricity)? No   (!) SOCIAL CONNECTIONS CONCERN      12/23/2024   Fall  "Risk   Fallen 2 or more times in the past year? No   Trouble with walking or balance? No          12/23/2024   Dental   Dentist two times every year? Yes            Today's PHQ-2 Score:       12/23/2024     1:25 PM   PHQ-2 ( 1999 [employer])   Q1: Little interest or pleasure in doing things 0   Q2: Feeling down, depressed or hopeless 0   PHQ-2 Score 0    Q1: Little interest or pleasure in doing things Not at all   Q2: Feeling down, depressed or hopeless Not at all   PHQ-2 Score 0       Patient-reported           12/23/2024   Substance Use   Alcohol more than 3/day or more than 7/wk No   Do you use any other substances recreationally? No     Social History     Tobacco Use    Smoking status: Never    Smokeless tobacco: Never    Tobacco comments:     NON SMOKING ENVIRONMENT, 10/6/11, [facility].    Vaping Use    Vaping status: Never Used   Substance Use Topics    Alcohol use: No     Comment: a few sips to help sleep    Drug use: No           12/23/2024   STI Screening   New sexual partner(s) since last STI/HIV test? (!) YES discussed, tested   Last PSA:   PSA   Date Value Ref Range Status   11/27/2017 0.80 0 - 4 ug/L Final     Comment:     Assay Method:  Chemiluminescence using Siemens Vista analyzer     Prostate Specific Antigen Screen   Date Value Ref Range Status   12/18/2024 1.58 0.00 - 3.50 ng/mL Final     ASCVD Risk   The 10-year ASCVD risk score (Elizabeth NUNES, et al., 2019) is: 6.2%    Values used to calculate the score:      Age: 59 years      Sex: Male      Is Non- : No      Diabetic: No      Tobacco smoker: No      Systolic Blood Pressure: 125 mmHg      Is BP treated: No      HDL Cholesterol: 48 mg/dL      Total Cholesterol: 155 mg/dL      Reviewed and updated as needed this visit by Provider   Tobacco   Meds   Med Hx  Surg Hx  Fam Hx               Objective    Exam  /77   Pulse 83   Temp 98.6  F (37  C) (Oral)   Resp 16   Ht 1.727 m (5' 8\")   Wt 76.7 kg (169 lb)   SpO2 95% " "  BMI 25.70 kg/m     Estimated body mass index is 25.7 kg/m  as calculated from the following:    Height as of this encounter: 1.727 m (5' 8\").    Weight as of this encounter: 76.7 kg (169 lb).    Physical Exam  GENERAL: alert and no distress  EARS/Throat: some wax, tm's slight dark, throat with some post-nasal trip  RESP: lungs clear to auscultation - no rales, rhonchi or wheezes  CV: regular rate and rhythm, normal S1 S2, no S3 or S4, no murmur, click or rub, no peripheral edema  ABDOMEN: soft, nontender, no hepatosplenomegaly, no masses and bowel sounds normal  MS: no gross musculoskeletal defects noted, no edema        Signed Electronically by: Armando Banks MD    "

## 2025-01-16 ENCOUNTER — OFFICE VISIT (OUTPATIENT)
Dept: PSYCHOLOGY | Facility: CLINIC | Age: 60
End: 2025-01-16
Payer: COMMERCIAL

## 2025-01-16 DIAGNOSIS — F91.9 DISRUPTIVE BEHAVIOR: Primary | ICD-10-CM

## 2025-01-16 DIAGNOSIS — F43.21 ADJUSTMENT DISORDER WITH DEPRESSED MOOD: ICD-10-CM

## 2025-01-16 NOTE — PROGRESS NOTES
[facility] - Progress Note    Date of Service: 25   Name: Vimal Goldsmith  : 1965  Medical Record Number: [id number]  Treating Provider: Jg Barrientos, PhD  Type of Session: Individual  Present in Session: Client, therapist, medical student Giselle Cervantes with client consent.  Session Start and Stop Time: 11:05am-11:54am  Number of Minutes:  49    SERVICE MODALITY:  In-person    DSM-5 Diagnoses:  Encounter Diagnoses   Name Primary?    Other Specified Disruptive, Impulse-Control, and Conduct Disorder (hypersexuality) Yes    Adjustment Disorder, With depressed mood          Current Reported Symptoms and Status update:  Changes since last session includes improvement in compulsivity and alignment with sexual health goals, improvement in adjustment and depressive symptoms.    Compulsive Sexual Behavior:   - Persistent pattern of failure or difficulty controlling intense, repetitive sexual impulses or urges.  - Continued difficulties controlling sexual urges/impulses despite adverse consequences.  - Sexual activities becoming a central focus to life to the point of neglecting other interests, activities, or responsibilities.  - Pattern has persisted for an extended period of time (greater than 6 months)  - Impairment in relationship functioning.     Adjustment Disorder with Depressed Mood:  - Mild depressive symptoms secondary to health concerns.     Progress Toward Treatment Goals:   Satisfactory progress     Therapeutic Interventions/Treatment Strategies:    Area(s) of treatment focus addressed in this session included Symptom Management, Interpersonal Relationship Skills, and Sexual Health and Wellness    Medical student Giselle Cervantes observed with client consent. Client shared positive changes in sexual and relationship health goals. He identified effective coping strategies as well as improved identification of risk factors for boundary violations. We discussed mindfulness/meditation  practices and supporting client to engage with community and build social support. Client shared some of his history and we identified factors contributing to sexual compulsivity. Encouraged client to continue self-reflection practice.     Psychotherapist offered support, feedback and validation and reinforced use of skills Treatment modalities used include Cognitive Behavioral Therapy  Support and Feedback    Patient Response:   Patient responded to session by accepting feedback, giving feedback, listening, focusing on goals, and accepting support  Possible barriers to participation / learning include: N/A    Current Mental Status Exam:   Appearance:  Appropriate   Eye Contact:  Good   Attitude / Demeanor: Friendly  Speech      Rate / Production: Normal/ Responsive      Volume:  Normal  volume  Orientation:  All  Mood:   Normal  Affect:   Appropriate   Thought Content: Clear   Insight:   Good         Plan/Need for Future Services:  Return for therapy in 2-4 weeks to treat diagnosed problems.    Patient has a current master individualized treatment plan.  See Epic treatment plan for more information.    Referral / Collaboration:  Referral to another professional/service is not indicated at this time..  Emergency Services Needed?  No    Assignment:  Mindfulness/meditation  Weekly self-reflection  [facility] or other community connection    Next session - identify factors contributing to risk or boundary violations. Start cycles.     Interactive Complexity:  There are four specific communication difficulties that complicate the work of the primary psychiatric procedure.  Interactive complexity (+40357) may be reported when at least one of these difficulties is present.    Communication difficulties present during current the psychiatric procedure include:  None.      Signature/Title:      Jg Barrientos, PhD, LP    [facility], [facility] and [facility] Health Clinic  Department of  Family Medicine and Community Health  [facility]

## 2025-01-23 ENCOUNTER — LAB (OUTPATIENT)
Dept: LAB | Facility: CLINIC | Age: 60
End: 2025-01-23
Attending: INTERNAL MEDICINE
Payer: COMMERCIAL

## 2025-01-23 DIAGNOSIS — A53.9 SYPHILIS: ICD-10-CM

## 2025-01-23 LAB
ANION GAP SERPL CALCULATED.3IONS-SCNC: 8 MMOL/L (ref 7–15)
BUN SERPL-MCNC: 22.3 MG/DL (ref 8–23)
CALCIUM SERPL-MCNC: 9.5 MG/DL (ref 8.8–10.4)
CHLORIDE SERPL-SCNC: 105 MMOL/L (ref 98–107)
CREAT SERPL-MCNC: 0.94 MG/DL (ref 0.67–1.17)
EGFRCR SERPLBLD CKD-EPI 2021: >90 ML/MIN/1.73M2
ERYTHROCYTE [DISTWIDTH] IN BLOOD BY AUTOMATED COUNT: 12.5 % (ref 10–15)
GLUCOSE SERPL-MCNC: 107 MG/DL (ref 70–99)
HCO3 SERPL-SCNC: 25 MMOL/L (ref 22–29)
HCT VFR BLD AUTO: 43.1 % (ref 40–53)
HGB BLD-MCNC: 15.3 G/DL (ref 13.3–17.7)
MCH RBC QN AUTO: 31.2 PG (ref 26.5–33)
MCHC RBC AUTO-ENTMCNC: 35.5 G/DL (ref 31.5–36.5)
MCV RBC AUTO: 88 FL (ref 78–100)
PLATELET # BLD AUTO: 248 10E3/UL (ref 150–450)
POTASSIUM SERPL-SCNC: 4.4 MMOL/L (ref 3.4–5.3)
RBC # BLD AUTO: 4.9 10E6/UL (ref 4.4–5.9)
SODIUM SERPL-SCNC: 138 MMOL/L (ref 135–145)
WBC # BLD AUTO: 5 10E3/UL (ref 4–11)

## 2025-01-28 ENCOUNTER — OFFICE VISIT (OUTPATIENT)
Dept: INFECTIOUS DISEASES | Facility: CLINIC | Age: 60
End: 2025-01-28
Attending: INTERNAL MEDICINE
Payer: COMMERCIAL

## 2025-01-28 VITALS
DIASTOLIC BLOOD PRESSURE: 72 MMHG | WEIGHT: 171 LBS | SYSTOLIC BLOOD PRESSURE: 118 MMHG | HEART RATE: 77 BPM | BODY MASS INDEX: 26 KG/M2 | OXYGEN SATURATION: 93 %

## 2025-01-28 DIAGNOSIS — Z72.51 HIGH RISK SEXUAL BEHAVIOR, UNSPECIFIED TYPE: ICD-10-CM

## 2025-01-28 DIAGNOSIS — A53.9 SYPHILIS: Primary | ICD-10-CM

## 2025-01-28 DIAGNOSIS — Z20.2 POTENTIAL EXPOSURE TO STD: ICD-10-CM

## 2025-01-28 DIAGNOSIS — A52.71 OCULAR SYPHILIS: ICD-10-CM

## 2025-01-28 PROCEDURE — 99213 OFFICE O/P EST LOW 20 MIN: CPT | Performed by: INTERNAL MEDICINE

## 2025-01-28 RX ORDER — EMTRICITABINE AND TENOFOVIR ALAFENAMIDE 200; 25 MG/1; MG/1
TABLET ORAL
Qty: 30 TABLET | Refills: 2 | Status: SHIPPED | OUTPATIENT
Start: 2025-01-28

## 2025-01-28 RX ORDER — DOXYCYCLINE 100 MG/1
200 CAPSULE ORAL DAILY PRN
Qty: 60 CAPSULE | Refills: 1 | Status: SHIPPED | OUTPATIENT
Start: 2025-01-28

## 2025-01-28 ASSESSMENT — PAIN SCALES - GENERAL: PAINLEVEL_OUTOF10: NO PAIN (0)

## 2025-01-28 NOTE — LETTER
"1/28/2025       RE: Vimal Goldsmith  [address] [place] [address]     Dear Colleague,    Thank you for referring your patient, Vimal Goldsmith, to the [facility] [place] [facility]. Please see a copy of my visit note below.     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 This most certainly suggests of complete cure from syphilis, and he does not need any additional monitoring or therapy. We instead recommended to focus on preventing new infections.     HIV PrEP with Descovy - he reports today that he was taking Descovy earlier (managed by PCP), but was missing a lot of doses and stopped given that he had no high-risk exposure activities and was concerned about side effects. He inquired about more flexible options for PreP. The only FDA approved regimen of PrEP is a daily medication, and it is known that the efficacy directly depends on compliance. We also reviewed the option for \"On Demand\" or event-driven PrEP regimen which has shown to be at least 86% effective in preventing HIV (IPERGAY trial). He was educated about potential benefits and risks of both options and will make an informed decision based on his situation:        - negative HIV test in December, reports no high-risk exposures since then. He will need HIV " test every 3 months regardless of the specific regimen of PrEP         - I confirmed that he is immune to Hep B and had negative HepB Ag        - provided with refills         - Event driven regimen means 2 pills are taken 2-24 hours before a potential sexual exposure to HIV, followed by a third tablet 24 hours after the first dose and a fourth tablet 48 hours after the first dose. More info here: CDC.     3.   We also refilled his DoxyPEP, and reviewed the recommended regimen and its risks and benefit.     Plan was discussed with attending physician, Dr. Gil.     Follow up as needed.     Marco Antonio Torres MD   Infectious Disease Fellow   Available on [employer]    ID Staff Assessment and Plan:   Patient was seen and examined by me with the ID Fellow. The note above reflects our joint assessment and recommendations. I have reviewed the medical record, labs, imaging, vitals signs and have discussed the patient with the ID fellow in detail.   Latasha Gil MD  ID staff physician         History of Present Illness & Interval Events     Phillip presents for follow-up visit today after almost a year sfter treatment for ocular and generalized syphilis skin lesions (last February). He reports that his initial symptoms have resolved (skin lesions and blurry vision), however he has some ongoing vision changes. He was supposed to follow up with ophthalmology in October but this did not happen. He has dry eyes now for which he uses artificial tears.     He was recently referred to audiology for tinnitus. Otherwise, denies health issues.     At the time of his initial diagnosis in February 1 RPR titer was 1: 64, went down to 1:4 in July and remained stable at 1:4 this December.     He denies any recent STD exposures. He was started on Descovy PrEP and DoxyPep by his PCP, but stopped taking PrEP given that he did not have encounters with high exposure risk.          Physical Exam:     VITAL SIGNS:    /72   Pulse 77   Wt 77.6 kg  (171 lb)   SpO2 93%   BMI 26.00 kg/m       GENERAL APPEARANCE: Not in acute distress    PHYSICAL EXAM:   Eyes:     no ptosis, no discharge, no scleral icterus  Mouth, Throat:     mucous membranes moist, pharynx normal without lesions  Cardiovascular:    Inspection: No Cyanosis, JVD not elevated  Respiratory:     Inspection: Not in respiratory distress, Chest expansion symmetrical   Auscultation: 4 point auscultation done clear to auscultation bilaterally, no wheezes, no rales, and no rhonchi  Gastrointestinal:      soft, non-tender; bowel sounds normal; no masses, no organomegaly  Skin:     Dry and intact  Neurologic:     Higher Mental Function: Conversant, AOx4   Facial asymmetry grossly absent   is ambulatory  Psychiatric:     appropriate       Again, thank you for allowing me to participate in the care of your patient.      Sincerely,    Latasha Gil MD

## 2025-01-28 NOTE — NURSING NOTE
"Chief Complaint   Patient presents with    RECHECK     6 month follow-up      Vital signs:      BP: 118/72 Pulse: 77     SpO2: 93 %       Weight: 77.6 kg (171 lb)  Estimated body mass index is 26 kg/m  as calculated from the following:    Height as of 12/23/24: 1.727 m (5' 8\").    Weight as of this encounter: 77.6 kg (171 lb).      Stephanie Hughes CMA   1/28/2025 11:06 AM    "

## 2025-01-28 NOTE — PROGRESS NOTES
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 This most certainly suggests of complete cure from syphilis, and he does not need any additional monitoring or therapy. We instead recommended to focus on preventing new infections.     HIV PrEP with Descovy - he reports today that he was taking Descovy earlier (managed by PCP), but was missing a lot of doses and stopped given that he had no high-risk exposure activities and was concerned about side effects. He inquired about more flexible options for PreP. The only FDA approved regimen of PrEP is a daily medication, and it is known that the efficacy directly depends on compliance. We also reviewed the option for \"On Demand\" or event-driven PrEP regimen which has shown to be at least 86% effective in preventing HIV (IPERGAY trial). He was educated about potential benefits and risks of both options and will make an informed decision based on his situation:        - negative HIV test in December, reports no high-risk exposures since then. He will need HIV test every 3 months regardless of the specific regimen of PrEP         - I confirmed that he is immune to Hep B and had negative HepB Ag        - provided with refills         - Event driven regimen means 2 pills are taken 2-24 hours before a potential sexual exposure to HIV, followed by a third tablet 24 hours after the " first dose and a fourth tablet 48 hours after the first dose. More info here: CDC.     3.   We also refilled his DoxyPEP, and reviewed the recommended regimen and its risks and benefit.     Plan was discussed with attending physician, Dr. Gil.     Follow up as needed.     Marco Antonio Torres MD   Infectious Disease Fellow   Available on [employer]    ID Staff Assessment and Plan:   Patient was seen and examined by me with the ID Fellow. The note above reflects our joint assessment and recommendations. I have reviewed the medical record, labs, imaging, vitals signs and have discussed the patient with the ID fellow in detail.   Latasha Gil MD  ID staff physician         History of Present Illness & Interval Events     Phillip presents for follow-up visit today after almost a year sfter treatment for ocular and generalized syphilis skin lesions (last February). He reports that his initial symptoms have resolved (skin lesions and blurry vision), however he has some ongoing vision changes. He was supposed to follow up with ophthalmology in October but this did not happen. He has dry eyes now for which he uses artificial tears.     He was recently referred to audiology for tinnitus. Otherwise, denies health issues.     At the time of his initial diagnosis in February 1 RPR titer was 1: 64, went down to 1:4 in July and remained stable at 1:4 this December.     He denies any recent STD exposures. He was started on Descovy PrEP and DoxyPep by his PCP, but stopped taking PrEP given that he did not have encounters with high exposure risk.          Physical Exam:     VITAL SIGNS:    /72   Pulse 77   Wt 77.6 kg (171 lb)   SpO2 93%   BMI 26.00 kg/m       GENERAL APPEARANCE: Not in acute distress    PHYSICAL EXAM:   Eyes:     no ptosis, no discharge, no scleral icterus  Mouth, Throat:     mucous membranes moist, pharynx normal without lesions  Cardiovascular:    Inspection: No Cyanosis, JVD not elevated  Respiratory:      Inspection: Not in respiratory distress, Chest expansion symmetrical   Auscultation: 4 point auscultation done clear to auscultation bilaterally, no wheezes, no rales, and no rhonchi  Gastrointestinal:      soft, non-tender; bowel sounds normal; no masses, no organomegaly  Skin:     Dry and intact  Neurologic:     Higher Mental Function: Conversant, AOx4   Facial asymmetry grossly absent   is ambulatory  Psychiatric:     appropriate

## 2025-01-29 ENCOUNTER — OFFICE VISIT (OUTPATIENT)
Dept: PSYCHOLOGY | Facility: CLINIC | Age: 60
End: 2025-01-29
Payer: COMMERCIAL

## 2025-01-29 DIAGNOSIS — F91.9 DISRUPTIVE BEHAVIOR: Primary | ICD-10-CM

## 2025-01-29 DIAGNOSIS — F43.21 ADJUSTMENT DISORDER WITH DEPRESSED MOOD: ICD-10-CM

## 2025-01-29 NOTE — PROGRESS NOTES
[facility] - Progress Note    Date of Service: 25   Name: Vimal Goldsmith  : 1965  Medical Record Number: [id number]  Treating Provider: Jg Barrientos, PhD  Type of Session: Individual  Present in Session: Client and theapist  Session Start and Stop Time: 2:02pm-2:55pm  Number of Minutes:  53    SERVICE MODALITY:  In-person    DSM-5 Diagnoses:  Encounter Diagnoses   Name Primary?    Other Specified Disruptive, Impulse-Control, and Conduct Disorder (hypersexuality) Yes    Adjustment Disorder, With depressed mood          Current Reported Symptoms and Status update:  Changes since last session includes maintained boundaries, reduced compulsivity, some compulsive urges, adjusting to health concerns.    Compulsive Sexual Behavior:   - Persistent pattern of failure or difficulty controlling intense, repetitive sexual impulses or urges.  - Continued difficulties controlling sexual urges/impulses despite adverse consequences.  - Sexual activities becoming a central focus to life to the point of neglecting other interests, activities, or responsibilities.  - Pattern has persisted for an extended period of time (greater than 6 months)  - Impairment in relationship functioning.     Adjustment Disorder with Depressed Mood:  - Mild depressive symptoms secondary to health concerns.     Progress Toward Treatment Goals:   Satisfactory progress     Therapeutic Interventions/Treatment Strategies:    Area(s) of treatment focus addressed in this session included Symptom Management, Interpersonal Relationship Skills, and Sexual Health and Wellness    Client reviewed treatment progress and practice of coping skills. We discussed additional mindfulness strategies (urge surfing, grounding exercises) he can practice daily to cope with compulsive urges. Client shared more about his history and sexual development, and ways it may contribute to CSB. Client also shared positive qualities about his current  relationship. He reflected on aging and how that factors into his treatment concerns.     Psychotherapist offered support, feedback and validation and reinforced use of skills Treatment modalities used include Cognitive Behavioral Therapy  Support and Feedback    Patient Response:   Patient responded to session by accepting feedback, giving feedback, listening, focusing on goals, and accepting support  Possible barriers to participation / learning include: N/A    Current Mental Status Exam:   Appearance:  Appropriate   Eye Contact:  Good   Attitude / Demeanor: Friendly  Speech      Rate / Production: Normal/ Responsive      Volume:  Normal  volume  Orientation:  All  Mood:   Normal  Affect:   Appropriate   Thought Content: Clear   Insight:   Good         Plan/Need for Future Services:  Return for therapy in 2 weeks to treat diagnosed problems.    Patient has a current master individualized treatment plan.  See Epic treatment plan for more information.    Referral / Collaboration:  Referral to another professional/service is not indicated at this time..  Emergency Services Needed?  No    Assignment:  Mindfulness practice    Interactive Complexity:  There are four specific communication difficulties that complicate the work of the primary psychiatric procedure.  Interactive complexity (+85154) may be reported when at least one of these difficulties is present.    Communication difficulties present during current the psychiatric procedure include:  None.      Signature/Title:    Jg Barrientos, PhD, LP    [place] [facility], Sexual and Gender Health Clinic  Department of Family Medicine and Community Health  [employer] [facility] [employer]

## 2025-02-11 ENCOUNTER — TELEPHONE (OUTPATIENT)
Dept: OPHTHALMOLOGY | Facility: CLINIC | Age: 60
End: 2025-02-11
Payer: COMMERCIAL

## 2025-02-11 NOTE — TELEPHONE ENCOUNTER
Health Call Center    Phone Message    May a detailed message be left on voicemail: yes     Reason for Call: Appointment Intake    Referring Provider Name: Marco Antonio Torres MD in  INFECTIOUS DISEASE  Diagnosis and/or Symptoms: Uveitis, Ocular syphilis  Writer unable to schedule referral per guidelines, writer did schedule pt for F/U with Dr Chahal 5/2025 on wait list, pt advised this referral is for that appt. Please review referral and link if ok, contact pt if pt is needing to be seen per referral in uveitis Thank you     Action Taken: Message routed to:  [facility] eye    Travel Screening: Not Applicable     Date of Service:

## 2025-02-11 NOTE — TELEPHONE ENCOUNTER
No eye symptoms per note    Routine referral in system    Pt was to follow up with Dr. Chahal in about 6 months since last visit    Ok for next available with Dr. Chahal.    Note to  to assist in scheduling.    Return adult eye  Dr. Chahal  H/o Acute anterior uveitis each eye.    Rogelio Vora RN 10:12 AM 02/11/25

## 2025-02-12 ENCOUNTER — OFFICE VISIT (OUTPATIENT)
Dept: PSYCHOLOGY | Facility: CLINIC | Age: 60
End: 2025-02-12
Payer: COMMERCIAL

## 2025-02-12 DIAGNOSIS — F43.21 ADJUSTMENT DISORDER WITH DEPRESSED MOOD: ICD-10-CM

## 2025-02-12 DIAGNOSIS — F91.9 DISRUPTIVE BEHAVIOR: Primary | ICD-10-CM

## 2025-02-12 NOTE — PROGRESS NOTES
Sexual and Gender Health Clinic - Progress Note    Date of Service: 25   Name: Vimal Goldsmith  : 1965  Medical Record Number: [id number]  Treating Provider: Jg Barrientos, PhD  Type of Session: Individual  Present in Session: Client and therapist  Session Start and Stop Time: 4:01pm-4:55pm  Number of Minutes:  54    SERVICE MODALITY:  In-person    DSM-5 Diagnoses:  Encounter Diagnoses   Name Primary?    Other Specified Disruptive, Impulse-Control, and Conduct Disorder (hypersexuality) Yes    Adjustment Disorder, With depressed mood          Current Reported Symptoms and Status update:  Changes since last session includes maintaining sexual/relationship boundaries, some urges for boundary violations, concerns about relationship and sexual connection secondary to partner's heatlh concerns.    Compulsive Sexual Behavior:   - Persistent pattern of failure or difficulty controlling intense, repetitive sexual impulses or urges.  - Continued difficulties controlling sexual urges/impulses despite adverse consequences.  - Sexual activities becoming a central focus to life to the point of neglecting other interests, activities, or responsibilities.  - Pattern has persisted for an extended period of time (greater than 6 months)  - Impairment in relationship functioning.     Adjustment Disorder with Depressed Mood:  - Mild depressive symptoms secondary to health concerns.     Progress Toward Treatment Goals:   Satisfactory progress     Therapeutic Interventions/Treatment Strategies:    Area(s) of treatment focus addressed in this session included Symptom Management, Interpersonal Relationship Skills, and Sexual Health and Wellness    Client discussed understanding of factors contributing to compulsive behavior and ways he is practicing skills to intervene. He identified distress tolerance and mindfulness strategies discussed in previous sessions. He shared updates in his partner's health and how that is  contributing to client's motivation and concern. Invited client to bring his partner to future sessions to discuss concerns. We identified ways in which client's behavior is increasingly aligning with values. We also normalized client's desires/urges. Shared research resources with client.    Psychotherapist offered support, feedback and validation and reinforced use of skills Treatment modalities used include Cognitive Behavioral Therapy  Support and Feedback    Patient Response:   Patient responded to session by accepting feedback, giving feedback, listening, focusing on goals, and accepting support  Possible barriers to participation / learning include: N/A    Current Mental Status Exam:   Appearance:  Appropriate   Eye Contact:  Good   Attitude / Demeanor: Friendly  Speech      Rate / Production: Normal/ Responsive      Volume:  Normal  volume  Orientation:  All  Mood:   Normal  Affect:   Appropriate   Thought Content: Clear   Insight:   Good       Plan/Need for Future Services:  Return for therapy in 2 weeks to treat diagnosed problems.    Patient has a current master individualized treatment plan.  See Epic treatment plan for more information.    Referral / Collaboration:  Referral to another professional/service is not indicated at this time..  Emergency Services Needed?  No    Assignment:  Research articles  Invite partner to future session    Interactive Complexity:  There are four specific communication difficulties that complicate the work of the primary psychiatric procedure.  Interactive complexity (+23069) may be reported when at least one of these difficulties is present.    Communication difficulties present during current the psychiatric procedure include:  None.      Signature/Title:    Jg Barrientos, PhD, LP    [place] [facility], Sexual and Gender Health Clinic  Department of Family Medicine and Community Health  [employer] [facility] [employer]

## 2025-02-26 ENCOUNTER — OFFICE VISIT (OUTPATIENT)
Dept: PSYCHOLOGY | Facility: CLINIC | Age: 60
End: 2025-02-26
Payer: COMMERCIAL

## 2025-02-26 DIAGNOSIS — F91.9 DISRUPTIVE BEHAVIOR: Primary | ICD-10-CM

## 2025-02-26 DIAGNOSIS — F43.21 ADJUSTMENT DISORDER WITH DEPRESSED MOOD: ICD-10-CM

## 2025-02-26 NOTE — PROGRESS NOTES
" [facility] - Progress Note    Date of Service: 25   Name: Vimal Goldsmith  : 1965  Medical Record Number: [id number]  Treating Provider: Jg Barrientos, PhD  Type of Session: Individual  Present in Session: Client and therapist  Session Start and Stop Time: 2:05pm-2:59pm  Number of Minutes:  54    SERVICE MODALITY:  In-person    DSM-5 Diagnoses:  Encounter Diagnoses   Name Primary?    Other Specified Disruptive, Impulse-Control, and Conduct Disorder (hypersexuality) Yes    Adjustment Disorder, With depressed mood          Current Reported Symptoms and Status update:  Changes since last session includes maintaining control over compulsive urges, maintaining boundaries, improvement in adjustment/depression.    Compulsive Sexual Behavior:   - Persistent pattern of failure or difficulty controlling intense, repetitive sexual impulses or urges.  - Continued difficulties controlling sexual urges/impulses despite adverse consequences.  - Sexual activities becoming a central focus to life to the point of neglecting other interests, activities, or responsibilities.  - Pattern has persisted for an extended period of time (greater than 6 months)  - Impairment in relationship functioning.     Adjustment Disorder with Depressed Mood:  - Mild depressive symptoms secondary to health concerns.     Progress Toward Treatment Goals:   Satisfactory progress     Therapeutic Interventions/Treatment Strategies:    Area(s) of treatment focus addressed in this session included Symptom Management, Interpersonal Relationship Skills, and Sexual Health and Wellness    Client shared updates in conversations and honestly with his partner. He shared upcoming travel plans, and concerns about maintaining boundaries while traveling. We discussed clients \"why\" around maintaining boundaries, including goals and values. Therapist supported client to identify negative self-talk and ineffective thinking patterns " contributing to previous concerns (e.g., rationalizing, minimizing). We practiced cognitive restructuring to challenge and re-write self-statements. Therapist support client to start writing these, as well as a list of values.     Psychotherapist offered support, feedback and validation and reinforced use of skills Treatment modalities used include Cognitive Behavioral Therapy  Support and Feedback    Patient Response:   Patient responded to session by accepting feedback, giving feedback, listening, focusing on goals, and accepting support  Possible barriers to participation / learning include: N/A    Current Mental Status Exam:   Appearance:  Appropriate   Eye Contact:  Good   Attitude / Demeanor: Friendly  Speech      Rate / Production: Normal/ Responsive      Volume:  Normal  volume  Orientation:  All  Mood:   Normal  Affect:   Appropriate   Thought Content: Clear   Insight:   Good         Plan/Need for Future Services:  Return for therapy in 2-4 weeks to treat diagnosed problems.    Patient has a current master individualized treatment plan.  See Epic treatment plan for more information.    Referral / Collaboration:  Referral to another professional/service is not indicated at this time..  Emergency Services Needed?  No    Assignment:  Values and self-statements    Interactive Complexity:  There are four specific communication difficulties that complicate the work of the primary psychiatric procedure.  Interactive complexity (+90590) may be reported when at least one of these difficulties is present.    Communication difficulties present during current the psychiatric procedure include:  None.      Signature/Title:      Jg Barrientos, PhD, LP    [place] [facility], [facility]  Department of Family Medicine and Community Health  [employer] [facility] [employer]

## 2025-03-04 ENCOUNTER — OFFICE VISIT (OUTPATIENT)
Dept: FAMILY MEDICINE | Facility: CLINIC | Age: 60
End: 2025-03-04
Payer: COMMERCIAL

## 2025-03-04 VITALS
OXYGEN SATURATION: 92 % | WEIGHT: 171 LBS | RESPIRATION RATE: 16 BRPM | TEMPERATURE: 98.6 F | DIASTOLIC BLOOD PRESSURE: 69 MMHG | HEIGHT: 68 IN | HEART RATE: 78 BPM | BODY MASS INDEX: 25.91 KG/M2 | SYSTOLIC BLOOD PRESSURE: 122 MMHG

## 2025-03-04 DIAGNOSIS — Z83.6 FAMILY HISTORY OF LUNG DISEASE: ICD-10-CM

## 2025-03-04 DIAGNOSIS — L50.8 CHRONIC URTICARIA: Primary | ICD-10-CM

## 2025-03-04 DIAGNOSIS — L13.0 DERMATITIS HERPETIFORMIS: ICD-10-CM

## 2025-03-04 DIAGNOSIS — E03.8 SUBCLINICAL HYPOTHYROIDISM: ICD-10-CM

## 2025-03-04 LAB
BASOPHILS # BLD AUTO: 0 10E3/UL (ref 0–0.2)
BASOPHILS NFR BLD AUTO: 0 %
CRP SERPL-MCNC: <3 MG/L
EOSINOPHIL # BLD AUTO: 0.1 10E3/UL (ref 0–0.7)
EOSINOPHIL NFR BLD AUTO: 2 %
ERYTHROCYTE [DISTWIDTH] IN BLOOD BY AUTOMATED COUNT: 13.9 % (ref 10–15)
HCT VFR BLD AUTO: 41 % (ref 40–53)
HGB BLD-MCNC: 13.9 G/DL (ref 13.3–17.7)
IMM GRANULOCYTES # BLD: 0 10E3/UL
IMM GRANULOCYTES NFR BLD: 0 %
LYMPHOCYTES # BLD AUTO: 1.3 10E3/UL (ref 0.8–5.3)
LYMPHOCYTES NFR BLD AUTO: 27 %
MCH RBC QN AUTO: 30.2 PG (ref 26.5–33)
MCHC RBC AUTO-ENTMCNC: 33.9 G/DL (ref 31.5–36.5)
MCV RBC AUTO: 89 FL (ref 78–100)
MONOCYTES # BLD AUTO: 0.4 10E3/UL (ref 0–1.3)
MONOCYTES NFR BLD AUTO: 7 %
NEUTROPHILS # BLD AUTO: 3 10E3/UL (ref 1.6–8.3)
NEUTROPHILS NFR BLD AUTO: 63 %
PLATELET # BLD AUTO: 208 10E3/UL (ref 150–450)
RBC # BLD AUTO: 4.6 10E6/UL (ref 4.4–5.9)
T4 FREE SERPL-MCNC: 1.08 NG/DL (ref 0.9–1.7)
TSH SERPL DL<=0.005 MIU/L-ACNC: 6.24 UIU/ML (ref 0.3–4.2)
WBC # BLD AUTO: 4.9 10E3/UL (ref 4–11)

## 2025-03-04 PROCEDURE — 84443 ASSAY THYROID STIM HORMONE: CPT

## 2025-03-04 PROCEDURE — 86140 C-REACTIVE PROTEIN: CPT

## 2025-03-04 PROCEDURE — 36415 COLL VENOUS BLD VENIPUNCTURE: CPT

## 2025-03-04 PROCEDURE — 3078F DIAST BP <80 MM HG: CPT

## 2025-03-04 PROCEDURE — 85025 COMPLETE CBC W/AUTO DIFF WBC: CPT

## 2025-03-04 PROCEDURE — 99214 OFFICE O/P EST MOD 30 MIN: CPT | Mod: GC

## 2025-03-04 PROCEDURE — 3074F SYST BP LT 130 MM HG: CPT

## 2025-03-04 PROCEDURE — 84439 ASSAY OF FREE THYROXINE: CPT

## 2025-03-04 RX ORDER — TRIAMCINOLONE ACETONIDE 1 MG/G
OINTMENT TOPICAL
Qty: 80 G | Refills: 11 | Status: SHIPPED | OUTPATIENT
Start: 2025-03-04

## 2025-03-04 RX ORDER — CETIRIZINE HYDROCHLORIDE 10 MG/1
10 TABLET ORAL 2 TIMES DAILY
Qty: 90 TABLET | Refills: 2 | Status: SHIPPED | OUTPATIENT
Start: 2025-03-04

## 2025-03-04 RX ORDER — EPINEPHRINE 0.3 MG/.3ML
0.3 INJECTION SUBCUTANEOUS PRN
Qty: 2 EACH | Refills: 0 | Status: SHIPPED | OUTPATIENT
Start: 2025-03-04

## 2025-03-04 NOTE — PROGRESS NOTES
Assessment & Plan     Chronic urticaria  Subclinical hypothyroidism   History of syphilis treatment with doxycycline in Jan 2025. Comes in with 2 months of new onset urticarial rash which appears to be improving. Considering a wide differential including most likely food exposure vs possible infection vs medications (NSAIDs like Ibuprofen; Antibiotics such as Doxycycline), foods and food additives, insect stings and bites and latex among other reasons. In adults, fish, shellfish, tree nuts, and peanuts are most often the reason for this issue, and requested avoidance of above. Considering potentially related to autoimmune, inflammatory vs thyroid disorders, and work up found to have subclinical hypothyroidism which should be followed up in 1 month. Will offer immunology referral, treatment with antihistamine and Epipen available in case of anaphylaxis with repeat exposures.   - CBC with platelets differential; Future  - CRP inflammation; Future  - TSH with free T4 reflex; Future  - cetirizine (ZYRTEC) 10 MG tablet; Take 1 tablet (10 mg) by mouth 2 times daily.  - EPINEPHrine (ANY BX GENERIC EQUIV) 0.3 MG/0.3ML injection 2-pack; Inject 0.3 mLs (0.3 mg) into the muscle as needed for anaphylaxis. May repeat one time in 5-15 minutes if response to initial dose is inadequate.  - CBC with platelets differential  - CRP inflammation  - TSH with free T4 reflex  - T4 free  - Immunology Referral; Future    Family history of lung disease  Saturating at 92% on room air, stable. Unclear if related to hereditary or acquired. Recommended separate follow up visit soon.     Dermatitis herpetiformis  Stable, no new concerns. Refill.   - triamcinolone (KENALOG) 0.1 % external ointment; Apply twice daily as needed for rash on the trunk or extremities      Return if symptoms worsen or fail to improve, for Follow up.    Cyndi Fisher is a 60 year old, presenting for the following health issues:  Clinic Care Coordination - Follow-up  "(Hives for past week. Started feb 19th. Congestion and mucus before hives. Unsure if hives is allergy related ) and Low O2 (Low oxygen concerns )      3/4/2025     8:22 AM   Additional Questions   Roomed by mirtha   Accompanied by self         3/4/2025    Information    services provided? No     HPI      Phillip reports developing <24 hr migratory hives beginning Feb 17th or 18th.   -Before that, felt chest congestion that was waking him up and mild chest pain. Now it's mostly chest congestion and post nasal drip.   -Hives were first noticed in groin and waist and upper thigh.   -No changes to detergents, perfumes, lotions  -Hives has since been noticed on legs and arms as well,  -Lip: swelling for a day which spontanously got better  -has been using kenalog cream.   -Doubling dapsone tab dose for celiacs  -been taking doxycycline for 2 days.     In January:   -Had sinus infection which did not go away but improved by end of January  -No new food exposures.   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 This happened after [employer]. Had normal food, seafood.   -No known food allergy except gluten.    Feels itchines, No pain with hives  No hand or feet swelling. No join pain. 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 No SOB than normal.       Constitutional, HEENT, cardiovascular, pulmonary, gi and gu systems are negative, except as otherwise noted.      Objective    /69   Pulse 89   Temp 98.6  F (37  C) (Oral)   Resp 16   Ht 1.727 m (5' 8\")   Wt 77.6 kg (171 lb)   SpO2 (S) 92%   BMI 26.00 kg/m    Body mass index is 26 kg/m .  Physical Exam   GENERAL: alert, well appearing and no distress  EYES: Eyes grossly normal to inspection, PERRL, EOMI and conjunctivae and sclerae normal  HENT: ear canals and TM's normal, nose and mouth without ulcers or " lesions  NECK: normal thyroid, no asymmetry, masses, or scars  RESP: NLB, lungs clear to auscultation - no rales, rhonchi or wheezes  CV: regular rate and rhythm, normal S1 S2, no murmur, no peripheral edema  ABDOMEN: soft, nontender, no hepatosplenomegaly, no masses and bowel sounds normal  MS: no gross musculoskeletal defects noted, no edema  SKIN: Subtle hives on trunk, otherwise no concerning rashes  NEURO: Normal strength and tone, mentation intact and speech normal  PSYCH: mentation appears normal, affect normal/bright  LYMPH: no cervical or clavicular adenopathy    Results for orders placed or performed in visit on 03/04/25   CRP inflammation     Status: Normal   Result Value Ref Range    CRP Inflammation <3.00 <5.00 mg/L   TSH with free T4 reflex     Status: Abnormal   Result Value Ref Range    TSH 6.24 (H) 0.30 - 4.20 uIU/mL   CBC with platelets and differential     Status: None   Result Value Ref Range    WBC Count 4.9 4.0 - 11.0 10e3/uL    RBC Count 4.60 4.40 - 5.90 10e6/uL    Hemoglobin 13.9 13.3 - 17.7 g/dL    Hematocrit 41.0 40.0 - 53.0 %    MCV 89 78 - 100 fL    MCH 30.2 26.5 - 33.0 pg    MCHC 33.9 31.5 - 36.5 g/dL    RDW 13.9 10.0 - 15.0 %    Platelet Count 208 150 - 450 10e3/uL    % Neutrophils 63 %    % Lymphocytes 27 %    % Monocytes 7 %    % Eosinophils 2 %    % Basophils 0 %    % Immature Granulocytes 0 %    Absolute Neutrophils 3.0 1.6 - 8.3 10e3/uL    Absolute Lymphocytes 1.3 0.8 - 5.3 10e3/uL    Absolute Monocytes 0.4 0.0 - 1.3 10e3/uL    Absolute Eosinophils 0.1 0.0 - 0.7 10e3/uL    Absolute Basophils 0.0 0.0 - 0.2 10e3/uL    Absolute Immature Granulocytes 0.0 <=0.4 10e3/uL   T4 free     Status: Normal   Result Value Ref Range    Free T4 1.08 0.90 - 1.70 ng/dL   CBC with platelets differential     Status: None    Narrative    The following orders were created for panel order CBC with platelets differential.  Procedure                               Abnormality         Status                      ---------                               -----------         ------                     CBC with platelets and d...[[id number]]                      Final result                 Please view results for these tests on the individual orders.           Signed Electronically by: Klever Wolff MD  {Email feedback regarding this note to [email or web address]   :[email or web address]}

## 2025-03-04 NOTE — PATIENT INSTRUCTIONS
Patient Education   Here is the plan from today's visit    1. Dermatitis herpetiformis  Refill  - triamcinolone (KENALOG) 0.1 % external ointment; Apply twice daily as needed for rash on the trunk or extremities  Dispense: 80 g; Refill: 11    2. Chronic urticaria (Primary)  I think we can treat this better with Zyrtec as this is a histamine mediated response that causes your rash and we need an antihistamine. If the Zyrtec does not work, I want you to call use because we can give you a steroid burst and referral to allergist. I will get labs today. You will have an epi pen available in case of mouth and throat swelling that becomes concerning. For now, avoid any food that could have be consumed 24 hours leading up to your initial symptoms. No seafood or peanuts for now.   - CBC with platelets differential; Future  - CRP inflammation; Future  - TSH with free T4 reflex; Future  - cetirizine (ZYRTEC) 10 MG tablet; Take 1 tablet (10 mg) by mouth 2 times daily.  Dispense: 90 tablet; Refill: 2  - EPINEPHrine (ANY BX GENERIC EQUIV) 0.3 MG/0.3ML injection 2-pack; Inject 0.3 mLs (0.3 mg) into the muscle as needed for anaphylaxis. May repeat one time in 5-15 minutes if response to initial dose is inadequate.  Dispense: 2 each; Refill: 0    3. Family history of lung disease  I want to check your saturation for your lungs again. I want you to follow up with a  separate visit for this.       Please call or return to clinic if your symptoms don't go away.    Follow up plan  Return if symptoms worsen or fail to improve, for Follow up.    Thank you for coming to [facility] today.  Lab Testing:  **If you had lab testing today and your results are reassuring or normal they will be mailed to you or sent through [employer] within 7 days.   **If the lab tests need quick action we will call you with the results.  **If you are having labs done on a different day, please call [phone] to schedule at [facility] or [phone] for  other [facility] locations. Labs do not offer walk-in appointments.  The phone number we will call with results is # [phone] (home) [phone] (work). If this is not the best number please call our clinic and change the number.  Medication Refills:  If you need any refills please call your pharmacy and they will contact us.   If you need to  your refill at a new pharmacy, please contact the new pharmacy directly. The new pharmacy will help you get your medications transferred faster.   Scheduling:  If you have any concerns about today's visit or wish to schedule another appointment please call our office during normal business hours [phone] (8-5:00 M-F). If you can no longer make a scheduled visit, please cancel via [employer] or call us to cancel.   If a referral was made to an [facility] specialty provider and you do not get a call from central scheduling, please refer to directions on your visit summary or call our office during normal business hours for assistance.   If a Mammogram was ordered for you at the [facility] call [phone] to schedule or change your appointment.  If you had an XRay/CT/Ultrasound/MRI ordered the number is [phone] to schedule or change your radiology appointment.   [facility] has limited ultrasound appointments available on Wednesdays, if you would like your ultrasound at [facility], please call [phone] to schedule.   Medical Concerns:  If you have urgent medical concerns please call [phone] at any time of the day.    Klever Wolff MD

## 2025-03-19 ENCOUNTER — OFFICE VISIT (OUTPATIENT)
Dept: FAMILY MEDICINE | Facility: CLINIC | Age: 60
End: 2025-03-19
Payer: COMMERCIAL

## 2025-03-19 VITALS
RESPIRATION RATE: 16 BRPM | BODY MASS INDEX: 26.22 KG/M2 | OXYGEN SATURATION: 94 % | TEMPERATURE: 97.9 F | WEIGHT: 173 LBS | HEART RATE: 87 BPM | SYSTOLIC BLOOD PRESSURE: 124 MMHG | DIASTOLIC BLOOD PRESSURE: 75 MMHG | HEIGHT: 68 IN

## 2025-03-19 DIAGNOSIS — J32.9 CHRONIC SINUSITIS, UNSPECIFIED LOCATION: ICD-10-CM

## 2025-03-19 DIAGNOSIS — R09.02 HYPOXIA: ICD-10-CM

## 2025-03-19 DIAGNOSIS — L50.9 URTICARIA: Primary | ICD-10-CM

## 2025-03-19 DIAGNOSIS — R07.89 CHEST DISCOMFORT: ICD-10-CM

## 2025-03-19 NOTE — PROGRESS NOTES
Assessment & Plan     Hypoxia:  - Chronic low-grade oxygen levels noted.  - Perform pulmonary function test. Consider referral to lung specialist if breathing issues persist.    Chronic sinusitis, unspecified location:  - Chronic sinus issues with postnasal drip and occasional mucus production.  - Consider ENT referral for reassessment of sinuses. Use Flonase as needed for sinus symptoms.    Chest discomfort:  - Recent onset of chest pain, not previously experienced.  - Perform CT calcium scan to assess for heart disease. Monitor for worsening symptoms and consider further cardiac evaluation if necessary.    >30 minutes spent with this patient and in documentation.  Consent was obtained from the patient to use an AI documentation tool in the creation of this note.                No follow-ups on file.    Cyndi Fisher is a 60 year old, presenting for the following health issues:  Clinic Care Coordination - Follow-up (Chest pressure and coughing up mucus )      3/19/2025     8:20 AM   Additional Questions   Roomed by Kamille   Accompanied by self         3/19/2025    Information    services provided? No     HPI - Phillip Goldsmith, 60-year-old male.  - Developed hives approximately two weeks ago, initially thought to be food-related.  - Hives have been recurring, with swelling of lips and itching on various body parts including buttocks, body, and arms.  - Previous similar reaction several years ago after eating at a [place] restaurant, with facial and body swelling, but no fever or body heat this time.  - Recent hives smaller and different in shape compared to past episodes.  - Experienced chest pain multiple times at night, not associated with fever or mucus.  - History of sinus issues with mucus production, but no bloody mucus.  - Previous blood tests showed normal results except for mild thyroid issues.  - Oxygen levels noted to be low at 92-93%.  - Doubled dose of Dapsone during hives episode,  "which he has done in the past for similar breakouts.  - Concerned about potential connection between sinus issues and lung involvement.  - No recent travel to TB areas; past TB test was negative.      Objective    /75   Pulse 87   Temp 97.9  F (36.6  C) (Temporal)   Resp 16   Ht 1.727 m (5' 8\")   Wt 78.5 kg (173 lb)   SpO2 94%   BMI 26.30 kg/m    Body mass index is 26.3 kg/m .  Physical Exam   - HEENT: Mild erythema in the posterior pharynx, slight nasal discharge, and a small effusion in the middle ear, not infected.  - LUNGS: Lungs sound clear.    Results    - Blood oxygen level: 92-93%  - Thyroid function: normal  - CRP (C-reactive protein): undetectably low  - Previous quant gold test for tuberculosis: negative  - Previous chest X-ray: normal (from last year)  - Previous CT scan of sinuses: no major issues (from seven years ago)           Pending blood work HIV, RPR, Quant Gold.  CT coronary calcium  PFT's.  Signed Electronically by: Armando Banks MD    "

## 2025-03-19 NOTE — PATIENT INSTRUCTIONS
- Schedule a pulmonary function test to check your lung health.  - If you continue to have trouble breathing, consider seeing a lung specialist.  - Use Flonase when you have sinus symptoms like postnasal drip or mucus.  - Think about visiting an ENT specialist to have your sinuses checked again.  - Arrange for a CT calcium scan to look at your heart health.  - Keep an eye on your chest discomfort. If it gets worse, consider getting more heart tests.    Thank you again for your visit!

## 2025-03-20 LAB
GAMMA INTERFERON BACKGROUND BLD IA-ACNC: 0.06 IU/ML
HIV 1+2 AB+HIV1 P24 AG SERPL QL IA: NONREACTIVE
M TB IFN-G BLD-IMP: NEGATIVE
M TB IFN-G CD4+ BCKGRND COR BLD-ACNC: 9.94 IU/ML
MITOGEN IGNF BCKGRD COR BLD-ACNC: -0.01 IU/ML
MITOGEN IGNF BCKGRD COR BLD-ACNC: 0.01 IU/ML
QUANTIFERON MITOGEN: 10 IU/ML
QUANTIFERON NIL TUBE: 0.06 IU/ML
QUANTIFERON TB1 TUBE: 0.07 IU/ML
QUANTIFERON TB2 TUBE: 0.05
RPR SER QL: REACTIVE
RPR SER-TITR: NORMAL {TITER}
T PALLIDUM AB SER QL: REACTIVE

## 2025-03-21 ENCOUNTER — TELEPHONE (OUTPATIENT)
Dept: FAMILY MEDICINE | Facility: CLINIC | Age: 60
End: 2025-03-21
Payer: COMMERCIAL

## 2025-03-21 NOTE — TELEPHONE ENCOUNTER
Attempted to call pt to relay provider message below. No answer, lmtcb.    If pt calls back please schedule for vaccine only appt with PCS    Armando Turpin RN, MD  [facility] Triage Nurse  Can you give Phillip a shot of bicillin 2.4 million units?  I will place an order.  I think he is pretty good at checking MyChart in terms of scheduling when to do it.  Thank you!  Armando Banks MD

## 2025-03-24 ENCOUNTER — OFFICE VISIT (OUTPATIENT)
Dept: PSYCHOLOGY | Facility: CLINIC | Age: 60
End: 2025-03-24
Payer: COMMERCIAL

## 2025-03-24 DIAGNOSIS — F43.21 ADJUSTMENT DISORDER WITH DEPRESSED MOOD: ICD-10-CM

## 2025-03-24 DIAGNOSIS — F91.9 DISRUPTIVE BEHAVIOR: Primary | ICD-10-CM

## 2025-03-24 NOTE — PROGRESS NOTES
Sexual and Gender Health Clinic - Progress Note    Date of Service: 3/24/25   Name: Vimal Goldsmith  : 1965  Medical Record Number: [id number]  Treating Provider: Jg Barrientos, PhD  Type of Session: Individual  Present in Session: Client and therapist  Session Start and Stop Time: 10:02am-10:52am  Number of Minutes:  50    SERVICE MODALITY:  In-person    DSM-5 Diagnoses:  Encounter Diagnoses   Name Primary?    Other Specified Disruptive, Impulse-Control, and Conduct Disorder (hypersexuality) Yes    Adjustment Disorder, With depressed mood          Current Reported Symptoms and Status update:  Changes since last session includes maintaining sexual boundaries, exploring healthy sexuality and alternative coping strategies, expanding social support, adjusting to medical concerns.    Compulsive Sexual Behavior:   - Persistent pattern of failure or difficulty controlling intense, repetitive sexual impulses or urges.  - Continued difficulties controlling sexual urges/impulses despite adverse consequences.  - Sexual activities becoming a central focus to life to the point of neglecting other interests, activities, or responsibilities.  - Pattern has persisted for an extended period of time (greater than 6 months)  - Impairment in relationship functioning.     Adjustment Disorder with Depressed Mood:  - Mild depressive symptoms secondary to health concerns.     Progress Toward Treatment Goals:   Satisfactory progress     Therapeutic Interventions/Treatment Strategies:    Area(s) of treatment focus addressed in this session included Symptom Management, Interpersonal Relationship Skills, and Sexual Health and Wellness    Client and therapist explored client's plans to maintain fidelity with sexual boundaries during an upcoming work trip. We identified risks and protective factors. Client discussed needing to find alternative sources of release especially after completing mentally demanding tasks. We discussed  alternatives, including exercise, self-care, talking with his partner, and sexual experiences in line with values and boundaries. Client discussed concerns with his partner's upcoming prostate surgery and potential impacts on sexual relationship. Therapist invited client to have his partner join future sessions to discuss concerns, and therapist offered to provide information for client's partner to see a sex therapist. We reviewed coping and self-care. Encouraged client to generate a list of functions sex/CSB has for him (e.g., reward or tension reduction).     Psychotherapist offered support, feedback and validation and reinforced use of skills Treatment modalities used include Cognitive Behavioral Therapy  Support and Feedback    Patient Response:   Patient responded to session by accepting feedback, giving feedback, listening, focusing on goals, and accepting support  Possible barriers to participation / learning include: N/A    Current Mental Status Exam:   Appearance:  Appropriate   Eye Contact:  Good   Attitude / Demeanor: Friendly  Speech      Rate / Production: Normal/ Responsive      Volume:  Normal  volume  Orientation:  All  Mood:   Normal  Affect:   Appropriate   Thought Content: Clear   Insight:   Good       Plan/Need for Future Services:  Return for therapy in 2 weeks to treat diagnosed problems.    Patient has a current master individualized treatment plan.  See Epic treatment plan for more information.    Referral / Collaboration:  Referral to another professional/service is not indicated at this time..  Emergency Services Needed?  No    Assignment:  Practice coping skills and explore different outlets  Generate list of functions of sex/CSB - start exploring alternatives    Interactive Complexity:  There are four specific communication difficulties that complicate the work of the primary psychiatric procedure.  Interactive complexity (+93596) may be reported when at least one of these difficulties  is present.    Communication difficulties present during current the psychiatric procedure include:  None.      Jg Barrientos, PhD, LP    [place] [facility], Sexual and Gender Health Clinic  Department of Family Medicine and Community Health  [facility]

## 2025-03-24 NOTE — TELEPHONE ENCOUNTER
Attempted to call pt to relay provider message below. No answer, lmtcb.    If pt calls back please schedule for vaccine only appt with PCS       Gail Poe RN

## 2025-03-26 ENCOUNTER — OFFICE VISIT (OUTPATIENT)
Dept: OPHTHALMOLOGY | Facility: CLINIC | Age: 60
End: 2025-03-26
Attending: OPHTHALMOLOGY
Payer: COMMERCIAL

## 2025-03-26 DIAGNOSIS — H02.88A MEIBOMIAN GLAND DYSFUNCTION (MGD), BILATERAL, BOTH UPPER AND LOWER LIDS: ICD-10-CM

## 2025-03-26 DIAGNOSIS — H02.88B MEIBOMIAN GLAND DYSFUNCTION (MGD), BILATERAL, BOTH UPPER AND LOWER LIDS: ICD-10-CM

## 2025-03-26 DIAGNOSIS — Z98.890 STATUS POST BILATERAL LASIK SURGERY: ICD-10-CM

## 2025-03-26 DIAGNOSIS — H04.123 BILATERAL DRY EYES: Primary | ICD-10-CM

## 2025-03-26 DIAGNOSIS — H53.001 AMBLYOPIA, RIGHT: ICD-10-CM

## 2025-03-26 DIAGNOSIS — A51.43: ICD-10-CM

## 2025-03-26 DIAGNOSIS — H25.813 MIXED TYPE AGE-RELATED CATARACT, BOTH EYES: ICD-10-CM

## 2025-03-26 PROCEDURE — 99213 OFFICE O/P EST LOW 20 MIN: CPT | Performed by: OPHTHALMOLOGY

## 2025-03-26 PROCEDURE — 92015 DETERMINE REFRACTIVE STATE: CPT

## 2025-03-26 ASSESSMENT — CONF VISUAL FIELD
OD_SUPERIOR_TEMPORAL_RESTRICTION: 0
OS_NORMAL: 1
OS_INFERIOR_TEMPORAL_RESTRICTION: 0
OD_NORMAL: 1
OS_INFERIOR_NASAL_RESTRICTION: 0
OS_SUPERIOR_NASAL_RESTRICTION: 0
OS_SUPERIOR_TEMPORAL_RESTRICTION: 0
OD_INFERIOR_TEMPORAL_RESTRICTION: 0
OD_SUPERIOR_NASAL_RESTRICTION: 0
OD_INFERIOR_NASAL_RESTRICTION: 0

## 2025-03-26 ASSESSMENT — VISUAL ACUITY
METHOD: SNELLEN - LINEAR
OD_SC: 20/25
OS_SC+: -2
OS_SC: 20/25
OD_SC+: -2

## 2025-03-26 ASSESSMENT — REFRACTION_MANIFEST
OS_SPHERE: +0.75
OD_CYLINDER: +0.50
OS_ADD: +2.50
OS_AXIS: 034
OD_SPHERE: +0.25
OD_AXIS: 136
OS_CYLINDER: +0.75
OD_ADD: +2.50

## 2025-03-26 ASSESSMENT — EXTERNAL EXAM - LEFT EYE: OS_EXAM: NORMAL

## 2025-03-26 ASSESSMENT — CUP TO DISC RATIO
OS_RATIO: 0.2
OD_RATIO: 0.2

## 2025-03-26 ASSESSMENT — SLIT LAMP EXAM - LIDS
COMMENTS: NORMAL
COMMENTS: NORMAL

## 2025-03-26 ASSESSMENT — TONOMETRY
OS_IOP_MMHG: 13
IOP_METHOD: TONOPEN
OD_IOP_MMHG: 14

## 2025-03-26 ASSESSMENT — EXTERNAL EXAM - RIGHT EYE: OD_EXAM: NORMAL

## 2025-03-26 NOTE — PROGRESS NOTES
HPI       Uveitis Follow-Up    In both eyes.  This started 9 months ago.             Comments    Pt. States that he has been noticing more eye fatigue BE. Has been noticing more dryness BE. No flashes or floaters BE.   Ani Nieves COT 7:36 AM March 26, 2025             Last edited by Ani Nieves on 3/26/2025  7:36 AM.          Review of systems for the eyes was negative other than the pertinent positives/negatives listed in the HPI.      Assessment & Plan      Vimal Goldsmith is a 60 year old male with the following diagnoses:   1. Bilateral dry eyes    2. Amblyopia, right    3. Meibomian gland dysfunction (MGD), bilateral, both upper and lower lids    4. Bilateral syphilitic iritis    5. Status post bilateral LASIK surgery - Left Eye    6. Mixed type age-related cataract, both eyes         Here for dilated fundus exam.  H/O bilateral syphilitic iritis (treated 2024) and remains quiescent today   H/O anisometropia and possible amblyopia (Left eye previously with greater myopia)  S/P laser in situ keratomileusis (LASIK) left eye in Jan 2017 c resultant monocular diplopia    Dry eye continues and hyperopic shift causing current symptoms   Currently using xiidra daily on most days and artificial tears when they feel fatigued    Start miebo four times a day  both eyes  If unable to get miebo, increase artificial tears to at least twice a day and as needed   Warm compresses daily   Xiidra twice a day both eyes     Cataract left eye > right eye   Early visually significant   Recheck this summer to determine significance after dry eye syndrome improves   Refractive options reviewed  Refraction given     Patient disposition:   Return in about 6 months (around 9/26/2025) for VT only, Refraction (BAT), IOL calcs.           Attending Physician Attestation:  Complete documentation of historical and exam elements from today's encounter can be found in the full encounter summary report (not reduplicated in this progress note).  I  personally obtained the chief complaint(s) and history of present illness.  I confirmed and edited as necessary the review of systems, past medical/surgical history, family history, social history, and examination findings as documented by others; and I examined the patient myself.  I personally reviewed the relevant tests, images, and reports as documented above.  I formulated and edited as necessary the assessment and plan and discussed the findings and management plan with the patient and family. . - Missael Chahal MD

## 2025-03-26 NOTE — NURSING NOTE
Chief Complaints and History of Present Illnesses   Patient presents with    Uveitis Follow-Up     Chief Complaint(s) and History of Present Illness(es)       Uveitis Follow-Up              Laterality: both eyes    Onset: 9 months ago              Comments    Pt. States that he has been noticing more eye fatigue BE. Has been noticing more dryness BE. No flashes or floaters BE.   Ani Nieves COT 7:36 AM March 26, 2025

## 2025-04-09 ENCOUNTER — HOSPITAL ENCOUNTER (OUTPATIENT)
Dept: CARDIAC REHAB | Facility: CLINIC | Age: 60
Discharge: HOME | End: 2025-04-09
Payer: COMMERCIAL

## 2025-04-09 ENCOUNTER — OFFICE VISIT (OUTPATIENT)
Dept: PSYCHOLOGY | Facility: CLINIC | Age: 60
End: 2025-04-09
Payer: COMMERCIAL

## 2025-04-09 ENCOUNTER — DOCUMENTATION ONLY (OUTPATIENT)
Dept: FAMILY MEDICINE | Facility: CLINIC | Age: 60
End: 2025-04-09
Payer: COMMERCIAL

## 2025-04-09 ENCOUNTER — ALLIED HEALTH/NURSE VISIT (OUTPATIENT)
Dept: FAMILY MEDICINE | Facility: CLINIC | Age: 60
End: 2025-04-09
Payer: COMMERCIAL

## 2025-04-09 DIAGNOSIS — R09.02 HYPOXIA: ICD-10-CM

## 2025-04-09 DIAGNOSIS — F43.21 ADJUSTMENT DISORDER WITH DEPRESSED MOOD: Primary | ICD-10-CM

## 2025-04-09 DIAGNOSIS — F91.9 DISRUPTIVE BEHAVIOR: ICD-10-CM

## 2025-04-09 DIAGNOSIS — Z23 ENCOUNTER FOR IMMUNIZATION: Primary | ICD-10-CM

## 2025-04-09 PROCEDURE — 94726 PLETHYSMOGRAPHY LUNG VOLUMES: CPT

## 2025-04-09 PROCEDURE — 94729 DIFFUSING CAPACITY: CPT

## 2025-04-09 PROCEDURE — 94375 RESPIRATORY FLOW VOLUME LOOP: CPT

## 2025-04-09 NOTE — PROGRESS NOTES
Sexual and Gender Health Clinic - Progress Note    Date of Service: 25   Name: Vimal Goldsmith  : 1965  Medical Record Number: [id number]  Treating Provider: Jg Barrientos, PhD  Type of Session: Individual  Present in Session: Client and therapist  Session Start and Stop Time: 1:05pm-1:51pm  Number of Minutes:  46    SERVICE MODALITY:  In-person    DSM-5 Diagnoses:  Encounter Diagnoses   Name Primary?    Adjustment Disorder, With depressed mood Yes    Other Specified Disruptive, Impulse-Control, and Conduct Disorder (hypersexuality)          Current Reported Symptoms and Status update:  Changes since last session includes improvement in adjustment issues, aligned with sexual health goals, staying within boundaries.    Compulsive Sexual Behavior:   - Persistent pattern of failure or difficulty controlling intense, repetitive sexual impulses or urges.  - Continued difficulties controlling sexual urges/impulses despite adverse consequences.  - Sexual activities becoming a central focus to life to the point of neglecting other interests, activities, or responsibilities.  - Pattern has persisted for an extended period of time (greater than 6 months)  - Impairment in relationship functioning.     Adjustment Disorder with Depressed Mood:  - Mild depressive symptoms secondary to health concerns.     Progress Toward Treatment Goals:   Satisfactory progress     Therapeutic Interventions/Treatment Strategies:    Area(s) of treatment focus addressed in this session included Symptom Management, Interpersonal Relationship Skills, and Sexual Health and Wellness    Client shared success with his recent work travel and staying within boundaries. He processed stressors related to his 's upcoming surgery and potential negative impacts on sexual functioning and their relationship. Client discussed ways in which he and his  are exploring other ways to experience sexual connection together. Client identified  potential risk situations and ways to respond more effectively to them. We highlighted client's strengths and treatment progress.     Psychotherapist offered support, feedback and validation and reinforced use of skills Treatment modalities used include Cognitive Behavioral Therapy  Support and Feedback    Patient Response:   Patient responded to session by accepting feedback, giving feedback, listening, focusing on goals, and accepting support  Possible barriers to participation / learning include: N/A    Current Mental Status Exam:   Appearance:  Appropriate   Eye Contact:  Good   Attitude / Demeanor: Friendly  Speech      Rate / Production: Normal/ Responsive      Volume:  Normal  volume  Orientation:  All  Mood:   Normal  Affect:   Appropriate   Thought Content: Clear   Insight:   Good         Plan/Need for Future Services:  Return for therapy in 2 weeks to treat diagnosed problems.    Patient has a current master individualized treatment plan.  See Epic treatment plan for more information.    Referral / Collaboration:  Referral to another professional/service is not indicated at this time..  Emergency Services Needed?  No    Assignment:  Continue staying within boundaries  Discuss potential for sex therapy for his  following surgery    Interactive Complexity:  There are four specific communication difficulties that complicate the work of the primary psychiatric procedure.  Interactive complexity (+98135) may be reported when at least one of these difficulties is present.    Communication difficulties present during current the psychiatric procedure include:  None.        Jg Barrientos, PhD, LP    [place] [facility], [facility] and [facility] Clinic  Department of Family Medicine and Community Health  [employer]

## 2025-04-09 NOTE — PROGRESS NOTES
Clinic Administered Medication Documentation      Injectable Medication Documentation    Is there an active order (written within the past 365 days, with administrations remaining, not ) in the chart? Yes.     Patient was given Penicillin G Benzathine (Bicillin). Prior to medication administration, verified patient's identity using patient s name and date of birth. Please see MAR and medication order for additional information. Patient instructed to remain in clinic for 15 minutes and report any adverse reaction to staff immediately.    Vial/Syringe: Single dose vial. Was entire vial of medication used? Yes  Was this medication supplied by the patient? No  Is this a medication the patient will need to receive again? Yes. Patient has no refills remaining. Refill encounter opened, order pended and Routed to the provider

## 2025-04-09 NOTE — PROGRESS NOTES
Prior to immunization administration, verified patients identity using patient s name and date of birth. Please see Immunization Activity for additional information.     Is the patient's temperature normal (100.5 or less)? Yes     Patient MEETS CRITERIA. PROCEED with vaccine administration.      Patient instructed to remain in clinic for 15 minutes afterwards, and to report any adverse reactions.      Link to Ancillary Visit Immunization Standing Orders SmartSet     Screening performed by Bernardino Cortez MA on 4/9/2025 at 2:24 PM.

## 2025-04-09 NOTE — PROGRESS NOTES
"When opening a documentation only encounter, be sure to enter in \"Chief Complaint\" Forms and in \" Comments\" Title of form, description if needed.    Phillip is a 60 year old  male  Form received via: [employer]  Form now resides in: Provider Ready    Gail Poe RN      Form has been completed by provider.     Form sent out via: Fax to [facility] at Fax Number: [phone]  Patient informed:   Output date: April 9, 2025    Cami Claros CMA      **Please close the encounter**           "

## 2025-04-10 LAB
DLCOUNC-%PRED-PRE: 100 %
DLCOUNC-PRE: 25.42 ML/MIN/MMHG
DLCOUNC-PRED: 25.22 ML/MIN/MMHG
ERV-%PRED-PRE: 39 %
ERV-PRE: 0.56 L
ERV-PRED: 1.41 L
EXPTIME-PRE: 9.88 SEC
FEF2575-%PRED-PRE: 101 %
FEF2575-PRE: 2.65 L/SEC
FEF2575-PRED: 2.62 L/SEC
FEFMAX-%PRED-PRE: 87 %
FEFMAX-PRE: 7.51 L/SEC
FEFMAX-PRED: 8.54 L/SEC
FEV1-%PRED-PRE: 111 %
FEV1-PRE: 3.36 L
FEV1FEV6-PRE: 76 %
FEV1FEV6-PRED: 79 %
FEV1FVC-PRE: 75 %
FEV1FVC-PRED: 79 %
FEV1SVC-PRE: 78 %
FEV1SVC-PRED: 73 %
FIFMAX-PRE: 6.98 L/SEC
FRCPLETH-%PRED-PRE: 117 %
FRCPLETH-PRE: 3.86 L
FRCPLETH-PRED: 3.28 L
FVC-%PRED-PRE: 116 %
FVC-PRE: 4.46 L
FVC-PRED: 3.84 L
IC-%PRED-PRE: 132 %
IC-PRE: 3.73 L
IC-PRED: 2.82 L
RVPLETH-%PRED-PRE: 141 %
RVPLETH-PRE: 3.28 L
RVPLETH-PRED: 2.33 L
TLCPLETH-%PRED-PRE: 116 %
TLCPLETH-PRE: 7.59 L
TLCPLETH-PRED: 6.52 L
VA-%PRED-PRE: 107 %
VA-PRE: 6.35 L
VC-%PRED-PRE: 103 %
VC-PRE: 4.3 L
VC-PRED: 4.15 L

## 2025-04-21 ENCOUNTER — OFFICE VISIT (OUTPATIENT)
Dept: PSYCHOLOGY | Facility: CLINIC | Age: 60
End: 2025-04-21
Payer: COMMERCIAL

## 2025-04-21 ENCOUNTER — OFFICE VISIT (OUTPATIENT)
Dept: FAMILY MEDICINE | Facility: CLINIC | Age: 60
End: 2025-04-21
Payer: COMMERCIAL

## 2025-04-21 VITALS
OXYGEN SATURATION: 93 % | BODY MASS INDEX: 26.07 KG/M2 | RESPIRATION RATE: 16 BRPM | WEIGHT: 172 LBS | HEIGHT: 68 IN | SYSTOLIC BLOOD PRESSURE: 126 MMHG | TEMPERATURE: 97.9 F | HEART RATE: 77 BPM | DIASTOLIC BLOOD PRESSURE: 79 MMHG

## 2025-04-21 DIAGNOSIS — A53.9 SYPHILIS: ICD-10-CM

## 2025-04-21 DIAGNOSIS — F43.21 ADJUSTMENT DISORDER WITH DEPRESSED MOOD: Primary | ICD-10-CM

## 2025-04-21 DIAGNOSIS — R09.02 HYPOXIA: Primary | ICD-10-CM

## 2025-04-21 DIAGNOSIS — R07.89 CHEST DISCOMFORT: ICD-10-CM

## 2025-04-21 DIAGNOSIS — Z23 ENCOUNTER FOR IMMUNIZATION: ICD-10-CM

## 2025-04-21 DIAGNOSIS — F91.9 DISRUPTIVE BEHAVIOR: ICD-10-CM

## 2025-04-21 NOTE — PROGRESS NOTES
Assessment & Plan     Hypoxia  Chest discomfort  Patient has been evaluated for hypoxia with pulmonary function tests that returned normal but continues to have SpO2 of 93% in clinic today. Reports intermittent episodes of chest discomfort that occur mostly at rest, last about 30 minutes, and resolve spontaneously. No chest pain with exertion. Differential includes cardiac, pulmonary, and esophageal etiologies. Will refer to pulmonology for further evaluation and guidance about obtaining CT chest. Gave patient number to call to schedule coronary artery calcium scan. Will trial pepcid nightly with dinner to evaluate for esophageal cause, although he did have a normal endoscopy in 2023 during his evaluation for celiac disease.   - Adult Pulmonary Medicine  Referral; Future    Encounter for immunization  Discussed pneumococcal and MMR vaccines today. Unsure if he was vaccinated for MMR as a child in [place] and concerned about recent outbreaks. Will do MMR vaccine today and can update pneumococcal vaccine at next visit.     Syphilis  Patient has history of syphilis with titer in March elevated to 1:8 from baseline of 1:4. No known exposures at that time. Received 1 dose of IM penicillin 12 days ago per recommendation of infectious disease. No new symptoms today. Will recheck titer to see if back at baseline.   - Treponema Abs w Reflex to RPR and Titer; Future        Subjective   Phillip is a 60 year old, presenting for the following health issues:  Follow Up        3/19/2025     8:20 AM   Additional Questions   Roomed by Kamille   Accompanied by self     HPI      No more episodes of hives    Syphilis test  - had penicillin shot  - need retest?  - no new rashes     Lung function testing  - was normal   - still having some pain, thinks it was related to gas problems/constipation  - happened a couple of times without related constipation  - feels like gas is pushing up  - can last 30 minutes, goes away on its own  -  "sometimes wakes him up from sleeping, more associated with being at rest than with movement   - no tobacco use or smoke exposures   - recently started using air purifier in bedroom     Sinus  - feeling less mucous with weather getting nicer out     Initially had some episodes of waking up at night not being able to breathe  - hasn't happen in the last few weeks     No refills needed  - only takes dapsone daily         Objective    /79 (BP Location: Left arm, Patient Position: Sitting, Cuff Size: Adult Regular)   Pulse 77   Temp 97.9  F (36.6  C) (Oral)   Resp 16   Ht 1.715 m (5' 7.5\")   Wt 78 kg (172 lb)   SpO2 93%   BMI 26.54 kg/m    Body mass index is 26.54 kg/m .  Physical Exam  Constitutional:       Appearance: Normal appearance.   HENT:      Head: Normocephalic.   Eyes:      Conjunctiva/sclera: Conjunctivae normal.   Cardiovascular:      Rate and Rhythm: Normal rate and regular rhythm.      Heart sounds: Normal heart sounds.   Pulmonary:      Effort: Pulmonary effort is normal.      Breath sounds: Normal breath sounds.   Abdominal:      General: Abdomen is flat.      Palpations: Abdomen is soft.   Neurological:      Mental Status: He is alert.   Psychiatric:         Mood and Affect: Mood normal.         Behavior: Behavior normal.         Margy Preciado, MS3  [employer]  April 21, 2025 3:59 PM     Signed Electronically by: Armando Banks MD    "

## 2025-04-21 NOTE — PROGRESS NOTES
Sexual and Gender Health Clinic - Progress Note    Date of Service: 25   Name: Vimal Goldsmith  : 1965  Medical Record Number: [id number]  Treating Provider: Jg Barrientos, PhD  Type of Session: Individual  Present in Session: Client and therapist  Session Start and Stop Time: 11:00am-11:45am  Number of Minutes:  45    SERVICE MODALITY:  In-person    DSM-5 Diagnoses:  Encounter Diagnoses   Name Primary?    Adjustment Disorder, With depressed mood Yes    Other Specified Disruptive, Impulse-Control, and Conduct Disorder (hypersexuality)          Current Reported Symptoms and Status update:  Changes since last session includes improvement in depression and mood, maintaining sexual health boundaries, exploring factors contributing to compulsive behavior.    Compulsive Sexual Behavior:   - Persistent pattern of failure or difficulty controlling intense, repetitive sexual impulses or urges.  - Continued difficulties controlling sexual urges/impulses despite adverse consequences.  - Sexual activities becoming a central focus to life to the point of neglecting other interests, activities, or responsibilities.  - Pattern has persisted for an extended period of time (greater than 6 months)  - Impairment in relationship functioning.     Adjustment Disorder with Depressed Mood:  - Mild depressive symptoms secondary to health concerns.     Progress Toward Treatment Goals:   Satisfactory progress     Therapeutic Interventions/Treatment Strategies:    Area(s) of treatment focus addressed in this session included Symptom Management, Interpersonal Relationship Skills, and Sexual Health and Wellness    Client shared plans for his partner's upcoming surgery and potential impacts on the relationship and intimacy. We explored ways in which they are effectively communicating and accommodating concerns. Client shared factors associated with past compulsive behavior and ways in which he is managing them. He also discussed  having a need for higher sensation/stimulation generally due to boredom. We discussed ways of easing discomfort associated with boredom and practicing mindfulness.    Psychotherapist offered support, feedback and validation and reinforced use of skills Treatment modalities used include Cognitive Behavioral Therapy  Support and Feedback    Patient Response:   Patient responded to session by accepting feedback, giving feedback, listening, focusing on goals, and accepting support  Possible barriers to participation / learning include: N/A    Current Mental Status Exam:   Appearance:  Appropriate   Eye Contact:  Good   Attitude / Demeanor: Friendly  Speech      Rate / Production: Normal/ Responsive      Volume:  Normal  volume  Orientation:  All  Mood:   Normal  Affect:   Appropriate   Thought Content: Clear   Insight:   Good       Plan/Need for Future Services:  Return for therapy in 2 weeks to treat diagnosed problems.    Patient has a current master individualized treatment plan.  See Epic treatment plan for more information.    Referral / Collaboration:  Referral to another professional/service is not indicated at this time..  Emergency Services Needed?  No    Assignment:  Mindfulness    Interactive Complexity:  There are four specific communication difficulties that complicate the work of the primary psychiatric procedure.  Interactive complexity (+98218) may be reported when at least one of these difficulties is present.    Communication difficulties present during current the psychiatric procedure include:  None.        Jg Barrientos, PhD, LP    [place] [facility] Sexual [facility], Sexual and Gender Health Clinic  Department of Family Medicine and Community Health  [employer]

## 2025-04-21 NOTE — PATIENT INSTRUCTIONS
Call [facility] [address] [place] [address]. [phone] to get CT Coronary Scan.

## 2025-04-22 LAB
RPR SER QL: REACTIVE
RPR SER-TITR: NORMAL {TITER}
T PALLIDUM AB SER QL: REACTIVE

## 2025-05-01 NOTE — CONFIDENTIAL NOTE
RECORDS RECEIVED FROM: internal    DATE RECEIVED:  7.21.25    NOTES STATUS DETAILS   OFFICE NOTE from referring provider internal    Armando Banks MD      MEDICATION LIST internal     IMAGING  (NEED IMAGES AND REPORTS)     CHEST XRAY (CXR) internal  2.28.24    TESTS     PULMONARY FUNCTION TESTING (PFT) internal  4.9.25       Action 5.1.25 sv    Action Taken Message sent to nurse pool to place CXR order

## 2025-05-02 ENCOUNTER — OFFICE VISIT (OUTPATIENT)
Dept: OTOLARYNGOLOGY | Facility: CLINIC | Age: 60
End: 2025-05-02
Attending: FAMILY MEDICINE
Payer: COMMERCIAL

## 2025-05-02 VITALS
WEIGHT: 172.5 LBS | DIASTOLIC BLOOD PRESSURE: 77 MMHG | HEIGHT: 68 IN | SYSTOLIC BLOOD PRESSURE: 126 MMHG | BODY MASS INDEX: 26.14 KG/M2

## 2025-05-02 DIAGNOSIS — J01.90 ACUTE SINUSITIS, RECURRENCE NOT SPECIFIED, UNSPECIFIED LOCATION: Primary | ICD-10-CM

## 2025-05-02 PROCEDURE — 1126F AMNT PAIN NOTED NONE PRSNT: CPT | Performed by: STUDENT IN AN ORGANIZED HEALTH CARE EDUCATION/TRAINING PROGRAM

## 2025-05-02 PROCEDURE — 31231 NASAL ENDOSCOPY DX: CPT | Performed by: STUDENT IN AN ORGANIZED HEALTH CARE EDUCATION/TRAINING PROGRAM

## 2025-05-02 PROCEDURE — 3078F DIAST BP <80 MM HG: CPT | Performed by: STUDENT IN AN ORGANIZED HEALTH CARE EDUCATION/TRAINING PROGRAM

## 2025-05-02 PROCEDURE — 3074F SYST BP LT 130 MM HG: CPT | Performed by: STUDENT IN AN ORGANIZED HEALTH CARE EDUCATION/TRAINING PROGRAM

## 2025-05-02 PROCEDURE — 99203 OFFICE O/P NEW LOW 30 MIN: CPT | Mod: 25 | Performed by: STUDENT IN AN ORGANIZED HEALTH CARE EDUCATION/TRAINING PROGRAM

## 2025-05-02 ASSESSMENT — PAIN SCALES - GENERAL: PAINLEVEL_OUTOF10: NO PAIN (0)

## 2025-05-02 NOTE — PATIENT INSTRUCTIONS
You were seen in the ENT Clinic today by Dr. Masters. If you have any questions or concerns after your appointment, please contact us (see below)    Please return to clinic as needed    How to Contact Us:  Send a [employer] message to your provider. Our team will respond to you via [employer]. Occasionally, we will need to call you to get further information.  For urgent matters (Monday-Friday), call the ENT Clinic: [phone] and speak with a call center team member - they will route your call appropriately.   If you'd like to speak directly with a nurse, please find our contact information below. We do our best to check voicemail frequently throughout the day, and will work to call you back within 1-2 days. For urgent matters, please use the general clinic phone numbers listed above.    Petra TRAMMELL RN, BSN   RN Care Coordinator, ENT Clinic  [facility]  Direct: [phone]  Silvia CISNEROS LPN  Direct: [phone]

## 2025-05-02 NOTE — LETTER
"5/2/2025       RE: Vimal Goldsmith  [address] [place] [address]     Dear Colleague,    Thank you for referring your patient, Vimal Goldsmith, to the [facility] EAR NOSE AND THROAT [facility]INIC [facility]. Please see a copy of my visit note below.      [facility] - Rhinology & Skull Base Surgery  New Patient Visit      Encounter date:   May 5, 2025    Referring Provider:  Armando Banks MD  [address] [place] [address]    Assessment, Decision Making, and Plan:  (J01.90) Acute sinusitis, recurrence not specified, unspecified location  (primary encounter diagnosis)  Comment:   --history consistent with acute sinusitis; not at criteria for RARS, no evidence of CRS  --unclear whether the occipital and temporal headache are related to episode of sinusitis or not  --endoscopy today clean, previous imaging without any evidence of CRS  Plan:   --discussed conservative treatment  --if increases in frequency, persists > 1 month, or new symptoms, could re-evaluate  --follow up as needed    History of Present Illness:   Vimal Goldsmith is a 60 year old male who presents for consultation regarding nasal issues.    A few times a year  Facial pressure - headache radiating to the back  PND  Occ ant rhinorrhea  Difficulty breathing through the nose  Has been on sprays before  No history of surgery      Physical Exam:  Vital signs: /77   Ht 1.715 m (5' 7.5\")   Wt 78.2 kg (172 lb 8 oz)   BMI 26.62 kg/m     General Appearance: No acute distress, appropriate demeanor, conversant  Eyes: moist conjunctivae; EOMI; pupils symmetric; visual acuity grossly intact; no proptosis  Head: normocephalic; overall symmetric appearance without deformity  Face: overall symmetric without deformity  Ears: Normal appearance of external ear; external meatus normal in appearance; TMs intact without perforation bilaterally;   Nose: No external deformity; septum " (midline) ; inferior turbinates (boggy)   Oral Cavity/oropharynx: Normal appearance of mucosa; tongue midline; no mass or lesions; oropharynx without obvious mucosal abnormality  Neck: no palpable lymphadenopathy; thyroid without palpable nodules  Lungs: symmetric chest rise; no wheezing  CV: Good distal perfusion; normal hear rate  Extremities: No deformity  Neurologic Exam: Cranial nerves II-XII are grossly intact; no focal deficit    Procedure Note  Procedure performed: Rigid nasal endoscopy  Indication: To evaluate for sinonasal pathology not visualized on routine anterior rhinoscopy  Anesthesia: 4% topical lidocaine with 0.05% oxymetazoline  Description of procedure: A 0-degree, 4 mm rigid endoscope was inserted into bilateral nasal cavities and the inferior and middle turbinates, nasal valves, nasal cavity, inferior meatus, middle meatus, sphenoethmoid recess, and nasopharynx were thoroughly evaluated for evidence of obstruction, edema, purulence, polyps and/or mass/lesion.     Findings:    MM SER NP clear    The patient tolerated the procedure well without complication.     Imaging Review:  CT 2018 - primary review with no paranasal sinus disease or anatomic drainage issues    Edis Masters MD    Rhinology  Endoscopic Skull Base Surgery  [facility]  Department of Otolaryngology - Head & Neck Surgery          Again, thank you for allowing me to participate in the care of your patient.      Sincerely,    Edis Masters MD

## 2025-05-05 NOTE — PROGRESS NOTES
"  [facility] - Rhinology & Skull Base Surgery  New Patient Visit      Encounter date:   May 5, 2025    Referring Provider:  Armando Banks MD  [address] [place] [address]    Assessment, Decision Making, and Plan:  (J01.90) Acute sinusitis, recurrence not specified, unspecified location  (primary encounter diagnosis)  Comment:   --history consistent with acute sinusitis; not at criteria for RARS, no evidence of CRS  --unclear whether the occipital and temporal headache are related to episode of sinusitis or not  --endoscopy today clean, previous imaging without any evidence of CRS  Plan:   --discussed conservative treatment  --if increases in frequency, persists > 1 month, or new symptoms, could re-evaluate  --follow up as needed    History of Present Illness:   Vimal Goldsmith is a 60 year old male who presents for consultation regarding nasal issues.    A few times a year  Facial pressure - headache radiating to the back  PND  Occ ant rhinorrhea  Difficulty breathing through the nose  Has been on sprays before  No history of surgery      Physical Exam:  Vital signs: /77   Ht 1.715 m (5' 7.5\")   Wt 78.2 kg (172 lb 8 oz)   BMI 26.62 kg/m     General Appearance: No acute distress, appropriate demeanor, conversant  Eyes: moist conjunctivae; EOMI; pupils symmetric; visual acuity grossly intact; no proptosis  Head: normocephalic; overall symmetric appearance without deformity  Face: overall symmetric without deformity  Ears: Normal appearance of external ear; external meatus normal in appearance; TMs intact without perforation bilaterally;   Nose: No external deformity; septum (midline) ; inferior turbinates (boggy)   Oral Cavity/oropharynx: Normal appearance of mucosa; tongue midline; no mass or lesions; oropharynx without obvious mucosal abnormality  Neck: no palpable lymphadenopathy; thyroid without palpable nodules  Lungs: symmetric chest rise; no wheezing  CV: Good distal perfusion; " normal hear rate  Extremities: No deformity  Neurologic Exam: Cranial nerves II-XII are grossly intact; no focal deficit    Procedure Note  Procedure performed: Rigid nasal endoscopy  Indication: To evaluate for sinonasal pathology not visualized on routine anterior rhinoscopy  Anesthesia: 4% topical lidocaine with 0.05% oxymetazoline  Description of procedure: A 0-degree, 4 mm rigid endoscope was inserted into bilateral nasal cavities and the inferior and middle turbinates, nasal valves, nasal cavity, inferior meatus, middle meatus, sphenoethmoid recess, and nasopharynx were thoroughly evaluated for evidence of obstruction, edema, purulence, polyps and/or mass/lesion.     Findings:    MM SER NP clear    The patient tolerated the procedure well without complication.     Imaging Review:  CT 2018 - primary review with no paranasal sinus disease or anatomic drainage issues    Edis Masters MD    Rhinology  Endoscopic Skull Base Surgery  [facility]  Department of Otolaryngology - Head & Neck Surgery

## 2025-05-09 ENCOUNTER — RESULTS FOLLOW-UP (OUTPATIENT)
Dept: FAMILY MEDICINE | Facility: CLINIC | Age: 60
End: 2025-05-09

## 2025-05-14 ENCOUNTER — OFFICE VISIT (OUTPATIENT)
Dept: PSYCHOLOGY | Facility: CLINIC | Age: 60
End: 2025-05-14
Payer: COMMERCIAL

## 2025-05-14 DIAGNOSIS — F91.9 DISRUPTIVE BEHAVIOR: ICD-10-CM

## 2025-05-14 DIAGNOSIS — F43.21 ADJUSTMENT DISORDER WITH DEPRESSED MOOD: Primary | ICD-10-CM

## 2025-05-14 NOTE — PROGRESS NOTES
[facility] - Progress Note    Date of Service: 25   Name: Vimal Goldsmith  : 1965  Medical Record Number: [id number]  Treating Provider: Jg Barrientos, PhD  Type of Session: Individual  Present in Session: Client and therapist  Session Start and Stop Time: 1:05pm-1:55pm  Number of Minutes:  50    SERVICE MODALITY:  In-person    DSM-5 Diagnoses:  Encounter Diagnoses   Name Primary?    Adjustment Disorder, With depressed mood Yes    Other Specified Disruptive, Impulse-Control, and Conduct Disorder (hypersexuality)          Current Reported Symptoms and Status update:  Changes since last session includes maintaining sexual health boundaries and values, decreased anxiety/stress related to sexual health concerns, improvement in relationship functioning.    Compulsive Sexual Behavior:   - Persistent pattern of failure or difficulty controlling intense, repetitive sexual impulses or urges.  - Continued difficulties controlling sexual urges/impulses despite adverse consequences.  - Sexual activities becoming a central focus to life to the point of neglecting other interests, activities, or responsibilities.  - Pattern has persisted for an extended period of time (greater than 6 months)  - Impairment in relationship functioning.     Adjustment Disorder with Depressed Mood:  - Mild depressive symptoms secondary to health concerns.     Progress Toward Treatment Goals:   Satisfactory progress     Therapeutic Interventions/Treatment Strategies:    Area(s) of treatment focus addressed in this session included Symptom Management and Interpersonal Relationship Skills    Client shared progress in maintaining sexual health boundaries, and concerns he had about maintaining boundaries in the lead up to his 's prostate surgery last week. He discussed ways in which he and his  are talking openly about sexual health and sexual functioning concerns. He noted decreased stress/anxiety related to  sexual health. He expressed hope for sustaining progress. We discussed theories around motivation and willpower, and how daily practice of checking in and actively making decisions is helpful in maintaining treatment progress. We planned for client's  to join future sessions to discuss issues related to trust, communication, and sexual functioning.     Psychotherapist offered support, feedback and validation and reinforced use of skills Treatment modalities used include Cognitive Behavioral Therapy  Support and Feedback    Patient Response:   Patient responded to session by accepting feedback, giving feedback, listening, focusing on goals, and accepting support  Possible barriers to participation / learning include: N/A    Current Mental Status Exam:   Appearance:  Appropriate   Eye Contact:  Good   Attitude / Demeanor: Friendly  Speech      Rate / Production: Normal/ Responsive      Volume:  Normal  volume  Orientation:  All  Mood:   Normal  Affect:   Appropriate   Thought Content: Clear   Insight:   Good       Plan/Need for Future Services:  Return for therapy in 2 weeks to treat diagnosed problems.    Patient has a current master individualized treatment plan.  See Epic treatment plan for more information.    Referral / Collaboration:  Referral to another professional/service is not indicated at this time..  Emergency Services Needed?  No    Assignment:  Self-care  Communication with     Interactive Complexity:  There are four specific communication difficulties that complicate the work of the primary psychiatric procedure.  Interactive complexity (+[id number]) may be reported when at least one of these difficulties is present.    Communication difficulties present during current the psychiatric procedure include:  None.      Signature/Title:      Jg Barrientos, PhD, LP    [place] [facility], [facility]  Department of Family Medicine and  Community Health  [facility]

## 2025-06-09 ENCOUNTER — OFFICE VISIT (OUTPATIENT)
Dept: PSYCHOLOGY | Facility: CLINIC | Age: 60
End: 2025-06-09
Payer: COMMERCIAL

## 2025-06-09 DIAGNOSIS — F91.9 DISRUPTIVE BEHAVIOR: Primary | ICD-10-CM

## 2025-06-09 DIAGNOSIS — F43.21 ADJUSTMENT DISORDER WITH DEPRESSED MOOD: ICD-10-CM

## 2025-06-09 PROCEDURE — 90834 PSYTX W PT 45 MINUTES: CPT | Performed by: STUDENT IN AN ORGANIZED HEALTH CARE EDUCATION/TRAINING PROGRAM

## 2025-06-09 NOTE — PROGRESS NOTES
[facility]ual [facility] Health Clinic - Progress Note    Date of Service: 25   Name: Vimal Goldsmith  : 1965  Medical Record Number: [id number]  Treating Provider: Jg Barrientos, PhD  Type of Session: Individual  Present in Session: Client and therapist  Session Start and Stop Time: 3:03pm-3:49pm  Number of Minutes:  46    SERVICE MODALITY:  In-person    DSM-5 Diagnoses:  Encounter Diagnoses   Name Primary?    Other Specified Disruptive, Impulse-Control, and Conduct Disorder (hypersexuality) Yes    Adjustment Disorder, With depressed mood          Current Reported Symptoms and Status update:  Changes since last session includes maintaining sexual health boundaries, some anxiety about boundaries and trust, navigating his 's recovery from surgery.     Progress Toward Treatment Goals:   Satisfactory progress     Therapeutic Interventions/Treatment Strategies:    Area(s) of treatment focus addressed in this session included Symptom Management, Interpersonal Relationship Skills, and Sexual Health and Wellness    Client shared how he is supporting his  with his 's recovery from surgery. He identified strategies for adapting to changes in current sexual functioning in the relationship. He discussed anxiety about not yet trusting himself to manage sexual urges/temptations. We reviewed boundaries and active steps he is taking to maintain his sexual and relational values/boundaries. Client expressed gratitude for the support and feedback.    We reviewed client's treatment plan and need for sessions. Client agrees to not make changes at this time.     Psychotherapist offered support, feedback and validation and reinforced use of skills Treatment modalities used include Cognitive Behavioral Therapy  Support and Feedback    Patient Response:   Patient responded to session by accepting feedback, giving feedback, listening, focusing on goals, and accepting support  Possible barriers to participation /  learning include: N/A    Current Mental Status Exam:   Appearance:  Appropriate   Eye Contact:  Good   Attitude / Demeanor: Friendly  Speech      Rate / Production: Normal/ Responsive      Volume:  Normal  volume  Orientation:  All  Mood:   Normal  Affect:   Appropriate   Thought Content: Clear   Insight:   Good       Plan/Need for Future Services:  Return for therapy in 2 weeks to treat diagnosed problems.    Patient has a current master individualized treatment plan.  See Epic treatment plan for more information.    Referral / Collaboration:  Referral to another professional/service is not indicated at this time..  Emergency Services Needed?  No    Assignment:  Continue self-care and communication skills    Interactive Complexity:  There are four specific communication difficulties that complicate the work of the primary psychiatric procedure.  Interactive complexity (+84338) may be reported when at least one of these difficulties is present.    Communication difficulties present during current the psychiatric procedure include:  None.    Jg Barrientos, PhD, LP    [place] [facility], [facility]  Department of Family Medicine and Community Health  [employer] [facility] [employer]

## 2025-06-13 NOTE — PROGRESS NOTES
I am seeing this patient in consultation for chronic sinusitis at the request of the provider Armando Dudley      Chief Complaint - Tinnitus, sinusitis    History of Present Illness - Vimal Goldsmith is a 60 year old male who presents to me today with ringing in the ears.  It has been present and noticeable for approximately over a year (1.5 years).  He can hear it faintly when he pays attention. It is a hum. He got IV penicillin for 2 weeks for syphilis. He wonders if it started after that. Hearing seems to fine. He had one bout of severe vertigo several years ago. None since. There is no history of chronic ear disease or ear surgery. With regards to recreational, , and work-related noise exposure he has none. He uses Dapsone for celiac disease. No regular use of aspirin or NSAIDS. I personally reviewed the relevant clinical notes in Epic including the ID providers note.     Each year, 2-3 times, he gets sinus drainage, cough, some sinus pressure. He has tried nasal spray. A few times when it is bad, he has taken antibiotics. It helps.     Tests personally reviewed today for this visit:   1.) audiogram was performed today as part of the evaluation and personally reviewed. The audiogram showed a significant symmetric high frequency sensorineural hearing loss at 6k and 8k Hz.    2.) tympanograms were performed today and were normal Type A curves, with normal canal volume and middle ear pressure.    3.) 1/6/2017 CT sinus Impression:  - Normal CT examination of the paranasal sinuses. Ostiomeatal units are  patent bilaterally.    Past Medical History -   Patient Active Problem List   Diagnosis    Dermatitis herpetiformis    Celiac disease    Tinea pedis    Acne rosacea    BPH (benign prostatic hypertrophy)    Bacterial folliculitis    Chalazion    Therapeutic drug monitoring    Bilateral dry eyes    Adjustment insomnia    Internal bleeding hemorrhoids    Syphilis    Tinnitus, left    Occupational exposure to  coal dust    Ocular syphilis    Compulsive disorder       Current Medications -   Current Outpatient Medications:     cetirizine (ZYRTEC) 10 MG tablet, Take 1 tablet (10 mg) by mouth 2 times daily., Disp: 90 tablet, Rfl: 2    clindamycin (CLEOCIN T) 1 % external solution, To the scalp, Disp: 60 mL, Rfl: 1    clobetasol (TEMOVATE) 0.05 % external ointment, Apply topically 2 times daily, Disp: 60 g, Rfl: 3    dapsone (ACZONE) 25 MG tablet, TAKE 1 TABLET BY MOUTH EVERY DAY TAKE 2 TABLET BY MOUTH DAILY WHEN FLARING, Disp: 180 tablet, Rfl: 3    DESCOVY 200-25 MG per tablet, For On-Demand: Take 2 pills at least 2H prior to sex, then take 1 pill for the next 2 days (4 pills total), Disp: 30 tablet, Rfl: 2    doxycycline hyclate (VIBRAMYCIN) 100 MG capsule, Take 2 capsules (200 mg) by mouth daily as needed (take within 72 hours after unprotected sex)., Disp: 60 capsule, Rfl: 1    econazole nitrate 1 % external cream, Apply topically 2 times daily --to anal area for two weeks, Disp: 85 g, Rfl: 2    EPINEPHrine (ANY BX GENERIC EQUIV) 0.3 MG/0.3ML injection 2-pack, Inject 0.3 mLs (0.3 mg) into the muscle as needed for anaphylaxis. May repeat one time in 5-15 minutes if response to initial dose is inadequate., Disp: 2 each, Rfl: 0    FLUARIX QUADRIVALENT 0.5 ML injection, , Disp: , Rfl:     fluticasone (FLONASE) 50 MCG/ACT nasal spray, Spray 1 spray into both nostrils daily., Disp: 18.2 mL, Rfl: 6    hypromellose (ARTIFICIAL TEARS) 0.5 % SOLN ophthalmic solution, 1 drop every hour as needed for dry eyes, Disp: , Rfl:     ipratropium (ATROVENT) 0.06 % nasal spray, Spray 2 sprays into both nostrils 4 times daily as needed for rhinitis, Disp: 15 mL, Rfl: 1    ketoconazole (NIZORAL) 2 % external shampoo, Apply topically daily For rash on face and scalp until clear then stop, Disp: 120 mL, Rfl: 11    lifitegrast (XIIDRA) 5 % opthalmic solution, Apply 1 drop to eye 2 times daily., Disp: 60 each, Rfl: 11    multivitamin, therapeutic  (THERA-VIT) TABS tablet, Take 1 tablet by mouth daily, Disp: , Rfl:     Perfluorohexyloctane 1.338 GM/ML SOLN, Apply 1 drop to eye 4 times daily., Disp: 3 mL, Rfl: 6    prednisoLONE acetate (PRED FORTE) 1 % ophthalmic suspension, Apply 1-2 drops to both eyes: 3 times per day for one week, 2 times per day for one week, 1 time per day for one week, Disp: 10 mL, Rfl: 1    sildenafil (VIAGRA) 50 MG tablet, Take 0.5 tablets (25 mg) 30 min to 4 hours before intercourse daily as needed. Never use with nitroglycerin, terazosin or doxazosin., Disp: 12 tablet, Rfl: 0    SPIKEVAX 50 MCG/0.5ML injection, , Disp: , Rfl:     traZODone (DESYREL) 50 MG tablet, Take 0.5 tablets (25 mg) by mouth nightly as needed for sleep., Disp: 20 tablet, Rfl: 3    triamcinolone (KENALOG) 0.1 % external ointment, Apply twice daily as needed for rash on the trunk or extremities, Disp: 80 g, Rfl: 11    zolpidem (AMBIEN) 5 MG tablet, TAKE 1 TABLET BY MOUTH EVERY NIGHT AS NEEDED FOR SLEEP, Disp: 30 tablet, Rfl: 1    Allergies -   Allergies   Allergen Reactions    Gluten Meal Rash and GI Disturbance    Nkda [No Known Drug Allergy]        Social History -   Social History     Socioeconomic History    Marital status:    Tobacco Use    Smoking status: Never    Smokeless tobacco: Never    Tobacco comments:     NON SMOKING ENVIRONMENT, 10/6/11, [facility].    Vaping Use    Vaping status: Never Used   Substance and Sexual Activity    Alcohol use: No     Comment: a few sips to help sleep    Drug use: No    Sexual activity: Yes     Partners: Male     Birth control/protection: Pull-out method, Condom, None     Comment: unprotected sex with my partner (), sometimes others   Other Topics Concern    Parent/sibling w/ CABG, MI or angioplasty before 65F 55M? Yes     Comment: My father     Social Drivers of Health     Financial Resource Strain: Low Risk  (12/23/2024)    Financial Resource Strain     Within the past 12 months, have you or your family members  you live with been unable to get utilities (heat, electricity) when it was really needed?: No   Food Insecurity: Low Risk  (12/23/2024)    Food Insecurity     Within the past 12 months, did you worry that your food would run out before you got money to buy more?: No     Within the past 12 months, did the food you bought just not last and you didn t have money to get more?: No   Transportation Needs: Low Risk  (12/23/2024)    Transportation Needs     Within the past 12 months, has lack of transportation kept you from medical appointments, getting your medicines, non-medical meetings or appointments, work, or from getting things that you need?: No   Physical Activity: Insufficiently Active (12/23/2024)    Exercise Vital Sign     Days of Exercise per Week: 4 days     Minutes of Exercise per Session: 20 min   Stress: No Stress Concern Present (12/23/2024)    [place] [employer] - Occupational Stress Questionnaire     Feeling of Stress : Only a little   Social Connections: Unknown (12/23/2024)    Social Connection and Isolation Panel [NHANES]     Frequency of Social Gatherings with Friends and Family: Never   Interpersonal Safety: Low Risk  (12/23/2024)    Interpersonal Safety     Do you feel physically and emotionally safe where you currently live?: Yes     Within the past 12 months, have you been hit, slapped, kicked or otherwise physically hurt by someone?: No     Within the past 12 months, have you been humiliated or emotionally abused in other ways by your partner or ex-partner?: No   Housing Stability: Low Risk  (12/23/2024)    Housing Stability     Do you have housing? : Yes     Are you worried about losing your housing?: No       Family History -   Family History   Problem Relation Age of Onset    Lipids Mother     Kidney Disease Mother     Arthritis Mother     Hypertension Mother         passed away    Osteoporosis Mother     Glaucoma Father     Gastrointestinal Disease Father         maybe  celiac    Cancer Father         Leukemia    Heart Disease Father     Hypertension Father         passed away    Cerebrovascular Disease Father         passed    Stomach Problem Father     Other Cancer Father         leukemia    Gastrointestinal Disease Brother         maybe celiac    Kidney Disease Brother         Kidnet stone    Glaucoma Other     Cancer - colorectal No family hx of     Prostate Cancer No family hx of     Melanoma No family hx of     Skin Cancer No family hx of        Physical Exam  General - The patient is in no distress. Alert, answers questions and cooperates with examination appropriately.   Neurologic - CN II-XII are grossly intact. No focal neurologic deficits.   Voice and Breathing - The patient was breathing comfortably without the use of accessory muscles. There was no wheezing, stridor, or stertor.  The patients voice was clear and strong.  Ears - some small amount of wax I cleaned. The tympanic membranes are normal in appearance, bony landmarks are intact.  No retraction, perforation, or masses. No fluid or purulence was seen in the external canal or the middle ear. No evidence of infection of the middle ear or external canal, cerumen was normal in appearance.  Eyes - Extraocular movements intact, and the pupils were reactive to light.  Sclera were not icteric or injected, conjunctiva were pink and moist.  Mouth - Examination of the oral cavity showed pink, healthy oral mucosa. No lesions or ulcerations noted.  The tongue was mobile and midline.  Throat - The walls of the oropharynx were smooth, symmetric, and had no lesions or ulcerations.  The uvula was midline on elevation.    Neck - Soft, non-tender. Palpation of the occipital, submental, submandibular, internal jugular chain, and supraclavicular nodes did not demonstrate any abnormal lymph nodes or masses. No parotid masses. Palpation of the thyroid was soft and smooth, with no nodules or goiter appreciated.  The trachea was mobile  and midline.      Assessment and Plan -     ICD-10-CM    1. Tinnitus, bilateral  H93.13 Adult Audiology  Referral      2. Sensorineural hearing loss (SNHL) of both ears  H90.3 Adult Audiology  Referral      3. Acute recurrent maxillary sinusitis  J01.01           Vimal Goldsmith is a 60 year old male who presents to me today with subjective tinnitus, likely due to sensorineural hearing loss in the high frequencies.  I can find no evidence of serious CNS disorders or other complicating factors that could be causing this.  We spent the remainder of today's visit on education. Discussed were steps that can be taken to mask the noise, such as a low volume de-tuned radio, a fan in the background. He doesn't need hearing aids.      The patient will follow up as necessary for worsening symptoms or changes in symptoms. Recheck hearing in 2-3 years.     Has sinusitis 2-3 times a year. I recommend antihistamine, flonase, and neti pot. If worsens I recommend CT sinus and return.       Missael Thomas MD  Otolaryngology  [facility]

## 2025-06-16 ENCOUNTER — OFFICE VISIT (OUTPATIENT)
Dept: AUDIOLOGY | Facility: CLINIC | Age: 60
End: 2025-06-16
Payer: COMMERCIAL

## 2025-06-16 ENCOUNTER — OFFICE VISIT (OUTPATIENT)
Dept: OTOLARYNGOLOGY | Facility: CLINIC | Age: 60
End: 2025-06-16
Payer: COMMERCIAL

## 2025-06-16 DIAGNOSIS — H90.3 SENSORINEURAL HEARING LOSS (SNHL) OF BOTH EARS: Primary | ICD-10-CM

## 2025-06-16 DIAGNOSIS — H93.13 TINNITUS, BILATERAL: Primary | ICD-10-CM

## 2025-06-16 DIAGNOSIS — H90.3 SENSORINEURAL HEARING LOSS (SNHL) OF BOTH EARS: ICD-10-CM

## 2025-06-16 DIAGNOSIS — H93.13 TINNITUS, BILATERAL: ICD-10-CM

## 2025-06-16 DIAGNOSIS — J01.01 ACUTE RECURRENT MAXILLARY SINUSITIS: ICD-10-CM

## 2025-06-16 PROCEDURE — 92550 TYMPANOMETRY & REFLEX THRESH: CPT | Performed by: AUDIOLOGIST

## 2025-06-16 PROCEDURE — 92557 COMPREHENSIVE HEARING TEST: CPT | Performed by: AUDIOLOGIST

## 2025-06-16 PROCEDURE — 99213 OFFICE O/P EST LOW 20 MIN: CPT | Performed by: OTOLARYNGOLOGY

## 2025-06-16 NOTE — NURSING NOTE
Vimal Goldsmith's chief complaint for this visit includes:  Chief Complaint   Patient presents with    Consult     Tinnitus bilateral. Onset after a sinus infection and being treated IV antibiotics for Syphilis. About 1 1/2 years ago.      PCP: Armando Banks    Referring Provider:  Referred Self, MD  No address on file    There were no vitals taken for this visit.

## 2025-06-16 NOTE — PROGRESS NOTES
AUDIOLOGY REPORT    SUMMARY: Audiology visit completed. See audiogram for results.    RECOMMENDATIONS: Follow-up with ENT.    Ernestina Hopkins  Doctor of Audiology  [place] License # [id number]

## 2025-06-16 NOTE — LETTER
6/16/2025      Vimal Goldsmith  [address]  [place] [address]      Dear Colleague,    Thank you for referring your patient, Vimal Goldsmith, to the [facility]. Please see a copy of my visit note below.    I am seeing this patient in consultation for chronic sinusitis at the request of the provider Armando Dudley      Chief Complaint - Tinnitus, sinusitis    History of Present Illness - Vimal Goldsmith is a 60 year old male who presents to me today with ringing in the ears.  It has been present and noticeable for approximately over a year (1.5 years).  He can hear it faintly when he pays attention. It is a hum. He got IV penicillin for 2 weeks for syphilis. He wonders if it started after that. Hearing seems to fine. He had one bout of severe vertigo several years ago. None since. There is no history of chronic ear disease or ear surgery. With regards to recreational, , and work-related noise exposure he has none. He uses Dapsone for celiac disease. No regular use of aspirin or NSAIDS. I personally reviewed the relevant clinical notes in Epic including the ID providers note.     Each year, 2-3 times, he gets sinus drainage, cough, some sinus pressure. He has tried nasal spray. A few times when it is bad, he has taken antibiotics. It helps.     Tests personally reviewed today for this visit:   1.) audiogram was performed today as part of the evaluation and personally reviewed. The audiogram showed a significant symmetric high frequency sensorineural hearing loss at 6k and 8k Hz.    2.) tympanograms were performed today and were normal Type A curves, with normal canal volume and middle ear pressure.    3.) 1/6/2017 CT sinus Impression:  - Normal CT examination of the paranasal sinuses. Ostiomeatal units are  patent bilaterally.    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     Ocular syphilis     Compulsive disorder       Current Medications -   Current Outpatient Medications:      cetirizine (ZYRTEC) 10 MG tablet, Take 1 tablet (10 mg) by mouth 2 times daily., Disp: 90 tablet, Rfl: 2     clindamycin (CLEOCIN T) 1 % external solution, To the scalp, Disp: 60 mL, Rfl: 1     clobetasol (TEMOVATE) 0.05 % external ointment, Apply topically 2 times daily, Disp: 60 g, Rfl: 3     dapsone (ACZONE) 25 MG tablet, TAKE 1 TABLET BY MOUTH EVERY DAY TAKE 2 TABLET BY MOUTH DAILY WHEN FLARING, Disp: 180 tablet, Rfl: 3     DESCOVY 200-25 MG per tablet, For On-Demand: Take 2 pills at least 2H prior to sex, then take 1 pill for the next 2 days (4 pills total), Disp: 30 tablet, Rfl: 2     doxycycline hyclate (VIBRAMYCIN) 100 MG capsule, Take 2 capsules (200 mg) by mouth daily as needed (take within 72 hours after unprotected sex)., Disp: 60 capsule, Rfl: 1     econazole nitrate 1 % external cream, Apply topically 2 times daily --to anal area for two weeks, Disp: 85 g, Rfl: 2     EPINEPHrine (ANY BX GENERIC EQUIV) 0.3 MG/0.3ML injection 2-pack, Inject 0.3 mLs (0.3 mg) into the muscle as needed for anaphylaxis. May repeat one time in 5-15 minutes if response to initial dose is inadequate., Disp: 2 each, Rfl: 0     FLUARIX QUADRIVALENT 0.5 ML injection, , Disp: , Rfl:      fluticasone (FLONASE) 50 MCG/ACT nasal spray, Spray 1 spray into both nostrils daily., Disp: 18.2 mL, Rfl: 6     hypromellose (ARTIFICIAL TEARS) 0.5 % SOLN ophthalmic solution, 1 drop every hour as needed for dry eyes, Disp: , Rfl:      ipratropium (ATROVENT) 0.06 % nasal spray, Spray 2 sprays into both nostrils 4 times daily as needed for rhinitis, Disp: 15 mL, Rfl: 1      ketoconazole (NIZORAL) 2 % external shampoo, Apply topically daily For rash on face and scalp until clear then stop, Disp: 120 mL, Rfl: 11     lifitegrast (XIIDRA) 5 % opthalmic solution, Apply 1 drop to eye 2 times daily., Disp: 60 each, Rfl: 11     multivitamin, therapeutic (THERA-VIT) TABS tablet, Take 1 tablet by mouth daily, Disp: , Rfl:      Perfluorohexyloctane 1.338 GM/ML SOLN, Apply 1 drop to eye 4 times daily., Disp: 3 mL, Rfl: 6     prednisoLONE acetate (PRED FORTE) 1 % ophthalmic suspension, Apply 1-2 drops to both eyes: 3 times per day for one week, 2 times per day for one week, 1 time per day for one week, Disp: 10 mL, Rfl: 1     sildenafil (VIAGRA) 50 MG tablet, Take 0.5 tablets (25 mg) 30 min to 4 hours before intercourse daily as needed. Never use with nitroglycerin, terazosin or doxazosin., Disp: 12 tablet, Rfl: 0     SPIKEVAX 50 MCG/0.5ML injection, , Disp: , Rfl:      traZODone (DESYREL) 50 MG tablet, Take 0.5 tablets (25 mg) by mouth nightly as needed for sleep., Disp: 20 tablet, Rfl: 3     triamcinolone (KENALOG) 0.1 % external ointment, Apply twice daily as needed for rash on the trunk or extremities, Disp: 80 g, Rfl: 11     zolpidem (AMBIEN) 5 MG tablet, TAKE 1 TABLET BY MOUTH EVERY NIGHT AS NEEDED FOR SLEEP, Disp: 30 tablet, Rfl: 1    Allergies -   Allergies   Allergen Reactions     Gluten Meal Rash and GI Disturbance     Nkda [No Known Drug Allergy]        Social History -   Social History     Socioeconomic History     Marital status:    Tobacco Use     Smoking status: Never     Smokeless tobacco: Never     Tobacco comments:     NON SMOKING ENVIRONMENT, 10/6/11, TIFFANYM.    Vaping Use     Vaping status: Never Used   Substance and Sexual Activity     Alcohol use: No     Comment: a few sips to help sleep     Drug use: No     Sexual activity: Yes     Partners: Male     Birth control/protection: Pull-out method, Condom, None     Comment: unprotected sex with my partner (), sometimes  others   Other Topics Concern     Parent/sibling w/ CABG, MI or angioplasty before 65F 55M? Yes     Comment: My father     Social Drivers of Health     Financial Resource Strain: Low Risk  (12/23/2024)    Financial Resource Strain      Within the past 12 months, have you or your family members you live with been unable to get utilities (heat, electricity) when it was really needed?: No   Food Insecurity: Low Risk  (12/23/2024)    Food Insecurity      Within the past 12 months, did you worry that your food would run out before you got money to buy more?: No      Within the past 12 months, did the food you bought just not last and you didn t have money to get more?: No   Transportation Needs: Low Risk  (12/23/2024)    Transportation Needs      Within the past 12 months, has lack of transportation kept you from medical appointments, getting your medicines, non-medical meetings or appointments, work, or from getting things that you need?: No   Physical Activity: Insufficiently Active (12/23/2024)    Exercise Vital Sign      Days of Exercise per Week: 4 days      Minutes of Exercise per Session: 20 min   Stress: No Stress Concern Present (12/23/2024)    [employer] - Occupational Stress Questionnaire      Feeling of Stress : Only a little   Social Connections: Unknown (12/23/2024)    Social Connection and Isolation Panel [NHANES]      Frequency of Social Gatherings with Friends and Family: Never   Interpersonal Safety: Low Risk  (12/23/2024)    Interpersonal Safety      Do you feel physically and emotionally safe where you currently live?: Yes      Within the past 12 months, have you been hit, slapped, kicked or otherwise physically hurt by someone?: No      Within the past 12 months, have you been humiliated or emotionally abused in other ways by your partner or ex-partner?: No   Housing Stability: Low Risk  (12/23/2024)    Housing Stability      Do you have housing? : Yes      Are you  worried about losing your housing?: No       Family History -   Family History   Problem Relation Age of Onset     Lipids Mother      Kidney Disease Mother      Arthritis Mother      Hypertension Mother         passed away     Osteoporosis Mother      Glaucoma Father      Gastrointestinal Disease Father         maybe celiac     Cancer Father         Leukemia     Heart Disease Father      Hypertension Father         passed away     Cerebrovascular Disease Father         passed     Stomach Problem Father      Other Cancer Father         leukemia     Gastrointestinal Disease Brother         maybe celiac     Kidney Disease Brother         Kidnet stone     Glaucoma Other      Cancer - colorectal No family hx of      Prostate Cancer No family hx of      Melanoma No family hx of      Skin Cancer No family hx of        Physical Exam  General - The patient is in no distress. Alert, answers questions and cooperates with examination appropriately.   Neurologic - CN II-XII are grossly intact. No focal neurologic deficits.   Voice and Breathing - The patient was breathing comfortably without the use of accessory muscles. There was no wheezing, stridor, or stertor.  The patients voice was clear and strong.  Ears - some small amount of wax I cleaned. The tympanic membranes are normal in appearance, bony landmarks are intact.  No retraction, perforation, or masses. No fluid or purulence was seen in the external canal or the middle ear. No evidence of infection of the middle ear or external canal, cerumen was normal in appearance.  Eyes - Extraocular movements intact, and the pupils were reactive to light.  Sclera were not icteric or injected, conjunctiva were pink and moist.  Mouth - Examination of the oral cavity showed pink, healthy oral mucosa. No lesions or ulcerations noted.  The tongue was mobile and midline.  Throat - The walls of the oropharynx were smooth, symmetric, and had no lesions or ulcerations.  The uvula was  midline on elevation.    Neck - Soft, non-tender. Palpation of the occipital, submental, submandibular, internal jugular chain, and supraclavicular nodes did not demonstrate any abnormal lymph nodes or masses. No parotid masses. Palpation of the thyroid was soft and smooth, with no nodules or goiter appreciated.  The trachea was mobile and midline.      Assessment and Plan -     ICD-10-CM    1. Tinnitus, bilateral  H93.13 Adult Audiology  Referral      2. Sensorineural hearing loss (SNHL) of both ears  H90.3 Adult Audiology  Referral      3. Acute recurrent maxillary sinusitis  J01.01           Vimal Goldsmith is a 60 year old male who presents to me today with subjective tinnitus, likely due to sensorineural hearing loss in the high frequencies.  I can find no evidence of serious CNS disorders or other complicating factors that could be causing this.  We spent the remainder of today's visit on education. Discussed were steps that can be taken to mask the noise, such as a low volume de-tuned radio, a fan in the background. He doesn't need hearing aids.      The patient will follow up as necessary for worsening symptoms or changes in symptoms. Recheck hearing in 2-3 years.     Has sinusitis 2-3 times a year. I recommend antihistamine, flonase, and neti pot. If worsens I recommend CT sinus and return.       Missael Thomas MD  Otolaryngology  [facility]        Again, thank you for allowing me to participate in the care of your patient.        Sincerely,        Missael Thomas MD    Electronically signed

## 2025-06-17 ENCOUNTER — PATIENT OUTREACH (OUTPATIENT)
Dept: CARE COORDINATION | Facility: CLINIC | Age: 60
End: 2025-06-17
Payer: COMMERCIAL

## 2025-06-25 ENCOUNTER — OFFICE VISIT (OUTPATIENT)
Dept: PSYCHOLOGY | Facility: CLINIC | Age: 60
End: 2025-06-25
Payer: COMMERCIAL

## 2025-06-25 DIAGNOSIS — F43.21 ADJUSTMENT DISORDER WITH DEPRESSED MOOD: ICD-10-CM

## 2025-06-25 DIAGNOSIS — F91.9 DISRUPTIVE BEHAVIOR: Primary | ICD-10-CM

## 2025-06-25 NOTE — PROGRESS NOTES
[facility]ual and [facility] - Progress Note    Date of Service: 25   Name: Vimal Goldsmith  : 1965  Medical Record Number: [id number]  Treating Provider: Jg Barrientos, PhD  Type of Session: Individual  Present in Session: Client and therapist  Session Start and Stop Time: 4:20pm-4:55pm; client arrived late due to traffic  Number of Minutes:  35    SERVICE MODALITY:  In-person    DSM-5 Diagnoses:  Encounter Diagnoses   Name Primary?    Other Specified Disruptive, Impulse-Control, and Conduct Disorder (hypersexuality) Yes    Adjustment Disorder, With depressed mood          Current Reported Symptoms and Status update:  Changes since last session includes maintaining sexual health boundaries, exploring sexual desire and fantasies, supporting his  post-surgery, understanding underlying factors contributing to compulsive behavior.     Progress Toward Treatment Goals:   Satisfactory progress     Therapeutic Interventions/Treatment Strategies:    Area(s) of treatment focus addressed in this session included Symptom Management, Interpersonal Relationship Skills, and Sexual Health and Wellness    Client shared updates from his 's surgery recovery and their curiosity/concern about his 's sexual functioning. He shared worry he has about maintaining sexual boundaries. We identified client's skills in identifying thoughts, feelings, and behaviors and being able to respond effectively. We discussed generalizing those skills across situations. We also normalized healthy sexual interests/behaviors and discussed client's values.     Psychotherapist offered support, feedback and validation and reinforced use of skills Treatment modalities used include Cognitive Behavioral Therapy  Support and Feedback    Patient Response:   Patient responded to session by accepting feedback, giving feedback, listening, focusing on goals, and accepting support  Possible barriers to participation / learning  include: N/A    Current Mental Status Exam:   Appearance:  Appropriate   Eye Contact:  Good   Attitude / Demeanor: Friendly  Speech      Rate / Production: Normal/ Responsive      Volume:  Normal  volume  Orientation:  All  Mood:   Normal  Affect:   Appropriate   Thought Content: Clear   Insight:   Good       Plan/Need for Future Services:  Return for therapy in 2 weeks to treat diagnosed problems.    Patient has a current master individualized treatment plan.  See Epic treatment plan for more information.    Referral / Collaboration:  Referral to another professional/service is not indicated at this time..  Emergency Services Needed?  No    Assignment:  Continue self-care, boundaries, communication    Interactive Complexity:  There are four specific communication difficulties that complicate the work of the primary psychiatric procedure.  Interactive complexity (+[id number]) may be reported when at least one of these difficulties is present.    Communication difficulties present during current the psychiatric procedure include:  None.      Signature/Title:    Jg Barrientos, PhD, LP    [facility], [facility]  Department of Family Medicine and Community Health  [facility] [employer]

## 2025-07-02 ENCOUNTER — OFFICE VISIT (OUTPATIENT)
Dept: GASTROENTEROLOGY | Facility: CLINIC | Age: 60
End: 2025-07-02
Attending: INTERNAL MEDICINE
Payer: COMMERCIAL

## 2025-07-02 VITALS
BODY MASS INDEX: 25.9 KG/M2 | OXYGEN SATURATION: 95 % | DIASTOLIC BLOOD PRESSURE: 76 MMHG | SYSTOLIC BLOOD PRESSURE: 116 MMHG | HEART RATE: 82 BPM | WEIGHT: 170.9 LBS | HEIGHT: 68 IN

## 2025-07-02 DIAGNOSIS — K90.0 CELIAC DISEASE: Primary | ICD-10-CM

## 2025-07-02 ASSESSMENT — PAIN SCALES - GENERAL: PAINLEVEL_OUTOF10: NO PAIN (0)

## 2025-07-02 NOTE — PATIENT INSTRUCTIONS
- stop at the lab for a blood draw at your earliest convenience  - consider taking stool softeners with travel or with change in schedule or diet before onset of marked constipation  - ok for stool softeners as needed  - continue your excellent work on maintaining a gluten-free diet  - follow-up in one year in Celiac Disease clinic      If you have any questions, please don't hesitate to contact me through our GI RN Clinic Coordinator, Cornelia Ulloa, at [phone].

## 2025-07-02 NOTE — PROGRESS NOTES
GI CLINIC VISIT    CC: follow-up      ASSESSMENT/PLAN:  (K90.0) Celiac disease  Bloating  Celiac disease on gluten-free diet with no evidence of active disease on duodenal biopsies in May 2023. Normal serologies in July 2024 as well as normal vit A, B12, D, E, zinc, iron, and ferritin. Having some bloating postprandially. I don't feel confident this is related to his celiac disease but we will check his celiac serologies and if abnormal, would certainly heighten suspicion that gluten exposure driving sx. Constipation could be playing a role as he correlates the two sx.  - Zinc, Vitamin D, Iron, Ferritin,   - Tissue transglutaminase myrna IgA and IgG    Constipation  Intermittent currently and with good response to OTC stool softeners. Discussed how intermittent constipation could be playing a role in discomfort as above. With relatively mild sx, for now will recommend liberalizing use of stool softeners and can consider further treatments if refractory.  - Stool softener PRN    Chest pain  Does not sound typical for GI sx to me. It is not associated with his bloating sensation, does not have any correlation to food, is associated with dyspnea and is a pressure like sensation. All of those to me argue against esophageal or gastric pathology as the main . Not unreasonable to trial PPI at some point if other workup unrevealing but agree with cardiopulm workup first per primary.  - Cardiopulmonary workup per primary       It was a pleasure to participate in the care of this patient; please contact us with any further questions.  A total of 26 face-to-face minutes was spent with this patient, >50% of which was counseling regarding the above delineated issues. An additional 10 minutes was spent on the date of the encounter doing chart review, documentation, care coordination, and further activities as noted above.    Mo Bartlett  Gastroenterology Fellow,  PGY4      ---------------------------------------------------------------------------------------------------  HPI:    Mr. 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 He established care with our clinic in June 2023.       Celiac Disease:  Taken/summarized from prior GI documentation:  Diagnosed with celiac disease in 2001 after many years diarrhea, weight loss, and skin rashes. He recalls even episodes of vomiting and pain as a kid as well. Diagnosis was reportedly via celiac serologies and confirmed with duodenal biopsies done here at the [place] (though prior to the EMR).  Since his diagnosis he has been on a gluten-free diet and he reports repeat endoscopy 3 months after diagnosis with no evidence of active celiac disease. He recalls experiencing weight gain after he went gluten-free. He does not recall if any nutritional evaluation was done at that time.     Review of records with normal Vit A level in 2007 and recent Hgb 14.9 with MCV 92. Had a normal DXA scan in 2007. From available EMR records he had normal TTG and anti-gliadin abs in 2005. We also have a pathology report from 2/11/2013 which showed normal duodenal histology (no signs of celiac disease). Dermatology recommended an EGD and re-establishing care with a GI clinic in 2023. EGD done 5/23/23 which was normal with normal duodenal biopsies.     Today, Mr. Goldsmith reports he is doing well. Continues on GFD. He does have sensation of bloating and fullness consistently after he eats and gets tired after he eats.     2-3 Bms per day. Will have a hard BM occasionally and used OTC stool softener at times. Good response. No further BRBPR.    Has also been having some chest pain. Unrelated to his other sx. Intermittent. Pressure like sensation. No GERD sx. Not exertional. Does occasionally have dyspnea.    No loose stools recently. GFD. A few accidental  exposures and does consistently have bloating after that. Will get skin sx as well.       Colorectal cancer screening:  Screening colonoscopy July 2022 - 5 mm TA was resected. Exam with diverticulosis and internal hemorrhoids. Due for repeat exam in [id number].     BRBPR:  Infrequently has harder ([place] 2-3 stools). Prior to GI clinic visit in June 2023 he reported BRBPR with small amount in toilet bowl after passing a hard stool. Noted some blood coating stool or with wiping for a few days thereafter.  Ultimately he presented to the ED and was told this was likely due to internal hemorrhoids. He did not note any anal/rectal pain or itching. No prolapsed tissue. Bleeding had resolved at the time of his GI visit. Use of stool softeners was recommended.     Today,  he reports this is not currently an issue.    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  All refills should be approved by this provider, or covering partner.    Medication(s): Ambien.   Maximum quantity per month:   Clinic visit frequency required:      Controlled substance agreement on file: No     Last [facility] website verification:  done on 6/11/2019  [email or web address]      Therapeutic drug monitoring 09/07/2013     Priority: Medium    Chalazion 05/20/2013     Priority: Medium    Bacterial folliculitis 01/06/2013     Priority: Medium    BPH (benign prostatic  hypertrophy) 02/08/2012     Priority: Medium    Tinea pedis 01/23/2012     Priority: Medium    Acne rosacea 01/23/2012     Priority: Medium    Celiac disease 10/23/2011     Priority: Medium    Dermatitis herpetiformis 10/06/2011     Priority: Medium       PERTINENT MEDICATIONS:  Current Outpatient Medications   Medication Sig Dispense Refill    cetirizine (ZYRTEC) 10 MG tablet Take 1 tablet (10 mg) by mouth 2 times daily. 90 tablet 2    clindamycin (CLEOCIN T) 1 % external solution To the scalp 60 mL 1    clobetasol (TEMOVATE) 0.05 % external ointment Apply topically 2 times daily 60 g 3    dapsone (ACZONE) 25 MG tablet TAKE 1 TABLET BY MOUTH EVERY DAY TAKE 2 TABLET BY MOUTH DAILY WHEN FLARING 180 tablet 3    DESCOVY 200-25 MG per tablet For On-Demand: Take 2 pills at least 2H prior to sex, then take 1 pill for the next 2 days (4 pills total) (Patient not taking: Reported on 6/16/2025) 30 tablet 2    doxycycline hyclate (VIBRAMYCIN) 100 MG capsule Take 2 capsules (200 mg) by mouth daily as needed (take within 72 hours after unprotected sex). (Patient not taking: Reported on 6/16/2025) 60 capsule 1    econazole nitrate 1 % external cream Apply topically 2 times daily --to anal area for two weeks 85 g 2    EPINEPHrine (ANY BX GENERIC EQUIV) 0.3 MG/0.3ML injection 2-pack Inject 0.3 mLs (0.3 mg) into the muscle as needed for anaphylaxis. May repeat one time in 5-15 minutes if response to initial dose is inadequate. 2 each 0    fluticasone (FLONASE) 50 MCG/ACT nasal spray Spray 1 spray into both nostrils daily. 18.2 mL 6    hypromellose (ARTIFICIAL TEARS) 0.5 % SOLN ophthalmic solution 1 drop every hour as needed for dry eyes      ipratropium (ATROVENT) 0.06 % nasal spray Spray 2 sprays into both nostrils 4 times daily as needed for rhinitis 15 mL 1    ketoconazole (NIZORAL) 2 % external shampoo Apply topically daily For rash on face and scalp until clear then stop (Patient not taking: Reported on 6/16/2025) 120 mL 11     "lifitegrast (XIIDRA) 5 % opthalmic solution Apply 1 drop to eye 2 times daily. 60 each 11    multivitamin, therapeutic (THERA-VIT) TABS tablet Take 1 tablet by mouth daily (Patient not taking: Reported on 6/16/2025)      Perfluorohexyloctane 1.338 GM/ML SOLN Apply 1 drop to eye 4 times daily. (Patient not taking: Reported on 6/16/2025) 3 mL 6    prednisoLONE acetate (PRED FORTE) 1 % ophthalmic suspension Apply 1-2 drops to both eyes: 3 times per day for one week, 2 times per day for one week, 1 time per day for one week 10 mL 1    sildenafil (VIAGRA) 50 MG tablet Take 0.5 tablets (25 mg) 30 min to 4 hours before intercourse daily as needed. Never use with nitroglycerin, terazosin or doxazosin. 12 tablet 0    traZODone (DESYREL) 50 MG tablet Take 0.5 tablets (25 mg) by mouth nightly as needed for sleep. 20 tablet 3    triamcinolone (KENALOG) 0.1 % external ointment Apply twice daily as needed for rash on the trunk or extremities 80 g 11    zolpidem (AMBIEN) 5 MG tablet TAKE 1 TABLET BY MOUTH EVERY NIGHT AS NEEDED FOR SLEEP 30 tablet 1     No current facility-administered medications for this visit.         PHYSICAL EXAMINATION:  Vitals /76   Pulse 82   Ht 1.727 m (5' 8\")   Wt 77.5 kg (170 lb 14.4 oz)   SpO2 95%   BMI 25.99 kg/m     Wt   Wt Readings from Last 2 Encounters:   07/02/25 77.5 kg (170 lb 14.4 oz)   05/02/25 78.2 kg (172 lb 8 oz)      Gen: Pt sitting up in NAD, interactive and cooperative on exam  Eyes: sclerae anicteric, no injection  ENT:  MMM  Resp: Breathing comfortably on exam, speaking in full sentences  Skin: No jaundice  Neuro: alert, oriented, answers questions appropriately        PERTINENT STUDIES:    Lab on 04/23/2024   Component Date Value Ref Range Status    Prostate Specific Antigen Screen 04/23/2024 2.23  0.00 - 3.50 ng/mL Final    Hemoglobin A1C 04/23/2024 4.8  <5.7 % Final    WBC Count 04/23/2024 5.3  4.0 - 11.0 10e3/uL Final    RBC Count 04/23/2024 4.44  4.40 - 5.90 10e6/uL " Final    Hemoglobin 04/23/2024 13.9  13.3 - 17.7 g/dL Final    Hematocrit 04/23/2024 39.6 (L)  40.0 - 53.0 % Final    MCV 04/23/2024 89  78 - 100 fL Final    MCH 04/23/2024 31.3  26.5 - 33.0 pg Final    MCHC 04/23/2024 35.1  31.5 - 36.5 g/dL Final    RDW 04/23/2024 12.9  10.0 - 15.0 % Final    Platelet Count 04/23/2024 201  150 - 450 10e3/uL Final    Sodium 04/23/2024 143  135 - 145 mmol/L Final    Potassium 04/23/2024 4.0  3.4 - 5.3 mmol/L Final    Chloride 04/23/2024 106  98 - 107 mmol/L Final    Carbon Dioxide (CO2) 04/23/2024 26  22 - 29 mmol/L Final    Anion Gap 04/23/2024 11  7 - 15 mmol/L Final    Urea Nitrogen 04/23/2024 16.2  8.0 - 23.0 mg/dL Final    Creatinine 04/23/2024 0.90  0.67 - 1.17 mg/dL Final    GFR Estimate 04/23/2024 >90  >60 mL/min/1.73m2 Final    Calcium 04/23/2024 9.5  8.6 - 10.0 mg/dL Final    Glucose 04/23/2024 103 (H)  70 - 99 mg/dL Final    Treponema Antibody Total 04/23/2024 Reactive (A)  Nonreactive Final    Rapid Plasma Reagin 04/23/2024 Reactive (A)  Nonreactive Final    Rapid Plasma Reagin Titer 04/23/2024 1:16 (H)  Non Reactive Final

## 2025-07-02 NOTE — LETTER
7/2/2025      Vimal Goldsmith  [address] [place] [address]      Dear Colleague,    Thank you for referring your patient, Vimal Goldsmith, to the [facility] [place]. Please see a copy of my visit note below.    GI CLINIC VISIT    CC: follow-up      ASSESSMENT/PLAN:  (K90.0) Celiac disease  Bloating  Celiac disease on gluten-free diet with no evidence of active disease on duodenal biopsies in May 2023. Normal serologies in July 2024 as well as normal vit A, B12, D, E, zinc, iron, and ferritin. Having some bloating postprandially. I don't feel confident this is related to his celiac disease but we will check his celiac serologies and if abnormal, would certainly heighten suspicion that gluten exposure driving sx. Constipation could be playing a role as he correlates the two sx.  - Zinc, Vitamin D, Iron, Ferritin,   - Tissue transglutaminase myrna IgA and IgG    Constipation  Intermittent currently and with good response to OTC stool softeners. Discussed how intermittent constipation could be playing a role in discomfort as above. With relatively mild sx, for now will recommend liberalizing use of stool softeners and can consider further treatments if refractory.  - Stool softener PRN    Chest pain  Does not sound typical for GI sx to me. It is not associated with his bloating sensation, does not have any correlation to food, is associated with dyspnea and is a pressure like sensation. All of those to me argue against esophageal or gastric pathology as the main . Not unreasonable to trial PPI at some point if other workup unrevealing but agree with cardiopulm workup first per primary.  - Cardiopulmonary workup per primary       It was a pleasure to participate in the care of this patient; please contact us with any further questions.  A total of 26 face-to-face minutes was spent with this patient, >50% of which was counseling regarding the above delineated issues. An  additional 10 minutes was spent on the date of the encounter doing chart review, documentation, care coordination, and further activities as noted above.    Mo Bartlett  Gastroenterology Fellow, PGY4      ---------------------------------------------------------------------------------------------------  HPI:    Mr. 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 He established care with our clinic in June 2023.       Celiac Disease:  Taken/summarized from prior GI documentation:  Diagnosed with celiac disease in 2001 after many years diarrhea, weight loss, and skin rashes. He recalls even episodes of vomiting and pain as a kid as well. Diagnosis was reportedly via celiac serologies and [facility]  Since his diagnosis he has been on a gluten-free diet and he reports repeat endoscopy 3 months after diagnosis with no evidence of active celiac disease. He recalls experiencing weight gain after he went gluten-free. He does not recall if any nutritional evaluation was done at that time.     Review of records with normal Vit A level in 2007 and recent Hgb 14.9 with MCV 92. Had a normal DXA scan in 2007. From available EMR records he had normal TTG and anti-gliadin abs in 2005. We also have a pathology report from 2/11/2013 which showed normal duodenal histology (no signs of celiac disease). Dermatology recommended an EGD and re-establishing care with a GI clinic in 2023. EGD done 5/23/23 which was normal with normal duodenal biopsies.     Today, Mr. Goldsmith reports he is doing well. Continues on GFD. He does have sensation of bloating and fullness consistently after he eats and gets tired after he eats.     2-3 Bms per day. Will have a hard BM occasionally and used OTC stool softener at times. Good response. No further BRBPR.    Has also been  having some chest pain. Unrelated to his other sx. Intermittent. Pressure like sensation. No GERD sx. Not exertional. Does occasionally have dyspnea.    No loose stools recently. GFD. A few accidental exposures and does consistently have bloating after that. Will get skin sx as well.       Colorectal cancer screening:  Screening colonoscopy July 2022 - 5 mm TA was resected. Exam with diverticulosis and internal hemorrhoids. Due for repeat exam in [id number].     BRBPR:  Infrequently has harder ([place] 2-3 stools). Prior to GI clinic visit in June 2023 he reported BRBPR with small amount in toilet bowl after passing a hard stool. Noted some blood coating stool or with wiping for a few days thereafter.  Ultimately he presented to the ED and was told this was likely due to internal hemorrhoids. He did not note any anal/rectal pain or itching. No prolapsed tissue. Bleeding had resolved at the time of his GI visit. Use of stool softeners was recommended.     Today,  he reports this is not currently an issue.    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  All refills should be approved by this provider, or covering partner.    Medication(s): Ambien.   Maximum quantity per month:   Clinic visit frequency required:      Controlled substance agreement on file: No     Last [facility] website verification:  done on  6/11/2019  [email or web address]  '       Therapeutic drug monitoring 09/07/2013     Priority: Medium     Chalazion 05/20/2013     Priority: Medium     Bacterial folliculitis 01/06/2013     Priority: Medium     BPH (benign prostatic hypertrophy) 02/08/2012     Priority: Medium     Tinea pedis 01/23/2012     Priority: Medium     Acne rosacea 01/23/2012     Priority: Medium     Celiac disease 10/23/2011     Priority: Medium     Dermatitis herpetiformis 10/06/2011     Priority: Medium       PERTINENT MEDICATIONS:  Current Outpatient Medications   Medication Sig Dispense Refill     cetirizine (ZYRTEC) 10 MG tablet Take 1 tablet (10 mg) by mouth 2 times daily. 90 tablet 2     clindamycin (CLEOCIN T) 1 % external solution To the scalp 60 mL 1     clobetasol (TEMOVATE) 0.05 % external ointment Apply topically 2 times daily 60 g 3     dapsone (ACZONE) 25 MG tablet TAKE 1 TABLET BY MOUTH EVERY DAY TAKE 2 TABLET BY MOUTH DAILY WHEN FLARING 180 tablet 3     DESCOVY 200-25 MG per tablet For On-Demand: Take 2 pills at least 2H prior to sex, then take 1 pill for the next 2 days (4 pills total) (Patient not taking: Reported on 6/16/2025) 30 tablet 2     doxycycline hyclate (VIBRAMYCIN) 100 MG capsule Take 2 capsules (200 mg) by mouth daily as needed (take within 72 hours after unprotected sex). (Patient not taking: Reported on 6/16/2025) 60 capsule 1     econazole nitrate 1 % external cream Apply topically 2 times daily --to anal area for two weeks 85 g 2     EPINEPHrine (ANY BX GENERIC EQUIV) 0.3 MG/0.3ML injection 2-pack Inject 0.3 mLs (0.3 mg) into the muscle as needed for anaphylaxis. May repeat one time in 5-15 minutes if response to initial dose is inadequate. 2 each 0     fluticasone (FLONASE) 50 MCG/ACT nasal spray Spray 1 spray into both nostrils daily. 18.2 mL 6     hypromellose (ARTIFICIAL TEARS) 0.5 % SOLN ophthalmic solution 1 drop every hour as needed for dry eyes       ipratropium (ATROVENT) 0.06 %  "nasal spray Spray 2 sprays into both nostrils 4 times daily as needed for rhinitis 15 mL 1     ketoconazole (NIZORAL) 2 % external shampoo Apply topically daily For rash on face and scalp until clear then stop (Patient not taking: Reported on 6/16/2025) 120 mL 11     lifitegrast (XIIDRA) 5 % opthalmic solution Apply 1 drop to eye 2 times daily. 60 each 11     multivitamin, therapeutic (THERA-VIT) TABS tablet Take 1 tablet by mouth daily (Patient not taking: Reported on 6/16/2025)       Perfluorohexyloctane 1.338 GM/ML SOLN Apply 1 drop to eye 4 times daily. (Patient not taking: Reported on 6/16/2025) 3 mL 6     prednisoLONE acetate (PRED FORTE) 1 % ophthalmic suspension Apply 1-2 drops to both eyes: 3 times per day for one week, 2 times per day for one week, 1 time per day for one week 10 mL 1     sildenafil (VIAGRA) 50 MG tablet Take 0.5 tablets (25 mg) 30 min to 4 hours before intercourse daily as needed. Never use with nitroglycerin, terazosin or doxazosin. 12 tablet 0     traZODone (DESYREL) 50 MG tablet Take 0.5 tablets (25 mg) by mouth nightly as needed for sleep. 20 tablet 3     triamcinolone (KENALOG) 0.1 % external ointment Apply twice daily as needed for rash on the trunk or extremities 80 g 11     zolpidem (AMBIEN) 5 MG tablet TAKE 1 TABLET BY MOUTH EVERY NIGHT AS NEEDED FOR SLEEP 30 tablet 1     No current facility-administered medications for this visit.         PHYSICAL EXAMINATION:  Vitals /76   Pulse 82   Ht 1.727 m (5' 8\")   Wt 77.5 kg (170 lb 14.4 oz)   SpO2 95%   BMI 25.99 kg/m     Wt   Wt Readings from Last 2 Encounters:   07/02/25 77.5 kg (170 lb 14.4 oz)   05/02/25 78.2 kg (172 lb 8 oz)      Gen: Pt sitting up in NAD, interactive and cooperative on exam  Eyes: sclerae anicteric, no injection  ENT:  MMM  Resp: Breathing comfortably on exam, speaking in full sentences  Skin: No jaundice  Neuro: alert, oriented, answers questions appropriately        PERTINENT STUDIES:    Lab on " 04/23/2024   Component Date Value Ref Range Status     Prostate Specific Antigen Screen 04/23/2024 2.23  0.00 - 3.50 ng/mL Final     Hemoglobin A1C 04/23/2024 4.8  <5.7 % Final     WBC Count 04/23/2024 5.3  4.0 - 11.0 10e3/uL Final     RBC Count 04/23/2024 4.44  4.40 - 5.90 10e6/uL Final     Hemoglobin 04/23/2024 13.9  13.3 - 17.7 g/dL Final     Hematocrit 04/23/2024 39.6 (L)  40.0 - 53.0 % Final     MCV 04/23/2024 89  78 - 100 fL Final     MCH 04/23/2024 31.3  26.5 - 33.0 pg Final     MCHC 04/23/2024 35.1  31.5 - 36.5 g/dL Final     RDW 04/23/2024 12.9  10.0 - 15.0 % Final     Platelet Count 04/23/2024 201  150 - 450 10e3/uL Final     Sodium 04/23/2024 143  135 - 145 mmol/L Final     Potassium 04/23/2024 4.0  3.4 - 5.3 mmol/L Final     Chloride 04/23/2024 106  98 - 107 mmol/L Final     Carbon Dioxide (CO2) 04/23/2024 26  22 - 29 mmol/L Final     Anion Gap 04/23/2024 11  7 - 15 mmol/L Final     Urea Nitrogen 04/23/2024 16.2  8.0 - 23.0 mg/dL Final     Creatinine 04/23/2024 0.90  0.67 - 1.17 mg/dL Final     GFR Estimate 04/23/2024 >90  >60 mL/min/1.73m2 Final     Calcium 04/23/2024 9.5  8.6 - 10.0 mg/dL Final     Glucose 04/23/2024 103 (H)  70 - 99 mg/dL Final     Treponema Antibody Total 04/23/2024 Reactive (A)  Nonreactive Final     Rapid Plasma Reagin 04/23/2024 Reactive (A)  Nonreactive Final     Rapid Plasma Reagin Titer 04/23/2024 1:16 (H)  Non Reactive Final       Attestation signed by Palak Sandoval MD at 7/2/2025  3:30 PM:    Physician Attestation  I, Palak Sandoval MD, saw this patient and agree with the findings and plan of care as documented in the note.      Items personally reviewed/procedural attestation: vitals.    Palak Sandoval MD      I spent 15 minutes with the patient, of which > 50% was spent face-to-face with the patient in education, counseling, and addressing questions regarding the above diagnoses.   An additional 15 minutes was spent on the date of  the encounter doing chart review (notes, labs, imaging reports, endoscopic and pathology reports, clinical status events, medications, etc)  documentation, care coodination, and arranging of care plan with the fellow.          Again, thank you for allowing me to participate in the care of your patient.        Sincerely,        Palak Sandoval MD    Electronically signed

## 2025-07-02 NOTE — NURSING NOTE
"Do you have a history of colon cancer in your immediate family? NO    If yes who: negative     And what age  were they diagnosed:       Chief Complaint   Patient presents with    Follow Up       Vitals:    07/02/25 1119   BP: 116/76   Pulse: 82   SpO2: 95%   Weight: 77.5 kg (170 lb 14.4 oz)   Height: 1.727 m (5' 8\")       Body mass index is 25.99 kg/m .    Viviana Meng MA    "

## 2025-07-14 ENCOUNTER — TELEPHONE (OUTPATIENT)
Dept: PULMONOLOGY | Facility: CLINIC | Age: 60
End: 2025-07-14
Payer: COMMERCIAL

## 2025-07-14 DIAGNOSIS — R09.02 HYPOXIA: Primary | ICD-10-CM

## 2025-07-14 NOTE — TELEPHONE ENCOUNTER
Left Voicemail (1st Attempt) and Sent Mychart (1st Attempt) for the patient to call back and schedule the following:    Appointment type: cxr  Provider: Annabella  Return date: 7/21/2025  Specialty phone number: [phone]  Additional appointment(s) needed: na  Additonal Notes: na

## 2025-07-16 NOTE — TELEPHONE ENCOUNTER
Left Voicemail (2nd Attempt) for the patient to call back and schedule the following:    Appointment type: cxr  Provider: Annabella  Return date: 7/21/2025  Specialty phone number: [phone]  Additional appointment(s) needed: na  Additonal Notes: na

## 2025-07-21 ENCOUNTER — OFFICE VISIT (OUTPATIENT)
Dept: PULMONOLOGY | Facility: CLINIC | Age: 60
End: 2025-07-21
Payer: COMMERCIAL

## 2025-07-21 ENCOUNTER — ANCILLARY PROCEDURE (OUTPATIENT)
Dept: GENERAL RADIOLOGY | Facility: CLINIC | Age: 60
End: 2025-07-21
Payer: COMMERCIAL

## 2025-07-21 ENCOUNTER — PRE VISIT (OUTPATIENT)
Dept: PULMONOLOGY | Facility: CLINIC | Age: 60
End: 2025-07-21
Payer: COMMERCIAL

## 2025-07-21 ENCOUNTER — LAB (OUTPATIENT)
Dept: LAB | Facility: CLINIC | Age: 60
End: 2025-07-21
Payer: COMMERCIAL

## 2025-07-21 VITALS
BODY MASS INDEX: 26.32 KG/M2 | WEIGHT: 173.1 LBS | OXYGEN SATURATION: 92 % | DIASTOLIC BLOOD PRESSURE: 81 MMHG | HEART RATE: 73 BPM | SYSTOLIC BLOOD PRESSURE: 143 MMHG

## 2025-07-21 DIAGNOSIS — R09.02 HYPOXEMIA: Primary | ICD-10-CM

## 2025-07-21 DIAGNOSIS — K90.0 CELIAC DISEASE: ICD-10-CM

## 2025-07-21 DIAGNOSIS — R09.02 HYPOXIA: ICD-10-CM

## 2025-07-21 DIAGNOSIS — R09.02 HYPOXEMIA: ICD-10-CM

## 2025-07-21 DIAGNOSIS — R07.89 CHEST DISCOMFORT: ICD-10-CM

## 2025-07-21 LAB
ALLEN'S TEST: YES
BASE EXCESS BLDA CALC-SCNC: 1.6 MMOL/L (ref -3–3)
FERRITIN SERPL-MCNC: 98 NG/ML (ref 31–409)
HCO3 BLD-SCNC: 27 MMOL/L (ref 21–28)
IRON BINDING CAPACITY (ROCHE): 273 UG/DL (ref 240–430)
IRON SATN MFR SERPL: 39 % (ref 15–46)
IRON SERPL-MCNC: 106 UG/DL (ref 61–157)
METHGB MFR BLD: 1.1 % (ref 0–3)
O2/TOTAL GAS SETTING VFR VENT: 21 %
OXYHGB MFR BLDA: 57 % (ref 92–100)
PCO2 BLD: 45 MM HG (ref 35–45)
PH BLD: 7.39 [PH] (ref 7.35–7.45)
PO2 BLD: 30 MM HG (ref 80–105)
SAO2 % BLDA: 58.3 % (ref 96–97)
VIT D+METAB SERPL-MCNC: 35 NG/ML (ref 20–50)

## 2025-07-21 PROCEDURE — 83050 HGB METHEMOGLOBIN QUAN: CPT | Performed by: PATHOLOGY

## 2025-07-21 PROCEDURE — 86364 TISS TRNSGLTMNASE EA IG CLAS: CPT | Performed by: STUDENT IN AN ORGANIZED HEALTH CARE EDUCATION/TRAINING PROGRAM

## 2025-07-21 PROCEDURE — 3077F SYST BP >= 140 MM HG: CPT

## 2025-07-21 PROCEDURE — 82306 VITAMIN D 25 HYDROXY: CPT | Performed by: STUDENT IN AN ORGANIZED HEALTH CARE EDUCATION/TRAINING PROGRAM

## 2025-07-21 PROCEDURE — 71046 X-RAY EXAM CHEST 2 VIEWS: CPT | Performed by: RADIOLOGY

## 2025-07-21 PROCEDURE — 99213 OFFICE O/P EST LOW 20 MIN: CPT

## 2025-07-21 PROCEDURE — 3079F DIAST BP 80-89 MM HG: CPT

## 2025-07-21 PROCEDURE — 99204 OFFICE O/P NEW MOD 45 MIN: CPT

## 2025-07-21 PROCEDURE — 82728 ASSAY OF FERRITIN: CPT | Performed by: PATHOLOGY

## 2025-07-21 PROCEDURE — 83540 ASSAY OF IRON: CPT | Performed by: PATHOLOGY

## 2025-07-21 PROCEDURE — 1126F AMNT PAIN NOTED NONE PRSNT: CPT

## 2025-07-21 PROCEDURE — 36415 COLL VENOUS BLD VENIPUNCTURE: CPT | Performed by: PATHOLOGY

## 2025-07-21 PROCEDURE — 84630 ASSAY OF ZINC: CPT | Mod: 90 | Performed by: PATHOLOGY

## 2025-07-21 PROCEDURE — 99000 SPECIMEN HANDLING OFFICE-LAB: CPT | Performed by: PATHOLOGY

## 2025-07-21 PROCEDURE — 83550 IRON BINDING TEST: CPT | Performed by: PATHOLOGY

## 2025-07-21 ASSESSMENT — PAIN SCALES - GENERAL: PAINLEVEL_OUTOF10: NO PAIN (0)

## 2025-07-21 NOTE — LETTER
7/21/2025      Vimal Goldsmith  [address] [place] [address]      Dear Colleague,    Thank you for referring your patient, Vimal Goldsmith, to the [facility]. Please see a copy of my visit note below.      [facility] [place] [id number] [facility] [address]  Phone: [phone]  Fax: [phone]    Patient:  Vimal Goldsmith, Date of birth 1965  Date of Visit:  07/21/2025  Referring Provider Armando Banks      Assessment & Plan     Hypoxemia    At baseline, he has low oxygen levels in the low 90% range, but he has noticed at some points it drops down into the 80% range at home.  Today's oxygen saturation is 92% on room air.  He has no known lung disease.  Pulmonary function testing is unremarkable.  His chest x-ray looks clear to me.    Of note, he uses dapsone, which can cause a methemoglobinemia.    We talked about the idea of just monitoring his symptoms and oxygen saturations for little bit longer, but in the end we decided to get testing to work this up.  My differential diagnosis would include hypoventilation, methemoglobinemia, right-to-left shunting from a pulmonary AVM or intracardiac shunt.  I will get an arterial blood gas and a methemoglobinemia level.  I ordered a CT scan with contrast looking for pulmonary AVMs and an echocardiogram with bubble study looking for evidence of right-to-left shunt.  I have also ordered an overnight oximetry study to get a better idea if his oxygen level is actually in the 80% range while he is sleeping.    Medical Decision Making            Chuckie Winchester MD              Today's visit note:     Chief Complaint: Low oxygen levels    HISTORY OF PRESENT ILLNESS:    Vimal Goldsmith is a 60 year old year old male who is being seen for low oxygen levels.     For the last 2 to 3 years he has noticed that his pulse oximeter readings are in  the low 90% range.  He bought 1 of these from [employer], and initially thought it was defective and so bought another 1, but it consistently shows oxygen levels in the low 90% range.  His partner has a wearable watch that can measure oxygen saturation and sometimes at night it will show that his oxygen saturation is in the 80% range.  He is active, and is able to walk up multiple flights of stairs before he gets short of breath.  He denies any cough.  He has a lot of sinus symptoms and he gets about 2-3 sinus infections a year.  He had tuberculosis when he was 12 and was treated for that in [place].  Sometimes in the middle the night, he wakes up with extreme pressure of his chest that goes away shortly after waking up.           Past Medical and Surgical History:     Past Medical History:   Diagnosis Date     Benign positional vertigo      Celiac disease 10/23/2011     Chronic kidney disease 1975    diagnosed when I was [age]     Chronic sinusitis      Dermatitis herpetiformis      Gastro-oesophageal reflux disease 1987    had suffered any years ago before  celiac disease was diagno     Hoarseness      Migraines      Nasal polyps      Reduced vision      Past Surgical History:   Procedure Laterality Date     CATARACT IOL, RT/LT       COLONOSCOPY N/A 7/14/2022    Procedure: COLONOSCOPY, WITH POLYPECTOMY AND BIOPSY;  Surgeon: Cooper Juan MD;  Location:  GI     ENHANCE LASER REFRACTIVE BILATERAL EXISTING PT IN PARAMETERS Left 01/04/2017     ESOPHAGOSCOPY, GASTROSCOPY, DUODENOSCOPY (EGD), COMBINED  2/11/2013    Procedure: COMBINED ESOPHAGOSCOPY, GASTROSCOPY, DUODENOSCOPY (EGD), BIOPSY SINGLE OR MULTIPLE;;  Surgeon: Luke Juan MD;  Location:  GI     ESOPHAGOSCOPY, GASTROSCOPY, DUODENOSCOPY (EGD), COMBINED N/A 5/23/2023    Procedure: Esophagoscopy, gastroscopy, duodenoscopy (EGD), combined;  Surgeon: Palak Sandoval MD;  [place] [facility] [place]     INSERT PICC LINE Left 3/7/2024    Procedure: Insert  picc line;  Surgeon: Tricia Albarran PA-C;  Location: [facility] OR     IR PICC PLACEMENT > 5 YRS OF AGE  3/7/2024     NASAL/SINUS POLYPECTOMY             Family History:     Family History   Problem Relation Age of Onset     Lipids Mother      Kidney Disease Mother      Arthritis Mother      Hypertension Mother         passed away     Osteoporosis Mother      Glaucoma Father      Gastrointestinal Disease Father         maybe celiac     Cancer Father         Leukemia     Heart Disease Father      Hypertension Father         passed away     Cerebrovascular Disease Father         passed     Stomach Problem Father      Other Cancer Father         leukemia     Gastrointestinal Disease Brother         maybe celiac     Kidney Disease Brother         Kidnet stone     Glaucoma Other      Cancer - colorectal No family hx of      Prostate Cancer No family hx of      Melanoma No family hx of      Skin Cancer No family hx of               Social History:     Social History     Socioeconomic History     Marital status:      Spouse name: Not on file     Number of children: Not on file     Years of education: Not on file     Highest education level: Not on file   Occupational History     Not on file   Tobacco Use     Smoking status: Never     Smokeless tobacco: Never     Tobacco comments:     NON SMOKING ENVIRONMENT, 10/6/11, [facility].    Vaping Use     Vaping status: Never Used   Substance and Sexual Activity     Alcohol use: No     Comment: a few sips to help sleep     Drug use: No     Sexual activity: Yes     Partners: Male     Birth control/protection: Pull-out method, Condom, None     Comment: unprotected sex with my partner (), sometimes others   Other Topics Concern     Parent/sibling w/ CABG, MI or angioplasty before 65F 55M? Yes     Comment: My father   Social History Narrative     Not on file     Social Drivers of Health     Financial Resource Strain: Low Risk  (12/23/2024)    Financial Resource Strain      Within the  past 12 months, have you or your family members you live with been unable to get utilities (heat, electricity) when it was really needed?: No   Food Insecurity: Low Risk  (12/23/2024)    Food Insecurity      Within the past 12 months, did you worry that your food would run out before you got money to buy more?: No      Within the past 12 months, did the food you bought just not last and you didn t have money to get more?: No   Transportation Needs: Low Risk  (12/23/2024)    Transportation Needs      Within the past 12 months, has lack of transportation kept you from medical appointments, getting your medicines, non-medical meetings or appointments, work, or from getting things that you need?: No   Physical Activity: Insufficiently Active (12/23/2024)    Exercise Vital Sign      Days of Exercise per Week: 4 days      Minutes of Exercise per Session: 20 min   Stress: No Stress Concern Present (12/23/2024)    [employer] - Occupational Stress Questionnaire      Feeling of Stress : Only a little   Social Connections: Unknown (12/23/2024)    Social Connection and Isolation Panel [NHANES]      Frequency of Communication with Friends and Family: Not on file      Frequency of Social Gatherings with Friends and Family: Never      Attends Yazidi Services: Not on file      Active Member of Clubs or Organizations: Not on file      Attends Club or Organization Meetings: Not on file      Marital Status: Not on file   Interpersonal Safety: Low Risk  (12/23/2024)    Interpersonal Safety      Do you feel physically and emotionally safe where you currently live?: Yes      Within the past 12 months, have you been hit, slapped, kicked or otherwise physically hurt by someone?: No      Within the past 12 months, have you been humiliated or emotionally abused in other ways by your partner or ex-partner?: No   Housing Stability: Low Risk  (12/23/2024)    Housing Stability      Do you have housing? : Yes       Are you worried about losing your housing?: No            Medications:     Current Outpatient Medications   Medication Sig Dispense Refill     cetirizine (ZYRTEC) 10 MG tablet Take 1 tablet (10 mg) by mouth 2 times daily. 90 tablet 2     clindamycin (CLEOCIN T) 1 % external solution To the scalp 60 mL 1     clobetasol (TEMOVATE) 0.05 % external ointment Apply topically 2 times daily 60 g 3     dapsone (ACZONE) 25 MG tablet TAKE 1 TABLET BY MOUTH EVERY DAY TAKE 2 TABLET BY MOUTH DAILY WHEN FLARING 180 tablet 3     econazole nitrate 1 % external cream Apply topically 2 times daily --to anal area for two weeks 85 g 2     EPINEPHrine (ANY BX GENERIC EQUIV) 0.3 MG/0.3ML injection 2-pack Inject 0.3 mLs (0.3 mg) into the muscle as needed for anaphylaxis. May repeat one time in 5-15 minutes if response to initial dose is inadequate. 2 each 0     fluticasone (FLONASE) 50 MCG/ACT nasal spray Spray 1 spray into both nostrils daily. 18.2 mL 6     hypromellose (ARTIFICIAL TEARS) 0.5 % SOLN ophthalmic solution 1 drop every hour as needed for dry eyes       ipratropium (ATROVENT) 0.06 % nasal spray Spray 2 sprays into both nostrils 4 times daily as needed for rhinitis 15 mL 1     lifitegrast (XIIDRA) 5 % opthalmic solution Apply 1 drop to eye 2 times daily. 60 each 11     prednisoLONE acetate (PRED FORTE) 1 % ophthalmic suspension Apply 1-2 drops to both eyes: 3 times per day for one week, 2 times per day for one week, 1 time per day for one week 10 mL 1     sildenafil (VIAGRA) 50 MG tablet Take 0.5 tablets (25 mg) 30 min to 4 hours before intercourse daily as needed. Never use with nitroglycerin, terazosin or doxazosin. 12 tablet 0     traZODone (DESYREL) 50 MG tablet Take 0.5 tablets (25 mg) by mouth nightly as needed for sleep. 20 tablet 3     triamcinolone (KENALOG) 0.1 % external ointment Apply twice daily as needed for rash on the trunk or extremities 80 g 11     zolpidem (AMBIEN) 5 MG tablet TAKE 1 TABLET BY MOUTH EVERY  NIGHT AS NEEDED FOR SLEEP 30 tablet 1     DESCOVY 200-25 MG per tablet For On-Demand: Take 2 pills at least 2H prior to sex, then take 1 pill for the next 2 days (4 pills total) (Patient not taking: Reported on 7/21/2025) 30 tablet 2     doxycycline hyclate (VIBRAMYCIN) 100 MG capsule Take 2 capsules (200 mg) by mouth daily as needed (take within 72 hours after unprotected sex). (Patient not taking: Reported on 7/21/2025) 60 capsule 1     ketoconazole (NIZORAL) 2 % external shampoo Apply topically daily For rash on face and scalp until clear then stop (Patient not taking: Reported on 7/21/2025) 120 mL 11     multivitamin, therapeutic (THERA-VIT) TABS tablet Take 1 tablet by mouth daily (Patient not taking: Reported on 7/21/2025)       Perfluorohexyloctane 1.338 GM/ML SOLN Apply 1 drop to eye 4 times daily. (Patient not taking: Reported on 7/21/2025) 3 mL 6     No current facility-administered medications for this visit.            Review of Systems:       A complete review of systems was otherwise negative except as noted in the HPI.      PHYSICAL EXAM:  BP (!) 143/81 (BP Location: Right arm, Patient Position: Chair, Cuff Size: Adult Regular)   Pulse 73   Wt 78.5 kg (173 lb 1.6 oz)   SpO2 92%   BMI 26.32 kg/m       General: Comfortable, No apparent distress  Eyes: Anicteric  Ears: Hearing grossly normal  Mouth: Oral mucosa is moist, without any lesions. No oropharyngeal exudate.  Neck: supple, no thyromegaly  Lymphatics: No cervical or supraclavicular nodes  Respiratory: Good air movement. No crackles. No rhonchi. No wheezes  Cardiac: RRR, normal S1, S2. No murmurs.  Musculoskeletal: Extremities normal. No clubbing. No cyanosis. No edema.  Skin: No rash on limited exam  Neuro: Normal mentation. Normal speech.  Psych:Normal affect           Data:   All laboratory and imaging data reviewed.       Latest Reference Range & Units 01/23/25 09:11   Sodium 135 - 145 mmol/L 138   Potassium 3.4 - 5.3 mmol/L 4.4   Chloride  98 - 107 mmol/L 105   Carbon Dioxide (CO2) 22 - 29 mmol/L 25   Urea Nitrogen 8.0 - 23.0 mg/dL 22.3   Creatinine 0.67 - 1.17 mg/dL 0.94   GFR Estimate >60 mL/min/1.73m2 >90   Calcium 8.8 - 10.4 mg/dL 9.5   Anion Gap 7 - 15 mmol/L 8       PFT:       PFT Interpretation:  No airflow obstruction  Possible gas trapping  Normal diffusion capacity  Valid Maneuver    CXR: I have reviewed the CXR images from 7/21/2025:    On my review, he has a normal-sized cardiac silhouette.  No pulmonary opacities.    Recent Results (from the past week)   Iron and iron binding capacity    Collection Time: 07/21/25 12:00 PM   Result Value Ref Range    Iron 106 61 - 157 ug/dL    Iron Binding Capacity 273 240 - 430 ug/dL    Iron Sat Index 39 15 - 46 %   Ferritin    Collection Time: 07/21/25 12:00 PM   Result Value Ref Range    Ferritin 98 31 - 409 ng/mL                                             Again, thank you for allowing me to participate in the care of your patient.        Sincerely,        Chuckie Winchester MD    Electronically signed

## 2025-07-21 NOTE — PROGRESS NOTES
This patient comes into the clinic today at the request of Dr. Winchester for an ABG (arterial blood gas).    ABG unsuccessful after many attempts.     Pressure applied.      This service provided today was under the direct supervision of Dr. Winchester, who was available if needed.

## 2025-07-21 NOTE — NURSING NOTE
Chief Complaint   Patient presents with    New Patient     New Pulmonary        Vitals were taken, medications reconciled.    Omar Guzmán, Clinic Assistant   12:47 PM

## 2025-07-21 NOTE — PROGRESS NOTES
[facility] [address] [address]  Phone: [phone]  Fax: [phone]    Patient:  Vimal Goldsmith, Date of birth 1965  Date of Visit:  07/21/2025  Referring Provider Armando Banks      Assessment & Plan      Hypoxemia    At baseline, he has low oxygen levels in the low 90% range, but he has noticed at some points it drops down into the 80% range at home.  Today's oxygen saturation is 92% on room air.  He has no known lung disease.  Pulmonary function testing is unremarkable.  His chest x-ray looks clear to me.    Of note, he uses dapsone, which can cause a methemoglobinemia.    We talked about the idea of just monitoring his symptoms and oxygen saturations for little bit longer, but in the end we decided to get testing to work this up.  My differential diagnosis would include hypoventilation, methemoglobinemia, right-to-left shunting from a pulmonary AVM or intracardiac shunt.  I will get an arterial blood gas and a methemoglobinemia level.  I ordered a CT scan with contrast looking for pulmonary AVMs and an echocardiogram with bubble study looking for evidence of right-to-left shunt.  I have also ordered an overnight oximetry study to get a better idea if his oxygen level is actually in the 80% range while he is sleeping.    Medical Decision Making             Chuckie Winchester MD              Today's visit note:     Chief Complaint: Low oxygen levels    HISTORY OF PRESENT ILLNESS:    Vimal Goldsmith is a 60 year old year old male who is being seen for low oxygen levels.     For the last 2 to 3 years he has noticed that his pulse oximeter readings are in the low 90% range.  He bought 1 of these from [employer], and initially thought it was defective and so bought another 1, but it consistently shows oxygen levels in the low 90% range.  His partner has a wearable watch that can measure oxygen saturation and sometimes at night  it will show that his oxygen saturation is in the 80% range.  He is active, and is able to walk up multiple flights of stairs before he gets short of breath.  He denies any cough.  He has a lot of sinus symptoms and he gets about 2-3 sinus infections a year.  He had tuberculosis when he was 12 and was treated for that in [place].  Sometimes in the middle the night, he wakes up with extreme pressure of his chest that goes away shortly after waking up.           Past Medical and Surgical History:     Past Medical History:   Diagnosis Date    Benign positional vertigo     Celiac disease 10/23/2011    Chronic kidney disease 1975    diagnosed when I was [age]    Chronic sinusitis     Dermatitis herpetiformis     Gastro-oesophageal reflux disease 1987    had suffered any years ago before  celiac disease was diagno    Hoarseness     Migraines     Nasal polyps     Reduced vision      Past Surgical History:   Procedure Laterality Date    CATARACT IOL, RT/LT      COLONOSCOPY N/A 7/14/2022    Procedure: COLONOSCOPY, WITH POLYPECTOMY AND BIOPSY;  Surgeon: Cooper Juan MD;  Location:  GI    ENHANCE LASER REFRACTIVE BILATERAL EXISTING PT IN PARAMETERS Left 01/04/2017    ESOPHAGOSCOPY, GASTROSCOPY, DUODENOSCOPY (EGD), COMBINED  2/11/2013    Procedure: COMBINED ESOPHAGOSCOPY, GASTROSCOPY, DUODENOSCOPY (EGD), BIOPSY SINGLE OR MULTIPLE;;  Surgeon: Luke Juan MD;  Location: [facility]    ESOPHAGOSCOPY, GASTROSCOPY, DUODENOSCOPY (EGD), COMBINED N/A 5/23/2023    Procedure: Esophagoscopy, gastroscopy, duodenoscopy (EGD), combined;  Surgeon: Palak Sandoval MD;  Location: [facility] OR    INSERT PICC LINE Left 3/7/2024    Procedure: Insert picc line;  Surgeon: Tricia Albarran PA-C;  Location: [facility] OR    IR PICC PLACEMENT > 5 YRS OF AGE  3/7/2024    NASAL/SINUS POLYPECTOMY             Family History:     Family History   Problem Relation Age of Onset    Lipids Mother     Kidney Disease Mother     Arthritis Mother      Hypertension Mother         passed away    Osteoporosis Mother     Glaucoma Father     Gastrointestinal Disease Father         maybe celiac    Cancer Father         Leukemia    Heart Disease Father     Hypertension Father         passed away    Cerebrovascular Disease Father         passed    Stomach Problem Father     Other Cancer Father         leukemia    Gastrointestinal Disease Brother         maybe celiac    Kidney Disease Brother         Kidnet stone    Glaucoma Other     Cancer - colorectal No family hx of     Prostate Cancer No family hx of     Melanoma No family hx of     Skin Cancer No family hx of               Social History:     Social History     Socioeconomic History    Marital status:      Spouse name: Not on file    Number of children: Not on file    Years of education: Not on file    Highest education level: Not on file   Occupational History    Not on file   Tobacco Use    Smoking status: Never    Smokeless tobacco: Never    Tobacco comments:     NON SMOKING ENVIRONMENT, 10/6/11, [facility].    Vaping Use    Vaping status: Never Used   Substance and Sexual Activity    Alcohol use: No     Comment: a few sips to help sleep    Drug use: No    Sexual activity: Yes     Partners: Male     Birth control/protection: Pull-out method, Condom, None     Comment: unprotected sex with my partner (), sometimes others   Other Topics Concern    Parent/sibling w/ CABG, MI or angioplasty before 65F 55M? Yes     Comment: My father   Social History Narrative    Not on file     Social Drivers of Health     Financial Resource Strain: Low Risk  (12/23/2024)    Financial Resource Strain     Within the past 12 months, have you or your family members you live with been unable to get utilities (heat, electricity) when it was really needed?: No   Food Insecurity: Low Risk  (12/23/2024)    Food Insecurity     Within the past 12 months, did you worry that your food would run out before you got money to buy more?: No      Within the past 12 months, did the food you bought just not last and you didn t have money to get more?: No   Transportation Needs: Low Risk  (12/23/2024)    Transportation Needs     Within the past 12 months, has lack of transportation kept you from medical appointments, getting your medicines, non-medical meetings or appointments, work, or from getting things that you need?: No   Physical Activity: Insufficiently Active (12/23/2024)    Exercise Vital Sign     Days of Exercise per Week: 4 days     Minutes of Exercise per Session: 20 min   Stress: No Stress Concern Present (12/23/2024)    [employer] [place] [employer] - Occupational Stress Questionnaire     Feeling of Stress : Only a little   Social Connections: Unknown (12/23/2024)    Social Connection and Isolation Panel [NHANES]     Frequency of Communication with Friends and Family: Not on file     Frequency of Social Gatherings with Friends and Family: Never     Attends Jain Services: Not on file     Active Member of Clubs or Organizations: Not on file     Attends Club or Organization Meetings: Not on file     Marital Status: Not on file   Interpersonal Safety: Low Risk  (12/23/2024)    Interpersonal Safety     Do you feel physically and emotionally safe where you currently live?: Yes     Within the past 12 months, have you been hit, slapped, kicked or otherwise physically hurt by someone?: No     Within the past 12 months, have you been humiliated or emotionally abused in other ways by your partner or ex-partner?: No   Housing Stability: Low Risk  (12/23/2024)    Housing Stability     Do you have housing? : Yes     Are you worried about losing your housing?: No            Medications:     Current Outpatient Medications   Medication Sig Dispense Refill    cetirizine (ZYRTEC) 10 MG tablet Take 1 tablet (10 mg) by mouth 2 times daily. 90 tablet 2    clindamycin (CLEOCIN T) 1 % external solution To the scalp 60 mL 1    clobetasol (TEMOVATE) 0.05 %  external ointment Apply topically 2 times daily 60 g 3    dapsone (ACZONE) 25 MG tablet TAKE 1 TABLET BY MOUTH EVERY DAY TAKE 2 TABLET BY MOUTH DAILY WHEN FLARING 180 tablet 3    econazole nitrate 1 % external cream Apply topically 2 times daily --to anal area for two weeks 85 g 2    EPINEPHrine (ANY BX GENERIC EQUIV) 0.3 MG/0.3ML injection 2-pack Inject 0.3 mLs (0.3 mg) into the muscle as needed for anaphylaxis. May repeat one time in 5-15 minutes if response to initial dose is inadequate. 2 each 0    fluticasone (FLONASE) 50 MCG/ACT nasal spray Spray 1 spray into both nostrils daily. 18.2 mL 6    hypromellose (ARTIFICIAL TEARS) 0.5 % SOLN ophthalmic solution 1 drop every hour as needed for dry eyes      ipratropium (ATROVENT) 0.06 % nasal spray Spray 2 sprays into both nostrils 4 times daily as needed for rhinitis 15 mL 1    lifitegrast (XIIDRA) 5 % opthalmic solution Apply 1 drop to eye 2 times daily. 60 each 11    prednisoLONE acetate (PRED FORTE) 1 % ophthalmic suspension Apply 1-2 drops to both eyes: 3 times per day for one week, 2 times per day for one week, 1 time per day for one week 10 mL 1    sildenafil (VIAGRA) 50 MG tablet Take 0.5 tablets (25 mg) 30 min to 4 hours before intercourse daily as needed. Never use with nitroglycerin, terazosin or doxazosin. 12 tablet 0    traZODone (DESYREL) 50 MG tablet Take 0.5 tablets (25 mg) by mouth nightly as needed for sleep. 20 tablet 3    triamcinolone (KENALOG) 0.1 % external ointment Apply twice daily as needed for rash on the trunk or extremities 80 g 11    zolpidem (AMBIEN) 5 MG tablet TAKE 1 TABLET BY MOUTH EVERY NIGHT AS NEEDED FOR SLEEP 30 tablet 1    DESCOVY 200-25 MG per tablet For On-Demand: Take 2 pills at least 2H prior to sex, then take 1 pill for the next 2 days (4 pills total) (Patient not taking: Reported on 7/21/2025) 30 tablet 2    doxycycline hyclate (VIBRAMYCIN) 100 MG capsule Take 2 capsules (200 mg) by mouth daily as needed (take within 72  hours after unprotected sex). (Patient not taking: Reported on 7/21/2025) 60 capsule 1    ketoconazole (NIZORAL) 2 % external shampoo Apply topically daily For rash on face and scalp until clear then stop (Patient not taking: Reported on 7/21/2025) 120 mL 11    multivitamin, therapeutic (THERA-VIT) TABS tablet Take 1 tablet by mouth daily (Patient not taking: Reported on 7/21/2025)      Perfluorohexyloctane 1.338 GM/ML SOLN Apply 1 drop to eye 4 times daily. (Patient not taking: Reported on 7/21/2025) 3 mL 6     No current facility-administered medications for this visit.            Review of Systems:       A complete review of systems was otherwise negative except as noted in the HPI.      PHYSICAL EXAM:  BP (!) 143/81 (BP Location: Right arm, Patient Position: Chair, Cuff Size: Adult Regular)   Pulse 73   Wt 78.5 kg (173 lb 1.6 oz)   SpO2 92%   BMI 26.32 kg/m       General: Comfortable, No apparent distress  Eyes: Anicteric  Ears: Hearing grossly normal  Mouth: Oral mucosa is moist, without any lesions. No oropharyngeal exudate.  Neck: supple, no thyromegaly  Lymphatics: No cervical or supraclavicular nodes  Respiratory: Good air movement. No crackles. No rhonchi. No wheezes  Cardiac: RRR, normal S1, S2. No murmurs.  Musculoskeletal: Extremities normal. No clubbing. No cyanosis. No edema.  Skin: No rash on limited exam  Neuro: Normal mentation. Normal speech.  Psych:Normal affect           Data:   All laboratory and imaging data reviewed.       Latest Reference Range & Units 01/23/25 09:11   Sodium 135 - 145 mmol/L 138   Potassium 3.4 - 5.3 mmol/L 4.4   Chloride 98 - 107 mmol/L 105   Carbon Dioxide (CO2) 22 - 29 mmol/L 25   Urea Nitrogen 8.0 - 23.0 mg/dL 22.3   Creatinine 0.67 - 1.17 mg/dL 0.94   GFR Estimate >60 mL/min/1.73m2 >90   Calcium 8.8 - 10.4 mg/dL 9.5   Anion Gap 7 - 15 mmol/L 8       PFT:       PFT Interpretation:  No airflow obstruction  Possible gas trapping  Normal diffusion capacity  Valid  Maneuver    CXR: I have reviewed the CXR images from 7/21/2025:    On my review, he has a normal-sized cardiac silhouette.  No pulmonary opacities.    Recent Results (from the past week)   Iron and iron binding capacity    Collection Time: 07/21/25 12:00 PM   Result Value Ref Range    Iron 106 61 - 157 ug/dL    Iron Binding Capacity 273 240 - 430 ug/dL    Iron Sat Index 39 15 - 46 %   Ferritin    Collection Time: 07/21/25 12:00 PM   Result Value Ref Range    Ferritin 98 31 - 409 ng/mL

## 2025-07-22 LAB
TTG IGA SER-ACNC: 0.7 U/ML
TTG IGG SER-ACNC: <0.6 U/ML

## 2025-07-23 ENCOUNTER — OFFICE VISIT (OUTPATIENT)
Dept: PSYCHOLOGY | Facility: CLINIC | Age: 60
End: 2025-07-23
Payer: COMMERCIAL

## 2025-07-23 DIAGNOSIS — F43.21 ADJUSTMENT DISORDER WITH DEPRESSED MOOD: Primary | ICD-10-CM

## 2025-07-23 DIAGNOSIS — F91.9 DISRUPTIVE BEHAVIOR: ICD-10-CM

## 2025-07-23 DIAGNOSIS — R09.02 HYPOXIA: Primary | ICD-10-CM

## 2025-07-23 LAB
ALLEN'S TEST: YES
BASE EXCESS BLDA CALC-SCNC: 0.1 MMOL/L (ref -3–3)
DLCOCOR-%PRED-PRE: 93 %
DLCOCOR-PRE: 24.02 ML/MIN/MMHG
DLCOUNC-%PRED-PRE: 97 %
DLCOUNC-PRE: 24.45 ML/MIN/MMHG
DLCOUNC-PRED: 25.17 ML/MIN/MMHG
ERV-%PRED-PRE: 86 %
ERV-PRE: 0.99 L
ERV-PRED: 1.14 L
EXPTIME-PRE: 8.96 SEC
FEF2575-%PRED-PRE: 90 %
FEF2575-PRE: 2.37 L/SEC
FEF2575-PRED: 2.61 L/SEC
FEFMAX-%PRED-PRE: 89 %
FEFMAX-PRE: 7.67 L/SEC
FEFMAX-PRED: 8.52 L/SEC
FEV1-%PRED-PRE: 107 %
FEV1-PRE: 3.27 L
FEV1FEV6-PRE: 75 %
FEV1FEV6-PRED: 79 %
FEV1FVC-PRE: 73 %
FEV1FVC-PRED: 79 %
FEV1SVC-PRE: 72 L
FEV1SVC-PRED: 66 L
FIFMAX-PRE: 5.96 L/SEC
FRCPLETH-%PRED-PRE: 109 %
FRCPLETH-PRE: 3.55 L
FRCPLETH-PRED: 3.24 L
FVC-%PRED-PRE: 116 %
FVC-PRE: 4.47 L
FVC-PRED: 3.85 L
GAW-PRED: 1.03 L/S/CMH2O
HCO3 BLD-SCNC: 25 MMOL/L (ref 21–28)
IC-%PRED-PRE: 106 %
IC-PRE: 3.54 L
IC-PRED: 3.32 L
Lab: 92 %
O2/TOTAL GAS SETTING VFR VENT: 21 %
OXYHGB MFR BLDA: 94 % (ref 92–100)
PCO2 BLD: 39 MM HG (ref 35–45)
PH BLD: 7.41 [PH] (ref 7.35–7.45)
PO2 BLD: 78 MM HG (ref 80–105)
RVPLETH-%PRED-PRE: 126 %
RVPLETH-PRE: 2.56 L
RVPLETH-PRED: 2.02 L
SAO2 % BLDA: 94.7 % (ref 96–97)
SGAW-PRED: 0.2 1/CMH2O*S
SRAW-PRED: < 4.76 CMH2O*S
TLCPLETH-%PRED-PRE: 108 %
TLCPLETH-PRE: 7.09 L
TLCPLETH-PRED: 6.51 L
VA-%PRED-PRE: 111 %
VA-PRE: 6.56 L
VC-%PRED-PRE: 99 %
VC-PRE: 4.54 L
VC-PRED: 4.57 L
ZINC SERPL-MCNC: 70.6 UG/DL

## 2025-07-23 NOTE — PROGRESS NOTES
[facility] - Progress Note    Date of Service: 25   Name: Vimal Goldsmith  : 1965  Medical Record Number: [id number]  Treating Provider: Jg Barrientos, PhD  Type of Session: Individual  Present in Session: Client and therapist  Session Start and Stop Time: 2:01pm-2:50pm  Number of Minutes:  49    SERVICE MODALITY:  In-person    DSM-5 Diagnoses:  Encounter Diagnoses   Name Primary?    Adjustment Disorder, With depressed mood Yes    Other Specified Disruptive, Impulse-Control, and Conduct Disorder (hypersexuality)          Current Reported Symptoms and Status update:  Changes since last session includes improvement in depression, working on social support/friendships, maintaining sexual health boundaries.     Progress Toward Treatment Goals:   Satisfactory progress     Therapeutic Interventions/Treatment Strategies:    Area(s) of treatment focus addressed in this session included Symptom Management and Interpersonal Relationship Skills    Client shared that he is staying within his sexual health boundaries. He identified strategies that help him cope and engage in values-aligned behaviors. He discussed wanting to work on earning trust with his partner. Therapist and client explored communication approaches to proactively build trust. Client discussed ways in which he and his partner have connected more recently. He shared activities of interest and success at work. Client also discussed wanting to have his partner join future sessions.     Psychotherapist offered support, feedback and validation and reinforced use of skills Treatment modalities used include Cognitive Behavioral Therapy  Support and Feedback    Patient Response:   Patient responded to session by accepting feedback, giving feedback, listening, focusing on goals, and accepting support  Possible barriers to participation / learning include: N/A    Current Mental Status Exam:   Appearance:  Appropriate   Eye Contact:  Good    Attitude / Demeanor: Friendly  Speech      Rate / Production: Normal/ Responsive      Volume:  Normal  volume  Orientation:  All  Mood:   Normal  Affect:   Appropriate   Thought Content: Clear   Insight:   Good         Plan/Need for Future Services:  Return for therapy in 2 weeks to treat diagnosed problems.    Patient has a current master individualized treatment plan.  See Epic treatment plan for more information.    Referral / Collaboration:  Referral to another professional/service is not indicated at this time..  Emergency Services Needed?  No    Assignment:  Continue discussions with his partner    Interactive Complexity:  There are four specific communication difficulties that complicate the work of the primary psychiatric procedure.  Interactive complexity (+[id number]) may be reported when at least one of these difficulties is present.    Communication difficulties present during current the psychiatric procedure include:  None.        Jg Barrientos, PhD, LP    [facility]  Department of Family Medicine and Community Health  [employer]

## 2025-07-25 ENCOUNTER — TRANSFERRED RECORDS (OUTPATIENT)
Dept: HEALTH INFORMATION MANAGEMENT | Facility: CLINIC | Age: 60
End: 2025-07-25
Payer: COMMERCIAL

## 2025-08-01 ENCOUNTER — HOSPITAL ENCOUNTER (OUTPATIENT)
Dept: CT IMAGING | Facility: CLINIC | Age: 60
Discharge: HOME | End: 2025-08-01
Attending: FAMILY MEDICINE | Admitting: FAMILY MEDICINE
Payer: COMMERCIAL

## 2025-08-01 DIAGNOSIS — R07.89 CHEST DISCOMFORT: ICD-10-CM

## 2025-08-01 PROCEDURE — 75571 CT HRT W/O DYE W/CA TEST: CPT | Mod: 26 | Performed by: INTERNAL MEDICINE

## 2025-08-01 PROCEDURE — 75571 CT HRT W/O DYE W/CA TEST: CPT

## 2025-08-05 ENCOUNTER — ANCILLARY PROCEDURE (OUTPATIENT)
Dept: CT IMAGING | Facility: CLINIC | Age: 60
End: 2025-08-05
Payer: COMMERCIAL

## 2025-08-05 ENCOUNTER — ANCILLARY PROCEDURE (OUTPATIENT)
Dept: CARDIOLOGY | Facility: CLINIC | Age: 60
End: 2025-08-05
Payer: COMMERCIAL

## 2025-08-05 DIAGNOSIS — R09.02 HYPOXEMIA: ICD-10-CM

## 2025-08-05 DIAGNOSIS — R07.89 CHEST DISCOMFORT: ICD-10-CM

## 2025-08-05 LAB
CREAT BLD-MCNC: 0.9 MG/DL (ref 0.7–1.2)
EGFRCR SERPLBLD CKD-EPI 2021: >60 ML/MIN/1.73M2
LVEF ECHO: NORMAL

## 2025-08-05 PROCEDURE — 93306 TTE W/DOPPLER COMPLETE: CPT | Performed by: INTERNAL MEDICINE

## 2025-08-05 PROCEDURE — 71270 CT THORAX DX C-/C+: CPT | Mod: GC | Performed by: RADIOLOGY

## 2025-08-05 PROCEDURE — 82565 ASSAY OF CREATININE: CPT | Performed by: PATHOLOGY

## 2025-08-05 RX ORDER — ACYCLOVIR 200 MG/1
10 CAPSULE ORAL ONCE
Status: COMPLETED | OUTPATIENT
Start: 2025-08-05 | End: 2025-08-05

## 2025-08-05 RX ORDER — IOPAMIDOL 755 MG/ML
100 INJECTION, SOLUTION INTRAVASCULAR ONCE
Status: COMPLETED | OUTPATIENT
Start: 2025-08-05 | End: 2025-08-05

## 2025-08-05 RX ADMIN — IOPAMIDOL 100 ML: 755 INJECTION, SOLUTION INTRAVASCULAR at 15:26

## 2025-08-05 RX ADMIN — ACYCLOVIR 10 ML: 200 CAPSULE ORAL at 16:02

## 2025-08-06 ENCOUNTER — MYC MEDICAL ADVICE (OUTPATIENT)
Dept: PULMONOLOGY | Facility: CLINIC | Age: 60
End: 2025-08-06
Payer: COMMERCIAL

## 2025-08-06 DIAGNOSIS — R09.02 HYPOXEMIA: Primary | ICD-10-CM

## 2025-08-25 DIAGNOSIS — R09.02 HYPOXIA: ICD-10-CM

## 2025-08-25 DIAGNOSIS — R09.02 HYPOXEMIA: Primary | ICD-10-CM

## 2025-08-25 DIAGNOSIS — R07.89 CHEST DISCOMFORT: ICD-10-CM

## 2025-08-25 LAB
ALLEN'S TEST: YES
BASE EXCESS BLDA CALC-SCNC: 0.8 MMOL/L (ref -3–3)
FIO2-PRE: 100 %
HCO3 BLD-SCNC: 25 MMOL/L (ref 21–28)
O2/TOTAL GAS SETTING VFR VENT: 100 %
OXYHGB MFR BLDA: 99 % (ref 92–100)
PCO2 BLD: 37 MM HG (ref 35–45)
PH BLD: 7.44 [PH] (ref 7.35–7.45)
PO2 BLD: 533 MM HG (ref 80–105)
SAO2 % BLDA: 99 % (ref 96–97)

## 2025-08-25 PROCEDURE — 82805 BLOOD GASES W/O2 SATURATION: CPT | Performed by: PATHOLOGY

## 2025-08-25 PROCEDURE — 36600 WITHDRAWAL OF ARTERIAL BLOOD: CPT | Performed by: INTERNAL MEDICINE

## 2025-08-28 ENCOUNTER — OFFICE VISIT (OUTPATIENT)
Dept: PSYCHOLOGY | Facility: CLINIC | Age: 60
End: 2025-08-28
Payer: COMMERCIAL

## 2025-08-28 DIAGNOSIS — F91.9 DISRUPTIVE BEHAVIOR: Primary | ICD-10-CM

## 2025-08-29 ENCOUNTER — MYC MEDICAL ADVICE (OUTPATIENT)
Dept: PULMONOLOGY | Facility: CLINIC | Age: 60
End: 2025-08-29
Payer: COMMERCIAL

## 2025-08-29 DIAGNOSIS — J41.0 SIMPLE CHRONIC BRONCHITIS (H): ICD-10-CM

## 2025-08-29 DIAGNOSIS — R09.02 HYPOXEMIA: Primary | ICD-10-CM

## 2025-08-29 DIAGNOSIS — Q21.12 PATENT FORAMEN OVALE WITH RIGHT TO LEFT SHUNT: ICD-10-CM

## 2025-09-02 ENCOUNTER — OFFICE VISIT (OUTPATIENT)
Dept: GASTROENTEROLOGY | Facility: CLINIC | Age: 60
End: 2025-09-02
Payer: COMMERCIAL

## 2025-09-02 VITALS
OXYGEN SATURATION: 96 % | DIASTOLIC BLOOD PRESSURE: 78 MMHG | HEIGHT: 68 IN | BODY MASS INDEX: 26.08 KG/M2 | HEART RATE: 90 BPM | SYSTOLIC BLOOD PRESSURE: 119 MMHG | WEIGHT: 172.1 LBS

## 2025-09-02 DIAGNOSIS — R10.13 ABDOMINAL PAIN, EPIGASTRIC: ICD-10-CM

## 2025-09-02 DIAGNOSIS — K90.0 CELIAC DISEASE: Primary | ICD-10-CM

## 2025-09-02 PROCEDURE — 99215 OFFICE O/P EST HI 40 MIN: CPT | Performed by: STUDENT IN AN ORGANIZED HEALTH CARE EDUCATION/TRAINING PROGRAM

## 2025-09-02 PROCEDURE — 1126F AMNT PAIN NOTED NONE PRSNT: CPT | Performed by: STUDENT IN AN ORGANIZED HEALTH CARE EDUCATION/TRAINING PROGRAM

## 2025-09-02 PROCEDURE — 3074F SYST BP LT 130 MM HG: CPT | Performed by: STUDENT IN AN ORGANIZED HEALTH CARE EDUCATION/TRAINING PROGRAM

## 2025-09-02 PROCEDURE — 99417 PROLNG OP E/M EACH 15 MIN: CPT | Performed by: STUDENT IN AN ORGANIZED HEALTH CARE EDUCATION/TRAINING PROGRAM

## 2025-09-02 PROCEDURE — 3078F DIAST BP <80 MM HG: CPT | Performed by: STUDENT IN AN ORGANIZED HEALTH CARE EDUCATION/TRAINING PROGRAM

## 2025-09-02 ASSESSMENT — PAIN SCALES - GENERAL: PAINLEVEL_OUTOF10: NO PAIN (0)

## 2025-09-03 ENCOUNTER — LAB (OUTPATIENT)
Dept: LAB | Facility: CLINIC | Age: 60
End: 2025-09-03
Payer: COMMERCIAL

## 2025-09-03 ENCOUNTER — ANCILLARY PROCEDURE (OUTPATIENT)
Dept: BONE DENSITY | Facility: CLINIC | Age: 60
End: 2025-09-03
Attending: STUDENT IN AN ORGANIZED HEALTH CARE EDUCATION/TRAINING PROGRAM
Payer: COMMERCIAL

## 2025-09-03 DIAGNOSIS — K90.0 CELIAC DISEASE: ICD-10-CM

## 2025-09-03 DIAGNOSIS — R10.13 ABDOMINAL PAIN, EPIGASTRIC: ICD-10-CM

## 2025-09-03 LAB — FOLATE SERPL-MCNC: 5.2 NG/ML (ref 4.6–34.8)

## 2025-09-03 PROCEDURE — 99000 SPECIMEN HANDLING OFFICE-LAB: CPT | Performed by: PATHOLOGY

## 2025-09-03 PROCEDURE — 82525 ASSAY OF COPPER: CPT | Mod: 90 | Performed by: PATHOLOGY

## 2025-09-03 PROCEDURE — 36415 COLL VENOUS BLD VENIPUNCTURE: CPT | Performed by: PATHOLOGY

## 2025-09-03 PROCEDURE — 84597 ASSAY OF VITAMIN K: CPT | Mod: 90 | Performed by: PATHOLOGY

## 2025-09-03 PROCEDURE — 82607 VITAMIN B-12: CPT | Performed by: STUDENT IN AN ORGANIZED HEALTH CARE EDUCATION/TRAINING PROGRAM

## 2025-09-03 PROCEDURE — 82746 ASSAY OF FOLIC ACID SERUM: CPT | Performed by: STUDENT IN AN ORGANIZED HEALTH CARE EDUCATION/TRAINING PROGRAM

## 2025-09-03 RX ORDER — PREDNISONE 20 MG/1
40 TABLET ORAL DAILY
Qty: 14 TABLET | Refills: 0 | Status: SHIPPED | OUTPATIENT
Start: 2025-09-03 | End: 2025-09-10

## 2025-09-03 RX ORDER — AZITHROMYCIN 250 MG/1
TABLET, FILM COATED ORAL
Qty: 6 TABLET | Refills: 0 | Status: SHIPPED | OUTPATIENT
Start: 2025-09-03 | End: 2025-09-08

## 2025-09-04 ENCOUNTER — PATIENT OUTREACH (OUTPATIENT)
Dept: CARE COORDINATION | Facility: CLINIC | Age: 60
End: 2025-09-04
Payer: COMMERCIAL

## 2025-09-04 LAB — VIT B12 SERPL-MCNC: 465 PG/ML (ref 232–1245)

## (undated) DEVICE — GLOVE PROTEXIS POWDER FREE SMT 6.5  2D72PT65X

## (undated) DEVICE — GOWN XLG DISP 9545

## (undated) DEVICE — DECANTER BAG 2002S

## (undated) DEVICE — PROBE COVER INTRAOPERATIVE 5"X96" PC1308

## (undated) DEVICE — SYR 30ML SLIP TIP W/O NDL 302833

## (undated) DEVICE — CAP LUER LOCK MALE/FEMALE DUAL 2C6250

## (undated) DEVICE — LINEN GOWN XLG 5407

## (undated) DEVICE — CAST STOCKINETTE 3"

## (undated) DEVICE — KIT INTRODUCER FLUENT MICRO 5FRX10CM ECHO TIP KIT-038-04

## (undated) DEVICE — GOWN IMPERVIOUS 2XL BLUE

## (undated) DEVICE — DRSG TEGADERM IV ADVANCED 3.5X4.5" 1685

## (undated) DEVICE — SUCTION CATH AIRLIFE TRI-FLO W/CONTROL PORT 14FR  T60C

## (undated) DEVICE — DRSG BIOPATCH GERMICIDAL SPLIT SPONGE 4MM MED 4150

## (undated) DEVICE — SUCTION MANIFOLD NEPTUNE 2 SYS 1 PORT 702-025-000

## (undated) DEVICE — KIT ENDO TURNOVER/PROCEDURE CARRY-ON 101822

## (undated) DEVICE — TUBING SUCTION MEDI-VAC 1/4"X20' N620A

## (undated) DEVICE — COVER EASY EQUIP BAG W/BAND LATEX FREE EZ-28

## (undated) DEVICE — LINEN TOWEL PACK X5 5464

## (undated) DEVICE — ENDO BITE BLOCK ADULT OMNI-BLOC

## (undated) DEVICE — GLOVE EXAM NITRILE LG PF LATEX FREE 5064

## (undated) DEVICE — Device

## (undated) DEVICE — SOL WATER IRRIG 500ML BOTTLE 2F7113

## (undated) RX ORDER — FENTANYL CITRATE 50 UG/ML
INJECTION, SOLUTION INTRAMUSCULAR; INTRAVENOUS
Status: DISPENSED
Start: 2022-07-14

## (undated) RX ORDER — FENTANYL CITRATE 50 UG/ML
INJECTION, SOLUTION INTRAMUSCULAR; INTRAVENOUS
Status: DISPENSED
Start: 2023-05-23

## (undated) RX ORDER — ONDANSETRON 2 MG/ML
INJECTION INTRAMUSCULAR; INTRAVENOUS
Status: DISPENSED
Start: 2023-05-23

## (undated) RX ORDER — DIPHENHYDRAMINE HYDROCHLORIDE 50 MG/ML
INJECTION INTRAMUSCULAR; INTRAVENOUS
Status: DISPENSED
Start: 2023-05-23